# Patient Record
Sex: FEMALE | Race: WHITE | URBAN - METROPOLITAN AREA
[De-identification: names, ages, dates, MRNs, and addresses within clinical notes are randomized per-mention and may not be internally consistent; named-entity substitution may affect disease eponyms.]

---

## 2020-11-22 ENCOUNTER — APPOINTMENT (OUTPATIENT)
Dept: GENERAL RADIOLOGY | Age: 35
DRG: 880 | End: 2020-11-22
Payer: MEDICARE

## 2020-11-22 ENCOUNTER — APPOINTMENT (OUTPATIENT)
Dept: CT IMAGING | Age: 35
DRG: 880 | End: 2020-11-22
Payer: MEDICARE

## 2020-11-22 ENCOUNTER — HOSPITAL ENCOUNTER (INPATIENT)
Age: 35
LOS: 7 days | Discharge: HOME | DRG: 880 | End: 2020-11-29
Attending: EMERGENCY MEDICINE | Admitting: INTERNAL MEDICINE
Payer: MEDICARE

## 2020-11-22 PROBLEM — G40.901 STATUS EPILEPTICUS (HCC): Status: ACTIVE | Noted: 2020-11-22

## 2020-11-22 LAB
AADO2: 253.6 MMHG
AADO2: 86.5 MMHG
ACETAMINOPHEN LEVEL: <5 MCG/ML (ref 10–30)
ALBUMIN SERPL-MCNC: 4.1 G/DL (ref 3.5–5.2)
ALP BLD-CCNC: 80 U/L (ref 35–104)
ALT SERPL-CCNC: 24 U/L (ref 0–32)
AMMONIA: 14 UMOL/L (ref 11–51)
AMPHETAMINE SCREEN, URINE: NOT DETECTED
ANION GAP SERPL CALCULATED.3IONS-SCNC: 12 MMOL/L (ref 7–16)
AST SERPL-CCNC: 22 U/L (ref 0–31)
B.E.: -1.9 MMOL/L (ref -3–3)
B.E.: -5.9 MMOL/L (ref -3–3)
BARBITURATE SCREEN URINE: NOT DETECTED
BASOPHILS ABSOLUTE: 0.04 E9/L (ref 0–0.2)
BASOPHILS RELATIVE PERCENT: 0.5 % (ref 0–2)
BENZODIAZEPINE SCREEN, URINE: NOT DETECTED
BILIRUB SERPL-MCNC: 0.3 MG/DL (ref 0–1.2)
BUN BLDV-MCNC: 16 MG/DL (ref 6–20)
CALCIUM SERPL-MCNC: 9.4 MG/DL (ref 8.6–10.2)
CANNABINOID SCREEN URINE: NOT DETECTED
CHLORIDE BLD-SCNC: 102 MMOL/L (ref 98–107)
CHP ED QC CHECK: YES
CO2: 22 MMOL/L (ref 22–29)
COCAINE METABOLITE SCREEN URINE: NOT DETECTED
COHB: 0.3 % (ref 0–1.5)
COHB: 0.3 % (ref 0–1.5)
CREAT SERPL-MCNC: 1.1 MG/DL (ref 0.5–1)
CRITICAL: ABNORMAL
CRITICAL: ABNORMAL
DATE ANALYZED: ABNORMAL
DATE ANALYZED: ABNORMAL
DATE OF COLLECTION: ABNORMAL
DATE OF COLLECTION: ABNORMAL
EKG ATRIAL RATE: 84 BPM
EKG P AXIS: 51 DEGREES
EKG P-R INTERVAL: 176 MS
EKG Q-T INTERVAL: 398 MS
EKG QRS DURATION: 88 MS
EKG QTC CALCULATION (BAZETT): 470 MS
EKG R AXIS: 1 DEGREES
EKG T AXIS: 62 DEGREES
EKG VENTRICULAR RATE: 84 BPM
EOSINOPHILS ABSOLUTE: 0.06 E9/L (ref 0.05–0.5)
EOSINOPHILS RELATIVE PERCENT: 0.7 % (ref 0–6)
ETHANOL: <10 MG/DL (ref 0–0.08)
FENTANYL SCREEN, URINE: NOT DETECTED
FIO2: 40 %
FIO2: 60 %
GFR AFRICAN AMERICAN: >60
GFR NON-AFRICAN AMERICAN: 56 ML/MIN/1.73
GLUCOSE BLD-MCNC: 105 MG/DL
GLUCOSE BLD-MCNC: 117 MG/DL (ref 74–99)
HCG, URINE, POC: NEGATIVE
HCO3: 19.2 MMOL/L (ref 22–26)
HCO3: 21.8 MMOL/L (ref 22–26)
HCT VFR BLD CALC: 35.8 % (ref 34–48)
HEMOGLOBIN: 11.3 G/DL (ref 11.5–15.5)
HHB: 1.6 % (ref 0–5)
HHB: 2.1 % (ref 0–5)
IMMATURE GRANULOCYTES #: 0.02 E9/L
IMMATURE GRANULOCYTES %: 0.2 % (ref 0–5)
LAB: ABNORMAL
LAB: ABNORMAL
LACTIC ACID: 1.3 MMOL/L (ref 0.5–2.2)
LYMPHOCYTES ABSOLUTE: 2.15 E9/L (ref 1.5–4)
LYMPHOCYTES RELATIVE PERCENT: 25.4 % (ref 20–42)
Lab: ABNORMAL
Lab: ABNORMAL
Lab: NORMAL
Lab: NORMAL
MAGNESIUM: 2 MG/DL (ref 1.6–2.6)
MCH RBC QN AUTO: 23.4 PG (ref 26–35)
MCHC RBC AUTO-ENTMCNC: 31.6 % (ref 32–34.5)
MCV RBC AUTO: 74.1 FL (ref 80–99.9)
METER GLUCOSE: 105 MG/DL (ref 74–99)
METHADONE SCREEN, URINE: NOT DETECTED
METHB: 0.4 % (ref 0–1.5)
METHB: 0.5 % (ref 0–1.5)
MODE: ABNORMAL
MODE: AC
MONOCYTES ABSOLUTE: 0.48 E9/L (ref 0.1–0.95)
MONOCYTES RELATIVE PERCENT: 5.7 % (ref 2–12)
NEGATIVE QC PASS/FAIL: NORMAL
NEUTROPHILS ABSOLUTE: 5.7 E9/L (ref 1.8–7.3)
NEUTROPHILS RELATIVE PERCENT: 67.5 % (ref 43–80)
O2 CONTENT: 17.1 ML/DL
O2 SATURATION: 97.9 % (ref 92–98.5)
O2 SATURATION: 98.4 % (ref 92–98.5)
O2HB: 97.1 % (ref 94–97)
O2HB: 97.7 % (ref 94–97)
OPERATOR ID: 1394
OPERATOR ID: 1874
OPIATE SCREEN URINE: NOT DETECTED
OXYCODONE URINE: NOT DETECTED
PATIENT TEMP: 37 C
PATIENT TEMP: 37 C
PCO2: 33.6 MMHG (ref 35–45)
PCO2: 36.4 MMHG (ref 35–45)
PDW BLD-RTO: 16.9 FL (ref 11.5–15)
PEEP/CPAP: 5 CMH2O
PEEP/CPAP: 5 CMH2O
PFO2: 1.99 MMHG/%
PFO2: 3.75 MMHG/%
PH BLOOD GAS: 7.34 (ref 7.35–7.45)
PH BLOOD GAS: 7.43 (ref 7.35–7.45)
PHENCYCLIDINE SCREEN URINE: NOT DETECTED
PLATELET # BLD: 426 E9/L (ref 130–450)
PMV BLD AUTO: 8.9 FL (ref 7–12)
PO2: 119.2 MMHG (ref 75–100)
PO2: 150.1 MMHG (ref 75–100)
POSITIVE QC PASS/FAIL: NORMAL
POTASSIUM REFLEX MAGNESIUM: 3.5 MMOL/L (ref 3.5–5)
PROCALCITONIN: 0.06 NG/ML (ref 0–0.08)
PROLACTIN: 19.68 NG/ML
PS: 10 CMH20
RBC # BLD: 4.83 E12/L (ref 3.5–5.5)
RI(T): 0.58
RI(T): 213 %
RR MECHANICAL: 18 B/MIN
SALICYLATE, SERUM: <0.3 MG/DL (ref 0–30)
SARS-COV-2, NAAT: NOT DETECTED
SODIUM BLD-SCNC: 136 MMOL/L (ref 132–146)
SOURCE, BLOOD GAS: ABNORMAL
SOURCE, BLOOD GAS: ABNORMAL
THB: 10.9 G/DL (ref 11.5–16.5)
THB: 12.4 G/DL (ref 11.5–16.5)
TIME ANALYZED: 2138
TIME ANALYZED: 932
TOTAL CK: 34 U/L (ref 20–180)
TOTAL CK: 47 U/L (ref 20–180)
TOTAL PROTEIN: 7.4 G/DL (ref 6.4–8.3)
TRICYCLIC ANTIDEPRESSANTS SCREEN SERUM: NEGATIVE NG/ML
TROPONIN: <0.01 NG/ML (ref 0–0.03)
VT MECHANICAL: 450 ML
WBC # BLD: 8.5 E9/L (ref 4.5–11.5)

## 2020-11-22 PROCEDURE — 87040 BLOOD CULTURE FOR BACTERIA: CPT

## 2020-11-22 PROCEDURE — 84484 ASSAY OF TROPONIN QUANT: CPT

## 2020-11-22 PROCEDURE — 6360000002 HC RX W HCPCS: Performed by: INTERNAL MEDICINE

## 2020-11-22 PROCEDURE — 83735 ASSAY OF MAGNESIUM: CPT

## 2020-11-22 PROCEDURE — 80307 DRUG TEST PRSMV CHEM ANLYZR: CPT

## 2020-11-22 PROCEDURE — 2500000003 HC RX 250 WO HCPCS

## 2020-11-22 PROCEDURE — 02HV33Z INSERTION OF INFUSION DEVICE INTO SUPERIOR VENA CAVA, PERCUTANEOUS APPROACH: ICD-10-PCS | Performed by: INTERNAL MEDICINE

## 2020-11-22 PROCEDURE — 93005 ELECTROCARDIOGRAM TRACING: CPT | Performed by: EMERGENCY MEDICINE

## 2020-11-22 PROCEDURE — 31500 INSERT EMERGENCY AIRWAY: CPT

## 2020-11-22 PROCEDURE — 96372 THER/PROPH/DIAG INJ SC/IM: CPT

## 2020-11-22 PROCEDURE — 82550 ASSAY OF CK (CPK): CPT

## 2020-11-22 PROCEDURE — U0002 COVID-19 LAB TEST NON-CDC: HCPCS

## 2020-11-22 PROCEDURE — 87205 SMEAR GRAM STAIN: CPT

## 2020-11-22 PROCEDURE — 70450 CT HEAD/BRAIN W/O DYE: CPT

## 2020-11-22 PROCEDURE — 96366 THER/PROPH/DIAG IV INF ADDON: CPT

## 2020-11-22 PROCEDURE — 36556 INSERT NON-TUNNEL CV CATH: CPT

## 2020-11-22 PROCEDURE — 82805 BLOOD GASES W/O2 SATURATION: CPT

## 2020-11-22 PROCEDURE — 6360000002 HC RX W HCPCS: Performed by: EMERGENCY MEDICINE

## 2020-11-22 PROCEDURE — 2000000000 HC ICU R&B

## 2020-11-22 PROCEDURE — 94002 VENT MGMT INPAT INIT DAY: CPT

## 2020-11-22 PROCEDURE — 84145 PROCALCITONIN (PCT): CPT

## 2020-11-22 PROCEDURE — 5A1945Z RESPIRATORY VENTILATION, 24-96 CONSECUTIVE HOURS: ICD-10-PCS | Performed by: EMERGENCY MEDICINE

## 2020-11-22 PROCEDURE — 0BH18EZ INSERTION OF ENDOTRACHEAL AIRWAY INTO TRACHEA, VIA NATURAL OR ARTIFICIAL OPENING ENDOSCOPIC: ICD-10-PCS | Performed by: EMERGENCY MEDICINE

## 2020-11-22 PROCEDURE — 96375 TX/PRO/DX INJ NEW DRUG ADDON: CPT

## 2020-11-22 PROCEDURE — 96365 THER/PROPH/DIAG IV INF INIT: CPT

## 2020-11-22 PROCEDURE — 6370000000 HC RX 637 (ALT 250 FOR IP): Performed by: NURSE PRACTITIONER

## 2020-11-22 PROCEDURE — 85025 COMPLETE CBC W/AUTO DIFF WBC: CPT

## 2020-11-22 PROCEDURE — 99284 EMERGENCY DEPT VISIT MOD MDM: CPT

## 2020-11-22 PROCEDURE — 84146 ASSAY OF PROLACTIN: CPT

## 2020-11-22 PROCEDURE — G0480 DRUG TEST DEF 1-7 CLASSES: HCPCS

## 2020-11-22 PROCEDURE — 2500000003 HC RX 250 WO HCPCS: Performed by: EMERGENCY MEDICINE

## 2020-11-22 PROCEDURE — 83605 ASSAY OF LACTIC ACID: CPT

## 2020-11-22 PROCEDURE — 71045 X-RAY EXAM CHEST 1 VIEW: CPT

## 2020-11-22 PROCEDURE — 2580000003 HC RX 258: Performed by: INTERNAL MEDICINE

## 2020-11-22 PROCEDURE — 93010 ELECTROCARDIOGRAM REPORT: CPT | Performed by: INTERNAL MEDICINE

## 2020-11-22 PROCEDURE — 6360000002 HC RX W HCPCS

## 2020-11-22 PROCEDURE — 82962 GLUCOSE BLOOD TEST: CPT

## 2020-11-22 PROCEDURE — 80053 COMPREHEN METABOLIC PANEL: CPT

## 2020-11-22 PROCEDURE — 2580000003 HC RX 258: Performed by: EMERGENCY MEDICINE

## 2020-11-22 PROCEDURE — 82140 ASSAY OF AMMONIA: CPT

## 2020-11-22 PROCEDURE — 96367 TX/PROPH/DG ADDL SEQ IV INF: CPT

## 2020-11-22 PROCEDURE — 2500000003 HC RX 250 WO HCPCS: Performed by: NURSE PRACTITIONER

## 2020-11-22 PROCEDURE — 99291 CRITICAL CARE FIRST HOUR: CPT | Performed by: SURGERY

## 2020-11-22 RX ORDER — ETOMIDATE 2 MG/ML
30 INJECTION INTRAVENOUS ONCE
Status: COMPLETED | OUTPATIENT
Start: 2020-11-22 | End: 2020-11-22

## 2020-11-22 RX ORDER — HALOPERIDOL 5 MG
5 TABLET ORAL NIGHTLY
Status: ON HOLD | COMMUNITY
End: 2020-11-29 | Stop reason: HOSPADM

## 2020-11-22 RX ORDER — ROCURONIUM BROMIDE 10 MG/ML
INJECTION, SOLUTION INTRAVENOUS
Status: COMPLETED
Start: 2020-11-22 | End: 2020-11-22

## 2020-11-22 RX ORDER — ACETAMINOPHEN 650 MG/1
650 SUPPOSITORY RECTAL EVERY 6 HOURS PRN
Status: DISCONTINUED | OUTPATIENT
Start: 2020-11-22 | End: 2020-11-23

## 2020-11-22 RX ORDER — ROCURONIUM BROMIDE 10 MG/ML
100 INJECTION, SOLUTION INTRAVENOUS ONCE
Status: COMPLETED | OUTPATIENT
Start: 2020-11-22 | End: 2020-11-22

## 2020-11-22 RX ORDER — FENTANYL CITRATE 50 UG/ML
100 INJECTION, SOLUTION INTRAMUSCULAR; INTRAVENOUS ONCE
Status: COMPLETED | OUTPATIENT
Start: 2020-11-22 | End: 2020-11-22

## 2020-11-22 RX ORDER — GABAPENTIN 300 MG/1
300 CAPSULE ORAL 2 TIMES DAILY
COMMUNITY
End: 2022-03-29

## 2020-11-22 RX ORDER — CHLORHEXIDINE GLUCONATE 0.12 MG/ML
15 RINSE ORAL 2 TIMES DAILY
Status: DISCONTINUED | OUTPATIENT
Start: 2020-11-22 | End: 2020-11-23

## 2020-11-22 RX ORDER — PROPOFOL 10 MG/ML
10 INJECTION, EMULSION INTRAVENOUS ONCE
Status: DISCONTINUED | OUTPATIENT
Start: 2020-11-22 | End: 2020-11-22

## 2020-11-22 RX ORDER — ACETAMINOPHEN 325 MG/1
650 TABLET ORAL EVERY 6 HOURS PRN
Status: DISCONTINUED | OUTPATIENT
Start: 2020-11-22 | End: 2020-11-23

## 2020-11-22 RX ORDER — ACETAMINOPHEN 325 MG/1
650 TABLET ORAL EVERY 6 HOURS PRN
Status: DISCONTINUED | OUTPATIENT
Start: 2020-11-22 | End: 2020-11-22

## 2020-11-22 RX ORDER — PRAZOSIN HYDROCHLORIDE 2 MG/1
2 CAPSULE ORAL NIGHTLY
COMMUNITY
End: 2022-03-29

## 2020-11-22 RX ORDER — FENTANYL CITRATE 50 UG/ML
INJECTION, SOLUTION INTRAMUSCULAR; INTRAVENOUS
Status: DISPENSED
Start: 2020-11-22 | End: 2020-11-22

## 2020-11-22 RX ORDER — GABAPENTIN 600 MG/1
600 TABLET ORAL NIGHTLY
COMMUNITY
End: 2022-03-29

## 2020-11-22 RX ORDER — AMLODIPINE BESYLATE 5 MG/1
5 TABLET ORAL DAILY
COMMUNITY

## 2020-11-22 RX ORDER — SODIUM CHLORIDE 0.9 % (FLUSH) 0.9 %
10 SYRINGE (ML) INJECTION EVERY 12 HOURS SCHEDULED
Status: DISCONTINUED | OUTPATIENT
Start: 2020-11-22 | End: 2020-11-29 | Stop reason: HOSPADM

## 2020-11-22 RX ORDER — ACETAMINOPHEN 650 MG/1
650 SUPPOSITORY RECTAL EVERY 6 HOURS PRN
Status: DISCONTINUED | OUTPATIENT
Start: 2020-11-22 | End: 2020-11-22

## 2020-11-22 RX ORDER — LAMOTRIGINE 25 MG/1
25 TABLET ORAL DAILY
COMMUNITY
End: 2022-03-29

## 2020-11-22 RX ORDER — IPRATROPIUM BROMIDE AND ALBUTEROL SULFATE 2.5; .5 MG/3ML; MG/3ML
1 SOLUTION RESPIRATORY (INHALATION) 4 TIMES DAILY
Status: DISCONTINUED | OUTPATIENT
Start: 2020-11-22 | End: 2020-11-23

## 2020-11-22 RX ORDER — PROPOFOL 10 MG/ML
10 INJECTION, EMULSION INTRAVENOUS
Status: DISCONTINUED | OUTPATIENT
Start: 2020-11-22 | End: 2020-11-23

## 2020-11-22 RX ORDER — FLUOXETINE HYDROCHLORIDE 20 MG/1
20 CAPSULE ORAL DAILY
COMMUNITY
End: 2022-03-29

## 2020-11-22 RX ORDER — LORAZEPAM 2 MG/ML
2 INJECTION INTRAMUSCULAR ONCE
Status: COMPLETED | OUTPATIENT
Start: 2020-11-22 | End: 2020-11-22

## 2020-11-22 RX ORDER — MIDAZOLAM HYDROCHLORIDE 2 MG/2ML
2 INJECTION, SOLUTION INTRAMUSCULAR; INTRAVENOUS ONCE
Status: COMPLETED | OUTPATIENT
Start: 2020-11-22 | End: 2020-11-22

## 2020-11-22 RX ORDER — ONDANSETRON 2 MG/ML
4 INJECTION INTRAMUSCULAR; INTRAVENOUS EVERY 6 HOURS PRN
Status: DISCONTINUED | OUTPATIENT
Start: 2020-11-22 | End: 2020-11-29 | Stop reason: HOSPADM

## 2020-11-22 RX ORDER — LEVETIRACETAM 10 MG/ML
INJECTION INTRAVASCULAR
Status: COMPLETED
Start: 2020-11-22 | End: 2020-11-22

## 2020-11-22 RX ORDER — METOPROLOL TARTRATE 5 MG/5ML
5 INJECTION INTRAVENOUS EVERY 6 HOURS PRN
Status: DISCONTINUED | OUTPATIENT
Start: 2020-11-22 | End: 2020-11-29 | Stop reason: HOSPADM

## 2020-11-22 RX ORDER — PROMETHAZINE HYDROCHLORIDE 25 MG/1
12.5 TABLET ORAL EVERY 6 HOURS PRN
Status: DISCONTINUED | OUTPATIENT
Start: 2020-11-22 | End: 2020-11-29 | Stop reason: HOSPADM

## 2020-11-22 RX ORDER — LORAZEPAM 2 MG/ML
INJECTION INTRAMUSCULAR
Status: COMPLETED
Start: 2020-11-22 | End: 2020-11-22

## 2020-11-22 RX ORDER — LEVETIRACETAM 10 MG/ML
1000 INJECTION INTRAVASCULAR ONCE
Status: COMPLETED | OUTPATIENT
Start: 2020-11-22 | End: 2020-11-22

## 2020-11-22 RX ORDER — LEVETIRACETAM 100 MG/ML
500 SOLUTION ORAL 2 TIMES DAILY
Status: DISCONTINUED | OUTPATIENT
Start: 2020-11-22 | End: 2020-11-23

## 2020-11-22 RX ORDER — POLYETHYLENE GLYCOL 3350 17 G/17G
17 POWDER, FOR SOLUTION ORAL DAILY PRN
Status: DISCONTINUED | OUTPATIENT
Start: 2020-11-22 | End: 2020-11-29 | Stop reason: HOSPADM

## 2020-11-22 RX ORDER — SODIUM CHLORIDE 0.9 % (FLUSH) 0.9 %
10 SYRINGE (ML) INJECTION PRN
Status: DISCONTINUED | OUTPATIENT
Start: 2020-11-22 | End: 2020-11-27 | Stop reason: SDUPTHER

## 2020-11-22 RX ORDER — FENTANYL CITRATE 50 UG/ML
INJECTION, SOLUTION INTRAMUSCULAR; INTRAVENOUS
Status: COMPLETED
Start: 2020-11-22 | End: 2020-11-22

## 2020-11-22 RX ADMIN — ETOMIDATE 30 MG: 2 INJECTION, SOLUTION INTRAVENOUS at 08:10

## 2020-11-22 RX ADMIN — MIDAZOLAM HYDROCHLORIDE 2 MG: 1 INJECTION, SOLUTION INTRAMUSCULAR; INTRAVENOUS at 10:11

## 2020-11-22 RX ADMIN — FENTANYL CITRATE 10 ML/HR: 0.05 INJECTION, SOLUTION INTRAMUSCULAR; INTRAVENOUS at 12:59

## 2020-11-22 RX ADMIN — SODIUM CHLORIDE, PRESERVATIVE FREE 10 ML: 5 INJECTION INTRAVENOUS at 20:37

## 2020-11-22 RX ADMIN — CHLORHEXIDINE GLUCONATE 0.12% ORAL RINSE 15 ML: 1.2 LIQUID ORAL at 14:38

## 2020-11-22 RX ADMIN — FENTANYL CITRATE 100 MCG: 0.05 INJECTION, SOLUTION INTRAMUSCULAR; INTRAVENOUS at 10:45

## 2020-11-22 RX ADMIN — FENTANYL CITRATE 100 MCG: 50 INJECTION, SOLUTION INTRAMUSCULAR; INTRAVENOUS at 10:45

## 2020-11-22 RX ADMIN — LORAZEPAM 2 MG: 2 INJECTION INTRAMUSCULAR at 06:51

## 2020-11-22 RX ADMIN — FAMOTIDINE 20 MG: 10 INJECTION INTRAVENOUS at 14:38

## 2020-11-22 RX ADMIN — CHLORHEXIDINE GLUCONATE 0.12% ORAL RINSE 15 ML: 1.2 LIQUID ORAL at 20:37

## 2020-11-22 RX ADMIN — SODIUM CHLORIDE 3 G: 9 INJECTION, SOLUTION INTRAVENOUS at 10:53

## 2020-11-22 RX ADMIN — LORAZEPAM 2 MG: 2 INJECTION INTRAMUSCULAR; INTRAVENOUS at 06:51

## 2020-11-22 RX ADMIN — ENOXAPARIN SODIUM 40 MG: 40 INJECTION SUBCUTANEOUS at 14:38

## 2020-11-22 RX ADMIN — LEVETIRACETAM 500 MG: 100 SOLUTION ORAL at 14:38

## 2020-11-22 RX ADMIN — MIDAZOLAM HYDROCHLORIDE 2 MG: 1 INJECTION, SOLUTION INTRAMUSCULAR; INTRAVENOUS at 10:45

## 2020-11-22 RX ADMIN — ROCURONIUM BROMIDE 100 MG: 10 INJECTION, SOLUTION INTRAVENOUS at 08:10

## 2020-11-22 RX ADMIN — FENTANYL CITRATE 100 MCG: 50 INJECTION, SOLUTION INTRAMUSCULAR; INTRAVENOUS at 12:01

## 2020-11-22 RX ADMIN — LEVETIRACETAM 1000 MG: 10 INJECTION INTRAVASCULAR at 07:38

## 2020-11-22 RX ADMIN — MIDAZOLAM 2 MG: 1 INJECTION INTRAMUSCULAR; INTRAVENOUS at 11:28

## 2020-11-22 RX ADMIN — LEVETIRACETAM 500 MG: 100 SOLUTION ORAL at 20:36

## 2020-11-22 RX ADMIN — PROPOFOL INJECTABLE EMULSION 10 MCG/KG/MIN: 10 INJECTION, EMULSION INTRAVENOUS at 08:55

## 2020-11-22 RX ADMIN — LEVETIRACETAM 1000 MG: 10 INJECTION, SOLUTION INTRAVENOUS at 07:38

## 2020-11-22 RX ADMIN — FAMOTIDINE 20 MG: 10 INJECTION INTRAVENOUS at 20:37

## 2020-11-22 RX ADMIN — ROCURONIUM BROMIDE 100 MG: 50 INJECTION INTRAVENOUS at 08:10

## 2020-11-22 RX ADMIN — MIDAZOLAM HYDROCHLORIDE 2 MG: 1 INJECTION, SOLUTION INTRAMUSCULAR; INTRAVENOUS at 12:03

## 2020-11-22 ASSESSMENT — PULMONARY FUNCTION TESTS
PIF_VALUE: 16
PIF_VALUE: 20
PIF_VALUE: 19
PIF_VALUE: 15
PIF_VALUE: 25
PIF_VALUE: 16
PIF_VALUE: 19
PIF_VALUE: 16
PIF_VALUE: 16
PIF_VALUE: 28
PIF_VALUE: 21
PIF_VALUE: 16
PIF_VALUE: 20

## 2020-11-22 ASSESSMENT — PAIN SCALES - GENERAL
PAINLEVEL_OUTOF10: 0
PAINLEVEL_OUTOF10: 4
PAINLEVEL_OUTOF10: 10
PAINLEVEL_OUTOF10: 0
PAINLEVEL_OUTOF10: 10
PAINLEVEL_OUTOF10: 0

## 2020-11-22 ASSESSMENT — PAIN - FUNCTIONAL ASSESSMENT: PAIN_FUNCTIONAL_ASSESSMENT: CPOT

## 2020-11-22 NOTE — ED NOTES
Preparing to intubate patient to maintain airway pt has no gag reflex      Lianna Azar RN  11/22/20 9149

## 2020-11-22 NOTE — ED NOTES
Upon arrival to ED, [facility] ambulance provided RN with pt's demographics. This RN went to register pt with given SSN, but different pt name is associated with given SSN. This RN tried putting in pt's name and , but no patient found with this information. Attempted to ask pt her name and SSN, but pt not answering questions. This RN called [facility] and spoke with RN, Jo Ann Escoto who verified pt's name and SSN according to their paperwork. Per RN, pt was recently in [facility] and that was the name and SSN on their paperwork.  This RN registered pt as new pt and no SSN was entered     [facility], [facility]  20 [phone]

## 2020-11-22 NOTE — ED PROVIDER NOTES
with distal pulses. All compartments are soft. Skin:     General: Skin is warm and dry. Neurological:      Mental Status: She is unresponsive. GCS: GCS eye subscore is 4. GCS verbal subscore is 1. GCS motor subscore is 1. Comments: The patient is lying in the bed and is essentially unresponsive, eyes do exhibit a very faint no nystagmus. Pupils are 3 mm equal, round and reactive to light, the patient does not respond to any painful stimuli, patient does have eyes open exhibiting no nystagmus, there is no gag reflex present   Psychiatric:      Comments: Unable to be evaluated secondary to patient being unresponsive            Procedures   Central Line Placement Procedure Note #1    Indication: vascular access    Consent: Unable to be obtained due to patient's condition. Procedure: The patient was positioned appropriately and the skin over the right internal jugular vein was prepped with betadine and draped in a sterile fashion. Local anesthesia was obtained by infiltration using 1% Lidocaine without epinephrine. A large bore needle was used to identify the vein. A guide wire was then inserted into the vein through the needle. A triple lumen catheter was then inserted into the vessel over the guide wire using the Seldinger technique. All ports showed good, free flowing blood return and were flushed with saline solution. The catheter was then securely fastened to the skin with suture at 16 cm. Two sutures were placed into the a suture end from each of the securing sutures was extended around the catheter and tied to the proximal eyelets as an added measure to prevent dislodgement. An antibiotic disk was placed and the site was then covered with a sterile dressing. A post procedure X-ray was ordered and is still pending at this time. The patient tolerated the procedure well.     Complications: None during procedure- line was pulled out      Redeem Placement Procedure Note #2    Indication: vascular access    Consent: Unable to be obtained due to the emergent nature of this procedure. Procedure: The patient was positioned appropriately and the skin over the bilateral femoral vein was prepped with betadine and draped in a sterile fashion. Local anesthesia was obtained by infiltration using 1% Lidocaine without epinephrine. A large bore needle was used to identify the vein. A guide wire was then inserted into the right femoral vein as the attempt was initially made through the left femoral vein but the guidewire was not able to advance vein through the needle. A triple lumen catheter was then inserted into the vessel over the guide wire using the Seldinger technique. All ports showed good, free flowing blood return and were flushed with saline solution. The catheter was then securely fastened to the skin with suture at the hub Two sutures were placed into the kit included tube clamp, proximal eyelets and a suture end from each of the securing sutures was extended around the catheter and tied to the proximal eyelets as an added measure to prevent dislodgement. An antibiotic disk was placed and the site was then covered with a sterile dressing. A post procedure X-ray was not indicated. The patient tolerated the procedure well. Complications: None        Intubation Procedure Note    Indication: potential airway compromise    Consent: Unable to be obtained due to patient's condition. Medications Used: etomidate intravenously and rocuronium intravenously    Procedure: The patient was placed in the appropriate position. Cricoid pressure was not required. Intubation was performed by direct laryngoscopy using a laryngoscope and an 8.0 cuffed endotracheal tube. The cuff was then inflated and the tube was secured appropriately at a distance of 24 cm to the dental ridge.   Initial confirmation of placement included bilateral breath sounds, an end tidal CO2 detector, absence of Concern for maintaining airway. Patient was intubated with an 8.0 tube. [BB]   1112 Spoke with Dr. Latonya Lo accept the patient to the neuro ICU    [MT]      ED Course User Index  [BB] Chase JoynerkodakDO  [MT] Raman Go DO       --------------------------------------------- PAST HISTORY ---------------------------------------------  Past Medical History:  has no past medical history on file. Past Surgical History:  has no past surgical history on file. Social History:      Family History: family history is not on file. The patients home medications have been reviewed. Allergies: Patient has no known allergies.     -------------------------------------------------- RESULTS -------------------------------------------------    LABS:  Results for orders placed or performed during the hospital encounter of 11/22/20   CBC Auto Differential   Result Value Ref Range    WBC 8.5 4.5 - 11.5 E9/L    RBC 4.83 3.50 - 5.50 E12/L    Hemoglobin 11.3 (L) 11.5 - 15.5 g/dL    Hematocrit 35.8 34.0 - 48.0 %    MCV 74.1 (L) 80.0 - 99.9 fL    MCH 23.4 (L) 26.0 - 35.0 pg    MCHC 31.6 (L) 32.0 - 34.5 %    RDW 16.9 (H) 11.5 - 15.0 fL    Platelets 348 432 - 553 E9/L    MPV 8.9 7.0 - 12.0 fL    Neutrophils % 67.5 43.0 - 80.0 %    Immature Granulocytes % 0.2 0.0 - 5.0 %    Lymphocytes % 25.4 20.0 - 42.0 %    Monocytes % 5.7 2.0 - 12.0 %    Eosinophils % 0.7 0.0 - 6.0 %    Basophils % 0.5 0.0 - 2.0 %    Neutrophils Absolute 5.70 1.80 - 7.30 E9/L    Immature Granulocytes # 0.02 E9/L    Lymphocytes Absolute 2.15 1.50 - 4.00 E9/L    Monocytes Absolute 0.48 0.10 - 0.95 E9/L    Eosinophils Absolute 0.06 0.05 - 0.50 E9/L    Basophils Absolute 0.04 0.00 - 0.20 E9/L   Comprehensive Metabolic Panel w/ Reflex to MG   Result Value Ref Range    Sodium 136 132 - 146 mmol/L    Potassium reflex Magnesium 3.5 3.5 - 5.0 mmol/L    Chloride 102 98 - 107 mmol/L    CO2 22 22 - 29 mmol/L    Anion Gap 12 7 - 16 mmol/L    Glucose 117 (H) 74 - 99 mg/dL (L) 7.350 - 7.450    PCO2 36.4 35.0 - 45.0 mmHg    PO2 119.2 (H) 75.0 - 100.0 mmHg    HCO3 19.2 (L) 22.0 - 26.0 mmol/L    B.E. -5.9 (L) -3.0 - 3.0 mmol/L    O2 Sat 97.9 92.0 - 98.5 %    PO2/FIO2 1.99 mmHg/%    AaDO2 253.6 mmHg    RI(T) 213 %    O2Hb 97.1 (H) 94.0 - 97.0 %    COHb 0.3 0.0 - 1.5 %    MetHb 0.5 0.0 - 1.5 %    O2 Content 17.1 mL/dL    HHb 2.1 0.0 - 5.0 %    tHb (est) 12.4 11.5 - 16.5 g/dL    Mode AC     FIO2 60.0 %    Rr Mechanical 18.0 b/min    Vt Mechanical 450.0 mL    Peep/Cpap 5.0 cmH2O    Date Of Collection      Time Collected      Pt Temp 37.0 C     ID [id number]     Lab [id number]     Critical(s) Notified . No Critical Values    CK   Result Value Ref Range    Total CK 47 20 - 180 U/L   POCT Glucose   Result Value Ref Range    Glucose 105 mg/dL    QC OK? yes    POCT Glucose   Result Value Ref Range    Meter Glucose 105 (H) 74 - 99 mg/dL   POC Pregnancy Urine   Result Value Ref Range    HCG, Urine, POC Negative Negative    Lot Number [id number]     Positive QC Pass/Fail Pass     Negative QC Pass/Fail Pass    EKG 12 Lead   Result Value Ref Range    Ventricular Rate 84 BPM    Atrial Rate 84 BPM    P-R Interval 176 ms    QRS Duration 88 ms    Q-T Interval 398 ms    QTc Calculation (Bazett) 470 ms    P Axis 51 degrees    R Axis 1 degrees    T Axis 62 degrees       RADIOLOGY:  CT HEAD WO CONTRAST   Final Result   No acute intracranial abnormality. XR CHEST PORTABLE   Final Result   Endotracheal tube tip projects 4 cm to the master. Right lung base atelectasis or infiltrate      XR CHEST PORTABLE   Final Result   Bilateral infiltrates, mostly in mid to lower lungs. XR CHEST PORTABLE    (Results Pending)   XR CHEST PORTABLE    (Results Pending)   XR CHEST PORTABLE    (Results Pending)       EKG: This EKG is signed and interpreted by me.     Normal sinus rhythm, rate of 84, no ST segment elevation or depression, DE interval 176 MS, QRS 88 MS, QTc 470 MS  Comparison: no previous EKG available    ------------------------- NURSING NOTES AND VITALS REVIEWED ---------------------------  Date / Time Roomed:  11/22/2020  6:21 AM  ED Bed Assignment:  [id number]/[id number]    The nursing notes within the ED encounter and vital signs as below have been reviewed. Patient Vitals for the past 24 hrs:   BP Temp Temp src Pulse Resp SpO2 Height Weight   11/22/20 1900 (!) 147/96 -- -- 82 16 100 % -- --   11/22/20 1800 (!) 145/88 97.3 °F (36.3 °C) Temporal 84 16 97 % -- --   11/22/20 1727 -- -- -- 85 12 100 % -- --   11/22/20 1700 134/78 -- -- 74 18 99 % -- --   11/22/20 1600 136/87 97.6 °F (36.4 °C) Temporal 94 18 100 % -- --   11/22/20 1500 125/87 -- -- 80 19 100 % -- --   11/22/20 1430 (!) 131/91 97.2 °F (36.2 °C) Temporal 77 18 100 % -- --   11/22/20 1400 126/80 97 °F (36.1 °C) Temporal 85 19 99 % -- --   11/22/20 1300 (!) 134/94 -- -- 79 19 100 % -- --   11/22/20 1149 (!) 138/90 -- -- 94 18 100 % -- --   11/22/20 1052 (!) 138/93 -- -- 93 18 100 % -- --   11/22/20 1009 -- -- -- 109 (!) 33 100 % -- --   11/22/20 0912 (!) 148/103 -- -- 107 18 100 % -- --   11/22/20 0856 (!) 163/115 -- -- 105 18 100 % -- --   11/22/20 0836 (!) 177/127 -- -- 125 18 100 % -- --   11/22/20 0818 -- -- -- 105 19 100 % -- --   11/22/20 0816 (!) 171/110 -- -- 103 19 100 % -- --   11/22/20 0812 (!) 186/136 -- -- 116 29 100 % -- --   11/22/20 0806 (!) 181/101 -- -- 82 23 100 % -- --   11/22/20 0622 (!) 151/94 98.4 °F (36.9 °C) -- 61 18 99 % 5' 4\" (1.626 m) 260 lb (117.9 kg)       Oxygen Saturation Interpretation: Abnormal    ------------------------------------------ PROGRESS NOTES ------------------------------------------  Re-evaluation(s):  Time: 0945  Patients symptoms show no change  Repeat physical examination is not changed    --------------------------------- ADDITIONAL PROVIDER NOTES ---------------------------------  Consultations:  Time: 1250. Spoke with Dr. Octavia Hurtado. Discussed case. They will admit the patient.   This patient's ED course included: a personal history and physicial examination, multiple bedside re-evaluations, IV medications, cardiac monitoring, continuous pulse oximetry and complex medical decision making and emergency management    This patient has remained hemodynamically stable during their ED course. Critical care:  Critical Care: Please note that the withdrawal or failure to initiate urgent interventions for this patient would likely result in a life threatening deterioration or permanent disability. Accordingly this patient received 60 minutes of critical care time, excluding separately billable procedures. Diagnosis:  1. Status epilepticus ([address]    2. Hypoxia        Disposition:  Patient's disposition: Admit to Neuro CCU/ICU  Patient's condition is stable. Patient was seen and evaluated by both myself and Natanael Melgar DO  Resident   20    ATTENDING PROVIDER ATTESTATION:     Stacisheilaceline GuerreroRene presented to the emergency department for evaluation of Seizures (Seizures from generations, per generations pseudoseizures.)    I have reviewed and discussed the case, including pertinent history (medical, surgical, family and social) and exam findings with the Resident and the Nurse assigned to Yanci López. I have personally performed and/or participated in the history, exam, medical decision making, and procedures and agree with all pertinent clinical information. I have reviewed my findings and recommendations with Yanci López and members of family present at the time of disposition. I, Dr. Cordell Aleman am the primary physician of record for this patient. MDM: The patient is 38 y.o. female  with a past medical history of No past medical history on file. presenting to the emergency department with a chief complaint of seizure.   Initially upon arrival the patient was lying in the bed, protecting her airway with no seizure-like activity noted, patient at that time was not responding to pain but did appear to be swallowing, handling her secretions was saturating 98% on room air, she did have pupils which are equal, round and reactive to light but did have very slight nystagmus, she did not blink to threat. Due to the patient at the current time protecting her airway initially upon arrival the patient was given Ativan as well as Keppra's it was informed to us the patient was recently admitted to [facility] in [facility] and did have negative seizure evaluation including EEG. The patient again was protecting her airway and attempts were made to get the patient to regain her consciousness. The patient was given Ativan and Keppra. There is no change in the patient's status but again she did initially maintain pulse ox of 98% on room air. Due to the patient still having very faint nystagmus this was presumed to be seizure-like activity so when the patient was being prepared to be intubated for status epilepticus she was laid back and she did drop her oxygen saturation to 82%. She was placed on supplemental oxygen. The patient was intubated without any difficulty. The patient had very poor peripheral access and did have to have a central line placed in the right internal jugular vein, this was done without any difficulty but was pulled out so a central line had to be placed in the right femoral vein. There was initial attempt through the left femoral vein but the guidewire was not able to advance. The patient then did have 1 seizure-like episode on the ventilator. This was myoclonic activity of her right leg. She was given 2 mg of Versed and this did resolve her seizure-like activity. The patient was then awake and responding on the ventilator, sedation was increased. Differential diagnosis includes but not limited to, status epilepticus, electrolyte derangement, hypoglycemia, intracranial hemorrhage, pseudoseizure.   The patient did have CBC which was unremarkable, CMP unremarkable, troponin negative, lactic only 1.3, prolactin level 19.68, serum and urine drug screen were negative. CT head unremarkable, chest x-ray showed endotracheal tube and central line in appropriate position. No pneumothorax. Patient will be admitted to the neuro ICU for further treatment and evaluation      Critical care:  Critical Care: Please note that the withdrawal or failure to initiate urgent interventions for this patient would likely result in a life threatening deterioration or permanent disability. Accordingly this patient received 60 minutes of critical care time, excluding separately billable procedures. My findings/plan: The primary encounter diagnosis was Status epilepticus ([facility] [address] A diagnosis of Hypoxia was also pertinent to this visit.   Current Discharge Medication List        Raman Go, [address], DO  11/24/20 [address], DO  11/24/20 4564

## 2020-11-22 NOTE — ED NOTES
Bed: 15  Expected date:   Expected time:   Means of arrival:   Comments:      Roula Gonzalez RN  11/22/20 8807

## 2020-11-22 NOTE — H&P
[facility]  Internal Medicine Residency Program  History and Physical    Patient:  Vianey Navarro 38 y.o. female MRN: [id number]     Date of Service: 11/22/2020    Hospital Day: 1      Chief complaint: had concerns including Seizures (Seizures from [facility], per [facility] pseudoseizures. ). History of Present Illness   The patient is a 38 y.o. female with PMHx of diabetes (not on any medications), schizoaffective disorder, PTSD, borderline personality disorder presenting from [facility] facility due to seizure episodes. She was recently admitted at [facility] for seizures and falls. 2-day EEG was done there which was negative. Patient was then transferred to [facility] on the night of 11/21 where she was noted to have continued 'seizure like' generalized twitching of her extremities, but stable vital signs, neurologic exam.  Per the nurse at [facility], patient was not responsive to pain however her vitals were normal overnight. On the morning of 11/22, patient was found on the floor continued to have generalized twitching with respiratory rate of 45 and O2 saturation of 82% which prompted him to bring her to the ED. At the ED patient was  hypertensive was noted to be initially unresponsive with no gag reflex, saturating well on room air, some nystagmus. Pt was not in any distress. Oxygen saturation then dropped to 88 and given altered mentation pt was intubated in ED for airway protection. CT head was neg for acute process. CXR was significant for b/l infiltrates pt was given 1 dose of unasyn. While in ED, pt had a witnessed seizure. Pt was given  Keppra 1000 mg, Ativan 2 mg once and, Versed 2 mg twice the started on propofol drip. On evaluation in ED patient was waking up, nodding yes or no. She was able to tell us her correct age.  During examination pt started shaking, gagging on ET tube but HR was noted to remain stable at 100, pupillary response to light noted b/l, no rigidity, bowel incontinence, significant posture change was noted. She was then transferred to the surgical ICU for further management. Past Medical History:  No past medical history on file. Past Surgical History:    No past surgical history on file. Medications Prior to Admission:    Prior to Admission medications    Medication Sig Start Date End Date Taking? Authorizing Provider   gabapentin (NEURONTIN) 300 MG capsule Take 300 mg by mouth 2 times daily. Patient takes at 9A and 3P   Yes Historical Provider, MD   gabapentin (NEURONTIN) 600 MG tablet Take 600 mg by mouth nightly. Patient takes at [address]   Yes Historical Provider, MD   haloperidol (HALDOL) 5 MG tablet Take 5 mg by mouth nightly   Yes Historical Provider, MD   lamoTRIgine (LAMICTAL) 25 MG tablet Take 25 mg by mouth daily   Yes Historical Provider, MD   prazosin (MINIPRESS) 2 MG capsule Take 2 mg by mouth nightly   Yes Historical Provider, MD   amLODIPine (NORVASC) 10 MG tablet Take 10 mg by mouth daily   Yes Historical Provider, MD   FLUoxetine (PROZAC) 20 MG capsule Take 20 mg by mouth daily   Yes Historical Provider, MD       Allergies:  Patient has no known allergies. Social History:   TOBACCO:   has no history on file for tobacco.  ETOH:   has no history on file for alcohol. Family History:   No family history on file.     REVIEW OF SYSTEMS:    · Intubated, nodding head to answer questions    Physical Exam   · Vitals: /80   Pulse 85   Temp 97 °F (36.1 °C) (Temporal)   Resp 19   Ht 5' 4\" (1.626 m)   Wt 260 lb (117.9 kg)   LMP  (LMP Unknown)   SpO2 99%   BMI 44.63 kg/m²     · General Appearance: awake and follows commands, in no acute distress  · Skin: warm and dry, no rash or erythema  · Head: normocephalic and atraumatic  · Eyes: pupils equal, round, and reactive to light, extraocular eye movements intact, conjunctivae normal, no nystagmus noted at this time  · Neck: supple and non-tender without mass, no thyromegaly or thyroid nodules, no cervical lymphadenopathy  · Pulmonary/Chest: Rhonchi heard b/l   · Cardiovascular: normal rate, regular rhythm, normal S1 and S2, no murmurs, rubs, clicks, or gallops, distal pulses intact, no carotid bruits  · Abdomen: soft, non-tender, non-distended, normal bowel sounds  · Extremities: no cyanosis, clubbing or edema   Labs and Imaging Studies   Basic Labs  Recent Labs     11/22/20  0712 11/22/20 0714     --    K 3.5  --      --    CO2 22  --    BUN 16  --    CREATININE 1.1*  --    GLUCOSE 117* 105   CALCIUM 9.4  --        Recent Labs     11/22/20 0712   WBC 8.5   RBC 4.83   HGB 11.3*   HCT 35.8   MCV 74.1*   MCH 23.4*   MCHC 31.6*   RDW 16.9*      MPV 8.9       CBC:   Lab Results   Component Value Date    WBC 8.5 11/22/2020    RBC 4.83 11/22/2020    HGB 11.3 11/22/2020    HCT 35.8 11/22/2020    MCV 74.1 11/22/2020    RDW 16.9 11/22/2020     11/22/2020     CMP:  Lab Results   Component Value Date     11/22/2020    K 3.5 11/22/2020     11/22/2020    CO2 22 11/22/2020    BUN 16 11/22/2020    PROT 7.4 11/22/2020       Imaging Studies:     Ct Head Wo Contrast    Result Date: 11/22/2020  EXAMINATION: CT OF THE HEAD WITHOUT CONTRAST  11/22/2020 9:01 am TECHNIQUE: CT of the head was performed without the administration of intravenous contrast. Dose modulation, iterative reconstruction, and/or weight based adjustment of the mA/kV was utilized to reduce the radiation dose to as low as reasonably achievable. COMPARISON: None. HISTORY: ORDERING SYSTEM PROVIDED HISTORY: seizure TECHNOLOGIST PROVIDED HISTORY: Has a \"code stroke\" or \"stroke alert\" been called? ->No Reason for exam:->seizure What reading provider will be dictating this exam?->CRC FINDINGS: BRAIN/VENTRICLES: There is no acute intracranial hemorrhage, mass effect or midline shift. No abnormal extra-axial fluid collection.   The gray-white differentiation is maintained without evidence of an acute infarct. There is no evidence of hydrocephalus. ORBITS: The visualized portion of the orbits demonstrate no acute abnormality. SINUSES: Small amount of fluid in the sphenoid sinus. Trace left maxillary sinus mucosal thickening. Mastoids are aerated. SOFT TISSUES/SKULL:  No acute abnormality of the visualized skull or soft tissues. No acute intracranial abnormality. Xr Chest Portable    Result Date: 11/22/2020  EXAMINATION: ONE XRAY VIEW OF THE CHEST 11/22/2020 8:36 am COMPARISON: 11/22/2020 HISTORY: ORDERING SYSTEM PROVIDED HISTORY: intubation TECHNOLOGIST PROVIDED HISTORY: Reason for exam:->intubation What reading provider will be dictating this exam?->CRC FINDINGS: Endotracheal tube tip projects 4 cm superior to the master. Right IJ catheter tip is in the superior vena cava. Nasogastric tube tip projects off the edge of the film. There is patchy right lung base atelectasis or infiltrate. No pneumothorax or effusion is seen. No acute osseous abnormality. Endotracheal tube tip projects 4 cm to the master. Right lung base atelectasis or infiltrate    Xr Chest Portable    Result Date: 11/22/2020  EXAMINATION: ONE XRAY VIEW OF THE CHEST 11/22/2020 7:48 am COMPARISON: None. HISTORY: ORDERING SYSTEM PROVIDED HISTORY: seizure TECHNOLOGIST PROVIDED HISTORY: Reason for exam:->seizure What reading provider will be dictating this exam?->CRC FINDINGS: Central venous catheter tip is at SVC/right atrial junction. No pneumothorax seen. There are bilateral infiltrates which predominate in the mid to lower lungs. I cannot confirm pleural effusion. There is no pneumothorax. Bilateral infiltrates, mostly in mid to lower lungs. EKG: normal sinus rhythm, unchanged from previous tracings. Resident's Assessment and Plan     Silvia Lake is a 38 y.o. female    1. Altered mental status 2/2 Seizures  (status epilepticus highly unlikely)  - Recent admission with [facility] due to seizures.  2-day EEG then was negative. - Transferred to the SICU, management per critical care team  - Continue Keppra 500 mg twice daily. S/p Kapil, [place], versed in ED  - Neurology consulted, appreciate recommendations  - Follow EEG, daily ABGs, daily labs    2. Acute hypoxic respiratory failure 2/2 AMS  - Oxygen saturation at [facility] prior to admission was 82%, saturation noted to drop to 88% in ED  - Intubated at the ED for airway protection on 11/22  - Currently sedated with fentanyl and propofol  - CXR on admission showed bilateral infiltrates in the mid to lower lung lobes, concern for aspiration pneumonia vs pneumonitis s/p 1 dose unasyn in ED  - Procalcitonin 0.06, WBC 8.5, afebrile, normotensive   - Follow cultures, MRSA nares, resp cx  - Continue to monitor off antibiotics for now  - Start DuoNeb and formoterol     3. History of schizoaffective disorder, PTSD, borderline personality disorder  - On Haldol, prazosin, fluoxetine at [facility]  - Continue to hold for now    4. History of hypertension  - On Norvasc 10 mg daily at [facility]  - Hold for now    5.  History of diabetes mellitus  - Not on any maintenance medications at [facility]  - Follow HbA1c  - Continue to monitor blood glucose      DVT prophylaxis/ GI prophylaxis: Lovenox/Pepcid  Disposition: Admit under house team 2/SICU    Ole Shepard MD, PGY-1   Attending physician: Dr. Blakely Expose

## 2020-11-22 NOTE — FLOWSHEET NOTE
Patient educated on importance and safety of lines and tubes. Patient pulling at lines and airway requiring order for bilateral soft wrist restraints. Will continue to monitor.

## 2020-11-22 NOTE — PROGRESS NOTES
[facility]  Internal Medicine Residency Program  Progress Note - House Team 2    Patient:  Fady Garrett 38 y.o. female MRN: [id number]     Date of Service: 11/22/2020     CC: had concerns including Seizures (Seizures from [facility], per [facility] pseudoseizures. ). Overnight events: Received Ativan overnight for seizure activity. Subjective     Extubated yesterday, on 3 L nasal cannula saturating well. Vital signs stable. Awaiting neurology assessment and recommendations. Still having bilateral upper extremity muscular twitching (R>L). Propofol and fentanyl discontinued. Eyes open, but not following commands. Plan for EEG and SLP eval today. Objective     Physical Exam:  · Vitals: /78   Pulse 85   Temp 97.6 °F (36.4 °C) (Temporal)   Resp 12   Ht 5' 4\" (1.626 m)   Wt 260 lb (117.9 kg)   LMP  (LMP Unknown)   SpO2 100%   BMI 44.63 kg/m²     · I & O - 24hr: I/O this shift:  · In: 0   · Out: 150 [Urine:150]   · General Appearance: appears stated age and eyes open, but not following commands. · HEENT:  Head: Normocephalic, no lesions, without obvious abnormality. Pupils equal and reactive to light bilaterally. · Neck: no adenopathy, no carotid bruit, no JVD, supple, symmetrical, trachea midline and thyroid not enlarged, symmetric, no tenderness/mass/nodules  · Lung: clear to auscultation bilaterally  · Heart: regular rate and rhythm, S1, S2 normal, no murmur, click, rub or gallop  · Abdomen: soft, non-tender; bowel sounds normal; no masses,  no organomegaly  · Extremities:  extremities normal, atraumatic, no cyanosis or edema  · Musculokeletal: No joint swelling, no muscle tenderness. ROM normal in all joints of extremities. · Neurologic: Mental status: Unresponsive to questions.   Subject  Pertinent Labs & Imaging Studies   andrea  CBC:   Lab Results   Component Value Date    WBC 9.0 11/23/2020    RBC 4.05 11/23/2020    HGB 9.4 11/23/2020    HCT 30.9 11/23/2020 MCV 76.3 2020    MCH 23.2 2020    MCHC 30.4 2020    RDW 16.9 2020     2020    MPV 9.3 2020     CMP:    Lab Results   Component Value Date     2020    K 3.5 2020    K 3.5 2020     2020    CO2 26 2020    BUN 9 2020    CREATININE 1.0 2020    GFRAA >60 2020    LABGLOM >60 2020    GLUCOSE 103 2020    PROT 6.3 2020    LABALBU 3.6 2020    CALCIUM 8.2 2020    BILITOT 0.4 2020    ALKPHOS 66 2020    AST 14 2020    ALT 18 2020       Resident's Assessment and Plan     [place] Case is a 38 y.o. female with PMH DM (not on meds), schizoaffective disorder, PTSD, borderline personality disorder, presented to ED from [facility] d/t seizures and admitted to SICU for further management. 1. Acute Encephalopathy 2/2 Seizures vs pseudoseizures   -Recent admission to [facility] d/t seizures; 2-day EEG negative.    -Witnessed seizure in ED, given Keppra 1000 mg x 1, Ativan 2 mg x 1, versed 2 mg x 2 and started on propofol gtt.    -Propofol and fentanyl discontinued. -CK level 47   -N.p.o.   -Continue seizure precautions   -Continue IVF with NS at 100 ml/hr    -Ammonia 14, UDS negative   -Patient was intubated in the ED for airway protection, and extubated same day. -Continue Keppra 500 mg BID IV; starting additional Keppra 1,000 mg oral every PM and 500 mg oral every AM.   -Starting Gabapentin 300 mg BID PO and and 600 mg PO nightly.    -Adding Lamotrigine 25 mg oral daily    -Continue Ativan 1 mg IV Q2 hrs prn for seizures.    -Follow EEG final report    -Continue SICU management    -Neurology consulted    2. Acute Hypoxic Respiratory Failure 2/2 AMS (2/2 Seizure)   -O2 sat at [facility] reported to be 82%, and 82% in ED.   -Intubated  AM, extubated last night   -CXR on admission showed BL infiltrates mid-lower lobes.     -AB.430, 33.6 PCO2, 21.8 bicarb   -On 3 L nasal cannula, wean as able. -Continue Duoneb Q4 hrs prn for SOB   -Continue to monitor respiratory status     3. BL infiltrates mid-lower lobes 2/2 aspiration pna (2/2 AMS) vs pneumonitis    -s/p unasyn x 1 in ED for aspiration pna coverage   -Procalcitonin 0.06, afebrile, no leukocytosis ~ suggesting unlikely to be infectious etiology. -follow-up cultures and MRSA nares    -continue to monitor off antibiotics      4. Microcytic Anemia likely 2/2 PATRICIO   -No baseline hemoglobin on file   -Hemoglobin down trended from 11.3-9.4   -Consider checking Fe, Ferritin, TIBC levels   -Continue to follow H&H    5. History of schizoaffective disorder, PTSD, borderline personality disorder.    -resuming home medications haldol 5 mg OD, prazosin 2 mg OD, fluoxetine 20 mg OD   -Psychiatry consulted     6. History of HTN   -BP stable   -resuming norvasc 10 mg daily today   -Continue Lopressor 5 mg IV every 6 hours as needed for SBP greater than 165. Do not administer if heart rate is less than 65   -Hydralazine 10 mg IV Q4 hrs prn for high BP, SBP>140 mmHg.     7. History of DM, not on home medications   -Last Hemoglobin A1c 9.4% on 11/23/2020   -BG range: 103-105   -Currently NPO   -hypoglycemic protocol    -continue to monitor BG,       PT/OT evaluation: not indicated at this time  DVT prophylaxis/ GI prophylaxis: lovenox/pepcid   Disposition: SICU management     Jalen Gallardo MD, PGY-1  Attending physician: Dr. Cheryle Scanlon

## 2020-11-22 NOTE — PROGRESS NOTES
[facility]  SURGICAL INTENSIVE CARE UNIT (SICU)  ATTENDING PHYSICIAN CRITICAL CARE PROGRESS NOTE     I have examined the patient, reviewed the record, and discussed the case with the APN/ resident. Please refer to the APN/ resident's note. I agree with the assessment and plan. I have reviewed all relevant labs and imaging data. The following summarizes my clinical findings and independent assessment.     CC:  Critical care management for seizures    Hospital Course/Overnight Events:  11/22--presented from [facility] with possible seizures; given ativan and intubated in ED; admitted to NSICU    Intubated; sedated; narcotized  Opens eyes off of sedation  Follows commands  Hrt:  Regular  Lungs:  Fairly clear bilaterally  Abd: obese; soft; BS active; NT  Skin:  Warm/dry  Ext:  Distal pulses readily detectable    Patient Active Problem List    Diagnosis Date Noted    Status epilepticus ([facility] [address] 11/22/2020       Seizures--elevated prolactin--Neurology consult--start keppra; EEG  Acute resp failure--wean vent support; attempt spont breathing trial  Renal insuff--monitor BUN/Cr/UO  Inability to feed--start TF if unable to extubate soon  PT/OT evals  DVT risk--PCDs/lovenox    Pt is at risk for neurologic/respiratory/metabolic deterioration and requires ongoing ICU care    Rony Mccarty MD, FACS  11/22/2020  2:09 PM      Critical care time exclusive of teaching and procedures = 38 minutes

## 2020-11-22 NOTE — FLOWSHEET NOTE
Patient pulling at lines, tubes, devices, and medical equipment sustaining/monitoring her currently stable condition. All redirection activities were unsuccessful. Patient is a safety risk to herself. The need for bilateral soft wrist restraints continues.

## 2020-11-22 NOTE — PLAN OF CARE
Problem: Falls - Risk of:  Goal: Will remain free from falls  Description: Will remain free from falls  Outcome: Met This Shift  Goal: Absence of physical injury  Description: Absence of physical injury  Outcome: Met This Shift     Problem: Skin Integrity:  Goal: Will show no infection signs and symptoms  Description: Will show no infection signs and symptoms  Outcome: Met This Shift  Goal: Absence of new skin breakdown  Description: Absence of new skin breakdown  Outcome: Met This Shift     Problem: Restraint Use - Nonviolent/Non-Self-Destructive Behavior:  Goal: Absence of restraint indications  Description: Absence of restraint indications  11/22/2020 1622 by Mellisa Hawthorne RN  Outcome: Not Met This Shift  11/22/2020 1526 by Abram Greenberg  Outcome: Not Met This Shift  Goal: Absence of restraint-related injury  Description: Absence of restraint-related injury  11/22/2020 1622 by Mellisa Hawthorne RN  Outcome: Met This Shift  11/22/2020 1526 by Abram Greenberg  Outcome: Met This Shift

## 2020-11-23 ENCOUNTER — APPOINTMENT (OUTPATIENT)
Dept: GENERAL RADIOLOGY | Age: 35
DRG: 880 | End: 2020-11-23
Payer: MEDICARE

## 2020-11-23 ENCOUNTER — APPOINTMENT (OUTPATIENT)
Dept: NEUROLOGY | Age: 35
DRG: 880 | End: 2020-11-23
Payer: MEDICARE

## 2020-11-23 ENCOUNTER — APPOINTMENT (OUTPATIENT)
Dept: CT IMAGING | Age: 35
DRG: 880 | End: 2020-11-23
Payer: MEDICARE

## 2020-11-23 LAB
ALBUMIN SERPL-MCNC: 3.6 G/DL (ref 3.5–5.2)
ALBUMIN SERPL-MCNC: 3.6 G/DL (ref 3.5–5.2)
ALP BLD-CCNC: 66 U/L (ref 35–104)
ALP BLD-CCNC: 79 U/L (ref 35–104)
ALT SERPL-CCNC: 18 U/L (ref 0–32)
ALT SERPL-CCNC: 20 U/L (ref 0–32)
ANION GAP SERPL CALCULATED.3IONS-SCNC: 10 MMOL/L (ref 7–16)
ANION GAP SERPL CALCULATED.3IONS-SCNC: 7 MMOL/L (ref 7–16)
AST SERPL-CCNC: 14 U/L (ref 0–31)
AST SERPL-CCNC: 19 U/L (ref 0–31)
B.E.: -1 MMOL/L (ref -3–3)
B.E.: -1.2 MMOL/L (ref -3–3)
BASOPHILS ABSOLUTE: 0.03 E9/L (ref 0–0.2)
BASOPHILS ABSOLUTE: 0.04 E9/L (ref 0–0.2)
BASOPHILS RELATIVE PERCENT: 0.4 % (ref 0–2)
BASOPHILS RELATIVE PERCENT: 0.4 % (ref 0–2)
BILIRUB SERPL-MCNC: 0.4 MG/DL (ref 0–1.2)
BILIRUB SERPL-MCNC: 0.4 MG/DL (ref 0–1.2)
BUN BLDV-MCNC: 7 MG/DL (ref 6–20)
BUN BLDV-MCNC: 9 MG/DL (ref 6–20)
CALCIUM SERPL-MCNC: 8.2 MG/DL (ref 8.6–10.2)
CALCIUM SERPL-MCNC: 8.7 MG/DL (ref 8.6–10.2)
CHLORIDE BLD-SCNC: 103 MMOL/L (ref 98–107)
CHLORIDE BLD-SCNC: 105 MMOL/L (ref 98–107)
CO2: 24 MMOL/L (ref 22–29)
CO2: 26 MMOL/L (ref 22–29)
COHB: 0.2 % (ref 0–1.5)
COHB: 0.2 % (ref 0–1.5)
CREAT SERPL-MCNC: 1 MG/DL (ref 0.5–1)
CREAT SERPL-MCNC: 1.1 MG/DL (ref 0.5–1)
CRITICAL: ABNORMAL
CRITICAL: ABNORMAL
DATE ANALYZED: ABNORMAL
DATE ANALYZED: ABNORMAL
DATE OF COLLECTION: ABNORMAL
DATE OF COLLECTION: ABNORMAL
EOSINOPHILS ABSOLUTE: 0.08 E9/L (ref 0.05–0.5)
EOSINOPHILS ABSOLUTE: 0.09 E9/L (ref 0.05–0.5)
EOSINOPHILS RELATIVE PERCENT: 1 % (ref 0–6)
EOSINOPHILS RELATIVE PERCENT: 1.1 % (ref 0–6)
FERRITIN: 18 NG/ML
GFR AFRICAN AMERICAN: >60
GFR AFRICAN AMERICAN: >60
GFR NON-AFRICAN AMERICAN: 56 ML/MIN/1.73
GFR NON-AFRICAN AMERICAN: >60 ML/MIN/1.73
GLUCOSE BLD-MCNC: 103 MG/DL (ref 74–99)
GLUCOSE BLD-MCNC: 106 MG/DL (ref 74–99)
GRAM STAIN ORDERABLE: NORMAL
HCO3: 21.9 MMOL/L (ref 22–26)
HCO3: 22.6 MMOL/L (ref 22–26)
HCT VFR BLD CALC: 30.9 % (ref 34–48)
HCT VFR BLD CALC: 31.5 % (ref 34–48)
HEMOGLOBIN: 9.4 G/DL (ref 11.5–15.5)
HEMOGLOBIN: 9.9 G/DL (ref 11.5–15.5)
HHB: 0.9 % (ref 0–5)
HHB: 1 % (ref 0–5)
IMMATURE GRANULOCYTES #: 0.02 E9/L
IMMATURE GRANULOCYTES #: 0.03 E9/L
IMMATURE GRANULOCYTES %: 0.3 % (ref 0–5)
IMMATURE GRANULOCYTES %: 0.3 % (ref 0–5)
IRON SATURATION: 11 % (ref 15–50)
IRON: 41 MCG/DL (ref 37–145)
LAB: ABNORMAL
LAB: ABNORMAL
LACTIC ACID: 1.1 MMOL/L (ref 0.5–2.2)
LYMPHOCYTES ABSOLUTE: 1.88 E9/L (ref 1.5–4)
LYMPHOCYTES ABSOLUTE: 1.94 E9/L (ref 1.5–4)
LYMPHOCYTES RELATIVE PERCENT: 21.5 % (ref 20–42)
LYMPHOCYTES RELATIVE PERCENT: 26 % (ref 20–42)
Lab: ABNORMAL
Lab: ABNORMAL
MAGNESIUM: 2.1 MG/DL (ref 1.6–2.6)
MCH RBC QN AUTO: 23.2 PG (ref 26–35)
MCH RBC QN AUTO: 23.7 PG (ref 26–35)
MCHC RBC AUTO-ENTMCNC: 30.4 % (ref 32–34.5)
MCHC RBC AUTO-ENTMCNC: 31.4 % (ref 32–34.5)
MCV RBC AUTO: 75.4 FL (ref 80–99.9)
MCV RBC AUTO: 76.3 FL (ref 80–99.9)
METER GLUCOSE: 105 MG/DL (ref 74–99)
METER GLUCOSE: 107 MG/DL (ref 74–99)
METHB: 0.3 % (ref 0–1.5)
METHB: 0.3 % (ref 0–1.5)
MODE: ABNORMAL
MODE: ABNORMAL
MONOCYTES ABSOLUTE: 0.41 E9/L (ref 0.1–0.95)
MONOCYTES ABSOLUTE: 0.49 E9/L (ref 0.1–0.95)
MONOCYTES RELATIVE PERCENT: 5.4 % (ref 2–12)
MONOCYTES RELATIVE PERCENT: 5.7 % (ref 2–12)
NEUTROPHILS ABSOLUTE: 4.82 E9/L (ref 1.8–7.3)
NEUTROPHILS ABSOLUTE: 6.45 E9/L (ref 1.8–7.3)
NEUTROPHILS RELATIVE PERCENT: 66.5 % (ref 43–80)
NEUTROPHILS RELATIVE PERCENT: 71.4 % (ref 43–80)
O2 CONTENT: 16 ML/DL
O2 CONTENT: 16.3 ML/DL
O2 SATURATION: 99 % (ref 92–98.5)
O2 SATURATION: 99.1 % (ref 92–98.5)
O2HB: 98.5 % (ref 94–97)
O2HB: 98.6 % (ref 94–97)
OPERATOR ID: 1926
OPERATOR ID: 1926
PATIENT TEMP: 37 C
PATIENT TEMP: 37 C
PCO2: 31.4 MMHG (ref 35–45)
PCO2: 34.1 MMHG (ref 35–45)
PDW BLD-RTO: 16.6 FL (ref 11.5–15)
PDW BLD-RTO: 16.9 FL (ref 11.5–15)
PH BLOOD GAS: 7.44 (ref 7.35–7.45)
PH BLOOD GAS: 7.46 (ref 7.35–7.45)
PHOSPHORUS: 3 MG/DL (ref 2.5–4.5)
PLATELET # BLD: 354 E9/L (ref 130–450)
PLATELET # BLD: 374 E9/L (ref 130–450)
PMV BLD AUTO: 9.2 FL (ref 7–12)
PMV BLD AUTO: 9.3 FL (ref 7–12)
PO2: 176.4 MMHG (ref 75–100)
PO2: 190.7 MMHG (ref 75–100)
POTASSIUM SERPL-SCNC: 3.5 MMOL/L (ref 3.5–5)
POTASSIUM SERPL-SCNC: 3.76 MMOL/L (ref 3.5–5)
POTASSIUM SERPL-SCNC: 3.8 MMOL/L (ref 3.5–5)
POTASSIUM SERPL-SCNC: 3.83 MMOL/L (ref 3.5–5)
RBC # BLD: 4.05 E12/L (ref 3.5–5.5)
RBC # BLD: 4.18 E12/L (ref 3.5–5.5)
SODIUM BLD-SCNC: 137 MMOL/L (ref 132–146)
SODIUM BLD-SCNC: 138 MMOL/L (ref 132–146)
SOURCE, BLOOD GAS: ABNORMAL
SOURCE, BLOOD GAS: ABNORMAL
THB: 11.3 G/DL (ref 11.5–16.5)
THB: 11.5 G/DL (ref 11.5–16.5)
TIME ANALYZED: 1826
TIME ANALYZED: 2035
TOTAL CK: 119 U/L (ref 20–180)
TOTAL IRON BINDING CAPACITY: 374 MCG/DL (ref 250–450)
TOTAL PROTEIN: 6.3 G/DL (ref 6.4–8.3)
TOTAL PROTEIN: 6.5 G/DL (ref 6.4–8.3)
WBC # BLD: 7.2 E9/L (ref 4.5–11.5)
WBC # BLD: 9 E9/L (ref 4.5–11.5)

## 2020-11-23 PROCEDURE — 84132 ASSAY OF SERUM POTASSIUM: CPT

## 2020-11-23 PROCEDURE — 84146 ASSAY OF PROLACTIN: CPT

## 2020-11-23 PROCEDURE — 6370000000 HC RX 637 (ALT 250 FOR IP): Performed by: INTERNAL MEDICINE

## 2020-11-23 PROCEDURE — 83540 ASSAY OF IRON: CPT

## 2020-11-23 PROCEDURE — 36415 COLL VENOUS BLD VENIPUNCTURE: CPT

## 2020-11-23 PROCEDURE — 36600 WITHDRAWAL OF ARTERIAL BLOOD: CPT

## 2020-11-23 PROCEDURE — 93005 ELECTROCARDIOGRAM TRACING: CPT | Performed by: INTERNAL MEDICINE

## 2020-11-23 PROCEDURE — 92610 EVALUATE SWALLOWING FUNCTION: CPT

## 2020-11-23 PROCEDURE — 2500000003 HC RX 250 WO HCPCS: Performed by: NURSE PRACTITIONER

## 2020-11-23 PROCEDURE — 6360000002 HC RX W HCPCS: Performed by: PSYCHIATRY & NEUROLOGY

## 2020-11-23 PROCEDURE — 6360000002 HC RX W HCPCS

## 2020-11-23 PROCEDURE — 85025 COMPLETE CBC W/AUTO DIFF WBC: CPT

## 2020-11-23 PROCEDURE — 6360000002 HC RX W HCPCS: Performed by: INTERNAL MEDICINE

## 2020-11-23 PROCEDURE — APPSS15 APP SPLIT SHARED TIME 0-15 MINUTES: Performed by: NURSE PRACTITIONER

## 2020-11-23 PROCEDURE — 82962 GLUCOSE BLOOD TEST: CPT

## 2020-11-23 PROCEDURE — 2580000003 HC RX 258: Performed by: NURSE PRACTITIONER

## 2020-11-23 PROCEDURE — C9254 INJECTION, LACOSAMIDE: HCPCS | Performed by: PSYCHIATRY & NEUROLOGY

## 2020-11-23 PROCEDURE — 99223 1ST HOSP IP/OBS HIGH 75: CPT | Performed by: INTERNAL MEDICINE

## 2020-11-23 PROCEDURE — 82550 ASSAY OF CK (CPK): CPT

## 2020-11-23 PROCEDURE — 6360000002 HC RX W HCPCS: Performed by: NURSE PRACTITIONER

## 2020-11-23 PROCEDURE — 82728 ASSAY OF FERRITIN: CPT

## 2020-11-23 PROCEDURE — 99232 SBSQ HOSP IP/OBS MODERATE 35: CPT | Performed by: INTERNAL MEDICINE

## 2020-11-23 PROCEDURE — 6360000002 HC RX W HCPCS: Performed by: SURGERY

## 2020-11-23 PROCEDURE — 84100 ASSAY OF PHOSPHORUS: CPT

## 2020-11-23 PROCEDURE — 2580000003 HC RX 258: Performed by: INTERNAL MEDICINE

## 2020-11-23 PROCEDURE — 2580000003 HC RX 258: Performed by: PSYCHIATRY & NEUROLOGY

## 2020-11-23 PROCEDURE — 94664 DEMO&/EVAL PT USE INHALER: CPT

## 2020-11-23 PROCEDURE — 83550 IRON BINDING TEST: CPT

## 2020-11-23 PROCEDURE — 2500000003 HC RX 250 WO HCPCS: Performed by: INTERNAL MEDICINE

## 2020-11-23 PROCEDURE — 80053 COMPREHEN METABOLIC PANEL: CPT

## 2020-11-23 PROCEDURE — 95816 EEG AWAKE AND DROWSY: CPT

## 2020-11-23 PROCEDURE — 2000000000 HC ICU R&B

## 2020-11-23 PROCEDURE — 71045 X-RAY EXAM CHEST 1 VIEW: CPT

## 2020-11-23 PROCEDURE — 99223 1ST HOSP IP/OBS HIGH 75: CPT | Performed by: PSYCHIATRY & NEUROLOGY

## 2020-11-23 PROCEDURE — 70450 CT HEAD/BRAIN W/O DYE: CPT

## 2020-11-23 PROCEDURE — 83605 ASSAY OF LACTIC ACID: CPT

## 2020-11-23 PROCEDURE — 82805 BLOOD GASES W/O2 SATURATION: CPT

## 2020-11-23 PROCEDURE — 83735 ASSAY OF MAGNESIUM: CPT

## 2020-11-23 RX ORDER — SODIUM CHLORIDE 9 MG/ML
INJECTION, SOLUTION INTRAVENOUS CONTINUOUS
Status: ACTIVE | OUTPATIENT
Start: 2020-11-23 | End: 2020-11-25

## 2020-11-23 RX ORDER — LEVETIRACETAM 10 MG/ML
1000 INJECTION INTRAVASCULAR ONCE
Status: COMPLETED | OUTPATIENT
Start: 2020-11-23 | End: 2020-11-23

## 2020-11-23 RX ORDER — PRAZOSIN HYDROCHLORIDE 2 MG/1
2 CAPSULE ORAL NIGHTLY
Status: DISCONTINUED | OUTPATIENT
Start: 2020-11-23 | End: 2020-11-29 | Stop reason: HOSPADM

## 2020-11-23 RX ORDER — FLUOXETINE HYDROCHLORIDE 20 MG/1
20 CAPSULE ORAL DAILY
Status: DISCONTINUED | OUTPATIENT
Start: 2020-11-23 | End: 2020-11-29 | Stop reason: HOSPADM

## 2020-11-23 RX ORDER — LORAZEPAM 2 MG/ML
INJECTION INTRAMUSCULAR
Status: COMPLETED
Start: 2020-11-23 | End: 2020-11-23

## 2020-11-23 RX ORDER — LEVETIRACETAM 5 MG/ML
500 INJECTION INTRAVASCULAR EVERY MORNING
Status: DISCONTINUED | OUTPATIENT
Start: 2020-11-24 | End: 2020-11-29 | Stop reason: HOSPADM

## 2020-11-23 RX ORDER — HYDRALAZINE HYDROCHLORIDE 20 MG/ML
10 INJECTION INTRAMUSCULAR; INTRAVENOUS EVERY 4 HOURS PRN
Status: DISCONTINUED | OUTPATIENT
Start: 2020-11-23 | End: 2020-11-29 | Stop reason: HOSPADM

## 2020-11-23 RX ORDER — POTASSIUM CHLORIDE 7.45 MG/ML
10 INJECTION INTRAVENOUS
Status: COMPLETED | OUTPATIENT
Start: 2020-11-23 | End: 2020-11-23

## 2020-11-23 RX ORDER — GABAPENTIN 300 MG/1
300 CAPSULE ORAL 2 TIMES DAILY
Status: DISCONTINUED | OUTPATIENT
Start: 2020-11-23 | End: 2020-11-29 | Stop reason: HOSPADM

## 2020-11-23 RX ORDER — MIDAZOLAM HYDROCHLORIDE 2 MG/2ML
2 INJECTION, SOLUTION INTRAMUSCULAR; INTRAVENOUS ONCE
Status: COMPLETED | OUTPATIENT
Start: 2020-11-23 | End: 2020-11-23

## 2020-11-23 RX ORDER — LAMOTRIGINE 25 MG/1
25 TABLET ORAL DAILY
Status: DISCONTINUED | OUTPATIENT
Start: 2020-11-23 | End: 2020-11-29 | Stop reason: HOSPADM

## 2020-11-23 RX ORDER — LORAZEPAM 2 MG/ML
1 INJECTION INTRAMUSCULAR ONCE
Status: COMPLETED | OUTPATIENT
Start: 2020-11-23 | End: 2020-11-27

## 2020-11-23 RX ORDER — LEVETIRACETAM 500 MG/1
1000 TABLET ORAL EVERY EVENING
Status: DISCONTINUED | OUTPATIENT
Start: 2020-11-23 | End: 2020-11-23

## 2020-11-23 RX ORDER — ACETAMINOPHEN 650 MG/1
650 SUPPOSITORY RECTAL EVERY 6 HOURS PRN
Status: DISCONTINUED | OUTPATIENT
Start: 2020-11-23 | End: 2020-11-29 | Stop reason: HOSPADM

## 2020-11-23 RX ORDER — HALOPERIDOL 5 MG
5 TABLET ORAL NIGHTLY
Status: DISCONTINUED | OUTPATIENT
Start: 2020-11-23 | End: 2020-11-23

## 2020-11-23 RX ORDER — LORAZEPAM 2 MG/ML
1 INJECTION INTRAMUSCULAR
Status: DISCONTINUED | OUTPATIENT
Start: 2020-11-23 | End: 2020-11-29 | Stop reason: HOSPADM

## 2020-11-23 RX ORDER — IPRATROPIUM BROMIDE AND ALBUTEROL SULFATE 2.5; .5 MG/3ML; MG/3ML
1 SOLUTION RESPIRATORY (INHALATION) EVERY 4 HOURS PRN
Status: DISCONTINUED | OUTPATIENT
Start: 2020-11-23 | End: 2020-11-29 | Stop reason: HOSPADM

## 2020-11-23 RX ORDER — MIDAZOLAM HYDROCHLORIDE 1 MG/ML
INJECTION INTRAMUSCULAR; INTRAVENOUS
Status: DISPENSED
Start: 2020-11-23 | End: 2020-11-24

## 2020-11-23 RX ORDER — ACETAMINOPHEN 325 MG/1
650 TABLET ORAL EVERY 6 HOURS PRN
Status: DISCONTINUED | OUTPATIENT
Start: 2020-11-23 | End: 2020-11-29 | Stop reason: HOSPADM

## 2020-11-23 RX ORDER — LEVETIRACETAM 10 MG/ML
1000 INJECTION INTRAVASCULAR EVERY EVENING
Status: DISCONTINUED | OUTPATIENT
Start: 2020-11-23 | End: 2020-11-29 | Stop reason: HOSPADM

## 2020-11-23 RX ORDER — KETOROLAC TROMETHAMINE 30 MG/ML
15 INJECTION, SOLUTION INTRAMUSCULAR; INTRAVENOUS ONCE
Status: DISCONTINUED | OUTPATIENT
Start: 2020-11-23 | End: 2020-11-23

## 2020-11-23 RX ORDER — LEVETIRACETAM 500 MG/1
1000 TABLET ORAL EVERY EVENING
Status: ON HOLD | COMMUNITY
End: 2020-11-29 | Stop reason: HOSPADM

## 2020-11-23 RX ORDER — MIDAZOLAM HYDROCHLORIDE 1 MG/ML
INJECTION INTRAMUSCULAR; INTRAVENOUS
Status: DISCONTINUED
Start: 2020-11-23 | End: 2020-11-23 | Stop reason: WASHOUT

## 2020-11-23 RX ORDER — LEVETIRACETAM 5 MG/ML
500 INJECTION INTRAVASCULAR 2 TIMES DAILY
Status: DISCONTINUED | OUTPATIENT
Start: 2020-11-23 | End: 2020-11-23

## 2020-11-23 RX ORDER — LEVETIRACETAM 500 MG/1
500 TABLET ORAL EVERY MORNING
Status: DISCONTINUED | OUTPATIENT
Start: 2020-11-23 | End: 2020-11-23

## 2020-11-23 RX ORDER — AMLODIPINE BESYLATE 10 MG/1
10 TABLET ORAL DAILY
Status: DISCONTINUED | OUTPATIENT
Start: 2020-11-23 | End: 2020-11-29 | Stop reason: HOSPADM

## 2020-11-23 RX ORDER — LEVETIRACETAM 500 MG/1
500 TABLET ORAL EVERY MORNING
Status: ON HOLD | COMMUNITY
End: 2020-11-29 | Stop reason: SDUPTHER

## 2020-11-23 RX ORDER — GABAPENTIN 600 MG/1
600 TABLET ORAL NIGHTLY
Status: DISCONTINUED | OUTPATIENT
Start: 2020-11-23 | End: 2020-11-24 | Stop reason: SDUPTHER

## 2020-11-23 RX ADMIN — POTASSIUM CHLORIDE 10 MEQ: 10 INJECTION, SOLUTION INTRAVENOUS at 13:46

## 2020-11-23 RX ADMIN — SODIUM CHLORIDE, PRESERVATIVE FREE 10 ML: 5 INJECTION INTRAVENOUS at 09:19

## 2020-11-23 RX ADMIN — LORAZEPAM 1 MG: 2 INJECTION INTRAMUSCULAR; INTRAVENOUS at 00:06

## 2020-11-23 RX ADMIN — FAMOTIDINE 20 MG: 10 INJECTION INTRAVENOUS at 09:19

## 2020-11-23 RX ADMIN — MIDAZOLAM 1 MG/HR: 5 INJECTION INTRAMUSCULAR; INTRAVENOUS at 22:07

## 2020-11-23 RX ADMIN — LEVETIRACETAM 1000 MG: 10 INJECTION INTRAVENOUS at 19:08

## 2020-11-23 RX ADMIN — FAMOTIDINE 20 MG: 10 INJECTION INTRAVENOUS at 22:50

## 2020-11-23 RX ADMIN — LEVETIRACETAM 1000 MG: 10 INJECTION INTRAVENOUS at 20:27

## 2020-11-23 RX ADMIN — LORAZEPAM 1 MG: 2 INJECTION INTRAMUSCULAR; INTRAVENOUS at 18:09

## 2020-11-23 RX ADMIN — IPRATROPIUM BROMIDE AND ALBUTEROL SULFATE 1 AMPULE: .5; 2.5 SOLUTION RESPIRATORY (INHALATION) at 08:26

## 2020-11-23 RX ADMIN — MIDAZOLAM 2 MG: 1 INJECTION INTRAMUSCULAR; INTRAVENOUS at 22:12

## 2020-11-23 RX ADMIN — SODIUM CHLORIDE, PRESERVATIVE FREE 10 ML: 5 INJECTION INTRAVENOUS at 22:14

## 2020-11-23 RX ADMIN — POTASSIUM CHLORIDE 10 MEQ: 10 INJECTION, SOLUTION INTRAVENOUS at 12:43

## 2020-11-23 RX ADMIN — SODIUM CHLORIDE: 9 INJECTION, SOLUTION INTRAVENOUS at 17:12

## 2020-11-23 RX ADMIN — MIDAZOLAM 2 MG: 1 INJECTION INTRAMUSCULAR; INTRAVENOUS at 21:06

## 2020-11-23 RX ADMIN — LEVETIRACETAM 500 MG: 5 INJECTION INTRAVENOUS at 09:19

## 2020-11-23 RX ADMIN — LORAZEPAM 1 MG: 2 INJECTION INTRAMUSCULAR; INTRAVENOUS at 20:06

## 2020-11-23 RX ADMIN — DEXTROSE MONOHYDRATE 300 MG: 50 INJECTION, SOLUTION INTRAVENOUS at 19:21

## 2020-11-23 RX ADMIN — MIDAZOLAM HYDROCHLORIDE 2 MG: 2 INJECTION, SOLUTION INTRAMUSCULAR; INTRAVENOUS at 22:12

## 2020-11-23 RX ADMIN — LORAZEPAM 1 MG: 2 INJECTION INTRAMUSCULAR; INTRAVENOUS at 09:19

## 2020-11-23 RX ADMIN — ENOXAPARIN SODIUM 40 MG: 40 INJECTION SUBCUTANEOUS at 09:19

## 2020-11-23 RX ADMIN — METOPROLOL TARTRATE 5 MG: 5 INJECTION INTRAVENOUS at 09:19

## 2020-11-23 ASSESSMENT — PAIN SCALES - GENERAL
PAINLEVEL_OUTOF10: 0
PAINLEVEL_OUTOF10: 7
PAINLEVEL_OUTOF10: 0
PAINLEVEL_OUTOF10: 4
PAINLEVEL_OUTOF10: 0
PAINLEVEL_OUTOF10: 0

## 2020-11-23 ASSESSMENT — PAIN DESCRIPTION - PAIN TYPE
TYPE: ACUTE PAIN
TYPE: ACUTE PAIN

## 2020-11-23 ASSESSMENT — PAIN DESCRIPTION - LOCATION
LOCATION: HEAD
LOCATION: HEAD

## 2020-11-23 ASSESSMENT — PAIN DESCRIPTION - ORIENTATION
ORIENTATION: MID
ORIENTATION: RIGHT

## 2020-11-23 ASSESSMENT — PAIN DESCRIPTION - DESCRIPTORS
DESCRIPTORS: HEADACHE
DESCRIPTORS: HEADACHE

## 2020-11-23 NOTE — CONSULTS
Robina Merrill is a 38 y.o. right handed female    Neurology was consulted for seizure    History of Present Illness: The patient is a 38year old female with a past medical history of schizoaffective disorder, PTSD, borderline personality disorder presented from [facility] due to seizure episodes. She was recently admitted at [facility] for seizure and falls. EEG at that time was unremarkable for any epileptiform changes. She was transferred to [facility] due to suicidal ideation. On 11/21 she was found to have seizure like activity with generalized twitching and was transferred to ED. In the ED on 11/21, she was found to have oxygen saturation of around 82%, tachypnea likely due to altered mental status. She was given ativan, versed and started on propofol drip and briefly intubated to protect airway and later extubated on 11/22. CT head unremarkable for any acute process. EEG was done today, pending result. On questioning today, she stated that she does not remember any shaking movement or seizure like activity. She remembers waking up in the hospital with tube in her mouth from [facility]. She reports headache and nausea. She denies any fecal or urinary incontinence, blurry vision. She endorse having increased sensation on the right side both upper and lower extremities. Neurology was consulted for further evaluation of seizure activity. Past Medical History:     Schizoaffective disorder  Borderline personality disorder  PTSD  Anxiety    Past Surgical History:     No past surgical history on file. Allergies:     Patient has no known allergies. Medications:     Prior to Admission medications    Medication Sig Start Date End Date Taking?  Authorizing Provider   levETIRAcetam (KEPPRA) 500 MG tablet Take 500 mg by mouth every morning   Yes Historical Provider, MD   levETIRAcetam (KEPPRA) 500 MG tablet Take 1,000 mg by mouth every evening   Yes Historical Provider, MD gabapentin (NEURONTIN) 300 MG capsule Take 300 mg by mouth 2 times daily. Patient takes at 9A and 3P    Historical Provider, MD   gabapentin (NEURONTIN) 600 MG tablet Take 600 mg by mouth nightly. Patient takes at [address] [facility] Provider, MD   haloperidol (HALDOL) 5 MG tablet Take 5 mg by mouth nightly    Historical Provider, MD   lamoTRIgine (LAMICTAL) 25 MG tablet Take 25 mg by mouth daily    Historical Provider, MD   prazosin (MINIPRESS) 2 MG capsule Take 2 mg by mouth nightly    Historical Provider, MD   amLODIPine (NORVASC) 10 MG tablet Take 10 mg by mouth daily    Historical Provider, MD   FLUoxetine (PROZAC) 20 MG capsule Take 20 mg by mouth daily    Historical Provider, MD       Social History:     Denies ETOH, tobacco, or illicit drugs    Review of Systems:     No chest pain or palpitations  No SOB  No vertigo, lightheadedness or loss of consciousness  No falls, tripping or stumbling  No incontinence of bowels or bladder  No itching or bruising appreciated  No numbness, tingling or focal arm/leg weakness    ROS is otherwise negative     Family History:     No family history on file. Objective:     BP (!) 129/96   Pulse 81   Temp 98.5 °F (36.9 °C) (Axillary)   Resp 17   Ht 5' 4\" (1.626 m)   Wt 251 lb 5.2 oz (114 kg)   LMP  (LMP Unknown)   SpO2 95%   BMI 43.14 kg/m²     General appearance: alert, appears stated age, cooperative and no distress  Head: normocephalic, without obvious abnormality, atraumatic  Eyes: conjunctivae/corneas clear.  .  Neck: no adenopathy, no carotid bruit, supple, symmetrical, trachea midline and thyroid not enlarged, symmetric, no tenderness/mass/nodules  Lungs: clear to auscultation bilaterally  Heart: regular rate and rhythm, S1, S2 normal, no murmur, click, rub or gallop  Extremities: normal, atraumatic, no cyanosis or edema  Pulses: 2+ and symmetric  Skin: color, texture, turgor normal---no rashes or lesions    Mental Status: alert, oriented, thought content

## 2020-11-23 NOTE — PROGRESS NOTES
[facility]  Internal Medicine Residency / [address]    Attending Physician Statement  I have discussed the case, including pertinent history and exam findings with the resident and the team.  I have seen and examined the patient and the key elements of the encounter have been performed by me. I have also personally reviewed imaging studies and labs. I agree with the assessment, plan and orders as documented by the resident. Patient seen and examined this morning. Extubated yesterday. She is awake, would follow some commands and answer questions slowly but appropriately. Noted seizure episode this morning. EEG has been done. Awaiting recommendations from Psych and Neurology. Failed bedside swallow. SLP recommending further studies regarding dysphagia. Assessment:  Acute hypoxic episode 2/2 seizure-like activity, resolved  Seizure- like episodes    Plan:  Await final EEG results  Continue AED  Continue psych neds  Blood pressure control  Videoswallow          Remainder of medical problems as per resident note. Ema Rey MD  Internal Medicine Residency Faculty

## 2020-11-23 NOTE — PROGRESS NOTES
Surgical  Neuro Science Intensive Care Unit  Critical Care  Daily Progress Note 11/23/2020    Date of Admission: 11/22/2020    CC: Follow up for seizures. HOSPITAL COURSE/OVERNIGHT EVENTS:    11/22 Presented from [facility] with possible seizures. Given ativan &  intubated in ED. Admitted to NSICU. Extubated last evening. Reported to have \"seizure activity\" of right arm. 11/23 Eyes open. Answers yes/no questions. EEG in progress. Spoke with nursing staff from [facility]. She had just been admitted to the facility when she developed seizure like activity of her right arm. She was admitted from [facility] in [place]. She was admitted to [facility] for suicidal ideations. She has PMH of the following:  psuedoseizures,  HTN, schizoaffective disorder, borderline personality disorder, PTSD & anxiety. 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 PHYSICAL EXAM:  BP (!) 157/75   Pulse 86   Temp 98.5 °F (36.9 °C) (Axillary)   Resp 24   Ht 5' 4\" (1.626 m)   Wt 251 lb 5.2 oz (114 kg)   LMP  (LMP Unknown)   SpO2 93%   BMI 43.14 kg/m²     Intake/Output Summary (Last 24 hours) at 11/23/2020 1011  Last data filed at 11/23/2020 0919  Gross per 24 hour   Intake 729.41 ml   Output 2950 ml   Net -2220.59 ml     General appearance:  Comfortable. Pain Description: moderate HA    NEUROLOGIC:   RASS Score: 0  GCS:  14  4 - Opens eyes on own   6 - Follows simple motor commands  4 - Seems confused, disoriented       Pupil size:  Left 3 mm  Right 3 mm  Pupil reaction: Yes   PERRLA  Wiggles fingers: Left   Yes  Right Yes  Hand grasp:   Left: Yes     Right    Yes  Wiggles toes: Left   Yes Right  Yes  Plantar flexion: Left  Yes  Right   Yes  Facial droop:   none   Speech:  clear    CONSTITUTIONAL: No acute distress, lying in hospital bed. CARDIOVASCULAR: S1 S2, regular rate, regular rhythm, no murmur/gallop/rub. Monitor: NSR. PULMONARY: Bilaterally clear.   No rhonchi/rales/wheezes, no use of accessory muscles. Room air. RENAL: Flores to gravity, clear yellow urine. Fluid balance for previous 24 hours:  -2.2 l.    ABDOMEN: Soft, nontender, nondistended, nontympanic, normal bowel sounds. No reported nausea or vomiting. MUSCULOSKELETAL:  Moves all extremities purposefully, 5/5 strength   SKIN/EXTREMITIES: No rashes/ecchymosis, no edema/clubbing, warm/dry, good capillary refill. LINES:  Peripheral     Recent Labs     11/22/20  0712 11/23/20  0502   WBC 8.5 9.0   HGB 11.3* 9.4*   HCT 35.8 30.9*   MCV 74.1* 76.3*    354       Recent Labs     11/22/20 0712 11/23/20  0502    138   K 3.5 3.5   CO2 22 26   PHOS  --  3.0   BUN 16 9   CREATININE 1.1* 1.0       Recent Labs     11/22/20 0712 11/23/20  0502   PROT 7.4 6.3*     ASSESSMENT/PLAN:     Active Problems:    Status epilepticus ([facility] [address]  Resolved Problems:    * No resolved hospital problems. *    Neuro:  Seizures. Hx of psuedoseizures, schizoaffective disorder, borderline personality disorder, PTSD & anxiety. Monitor neuro status. Neurology following. EEG pending. Prozac. Gabapentin  Lamictal  Haldol. Minipress. .   CV:  No acute issues. Hx of HTN    Monitor hemodynamics. Norvasc   Pulm: No acute issues. At risk for respiratory insufficiency. Monitor RR & SpO2. O2 as needed. Albuterol. Duoneb PRN    Encourage cough, SMI  & deep breathing. GI: Failed bedside swallow. [place] BMI 43. Monitor bowel function. NPO. Will need bedside swallow. Zofran. Bowel regime. Speech to do Video swallow am 11/24. Renal:  No acute issues. Monitor BUN & Cr, electrolytes & replace as needed. Monitor I & O. Flores. ID: No acute issues. Blood cultures pending. Endocrine: No acute issues. Monitor BS.    MSK: No acute issues.    ROM. Turn & reposition. PT & OT  pending recommendations; [place] Monitor for skin breakdown. Heme: No acute issues. Monitor CBC. Bowel regime: Glycolax  Pain control/Sedation: Ativan  DVT prophylaxis: SCD and Lovenox   GI prophylaxis: Pepcid  Glucose protocol:  ISS  Flores: Keep in place for critical care monitoring of fluid balance. Ancillary consults:  Neurology, Medicine and Critical care  Patient/Family update: will update when family contact info obtained. Code status: Full code. Disposition: Admitted to ICU. Transfer.       Electronically signed by Lola Davalos RN MSN APRN-NP [facility] NP  CCNS CCRN 11/23/2020 10:53 AM

## 2020-11-23 NOTE — CARE COORDINATION
Pt was asleep when I visited. Spoke with Ysabel Peñaloza. Ysabel states that she is not a relative, just a very good friend for the past 12 yrs. She states that pt has no family contacts. Her mom  by suicide with an OD, 2 yrs later her sister  of suicide by hanging. Ysabel reports that pt has a brother and a grandmother ,but has no contact information. Ysabel is not [facility]. Pt lives with Niger at [address], [facility] [address]. Pt has a 11 yr old daughter who is in the custody of the child's father in [place]. Per Ysabel, pt was at  [facility] from  to  and was discharged to [facility] in [place]. Pt does not have a PCP. Ysabel is assisting pt in establishing with a psych dr at [facility] and has an appt on  and an appt with a counselor on 20. Awaiting psych eval to determine if pt to return to [facility] when stable. Contacted Intake at [facility] and was connected to a sw. Left vm to inquire if pt can return.

## 2020-11-23 NOTE — PLAN OF CARE
Problem: Restraint Use - Nonviolent/Non-Self-Destructive Behavior:  Goal: Absence of restraint indications  Description: Absence of restraint indications  11/22/2020 2158 by Lars Camacho RN  Outcome: Completed     Problem: Restraint Use - Nonviolent/Non-Self-Destructive Behavior:  Goal: Absence of restraint-related injury  Description: Absence of restraint-related injury  11/22/2020 2158 by Lars Camacho RN  Outcome: Completed

## 2020-11-23 NOTE — CONSULTS
I have seen and examined the patient with the Resident Physician on 11/23/2020 and the clinical findings and decision making for this patient were discussed in detail. I have reviewed the documentation included and I agree with plan of hearing [facility] including the workup, evaluation, management, and diagnosis as detailed in the Consultation note. Patient is a 77-year-old female history of bipolar disorder, schizoaffective disorder borderline personality disorder, and epilepsy who was admitted for seizure activity. She had been admitted to [facility] in [facility] for seizure and falls with suicidal ideations. The seizure work-up was negative for seizure activity on EEG and patient was transferred to [facility] because of her suicidal ideations. At [facility] she had a witnessed seizure on 11/21 described as generalized twitching with loss of awareness with postevent somnolence and confusion. She was brought to the ER due to altered mental status with noted O2 desaturation at 82% in tachypnea. Patient was intubated with sedation using Ativan, Versed and propofol. No further seizure activity was noted with subsequent extubation on 11/22/2020. She indicates that she is feeling more coherent today. Last recall prior to hospitalization was being at [facility]. Subsequent recall is awakening in the hospital on ventilator. She indicates that she has had a history of seizure activity since childhood and had been recently maintained on Keppra and Lamictal.  Previously, she has been on Lamictal and Vimpat but due to lack of insurance coverage of Vimpat had to go back on Keppra. With Keppra therapy she has not noticed any improvement in her seizure control and indicates that she has been having seizure activity about once or twice monthly. Same pattern to seizure activity.   She recently moved to [facility] from [place] where she had been followed by her neurologist.  She has not had an opportunity to reestablish physician care in [facility]. Examination was notable for slightly abnormal affect with some flattening of affect. She was responsive to questions and follow commands. She was somewhat disoriented but knew she was in the hospital.  Speech was fluent but voice tone was very low which she attributed to recent intubation and extubation with some soreness in her throat. Patient with difficulty swallowing per report. Strength was grossly normal throughout but there was note relative decrease sensation in the left hemibody in comparison to the right. Increased reflexes noted throughout with negative Babinski. EEG study was performed and results are pending. She is on low-dose of Lamictal and has not received any in the last 2 days. Head CT was negative for any significant intracranial abnormalities. Patient clinical picture despite her varied psychiatric diagnoses does have a pattern consistent with seizure activity. Seizure activity has been present now for several years ago. She continues to have frequent events but does not appear to be adequately treated by her medications. She could not afford Vimpat but given report of problems with swallowing and n.p.o. restriction she will be continued on Keppra and initiated on Vimpat. We will need to look for alternative to Vimpat in the outpatient setting. Patient had come from a psychiatric facility and likely will be discharged back to this facility for continuation of her psychiatric care. Recommendations:  1. Vimpat loading 300 mg IV followed by maintenance of 150 mg twice daily. 2.  Continue Keppra at 500 mg twice daily  3. Psychiatric continued care on referral back to [facility] for management. 4.  Outpatient neurology follow-up for management of seizure activity and AEDs. She is a resident of [facility] and has been looking for a neurologist in [facility] area. Patient also willing to follow-up.   Neurology clinic since she is now a known

## 2020-11-23 NOTE — PROGRESS NOTES
Messaged Dr Vanessa Marmolejo with House Team 2 regarding possible order for medication for headache that is not PO due to NPO status. Awaiting response.

## 2020-11-23 NOTE — PROGRESS NOTES
Spoke with Dr. Leticia Macias to extubate    Patient extubated, OG discontinued a long with ETT. Placed on 3L sating at 100%. Instructed to take slow deep breaths, patient demonstrated understanding. Patient alert, voice hoarse, follows commands, PACHECO, breath sounds remain clear and diminished.  No stridor noted

## 2020-11-23 NOTE — PROCEDURES
EEG Report  Chandu Fairbanks is a 38 y.o. female      Appointment Date 11/23/2020   Appointment Time 9:00am   Facility Location [facility] EEG Number [id number]   Type of Study Portable routine Floor 3821-A     Technical Specifications  Technician [facility] of consciousness awake   Sleep deprived? no   Hyperventilation tested? no   Photic stim tested? no   EEG recording Standard 10-20 electrode placement    Duration of recording 25 mins   EEG complete? Yes         Clinical History  Patient is a 55-year-old female history of bipolar disorder, schizoaffective disorder borderline personality disorder, and epilepsy who was admitted for seizure activity. She had been admitted to [facility] in [place] for seizure and falls with suicidal ideations. The seizure work-up was negative for seizure activity on EEG and patient was transferred to [facility] because of her suicidal ideations. At [facility] she had a witnessed seizure on 11/21 described as generalized twitching with loss of awareness with postevent somnolence and confusion. She was brought to the ER due to altered mental status with noted O2 desaturation at 82% in tachypnea. Patient was intubated with sedation using Ativan, Versed and propofol. No further seizure activity was noted with subsequent extubation on 11/22/2020. She indicates that she is feeling more coherent today. Last recall prior to hospitalization was being at [facility]. Subsequent recall is awakening in the hospital on ventilator. She indicates that she has had a history of seizure activity since childhood and had been recently maintained on Keppra and Lamictal.  Previously, she has been on Lamictal and Vimpat but due to lack of insurance coverage of Vimpat had to go back on Keppra. With Keppra therapy she has not noticed any improvement in her seizure control and indicates that she has been having seizure activity about once or twice monthly. Same pattern to seizure activity. She recently moved to [facility] from [place] where she had been followed by her neurologist.  She has not had an opportunity to reestablish physician care in [facility].     Medications    Current Facility-Administered Medications:     LORazepam (ATIVAN) injection 1 mg, 1 mg, Intravenous, Q2H PRN, Lesa Grimm MD, 1 mg at 11/23/20 0919    ipratropium-albuterol (DUONEB) nebulizer solution 1 ampule, 1 ampule, Inhalation, Q4H PRN, Radha Montoya APRN - CNS    levetiracetam (KEPPRA) 500 mg/100 mL IVPB, 500 mg, Intravenous, BID, Radha Montoya APRN - CNS, Stopped at 11/23/20 0934    amLODIPine (NORVASC) tablet 10 mg, 10 mg, Oral, Daily, Marnee Muster, APRN - CNS    FLUoxetine (PROZAC) capsule 20 mg, 20 mg, Oral, Daily, Soumyae Musteligio APRN - CNS    gabapentin (NEURONTIN) capsule 300 mg, 300 mg, Oral, BID, Marnee Muster, APRN - CNS    gabapentin (NEURONTIN) tablet 600 mg, 600 mg, Oral, Nightly, Soumyae Muster APRN - CNS    haloperidol (HALDOL) tablet 5 mg, 5 mg, Oral, Nightly, Marnee Muster, APRN - CNS    lamoTRIgine (LAMICTAL) tablet 25 mg, 25 mg, Oral, Daily, Marnee Muster, APRN - CNS    prazosin (MINIPRESS) capsule 2 mg, 2 mg, Oral, Nightly, Soumyae Muster, APRN - CNS    levETIRAcetam (KEPPRA) tablet 500 mg, 500 mg, Oral, QAM, Soumyae Muster, APRN - CNS    levETIRAcetam (KEPPRA) tablet 1,000 mg, 1,000 mg, Oral, QPM, Radha Montoya, APRN - CNS    sodium chloride flush 0.9 % injection 10 mL, 10 mL, Intravenous, 2 times per day, Lee Yoo MD, 10 mL at 11/23/20 0919    sodium chloride flush 0.9 % injection 10 mL, 10 mL, Intravenous, PRN, Willian Guillaume MD    polyethylene glycol (GLYCOLAX) packet 17 g, 17 g, Oral, Daily PRN, Willian Guillaume MD    promethazine (PHENERGAN) tablet 12.5 mg, 12.5 mg, Oral, Q6H PRN **OR** ondansetron (ZOFRAN) injection 4 mg, 4 mg, Intravenous, Q6H PRN, Willian Guillaume MD    enoxaparin (LOVENOX) injection 40 mg, 40 mg, Subcutaneous, Daily, Lee Yoo MD, 40 mg at 11/23/20 0919    metoprolol (LOPRESSOR) injection 5 mg, 5 mg, Intravenous, Q6H PRN, Willian Guillaume MD, 5 mg at 11/23/20 0919    acetaminophen (TYLENOL) tablet 650 mg, 650 mg, Per NG tube, Q6H PRN **OR** acetaminophen (TYLENOL) suppository 650 mg, 650 mg, Rectal, Q6H PRN, Cathern Jered, APRN - CNS    famotidine (PEPCID) injection 20 mg, 20 mg, Intravenous, BID, Cathern Jered, APRN - CNS, 20 mg at 11/23/20 7105        Physician Interpretation    General EEG Report  EEG study was performed using the 10-20 electrode placement system in patient who was awake and cooperative at time of study. No abnormal behavior or movements noted during the study. Activation procedures were not performed. Type of EEG: routine inpatient EEG with video    Epileptiform Discharge   None appreciated    Non-Epileptiform Abnormality  Use low amplitude background activity at baseline with patient unresponsive during this time. No significant posterior alpha appreciated. During most of the study with patient lying eyes closed. Did not appreciate any sleep activity. Superimposed beta activity likely he had medication effect given the low amplitude beta across all leads. Isolated sharp discharges emanating C3 or P3 that did not have spike and wave pattern. No significant slowing was appreciated. Ictal  Not applicable    General Impression  Normal-appearing EEG with low amplitude fast background activity and isolated sharp discharges or primarily left central and parietal in location without significant spread. No clear epileptiform discharges. No significant slowing. The absence of epileptiform activity during EEG study does not exclude the possibility of seizures or epilepsy given limited duration of study and lack of epileptic type activity during study. Clinical correlation is indicated.     MD Justen Riosport

## 2020-11-24 ENCOUNTER — APPOINTMENT (OUTPATIENT)
Dept: GENERAL RADIOLOGY | Age: 35
DRG: 880 | End: 2020-11-24
Payer: MEDICARE

## 2020-11-24 ENCOUNTER — ANESTHESIA EVENT (OUTPATIENT)
Dept: ICU | Age: 35
DRG: 880 | End: 2020-11-24
Payer: MEDICARE

## 2020-11-24 ENCOUNTER — APPOINTMENT (OUTPATIENT)
Dept: NEUROLOGY | Age: 35
DRG: 880 | End: 2020-11-24
Payer: MEDICARE

## 2020-11-24 ENCOUNTER — ANESTHESIA (OUTPATIENT)
Dept: ICU | Age: 35
DRG: 880 | End: 2020-11-24
Payer: MEDICARE

## 2020-11-24 LAB
AADO2: 125.8 MMHG
ALBUMIN SERPL-MCNC: 3.4 G/DL (ref 3.5–5.2)
ALP BLD-CCNC: 70 U/L (ref 35–104)
ALT SERPL-CCNC: 21 U/L (ref 0–32)
ANION GAP SERPL CALCULATED.3IONS-SCNC: 9 MMOL/L (ref 7–16)
AST SERPL-CCNC: 20 U/L (ref 0–31)
B.E.: -1.9 MMOL/L (ref -3–3)
B.E.: -2.1 MMOL/L (ref -3–3)
BASOPHILS ABSOLUTE: 0.02 E9/L (ref 0–0.2)
BASOPHILS ABSOLUTE: 0.04 E9/L (ref 0–0.2)
BASOPHILS RELATIVE PERCENT: 0.3 % (ref 0–2)
BASOPHILS RELATIVE PERCENT: 0.5 % (ref 0–2)
BILIRUB SERPL-MCNC: 0.4 MG/DL (ref 0–1.2)
BUN BLDV-MCNC: 9 MG/DL (ref 6–20)
CALCIUM SERPL-MCNC: 8.7 MG/DL (ref 8.6–10.2)
CHLORIDE BLD-SCNC: 106 MMOL/L (ref 98–107)
CO2: 24 MMOL/L (ref 22–29)
COHB: 0.1 % (ref 0–1.5)
COHB: 0.4 % (ref 0–1.5)
CREAT SERPL-MCNC: 1.1 MG/DL (ref 0.5–1)
CRITICAL: ABNORMAL
CRITICAL: ABNORMAL
DATE ANALYZED: ABNORMAL
DATE ANALYZED: ABNORMAL
DATE OF COLLECTION: ABNORMAL
DATE OF COLLECTION: ABNORMAL
EOSINOPHILS ABSOLUTE: 0.19 E9/L (ref 0.05–0.5)
EOSINOPHILS ABSOLUTE: 0.2 E9/L (ref 0.05–0.5)
EOSINOPHILS RELATIVE PERCENT: 2.7 % (ref 0–6)
EOSINOPHILS RELATIVE PERCENT: 2.8 % (ref 0–6)
FIO2: 40 %
GFR AFRICAN AMERICAN: >60
GFR NON-AFRICAN AMERICAN: 56 ML/MIN/1.73
GLUCOSE BLD-MCNC: 95 MG/DL (ref 74–99)
HCO3: 21.7 MMOL/L (ref 22–26)
HCO3: 21.8 MMOL/L (ref 22–26)
HCT VFR BLD CALC: 31.2 % (ref 34–48)
HCT VFR BLD CALC: 31.7 % (ref 34–48)
HEMOGLOBIN: 9.5 G/DL (ref 11.5–15.5)
HEMOGLOBIN: 9.6 G/DL (ref 11.5–15.5)
HHB: 1.3 % (ref 0–5)
HHB: 2.1 % (ref 0–5)
IMMATURE GRANULOCYTES #: 0.02 E9/L
IMMATURE GRANULOCYTES #: 0.02 E9/L
IMMATURE GRANULOCYTES %: 0.3 % (ref 0–5)
IMMATURE GRANULOCYTES %: 0.3 % (ref 0–5)
LAB: ABNORMAL
LAB: ABNORMAL
LACTIC ACID: 0.8 MMOL/L (ref 0.5–2.2)
LACTIC ACID: 0.9 MMOL/L (ref 0.5–2.2)
LYMPHOCYTES ABSOLUTE: 1.48 E9/L (ref 1.5–4)
LYMPHOCYTES ABSOLUTE: 1.71 E9/L (ref 1.5–4)
LYMPHOCYTES RELATIVE PERCENT: 21.8 % (ref 20–42)
LYMPHOCYTES RELATIVE PERCENT: 22.9 % (ref 20–42)
Lab: ABNORMAL
Lab: ABNORMAL
MAGNESIUM: 2.2 MG/DL (ref 1.6–2.6)
MCH RBC QN AUTO: 23.3 PG (ref 26–35)
MCH RBC QN AUTO: 23.4 PG (ref 26–35)
MCHC RBC AUTO-ENTMCNC: 30.3 % (ref 32–34.5)
MCHC RBC AUTO-ENTMCNC: 30.4 % (ref 32–34.5)
MCV RBC AUTO: 76.5 FL (ref 80–99.9)
MCV RBC AUTO: 77.1 FL (ref 80–99.9)
METER GLUCOSE: 90 MG/DL (ref 74–99)
METER GLUCOSE: 90 MG/DL (ref 74–99)
METER GLUCOSE: 96 MG/DL (ref 74–99)
METHB: 0.3 % (ref 0–1.5)
METHB: 0.3 % (ref 0–1.5)
MODE: ABNORMAL
MODE: AC
MONOCYTES ABSOLUTE: 0.41 E9/L (ref 0.1–0.95)
MONOCYTES ABSOLUTE: 0.47 E9/L (ref 0.1–0.95)
MONOCYTES RELATIVE PERCENT: 6 % (ref 2–12)
MONOCYTES RELATIVE PERCENT: 6.3 % (ref 2–12)
NEUTROPHILS ABSOLUTE: 4.68 E9/L (ref 1.8–7.3)
NEUTROPHILS ABSOLUTE: 5.04 E9/L (ref 1.8–7.3)
NEUTROPHILS RELATIVE PERCENT: 67.3 % (ref 43–80)
NEUTROPHILS RELATIVE PERCENT: 68.8 % (ref 43–80)
O2 CONTENT: 15 ML/DL
O2 CONTENT: 15.4 ML/DL
O2 SATURATION: 97.9 % (ref 92–98.5)
O2 SATURATION: 98.7 % (ref 92–98.5)
O2HB: 97.5 % (ref 94–97)
O2HB: 98 % (ref 94–97)
OPERATOR ID: 2962
OPERATOR ID: 366
PATIENT TEMP: 37 C
PATIENT TEMP: 37 C
PCO2: 33.4 MMHG (ref 35–45)
PCO2: 33.8 MMHG (ref 35–45)
PDW BLD-RTO: 16.6 FL (ref 11.5–15)
PDW BLD-RTO: 16.6 FL (ref 11.5–15)
PEEP/CPAP: 8 CMH2O
PFO2: 2.78 MMHG/%
PH BLOOD GAS: 7.43 (ref 7.35–7.45)
PH BLOOD GAS: 7.43 (ref 7.35–7.45)
PHOSPHORUS: 2.7 MG/DL (ref 2.5–4.5)
PLATELET # BLD: 337 E9/L (ref 130–450)
PLATELET # BLD: 341 E9/L (ref 130–450)
PMV BLD AUTO: 8.8 FL (ref 7–12)
PMV BLD AUTO: 8.9 FL (ref 7–12)
PO2: 111 MMHG (ref 75–100)
PO2: 135.2 MMHG (ref 75–100)
POTASSIUM SERPL-SCNC: 3.7 MMOL/L (ref 3.5–5)
PROLACTIN: 7.94 NG/ML
RBC # BLD: 4.08 E12/L (ref 3.5–5.5)
RBC # BLD: 4.11 E12/L (ref 3.5–5.5)
RI(T): 1.13
RR MECHANICAL: 16 B/MIN
SODIUM BLD-SCNC: 139 MMOL/L (ref 132–146)
SOURCE, BLOOD GAS: ABNORMAL
SOURCE, BLOOD GAS: ABNORMAL
THB: 10.7 G/DL (ref 11.5–16.5)
THB: 11.1 G/DL (ref 11.5–16.5)
TIME ANALYZED: 2217
TIME ANALYZED: 443
TOTAL PROTEIN: 6.6 G/DL (ref 6.4–8.3)
VT MECHANICAL: 400 ML
WBC # BLD: 6.8 E9/L (ref 4.5–11.5)
WBC # BLD: 7.5 E9/L (ref 4.5–11.5)

## 2020-11-24 PROCEDURE — 2500000003 HC RX 250 WO HCPCS: Performed by: NURSE PRACTITIONER

## 2020-11-24 PROCEDURE — 94770 HC ETCO2 MONITOR DAILY: CPT

## 2020-11-24 PROCEDURE — 71045 X-RAY EXAM CHEST 1 VIEW: CPT

## 2020-11-24 PROCEDURE — C9254 INJECTION, LACOSAMIDE: HCPCS | Performed by: PSYCHIATRY & NEUROLOGY

## 2020-11-24 PROCEDURE — 2580000003 HC RX 258: Performed by: NURSE PRACTITIONER

## 2020-11-24 PROCEDURE — 80053 COMPREHEN METABOLIC PANEL: CPT

## 2020-11-24 PROCEDURE — 84100 ASSAY OF PHOSPHORUS: CPT

## 2020-11-24 PROCEDURE — 2500000003 HC RX 250 WO HCPCS: Performed by: INTERNAL MEDICINE

## 2020-11-24 PROCEDURE — 95816 EEG AWAKE AND DROWSY: CPT

## 2020-11-24 PROCEDURE — 6360000002 HC RX W HCPCS: Performed by: NURSE PRACTITIONER

## 2020-11-24 PROCEDURE — 6360000002 HC RX W HCPCS: Performed by: INTERNAL MEDICINE

## 2020-11-24 PROCEDURE — 99233 SBSQ HOSP IP/OBS HIGH 50: CPT | Performed by: NURSE PRACTITIONER

## 2020-11-24 PROCEDURE — 2000000000 HC ICU R&B

## 2020-11-24 PROCEDURE — 92611 MOTION FLUOROSCOPY/SWALLOW: CPT | Performed by: SPEECH-LANGUAGE PATHOLOGIST

## 2020-11-24 PROCEDURE — 85025 COMPLETE CBC W/AUTO DIFF WBC: CPT

## 2020-11-24 PROCEDURE — 94003 VENT MGMT INPAT SUBQ DAY: CPT

## 2020-11-24 PROCEDURE — 83735 ASSAY OF MAGNESIUM: CPT

## 2020-11-24 PROCEDURE — 74018 RADEX ABDOMEN 1 VIEW: CPT

## 2020-11-24 PROCEDURE — 6360000002 HC RX W HCPCS

## 2020-11-24 PROCEDURE — 31500 INSERT EMERGENCY AIRWAY: CPT

## 2020-11-24 PROCEDURE — 2580000003 HC RX 258: Performed by: PSYCHIATRY & NEUROLOGY

## 2020-11-24 PROCEDURE — 74230 X-RAY XM SWLNG FUNCJ C+: CPT

## 2020-11-24 PROCEDURE — 95816 EEG AWAKE AND DROWSY: CPT | Performed by: PSYCHIATRY & NEUROLOGY

## 2020-11-24 PROCEDURE — 83605 ASSAY OF LACTIC ACID: CPT

## 2020-11-24 PROCEDURE — 99231 SBSQ HOSP IP/OBS SF/LOW 25: CPT | Performed by: INTERNAL MEDICINE

## 2020-11-24 PROCEDURE — 2700000000 HC OXYGEN THERAPY PER DAY

## 2020-11-24 PROCEDURE — 6360000002 HC RX W HCPCS: Performed by: PSYCHIATRY & NEUROLOGY

## 2020-11-24 PROCEDURE — 82962 GLUCOSE BLOOD TEST: CPT

## 2020-11-24 PROCEDURE — 82805 BLOOD GASES W/O2 SATURATION: CPT

## 2020-11-24 PROCEDURE — 99233 SBSQ HOSP IP/OBS HIGH 50: CPT | Performed by: INTERNAL MEDICINE

## 2020-11-24 PROCEDURE — 31500 INSERT EMERGENCY AIRWAY: CPT | Performed by: ANESTHESIOLOGY

## 2020-11-24 RX ORDER — SUCCINYLCHOLINE CHLORIDE 20 MG/ML
INJECTION INTRAMUSCULAR; INTRAVENOUS
Status: COMPLETED
Start: 2020-11-24 | End: 2020-11-24

## 2020-11-24 RX ORDER — ACETAMINOPHEN 325 MG/1
650 TABLET ORAL EVERY 6 HOURS PRN
Status: DISCONTINUED | OUTPATIENT
Start: 2020-11-24 | End: 2020-11-25 | Stop reason: SDUPTHER

## 2020-11-24 RX ORDER — POLYETHYLENE GLYCOL 3350 17 G/17G
17 POWDER, FOR SOLUTION ORAL DAILY PRN
Status: DISCONTINUED | OUTPATIENT
Start: 2020-11-24 | End: 2020-11-25 | Stop reason: SDUPTHER

## 2020-11-24 RX ORDER — PROPOFOL 10 MG/ML
10 INJECTION, EMULSION INTRAVENOUS
Status: DISCONTINUED | OUTPATIENT
Start: 2020-11-24 | End: 2020-11-27

## 2020-11-24 RX ORDER — ACETAMINOPHEN 650 MG/1
650 SUPPOSITORY RECTAL EVERY 6 HOURS PRN
Status: DISCONTINUED | OUTPATIENT
Start: 2020-11-24 | End: 2020-11-25 | Stop reason: SDUPTHER

## 2020-11-24 RX ORDER — PROPOFOL 10 MG/ML
INJECTION, EMULSION INTRAVENOUS
Status: COMPLETED
Start: 2020-11-24 | End: 2020-11-24

## 2020-11-24 RX ORDER — NICOTINE POLACRILEX 4 MG
15 LOZENGE BUCCAL PRN
Status: DISCONTINUED | OUTPATIENT
Start: 2020-11-24 | End: 2020-11-29 | Stop reason: HOSPADM

## 2020-11-24 RX ORDER — SUCCINYLCHOLINE CHLORIDE 20 MG/ML
INJECTION INTRAMUSCULAR; INTRAVENOUS PRN
Status: DISCONTINUED | OUTPATIENT
Start: 2020-11-24 | End: 2020-11-25 | Stop reason: HOSPADM

## 2020-11-24 RX ORDER — GABAPENTIN 300 MG/1
600 CAPSULE ORAL NIGHTLY
Status: DISCONTINUED | OUTPATIENT
Start: 2020-11-24 | End: 2020-11-24

## 2020-11-24 RX ORDER — SODIUM CHLORIDE 0.9 % (FLUSH) 0.9 %
10 SYRINGE (ML) INJECTION EVERY 12 HOURS SCHEDULED
Status: DISCONTINUED | OUTPATIENT
Start: 2020-11-25 | End: 2020-11-25 | Stop reason: SDUPTHER

## 2020-11-24 RX ORDER — DEXTROSE MONOHYDRATE 50 MG/ML
100 INJECTION, SOLUTION INTRAVENOUS PRN
Status: DISCONTINUED | OUTPATIENT
Start: 2020-11-24 | End: 2020-11-29 | Stop reason: HOSPADM

## 2020-11-24 RX ORDER — SODIUM CHLORIDE 0.9 % (FLUSH) 0.9 %
10 SYRINGE (ML) INJECTION PRN
Status: DISCONTINUED | OUTPATIENT
Start: 2020-11-24 | End: 2020-11-25 | Stop reason: SDUPTHER

## 2020-11-24 RX ORDER — DEXTROSE MONOHYDRATE 25 G/50ML
12.5 INJECTION, SOLUTION INTRAVENOUS PRN
Status: DISCONTINUED | OUTPATIENT
Start: 2020-11-24 | End: 2020-11-29 | Stop reason: HOSPADM

## 2020-11-24 RX ADMIN — LEVETIRACETAM 500 MG: 5 INJECTION INTRAVENOUS at 09:04

## 2020-11-24 RX ADMIN — BARIUM SULFATE 15 ML: 0.81 POWDER, FOR SUSPENSION ORAL at 10:35

## 2020-11-24 RX ADMIN — LEVETIRACETAM 1000 MG: 10 INJECTION INTRAVENOUS at 18:42

## 2020-11-24 RX ADMIN — FAMOTIDINE 20 MG: 10 INJECTION INTRAVENOUS at 09:08

## 2020-11-24 RX ADMIN — SUCCINYLCHOLINE CHLORIDE 140 MG: 20 INJECTION, SOLUTION INTRAMUSCULAR; INTRAVENOUS at 20:47

## 2020-11-24 RX ADMIN — PROPOFOL 100 MG: 10 INJECTION, EMULSION INTRAVENOUS at 20:47

## 2020-11-24 RX ADMIN — PROPOFOL 50 MCG/KG/MIN: 10 INJECTION, EMULSION INTRAVENOUS at 23:13

## 2020-11-24 RX ADMIN — SODIUM CHLORIDE, PRESERVATIVE FREE 10 ML: 5 INJECTION INTRAVENOUS at 09:08

## 2020-11-24 RX ADMIN — DEXTROSE MONOHYDRATE 150 MG: 50 INJECTION, SOLUTION INTRAVENOUS at 09:29

## 2020-11-24 RX ADMIN — SODIUM CHLORIDE, PRESERVATIVE FREE 10 ML: 5 INJECTION INTRAVENOUS at 09:04

## 2020-11-24 RX ADMIN — SODIUM CHLORIDE, PRESERVATIVE FREE 10 ML: 5 INJECTION INTRAVENOUS at 20:14

## 2020-11-24 RX ADMIN — PROPOFOL 10 MCG/KG/MIN: 10 INJECTION, EMULSION INTRAVENOUS at 20:57

## 2020-11-24 RX ADMIN — SODIUM CHLORIDE: 9 INJECTION, SOLUTION INTRAVENOUS at 04:51

## 2020-11-24 RX ADMIN — DEXTROSE MONOHYDRATE 150 MG: 50 INJECTION, SOLUTION INTRAVENOUS at 20:57

## 2020-11-24 ASSESSMENT — PAIN SCALES - GENERAL
PAINLEVEL_OUTOF10: 0

## 2020-11-24 ASSESSMENT — PULMONARY FUNCTION TESTS
PIF_VALUE: 23
PIF_VALUE: 24
PIF_VALUE: 22

## 2020-11-24 NOTE — CONSULTS
[address] [facility]   Department of Internal Medicine   Internal Medicine Residency  CONSULT    Patient:  Vianey Navarro 38 y.o. female   MRN: [id number]       Date of Service: 11/23/2020      Chief Complaint: AMS 2/2 status epilepticus    History of Present Illness   History obtained from chart    The patient is a 38 y.o. female with a PMH of diabetes, schizoaffective disorder, PTSD, and borderline personality disorder, admitted from [facility] for seizure episodes. She was recently admitted at [facility] for seizures and falls, EEG was negative there. Transfer to [facility] on 11/21, she was having seizure-like activity, and was altered, hypoxic, brought to ED. She was hypoxic, altered, nystagmus and was intubated for airway protection. CT head was negative. Patient was transferred out of SICU, 11/23. An RRT was called at around 6 PM, after she had a fall, possibly from seizure, on exam she was responsive to pain stimuli, able to protect airway, ABG within normal limits, having seizure-like activity with rhythmic movement of all extremities. 1 mg of Ativan given, neurology was consulted and Vimpat 300, Keppra 500, was recommended. She did get a CT head which was negative for hemorrhage, after return from the CT she started having another seizure episode, where she received Ativan x2. Neurology was consulted again, Keppra 1000 mg was started. Patient was seizing from around 8 PM, on and off with intermittent response, she was still having seizure-like activity, and Versed drip was recommended and transferred to MICU for further management of seizures. Past Medical History:  No past medical history on file. Past Surgical History:    No past surgical history on file. Prior to Admission medications    Medication Sig Start Date End Date Taking?  Authorizing Provider   levETIRAcetam (KEPPRA) 500 MG tablet Take 500 mg by mouth every morning   Yes Historical Provider, MD   levETIRAcetam (KEPPRA) 500 MG tablet Take 1,000 mg by mouth every evening   Yes Historical Provider, MD   gabapentin (NEURONTIN) 300 MG capsule Take 300 mg by mouth 2 times daily. Patient takes at 9A and 3P    Historical Provider, MD   gabapentin (NEURONTIN) 600 MG tablet Take 600 mg by mouth nightly. Patient takes at [facility] Provider, MD   haloperidol (HALDOL) 5 MG tablet Take 5 mg by mouth nightly    Historical Provider, MD   lamoTRIgine (LAMICTAL) 25 MG tablet Take 25 mg by mouth daily    Historical Provider, MD   prazosin (MINIPRESS) 2 MG capsule Take 2 mg by mouth nightly    Historical Provider, MD   amLODIPine (NORVASC) 10 MG tablet Take 10 mg by mouth daily    Historical Provider, MD   FLUoxetine (PROZAC) 20 MG capsule Take 20 mg by mouth daily    Historical Provider, MD       Allergies:  Patient has no known allergies. Social History:   TOBACCO:   has no history on file for tobacco.  ETOH:   has no history on file for alcohol. OCCUPATION:      Family History:   No family history on file. REVIEW OF SYSTEMS:  Could not be done due to patient's mental status    Physical Exam     VS: BP (!) 160/87   Pulse 73   Temp 98.6 °F (37 °C) (Temporal)   Resp 18   Ht 5' 4\" (1.626 m)   Wt 251 lb 5.2 oz (114 kg)   LMP  (LMP Unknown)   SpO2 94%   BMI 43.14 kg/m²   General Appearance: Altered, wakes up intermittently for verbal stimuli   HEENT:    NC/AT, PERRL, no pallor no icterus, moist mucous membrane   Neck:   Supple, Normal thyroid; no carotid bruit or JVD   Resp:     CTAB, no wheezes no rhonchi   Heart:    RRR, S1 & S2 normal, no murmur, rub or gallop.    Abdomen:     Soft, non-tender, BS +, no organomegaly   Extremities:  Rhythmic twitching movements   Pulses:   2+ and symmetric all extremities   Skin:   Skin color, texture, turgor normal, no rashes or lesions   Neurologic:  Altered and drowsy     Labs       CBC:   Lab Results   Component Value Date    WBC 7.2 11/23/2020    RBC 4.18 11/23/2020    HGB 9.9 11/23/2020    HCT 31.5 11/23/2020     11/23/2020    MCV 75.4 11/23/2020     BMP:    Lab Results   Component Value Date     11/23/2020    K 3.76 11/23/2020    K 3.5 11/22/2020     11/23/2020    CO2 24 11/23/2020    BUN 7 11/23/2020    CREATININE 1.1 11/23/2020    GLUCOSE 106 11/23/2020    CALCIUM 8.7 11/23/2020     Hepatic Function Panel:    Lab Results   Component Value Date    ALKPHOS 79 11/23/2020    AST 19 11/23/2020    ALT 20 11/23/2020    PROT 6.5 11/23/2020    LABALBU 3.6 11/23/2020    BILITOT 0.4 11/23/2020     Cardiac Enzymes:   Lab Results   Component Value Date    CKTOTAL 119 11/23/2020    CKTOTAL 47 11/22/2020    CKTOTAL 34 11/22/2020    TROPONINI <0.01 11/22/2020     PT/INR:  No results found for: PROTIME, INR  TSH:  No results found for: TSH  Notable Cultures:       Culture  Date sent  Result    Nasal      Sputum       Sputum Gram Stain      Respiratory Panel      Urine Strep pneumonia Ag        Urine Legionella Ag        Blood        Urine      Wound          Antibiotic  Days  Day started                                       Oxygen:   Nasal cannula L/min     Face mask %     Reservoirs mask %       ABG   Vent Settings     PH   Mode      PCO2   TV      PO2   RR      HCO3   PS      Sat%   PEEP        FIO2        P/F          Lines:  Site  Day  Date inserted     TLC              PICC              Arterial line              Peripheral line                           Imaging studies:  Ct Head Wo Contrast    Result Date: 11/23/2020  EXAMINATION: CT OF THE HEAD WITHOUT CONTRAST  11/23/2020 7:37 pm TECHNIQUE: CT of the head was performed without the administration of intravenous contrast. Dose modulation, iterative reconstruction, and/or weight based adjustment of the mA/kV was utilized to reduce the radiation dose to as low as reasonably achievable.  COMPARISON: 11/22/2020 HISTORY: ORDERING SYSTEM PROVIDED HISTORY: s/p seizure TECHNOLOGIST PROVIDED HISTORY: Reason for exam:->s/p seizure Has a \"code stroke\" or \"stroke alert\" been called? ->No What reading provider will be dictating this exam?->CRC FINDINGS: BRAIN/VENTRICLES: There is no acute intracranial hemorrhage, mass effect or midline shift. No abnormal extra-axial fluid collection. The gray-white differentiation is maintained without evidence of an acute infarct. There is no evidence of hydrocephalus. ORBITS: The bilateral orbits demonstrate no acute abnormality. SINUSES: Minimal mucoperiosteal thickening of the left maxillary and left sphenoid sinuses is seen. SOFT TISSUES/SKULL:  No acute abnormality of the visualized skull or soft tissues. No acute intracranial abnormality. MRI may be obtained if clinically indicated. Ct Head Wo Contrast    Result Date: 11/22/2020  EXAMINATION: CT OF THE HEAD WITHOUT CONTRAST  11/22/2020 9:01 am TECHNIQUE: CT of the head was performed without the administration of intravenous contrast. Dose modulation, iterative reconstruction, and/or weight based adjustment of the mA/kV was utilized to reduce the radiation dose to as low as reasonably achievable. COMPARISON: None. HISTORY: ORDERING SYSTEM PROVIDED HISTORY: seizure TECHNOLOGIST PROVIDED HISTORY: Has a \"code stroke\" or \"stroke alert\" been called? ->No Reason for exam:->seizure What reading provider will be dictating this exam?->CRC FINDINGS: BRAIN/VENTRICLES: There is no acute intracranial hemorrhage, mass effect or midline shift. No abnormal extra-axial fluid collection. The gray-white differentiation is maintained without evidence of an acute infarct. There is no evidence of hydrocephalus. ORBITS: The visualized portion of the orbits demonstrate no acute abnormality. SINUSES: Small amount of fluid in the sphenoid sinus. Trace left maxillary sinus mucosal thickening. Mastoids are aerated. SOFT TISSUES/SKULL:  No acute abnormality of the visualized skull or soft tissues. No acute intracranial abnormality.     Xr Chest Portable    Result Date: 11/23/2020  EXAMINATION: ONE XRAY VIEW OF THE CHEST 11/23/2020 6:15 pm COMPARISON: November 22 HISTORY: ORDERING SYSTEM PROVIDED HISTORY: r/o aspiration pneumonitis TECHNOLOGIST PROVIDED HISTORY: Reason for exam:->r/o aspiration pneumonitis What reading provider will be dictating this exam?->CRC FINDINGS: Status post extubation. Decreased inspiration. Resolution of right basilar atelectasis since the prior examination. No new areas of airspace consolidation or pleural effusions. Pulmonary vasculature is within normal limits. Decreased inspiration with no radiographic evidence of acute cardiopulmonary disease. Xr Chest Portable    Result Date: 11/22/2020  EXAMINATION: ONE XRAY VIEW OF THE CHEST 11/22/2020 8:36 am COMPARISON: 11/22/2020 HISTORY: ORDERING SYSTEM PROVIDED HISTORY: intubation TECHNOLOGIST PROVIDED HISTORY: Reason for exam:->intubation What reading provider will be dictating this exam?->CRC FINDINGS: Endotracheal tube tip projects 4 cm superior to the master. Right IJ catheter tip is in the superior vena cava. Nasogastric tube tip projects off the edge of the film. There is patchy right lung base atelectasis or infiltrate. No pneumothorax or effusion is seen. No acute osseous abnormality. Endotracheal tube tip projects 4 cm to the master. Right lung base atelectasis or infiltrate    Xr Chest Portable    Result Date: 11/22/2020  EXAMINATION: ONE XRAY VIEW OF THE CHEST 11/22/2020 7:48 am COMPARISON: None. HISTORY: ORDERING SYSTEM PROVIDED HISTORY: seizure TECHNOLOGIST PROVIDED HISTORY: Reason for exam:->seizure What reading provider will be dictating this exam?->CRC FINDINGS: Central venous catheter tip is at SVC/right atrial junction. No pneumothorax seen. There are bilateral infiltrates which predominate in the mid to lower lungs. I cannot confirm pleural effusion. There is no pneumothorax. Bilateral infiltrates, mostly in mid to lower lungs.           Resident's Assessment & Plan Assessment  Status epilepticus  Altered mental status 2/2 postictal state  Fall  Acute hypoxic respiratory failure 2/2 AMS  History of schizoaffective, PTSD, borderline disorder  Hypertension    Plan  Continue Versed drip  Follow EEG  Follow neurology recommendations  CT head unremarkable for hemorrhage  Monitor for worsening respiratory status  Monitor end-tidal CO2  Responds well to Versed bolus while seizing  Patient is having seizure-like episodes even on Versed drip  Her glucose levels were within normal limits  Continue Vimpat, Keppra  Seizure precautions  Aspiration precautions    1. Code status: Full    2. Peptic ulcer prophylaxis: Pepcid    3. DVT Prophylaxis: Lovenox    4. Lines / tubes: Right femoral line    5. Disposition: MICU Garnetta Olszewski, M.D. Internal Medicine Resident - PGY 2    Attending Physician: [address] [place] [address]  Department of Pulmonary, Critical Care and Sleep Medicine  [address] [facility]  Department of Internal Medicine      During multidisciplinary team rounds Renaldo Salcedo is a 38 y.o. female was seen, examined and discussed. This is confirmation that I have personally seen and examined the patient and that the key elements of the encounter were performed by me (> 85 % time). The medications & laboratory data was discussed and adjusted where necessary. The radiographic images were reviewed or with radiologist or consultant if felt dis-concordant with the exam or history. The above findings were corroborated, plans confirmed and changes made if needed. Family is updated at the bedside as available. Key issues of the case were discussed among consultants. Critical Care time is documented if appropriate.       David Lacey, , FACP, FCCP, [place],
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
## 2020-11-24 NOTE — PROGRESS NOTES
[facility]  Internal Medicine Residency Program  Progress Note - House Team 2    Patient:  Silvia Lake 38 y.o. female MRN: [id number]     Date of Service: 11/24/2020     CC: had concerns including Seizures (Seizures from generations, per generations pseudoseizures. ). Overnight events: Patient was having recurrent seizures starting yesterday evening. She was unassisted in the bathroom status post fall: CT head unremarkable. Vimpat and Keppra was started initially for the recurrent seizures, with minimal response. Another Keppra bolus 1000 mg given Ativan 1 mg x 2, again with minimal response. Followed by Versed 2 mg x 2, still seizure and with twitching and nystagmus. Patient was transferred to the MICU for further management. Subjective     Patient seen and examined. Vital signs stable. Eyes open, AAOx3, following commands. No muscular twitching. Complains of a right-sided headache today; states she has a history of chronic migraines. Failed bedside swallow study this morning. Plan for  today. P.o. medications on hold due to n.p.o. On IV fluids, normal saline at 75 mL an hour. On Versed drip at 2 mL an hour and weaning off. On Keppra 1000 mg p.m., 500 mg a.m. IV  On Vimpat 150 mg twice daily IV. Patient denies chest pain, SOB, fever, chills, abdominal pain, numbness, tingling, muscular twitching. Objective     Physical Exam:  · Vitals: /81   Pulse 77   Temp 98.1 °F (36.7 °C) (Bladder)   Resp 13   Ht 5' 4\" (1.626 m)   Wt 251 lb 5.2 oz (114 kg)   LMP  (LMP Unknown)   SpO2 100%   BMI 43.14 kg/m²     I & O - 24hr: I/O this shift: In: 508 [I.V.:508]  · Out: 185 [Urine:185]   · General Appearance: alert, appears stated age, cooperative, no distress and morbidly obese  · HEENT:  Head: Normocephalic, no lesions, without obvious abnormality. No tongue biting. Pupils equal and reactive to light bilaterally.  Mild left-beating nystagmus appreciated. · Neck: no adenopathy, no carotid bruit, no JVD, supple, symmetrical, trachea midline and thyroid not enlarged, symmetric, no tenderness/mass/nodules  · Lung: clear to auscultation bilaterally, no wheezing, rales, rhonchi. · Heart: regular rate and rhythm, S1, S2 normal, no murmur, click, rub or gallop  · Abdomen: soft, non-tender; bowel sounds normal; no masses,  no organomegaly  · Extremities:  extremities normal, atraumatic, no cyanosis or edema  · Musculokeletal: No joint swelling, no muscle tenderness. ROM normal in all joints of extremities. · Neurologic: Alert and oriented x 3, MSR are 5/5 in BL UE and LE in all planes of motion, DTR in patellar, bicipital are 2/4 BL, no babinski present BL, CN II-XII grossly intact, Sensation intact BL in UE and LE,   Subject  Pertinent Labs & Imaging Studies   andrea  CBC:   Lab Results   Component Value Date    WBC 7.5 11/24/2020    RBC 4.11 11/24/2020    HGB 9.6 11/24/2020    HCT 31.7 11/24/2020    MCV 77.1 11/24/2020    MCH 23.4 11/24/2020    MCHC 30.3 11/24/2020    RDW 16.6 11/24/2020     11/24/2020    MPV 8.8 11/24/2020     CMP:    Lab Results   Component Value Date     11/24/2020    K 3.7 11/24/2020    K 3.5 11/22/2020     11/24/2020    CO2 24 11/24/2020    BUN 9 11/24/2020    CREATININE 1.1 11/24/2020    GFRAA >60 11/24/2020    LABGLOM 56 11/24/2020    GLUCOSE 95 11/24/2020    PROT 6.6 11/24/2020    LABALBU 3.4 11/24/2020    CALCIUM 8.7 11/24/2020    BILITOT 0.4 11/24/2020    ALKPHOS 70 11/24/2020    AST 20 11/24/2020    ALT 21 11/24/2020       Resident's Assessment and Plan     Robb Garland is a 38 y.o. female with PMH DM (not on meds), schizoaffective disorder, PTSD, borderline personality disorder, presented to ED from [facility] facility d/t seizures and admitted to SICU for further management. 1. Acute Encephalopathy 2/2 Seizures vs pseudoseizures.   Patient states she has history of seizures since childhood and recently -continue to monitor off antibiotics      5. Microcytic Anemia likely 2/2 PATRICIO   -No baseline hemoglobin on file   -Hemoglobin down trended from 11.3->9.4->9.9->9.6   -Fe 41, Ferritin 18, TIBC 374, iron saturation 11    -Continue to follow H&H with daily CBC   -Follow-up peripheral blood smear    6. History of schizoaffective disorder, PTSD, borderline personality disorder.    -Holding home medications haldol 5 mg OD, prazosin 2 mg OD, fluoxetine 20 mg OD due to n.p.o.   -Psychiatry following     7. History of HTN   -BP stable   -Holding norvasc 10 mg daily today due to n.p.o.   -Continue Lopressor 5 mg IV every 6 hours as needed for SBP greater than 165. Do not administer if heart rate is less than 65   -Hydralazine 10 mg IV Q4 hrs prn for high BP, SBP>140 mmHg.     8. History of DM, not on home medications   -Last Hemoglobin A1c 9.4% on 11/23/2020   -BG range:    -Currently NPO   -hypoglycemic protocol    -continue to monitor BG,       PT/OT evaluation: not indicated at this time  DVT prophylaxis/ GI prophylaxis: lovenox/pepcid   Disposition: continue MICU management     Queen Gabrielle MD, PGY-1  Attending physician: Dr. Maycol Delgado

## 2020-11-24 NOTE — PROCEDURES
EEG Report  Renaldo Salcedo is a 38 y.o. female      Appointment Date 11/24/2020   Appointment Time 3:00pm   Facility Location [facility] EEG Number [id number]   Type of Study Portable Floor 4424     Technical Specifications  Technician [facility] of consciousness Awake/resting   Sleep deprived? no   Hyperventilation tested? no   Photic stim tested? yes   EEG recording Standard 10-20 electrode placement    Duration of recording 26 mins   EEG complete? Yes         Clinical History  As per prior EEG done yesterday. Patient had increased seizure activity reported on floor and subsequently transferred to ICU for close observation and management. Currently on Versed drip for seizure control given failure on the other medications. She is on Vimpat and Keppra along with Versed drip.     Medications    Current Facility-Administered Medications:     glucose (GLUTOSE) 40 % oral gel 15 g, 15 g, Oral, PRN, Daniel Villanueva MD    dextrose 50 % IV solution, 12.5 g, Intravenous, PRN, Daniel Villanueva MD    glucagon (rDNA) injection 1 mg, 1 mg, Intramuscular, PRN, Daniel Villanueva MD    dextrose 5 % solution, 100 mL/hr, Intravenous, PRN, Daniel Villanueva MD    LORazepam (ATIVAN) injection 1 mg, 1 mg, Intravenous, Q2H PRN, HARRISON Walker, 1 mg at 11/23/20 2006    ipratropium-albuterol (DUONEB) nebulizer solution 1 ampule, 1 ampule, Inhalation, Q4H PRN, HARRISON Walker - CNS    amLODIPine (NORVASC) tablet 10 mg, 10 mg, Oral, Daily, HARRISON Walker - CNS    FLUoxetine (PROZAC) capsule 20 mg, 20 mg, Oral, Daily, HARRISON Walker - CNS    gabapentin (NEURONTIN) capsule 300 mg, 300 mg, Oral, BID, HARRISON Walker    lamoTRIgine (LAMICTAL) tablet 25 mg, 25 mg, Oral, Daily, HARRISON Walker    prazosin (MINIPRESS) capsule 2 mg, 2 mg, Oral, Nightly, HARRISON Walker - CNS    hydrALAZINE (APRESOLINE) injection 10 mg, 10 mg, Intravenous, Q4H PRN, HARRISON Walker - CNS   0.9 % sodium chloride infusion, , Intravenous, Continuous, Lloyd Rana, APRN - CNS, Last Rate: 75 mL/hr at 11/24/20 0451    acetaminophen (TYLENOL) tablet 650 mg, 650 mg, Oral, Q6H PRN **OR** acetaminophen (TYLENOL) suppository 650 mg, 650 mg, Rectal, Q6H PRN, Lloyd Rana, APRN - CNS    [COMPLETED] lacosamide (VIMPAT) 300 mg in dextrose 5 % 80 mL IVPB, 300 mg, Intravenous, Once, Stopped at 11/23/20 2007 **FOLLOWED BY** lacosamide (VIMPAT) 150 mg in dextrose 5 % 65 mL IVPB, 150 mg, Intravenous, BID, Dandre Faust MD, Stopped at 11/24/20 1000    levetiracetam (KEPPRA) 1000 mg/100 mL IVPB, 1,000 mg, Intravenous, QPM, Ilana Mathew MD, Stopped at 11/23/20 2006    levetiracetam (KEPPRA) 500 mg/100 mL IVPB, 500 mg, Intravenous, QAM, Ilana Mathew MD, Stopped at 11/24/20 0929    LORazepam (ATIVAN) injection 1 mg, 1 mg, Intravenous, Once, Symone Durand MD, Stopped at 11/23/20 2251    midazolam (VERSED) 100 mg in dextrose 5 % 100 mL infusion, 1 mg/hr, Intravenous, Continuous, Symone Durand MD, Last Rate: 1 mL/hr at 11/24/20 1330, 1 mg/hr at 11/24/20 1330    sodium chloride flush 0.9 % injection 10 mL, 10 mL, Intravenous, 2 times per day, Lloyd Rana, APRN - CNS, 10 mL at 11/24/20 5271    sodium chloride flush 0.9 % injection 10 mL, 10 mL, Intravenous, PRN, Lloyd Rana, APRN - CNS, 10 mL at 11/24/20 0908    polyethylene glycol (GLYCOLAX) packet 17 g, 17 g, Oral, Daily PRN, Lloyd Rana, APRN - CNS    promethazine (PHENERGAN) tablet 12.5 mg, 12.5 mg, Oral, Q6H PRN **OR** ondansetron (ZOFRAN) injection 4 mg, 4 mg, Intravenous, Q6H PRN, Lloyd Rana, APRN - CNS    enoxaparin (LOVENOX) injection 40 mg, 40 mg, Subcutaneous, Daily, Lloyd Rana, APRN - CNS, 40 mg at 11/23/20 0919    metoprolol (LOPRESSOR) injection 5 mg, 5 mg, Intravenous, Q6H PRN, Lloyd Rana, APRN - CNS, 5 mg at 11/23/20 0919    famotidine (PEPCID) injection 20 mg, 20 mg, Intravenous, BID, Lloyd Rana, APRN - CNS, 20 mg at 11/24/20 0908        Physician Interpretation    General EEG Report  EEG study was performed using the 10-20 electrode placement system in patient who was awake and cooperative at time of study. No abnormal behavior or movements noted during the study. Activation procedures included photic. Type of EEG:   Routine inpatient EEG G with video done at bedside. Epileptiform Discharge   None appreciated    Non-Epileptiform Abnormality  Low amplitude background activity throughout the study in patient who was awake and drowsy throughout the study. There was no significant slowing appreciated. No abnormal discharges. No spike and wave activity seen. Small isolated sharps observed was nonspecific. Ictal  Bilateral a couple    General Impression  Normal awake and drowsy EEG with note of low amplitude background with superimposed beta suggesting medication effect. No epileptiform activity or significant slowing appreciated. Clinical correlation indicated. If further continue report of seizure activity will consider for prolonged EEG to capture seizure events on EEG.     Vangie West

## 2020-11-24 NOTE — PROGRESS NOTES
Spoke to residents about pts intermittent tremors in her hands and legs. She has less nystagmus than when she arrived to the unit. HR/BP have been stable. Will continue to monitor.

## 2020-11-24 NOTE — PROGRESS NOTES
Home Zavaleta is a 38 y.o.  female     Neurology is following for seizure    PMH: schizoaffective disorder, PTSD, borderline personality disorder, anxiety    Patient presented to ED from [facility] due to seizures. She was recently admitted at [facility] for seizure and falls. EEG at that time was unremarkable for any epileptiform changes. She was transferred to [facility] due to suicidal ideation. On 11/21 she was found to have seizure like activity with generalized twitching and was transferred to ED. In the ED on 11/21, she was found to have low O2 sat of 82%, tachypnea and altered mental status. She was given ativan, versed and started on propofol drip and briefly intubated to protect airway and later extubated on 11/22. CT head unremarkable for any acute process x2. EEG was done Monday, pending result. On initial neuro consult, she stated that she does not remember any shaking movement or seizure like activity. She remembers waking up in the hospital with tube in her mouth from [facility] facility. She reported headache and nausea with increased sensation on the right side both upper and lower extremities. She denies any fecal or urinary incontinence and blurry vision    Reported history of seizure activity since childhood   Maintained on Keppra and Lamictal;    Previously on Lamictal and Vimpat however changed due to insurance issues. With Keppra therapy she has not noticed any improvement in her seizure control and indicates that she has been having seizure activity about once or twice monthly.   Same pattern to seizure activity    Vital signs are stable at present time     Today ROS is limited as patient is currently shaking and nonverbal for me    No family present at bedside    Objective:     /81   Pulse 77   Temp 98.1 °F (36.7 °C) (Bladder)   Resp 13   Ht 5' 4\" (1.626 m)   Wt 251 lb 5.2 oz (114 kg)   LMP  (LMP Unknown)   SpO2 100%   BMI 43.14 kg/m² General appearance: nonverbal, eyes open intermittently, generalized shaking, no obvious distress   Head: normocephalic, without obvious abnormality, atraumatic  Eyes: conjunctivae/corneas clear.  .  Neck:  supple, symmetrical, trachea midline  Heart: NSR on tele   Extremities: normal, atraumatic, no cyanosis or edema  Pulses: 2+ and symmetric  Skin: color, texture, turgor normal---no rashes or lesions      Mental Status: Opens eyes, does not follow commands, nonverbal; flat affect    Poor attention/concentration  Intact fundus of knowledge--unable to assess due to condition  Intact memories--unable to assess due to condition    Speech/Language: nonverbal for me    Cranial Nerves: CN 2-12 limited as pt did not follow commands for me  I: smell NA   II: visual acuity  NA   II: visual fields  blinks to threat   II: pupils PERRL   III,VII: ptosis None   III,IV,VI: extraocular muscles   no gaze preference   V: mastication    V: facial light touch sensation  Appears to appreciate touch    V,VII: corneal reflex  Present   VII: facial muscle function - upper  Normal   VII: facial muscle function - lower Normal   VIII: hearing  blinks to loud claps   IX: soft palate elevation     IX,X: gag reflex Present   XI: trapezius strength     XI: sternocleidomastoid strength    XI: neck extension strength     XII: tongue strength       Motor:  Limited motor exam--- does not follow commands  Obese bulk  Increased tone to BLE  Generalized shaking right side more than left; RLE more than others at times    Sensory:  Withdraws to pain in all    Coordination:   Unable to complete due to patient condition    Gait:  Unable to complete due to patient condition    DTR:   Right Brachioradialis reflex 3+  Left Brachioradialis reflex 3+  Right Biceps reflex 2+  Left Biceps reflex 2+  Right Triceps reflex 2+  Left Triceps reflex 2+  Right Quadriceps reflex 1+  Left Quadriceps reflex 1+  Right Achilles reflex 0  Left Achilles reflex 0     No

## 2020-11-24 NOTE — PROGRESS NOTES
[facility] Quality Flow/Interdisciplinary Rounds Progress Note        Quality Flow Rounds held on November 24, 2020    Disciplines Attending: Bedside RN, charge nurse, , unit leadership, PT, OT    Obion Lush was admitted on 11/22/2020  6:21 AM    Anticipated Discharge Date:  Expected Discharge Date: 11/27/20    Disposition:    Vikash Score:  Vikash Scale Score: 17    Readmission Risk              Risk of Unplanned Readmission:        17           Discussed patient goal for the day, patient clinical progression, and barriers to discharge. The following Goal(s) of the Day/Commitment(s) have been identified:  Continue to monitor for seizures; diet per video swallow.       Kyle Siddiqui  November 24, 2020

## 2020-11-24 NOTE — SIGNIFICANT EVENT
Rapid Response Team Note  Date of event: 11/23/2020   Time of event: 06:05PM  Tico Capellan 38y.o. year old female   YOB: 1985   Admit date:  11/22/2020   Location: 52/5210-A   Witnessed? : [x]Yes  [] No  Monitored? : [x]Yes  [] No  Code status: [x] Full  [] DNR-CCA  []DNR-CC  ______________________________________________________________________  Reason for RRT:     RRT called for seizure  Subjective:   Upon arrival the pt was face down on the floor of bathroom. She was not responsive to chest rub or pain stimulation, her eyes and mouth were closed. She was able to protect her airway, she's on 3 L nasal cannula oxygen, saturation above 96%, stat ABG showed pH 7.461/PCO2 31.4 /PO2 176.4 /HCO3 21.9. According to staff, patient was as usual until 10 minutes ago, she was using toilet by self and when nurse aid came back in 2 minutes, found her down with forehead on the floor. She was also having seizure activities with shaking movement of all extremities, 1 mg of PRN Ativan was given, seizing stopped before RRT team arrived. Patient was just transferred from the SICU 1 hour ago after briefly intubated and extubated yesterday. She failed her bedside swallow and pending formal swallowing evaluation. She is on PO Keppra 500 mg AM and 1000 mg PM, PO Lamictal 25 mg daily. Neurology has been consulted, she was started on IV Vimpat 300 mg loading dose with maintenance dose 150mg BID, pending EEG result from this morning. She did not receive her oral seizure medications as well as her Vimpat. Discussed with neurology , will give her her Vimpat loading dose right now and switch Keppra to IV. Called pharmacy there is no IV form of Lamictal.  Patient was intermittently having some shaking movement over her extremities. Stat CT head unremarkable. Will continue monitor, if she continued seizing, may need versed drip.       Objective:   Vital signs: BP: 137/102    /RR: 20   /HR: 72    /Temp:  98.6 LABALBU 3.6 11/23/2020    CALCIUM 8.7 11/23/2020    BILITOT 0.4 11/23/2020    ALKPHOS 79 11/23/2020    AST 19 11/23/2020    ALT 20 11/23/2020         Teams Assessment and Plan:  ?  1. Seizure activity, postictal status  - Continue IV Vimpat 300 mg loading dose with maintenance dose 150mg BID  -Continue IV Keppra 500 a.m. and 1000 p.m.  -IV Ativan 1 mg PRN every 2 hours  - Pending EEG result   -Discussed with neurologist  -Seizure precaution, aspiration precaution   -May need Versed drip if seizure continues  -Check prolactin level, CK, lactic acid, CMP and CBC     2. S/p Fall   CT head unremarkable    ? Disposition:  [x] No transfer   [] Transfer to monitor floor  [] Transfer to: [] MICU [] NICU [] CCU [] SICU        Patients family updated: [] Yes  [x] No   Discussed with:  [] Critical Care Intensivist:         [x] Primary Care Provider: Unice Galeazzi, MD resident on-call      [x] Other: Neurologist Dr. Slaud Boyle ?     Effie Poole MD   PGY-3 Internal Medicine   7:55 PM  Attending Physician: Dr. Salud Boyle

## 2020-11-24 NOTE — PROCEDURES
[address] [facility]   Department of Internal Medicine   Internal Medicine Residency  MICU Progress Note    Patient:  Nabil Norris 38 y.o. female   MRN: [id number]       Date of Service: 2020    Allergy: Patient has no known allergies. Subjective     I saw and examined the  Patient in the AM today, she was on versed drip, awake and oriented x3. She mentioned that she is not taking any drugs or using alcohol. Other ROS is unremarkable    Objective     TEMPERATURE:  Current - Temp: 98.8 °F (37.1 °C); Max - Temp  Av.3 °F (36.8 °C)  Min: 97.7 °F (36.5 °C)  Max: 98.8 °F (37.1 °C)    RESPIRATIONS RANGE: Resp  Av.3  Min: 11  Max: 39    PULSE RANGE: Pulse  Av.1  Min: 66  Max: 79    BLOOD PRESSURE RANGE:  Systolic (52KYR), AJB:159 , Min:120 , XFM:581   ; Diastolic (07VNG), DWU:27, Min:80, Max:132      PULSE OXIMETRY RANGE: SpO2  Av.2 %  Min: 78 %  Max: 100 %    Physical Exam:  · I & O - 24hr: No intake/output data recorded. · General Appearance: alert, appears stated age and cooperative  · HEENT:  Head: Normocephalic, no lesions, without obvious abnormality. · Neck: no adenopathy, no carotid bruit, no JVD, supple, symmetrical, trachea midline and thyroid not enlarged, symmetric, no tenderness/mass/nodules  · Lung: clear to auscultation bilaterally  · Heart: regular rate and rhythm, S1, S2 normal, no murmur, click, rub or gallop  · Abdomen: soft, non-tender; bowel sounds normal; no masses,  no organomegaly  · Extremities:  extremities normal, atraumatic, no cyanosis or edema  · Musculokeletal: No joint swelling, no muscle tenderness. ROM normal in all joints of extremities.    · Neurologic: Mental status: Alert, oriented, thought content appropriate      Medications     Continuous Infusions:   dextrose      sodium chloride 75 mL/hr at 20 0451    midazolam 1 mg/hr (20 1330)     Scheduled Meds:   amLODIPine  10 mg Oral Daily    FLUoxetine  20 mg Oral Daily    gabapentin 300 mg Oral BID    lamoTRIgine  25 mg Oral Daily    prazosin  2 mg Oral Nightly    lacosamide (VIMPAT) IVPB  150 mg Intravenous BID    levetiracetam  1,000 mg Intravenous QPM    levetiracetam  500 mg Intravenous QAM    LORazepam  1 mg Intravenous Once    sodium chloride flush  10 mL Intravenous 2 times per day    enoxaparin  40 mg Subcutaneous Daily    famotidine (PEPCID) injection  20 mg Intravenous BID     PRN Meds: glucose, dextrose, glucagon (rDNA), dextrose, LORazepam, ipratropium-albuterol, hydrALAZINE, acetaminophen **OR** acetaminophen, sodium chloride flush, polyethylene glycol, promethazine **OR** ondansetron, metoprolol  Nutrition:   NPO    Labs and Imaging Studies     CBC:   Recent Labs     11/23/20 1850 11/24/20 0442 11/24/20  1339   WBC 7.2 7.5 6.8   RBC 4.18 4.11 4.08   HGB 9.9* 9.6* 9.5*   HCT 31.5* 31.7* 31.2*   MCV 75.4* 77.1* 76.5*   MCH 23.7* 23.4* 23.3*   MCHC 31.4* 30.3* 30.4*   RDW 16.6* 16.6* 16.6*    341 337   MPV 9.2 8.8 8.9       BMP:    Recent Labs     11/23/20  0502  11/23/20  1850 11/23/20 2035 11/24/20  0442     --  137  --  139   K 3.5   < > 3.8 3.76 3.7     --  103  --  106   CO2 26  --  24  --  24   BUN 9  --  7  --  9   CREATININE 1.0  --  1.1*  --  1.1*   GLUCOSE 103*  --  106*  --  95   CALCIUM 8.2*  --  8.7  --  8.7   PROT 6.3*  --  6.5  --  6.6   LABALBU 3.6  --  3.6  --  3.4*   BILITOT 0.4  --  0.4  --  0.4   ALKPHOS 66  --  79  --  70   AST 14  --  19  --  20   ALT 18  --  20  --  21    < > = values in this interval not displayed. LIVER PROFILE:   Recent Labs     11/23/20  0502 11/23/20  1850 11/24/20  0442   AST 14 19 20   ALT 18 20 21   BILITOT 0.4 0.4 0.4   ALKPHOS 66 79 70       PT/INR:   No results for input(s): PROTIME, INR in the last 72 hours. APTT:   No results for input(s): APTT in the last 72 hours.     Fasting Lipid Panel:    No results found for: CHOL, TRIG, HDL    Cardiac Enzymes:    Lab Results   Component Value Date CKTOTAL 119 11/23/2020    CKTOTAL 47 11/22/2020    CKTOTAL 34 11/22/2020    TROPONINI <0.01 11/22/2020       Notable Cultures:      Blood cultures   Blood Culture, Routine   Date Value Ref Range Status   11/22/2020 24 Hours no growth  Preliminary     Respiratory cultures No results found for: RESPCULTURE No results found for: LABGRAM  Urine No results found for: LABURIN  Legionella No results found for: LABLEGI  C Diff PCR No results found for: CDIFPCR  Wound culture/abscess: No results for input(s): WNDABS in the last 72 hours. Tip culture:No results for input(s): CXCATHTIP in the last 72 hours. Antibiotic  Days  Day started                                Oxygen:     Vent Information  $Ventilation: $Initial Day  Skin Assessment: Clean, dry, & intact  Suction Catheter Diameter: 14  Equipment ID: [id number]  Vent Type: 980  Vent Mode: PS  Vt Ordered: 0 mL  Rate Set: 0 bmp  Peak Flow: 0 L/min  Pressure Support: 10 cmH20  FiO2 : 40 %  SpO2: 99 %  SpO2/FiO2 ratio: 250  Sensitivity: 2  PEEP/CPAP: 5  I Time/ I Time %: 0 s  Humidification Source: Heated wire  Humidification Temp: 37  Humidification Temp Measured: 37  Circuit Condensation: Drained    Additional Respiratory  Assessments  Pulse: 69  Resp: (!) 39  SpO2: 99 %  $End Tidal CO2: 35  Humidification Source: Heated wire  Humidification Temp: 37  Circuit Condensation: Drained  Oral Care: Mouth swabbed, Lip moisturizer applied  Airway Type: ET   [REMOVED] Urethral Catheter-Output (mL): 225 mL  Urethral Catheter Temperature probe-Output (mL): 200 mL    Imaging Studies:    Ct Head Wo Contrast    Result Date: 11/23/2020  EXAMINATION: CT OF THE HEAD WITHOUT CONTRAST  11/23/2020 7:37 pm TECHNIQUE: CT of the head was performed without the administration of intravenous contrast. Dose modulation, iterative reconstruction, and/or weight based adjustment of the mA/kV was utilized to reduce the radiation dose to as low as reasonably achievable.  COMPARISON: 11/22/2020 HISTORY: ORDERING SYSTEM PROVIDED HISTORY: s/p seizure TECHNOLOGIST PROVIDED HISTORY: Reason for exam:->s/p seizure Has a \"code stroke\" or \"stroke alert\" been called? ->No What reading provider will be dictating this exam?->CRC FINDINGS: BRAIN/VENTRICLES: There is no acute intracranial hemorrhage, mass effect or midline shift. No abnormal extra-axial fluid collection. The gray-white differentiation is maintained without evidence of an acute infarct. There is no evidence of hydrocephalus. ORBITS: The bilateral orbits demonstrate no acute abnormality. SINUSES: Minimal mucoperiosteal thickening of the left maxillary and left sphenoid sinuses is seen. SOFT TISSUES/SKULL:  No acute abnormality of the visualized skull or soft tissues. No acute intracranial abnormality. MRI may be obtained if clinically indicated. Ct Head Wo Contrast    Result Date: 11/22/2020  EXAMINATION: CT OF THE HEAD WITHOUT CONTRAST  11/22/2020 9:01 am TECHNIQUE: CT of the head was performed without the administration of intravenous contrast. Dose modulation, iterative reconstruction, and/or weight based adjustment of the mA/kV was utilized to reduce the radiation dose to as low as reasonably achievable. COMPARISON: None. HISTORY: ORDERING SYSTEM PROVIDED HISTORY: seizure TECHNOLOGIST PROVIDED HISTORY: Has a \"code stroke\" or \"stroke alert\" been called? ->No Reason for exam:->seizure What reading provider will be dictating this exam?->CRC FINDINGS: BRAIN/VENTRICLES: There is no acute intracranial hemorrhage, mass effect or midline shift. No abnormal extra-axial fluid collection. The gray-white differentiation is maintained without evidence of an acute infarct. There is no evidence of hydrocephalus. ORBITS: The visualized portion of the orbits demonstrate no acute abnormality. SINUSES: Small amount of fluid in the sphenoid sinus. Trace left maxillary sinus mucosal thickening. Mastoids are aerated.  SOFT TISSUES/SKULL:  No acute abnormality of the visualized skull or soft tissues. No acute intracranial abnormality. Xr Chest Portable    Result Date: 11/23/2020  EXAMINATION: ONE XRAY VIEW OF THE CHEST 11/23/2020 6:15 pm COMPARISON: November 22 HISTORY: ORDERING SYSTEM PROVIDED HISTORY: r/o aspiration pneumonitis TECHNOLOGIST PROVIDED HISTORY: Reason for exam:->r/o aspiration pneumonitis What reading provider will be dictating this exam?->CRC FINDINGS: Status post extubation. Decreased inspiration. Resolution of right basilar atelectasis since the prior examination. No new areas of airspace consolidation or pleural effusions. Pulmonary vasculature is within normal limits. Decreased inspiration with no radiographic evidence of acute cardiopulmonary disease. Xr Chest Portable    Result Date: 11/22/2020  EXAMINATION: ONE XRAY VIEW OF THE CHEST 11/22/2020 8:36 am COMPARISON: 11/22/2020 HISTORY: ORDERING SYSTEM PROVIDED HISTORY: intubation TECHNOLOGIST PROVIDED HISTORY: Reason for exam:->intubation What reading provider will be dictating this exam?->CRC FINDINGS: Endotracheal tube tip projects 4 cm superior to the master. Right IJ catheter tip is in the superior vena cava. Nasogastric tube tip projects off the edge of the film. There is patchy right lung base atelectasis or infiltrate. No pneumothorax or effusion is seen. No acute osseous abnormality. Endotracheal tube tip projects 4 cm to the master. Right lung base atelectasis or infiltrate    Xr Chest Portable    Result Date: 11/22/2020  EXAMINATION: ONE XRAY VIEW OF THE CHEST 11/22/2020 7:48 am COMPARISON: None. HISTORY: ORDERING SYSTEM PROVIDED HISTORY: seizure TECHNOLOGIST PROVIDED HISTORY: Reason for exam:->seizure What reading provider will be dictating this exam?->CRC FINDINGS: Central venous catheter tip is at SVC/right atrial junction. No pneumothorax seen. There are bilateral infiltrates which predominate in the mid to lower lungs. I cannot confirm pleural effusion.   There is no intracranial hemorrhage, mass effect or midline shift. No abnormal extra-axial fluid collection. The gray-white differentiation is maintained without evidence of an acute infarct. There is no evidence of hydrocephalus. ORBITS: The visualized portion of the orbits demonstrate no acute abnormality. SINUSES: Small amount of fluid in the sphenoid sinus. Trace left maxillary sinus mucosal thickening. Mastoids are aerated. SOFT TISSUES/SKULL:  No acute abnormality of the visualized skull or soft tissues. No acute intracranial abnormality. Xr Chest Portable    Result Date: 11/23/2020  EXAMINATION: ONE XRAY VIEW OF THE CHEST 11/23/2020 6:15 pm COMPARISON: November 22 HISTORY: ORDERING SYSTEM PROVIDED HISTORY: r/o aspiration pneumonitis TECHNOLOGIST PROVIDED HISTORY: Reason for exam:->r/o aspiration pneumonitis What reading provider will be dictating this exam?->CRC FINDINGS: Status post extubation. Decreased inspiration. Resolution of right basilar atelectasis since the prior examination. No new areas of airspace consolidation or pleural effusions. Pulmonary vasculature is within normal limits. Decreased inspiration with no radiographic evidence of acute cardiopulmonary disease. Xr Chest Portable    Result Date: 11/22/2020  EXAMINATION: ONE XRAY VIEW OF THE CHEST 11/22/2020 8:36 am COMPARISON: 11/22/2020 HISTORY: ORDERING SYSTEM PROVIDED HISTORY: intubation TECHNOLOGIST PROVIDED HISTORY: Reason for exam:->intubation What reading provider will be dictating this exam?->CRC FINDINGS: Endotracheal tube tip projects 4 cm superior to the master. Right IJ catheter tip is in the superior vena cava. Nasogastric tube tip projects off the edge of the film. There is patchy right lung base atelectasis or infiltrate. No pneumothorax or effusion is seen. No acute osseous abnormality. Endotracheal tube tip projects 4 cm to the master.  Right lung base atelectasis or infiltrate    Xr Chest Portable    Result Date: 11/22/2020  EXAMINATION: ONE XRAY VIEW OF THE CHEST 11/22/2020 7:48 am COMPARISON: None. HISTORY: ORDERING SYSTEM PROVIDED HISTORY: seizure TECHNOLOGIST PROVIDED HISTORY: Reason for exam:->seizure What reading provider will be dictating this exam?->CRC FINDINGS: Central venous catheter tip is at SVC/right atrial junction. No pneumothorax seen. There are bilateral infiltrates which predominate in the mid to lower lungs. I cannot confirm pleural effusion. There is no pneumothorax. Bilateral infiltrates, mostly in mid to lower lungs. Fluoroscopy Modified Barium Swallow With Video    Result Date: 11/24/2020  EXAMINATION: MODIFIED BARIUM SWALLOW WAS PERFORMED IN CONJUNCTION WITH SPEECH PATHOLOGY SERVICES TECHNIQUE: Fluoroscopic evaluation of the swallowing mechanism was performed with multiple consistency of barium product. FLUOROSCOPY DOSE AND TYPE OR TIME AND EXPOSURES: Fluoroscopy time 0.8 minutes Dose: 10 mGy Single image COMPARISON: None HISTORY: ORDERING SYSTEM PROVIDED HISTORY: difficulty swalllow TECHNOLOGIST PROVIDED HISTORY: Reason for exam:->difficulty swalllow What reading provider will be dictating this exam?->CRC FINDINGS: Oral phase of swallowing was grossly within normal limits. Transient penetration seen with thin liquids. No aspiration. Transient penetration seen with thin liquids. No aspiration. Please see separate speech pathology report for full discussion of findings and recommendations. EKG: Rhythm Strip: normal sinus rhythm, unchanged from previous tracings. Resident's Assessment and PLan     Assessment:     1. Status epilepticus: rule out psychogenic seizures, On keppra and Vimpat per neurology. ON versed drip. Protecting her airways. Unremarkable EEG on initial assessment. 2. Bipolar disorder: on fluexetine and Lamotrigine   3. HTN: on amlodipine. Plan:     · Wean versed drip as tolerated.    · Keep NPO, another brief seizure episode in the ICU when the versed drip was turned off. · Continue IV antihypertensive medications  · Continue lamotrigine, keppra and vimpat per neurology. · Stat EEG for repeated episodes  · Check lactic acid and CK. · Keep Normal saline at 75 ml/hr for the next 24 hours  · Seizure precautions   · Possibly will need an MRI, will defer to neurology  · Follow psychiatry recommendation       Core measures:  1. Code status: Full     2. Peptic ulcer prophylaxis: not indicated    3. DVT Prophylaxis: ENOXAPARIN    4. Lines / tubes: Right femoral CVC (2 days)     5. Disposition: Continue current care    Paulina Perla M.D. Internal Medicine Resident - PGY2    Attending Physician: Dr. Michelle Jackson, [address] [facility]  Department of Pulmonary, Critical Care and Sleep Medicine  [address] [facility]  Department of Internal Medicine      During multidisciplinary team rounds Molly Heredia is a 38 y.o. female was seen, examined and discussed. This is confirmation that I have personally seen and examined the patient and that the key elements of the encounter were performed by me (> 85 % time). The medications & laboratory data was discussed and adjusted where necessary. The radiographic images were reviewed or with radiologist or consultant if felt dis-concordant with the exam or history. The above findings were corroborated, plans confirmed and changes made if needed. Family is updated at the bedside as available. Key issues of the case were discussed among consultants. Critical Care time is documented if appropriate.       Heidy Azar DO, FACP, FCCP, [facility],

## 2020-11-24 NOTE — PLAN OF CARE
Problem: Falls - Risk of:  Goal: Will remain free from falls  Description: Will remain free from falls  Outcome: Met This Shift  Goal: Absence of physical injury  Description: Absence of physical injury  Outcome: Met This Shift     Problem: Skin Integrity:  Goal: Will show no infection signs and symptoms  Description: Will show no infection signs and symptoms  Outcome: Met This Shift     Problem: Safety:  Goal: Ability to remain free from injury will improve  Description: Ability to remain free from injury will improve  Outcome: Met This Shift     Problem: Self-Concept:  Goal: Level of anxiety will decrease  Description: Level of anxiety will decrease  Outcome: Met This Shift     Problem: Physical Regulation:  Goal: Signs of adequate cerebral perfusion will increase  Description: Signs of adequate cerebral perfusion will increase  Outcome: Ongoing  Goal: Ability to maintain a stable neurologic state will improve  Description: Ability to maintain a stable neurologic state will improve  Outcome: Ongoing

## 2020-11-25 ENCOUNTER — APPOINTMENT (OUTPATIENT)
Dept: GENERAL RADIOLOGY | Age: 35
DRG: 880 | End: 2020-11-25
Payer: MEDICARE

## 2020-11-25 ENCOUNTER — APPOINTMENT (OUTPATIENT)
Dept: NEUROLOGY | Age: 35
DRG: 880 | End: 2020-11-25
Payer: MEDICARE

## 2020-11-25 LAB
AADO2: 114.3 MMHG
ALBUMIN SERPL-MCNC: 3.5 G/DL (ref 3.5–5.2)
ALP BLD-CCNC: 70 U/L (ref 35–104)
ALT SERPL-CCNC: 20 U/L (ref 0–32)
ANION GAP SERPL CALCULATED.3IONS-SCNC: 11 MMOL/L (ref 7–16)
AST SERPL-CCNC: 17 U/L (ref 0–31)
B.E.: -2.9 MMOL/L (ref -3–3)
BASOPHILS ABSOLUTE: 0.03 E9/L (ref 0–0.2)
BASOPHILS RELATIVE PERCENT: 0.4 % (ref 0–2)
BILIRUB SERPL-MCNC: 0.2 MG/DL (ref 0–1.2)
BUN BLDV-MCNC: 10 MG/DL (ref 6–20)
CALCIUM SERPL-MCNC: 8.8 MG/DL (ref 8.6–10.2)
CHLORIDE BLD-SCNC: 108 MMOL/L (ref 98–107)
CO2: 23 MMOL/L (ref 22–29)
COHB: 0.3 % (ref 0–1.5)
CREAT SERPL-MCNC: 1.2 MG/DL (ref 0.5–1)
CRITICAL: ABNORMAL
DATE ANALYZED: ABNORMAL
DATE OF COLLECTION: ABNORMAL
EOSINOPHILS ABSOLUTE: 0.18 E9/L (ref 0.05–0.5)
EOSINOPHILS RELATIVE PERCENT: 2.6 % (ref 0–6)
FIO2: 40 %
GFR AFRICAN AMERICAN: >60
GFR NON-AFRICAN AMERICAN: 50 ML/MIN/1.73
GLUCOSE BLD-MCNC: 85 MG/DL (ref 74–99)
HCO3: 21 MMOL/L (ref 22–26)
HCT VFR BLD CALC: 32.8 % (ref 34–48)
HEMOGLOBIN: 9.9 G/DL (ref 11.5–15.5)
HHB: 1.6 % (ref 0–5)
IMMATURE GRANULOCYTES #: 0.02 E9/L
IMMATURE GRANULOCYTES %: 0.3 % (ref 0–5)
LAB: ABNORMAL
LACTIC ACID: 0.8 MMOL/L (ref 0.5–2.2)
LYMPHOCYTES ABSOLUTE: 1.4 E9/L (ref 1.5–4)
LYMPHOCYTES RELATIVE PERCENT: 20.5 % (ref 20–42)
Lab: ABNORMAL
MAGNESIUM: 2 MG/DL (ref 1.6–2.6)
MCH RBC QN AUTO: 23.2 PG (ref 26–35)
MCHC RBC AUTO-ENTMCNC: 30.2 % (ref 32–34.5)
MCV RBC AUTO: 77 FL (ref 80–99.9)
METHB: 0.3 % (ref 0–1.5)
MODE: AC
MONOCYTES ABSOLUTE: 0.45 E9/L (ref 0.1–0.95)
MONOCYTES RELATIVE PERCENT: 6.6 % (ref 2–12)
NEUTROPHILS ABSOLUTE: 4.74 E9/L (ref 1.8–7.3)
NEUTROPHILS RELATIVE PERCENT: 69.6 % (ref 43–80)
O2 CONTENT: 15.5 ML/DL
O2 SATURATION: 98.4 % (ref 92–98.5)
O2HB: 97.8 % (ref 94–97)
OPERATOR ID: 3342
PATHOLOGIST REVIEW: NORMAL
PATIENT TEMP: 37 C
PCO2: 33.8 MMHG (ref 35–45)
PDW BLD-RTO: 16.3 FL (ref 11.5–15)
PEEP/CPAP: 8 CMH2O
PFO2: 3.05 MMHG/%
PH BLOOD GAS: 7.41 (ref 7.35–7.45)
PLATELET # BLD: 339 E9/L (ref 130–450)
PMV BLD AUTO: 9 FL (ref 7–12)
PO2: 122 MMHG (ref 75–100)
POTASSIUM REFLEX MAGNESIUM: 3.5 MMOL/L (ref 3.5–5)
POTASSIUM SERPL-SCNC: 3.5 MMOL/L (ref 3.5–5)
RBC # BLD: 4.26 E12/L (ref 3.5–5.5)
RI(T): 0.94
RR MECHANICAL: 14 B/MIN
SODIUM BLD-SCNC: 142 MMOL/L (ref 132–146)
SOURCE, BLOOD GAS: ABNORMAL
THB: 11.1 G/DL (ref 11.5–16.5)
TIME ANALYZED: 512
TOTAL CK: 45 U/L (ref 20–180)
TOTAL PROTEIN: 6.7 G/DL (ref 6.4–8.3)
VT MECHANICAL: 400 ML
WBC # BLD: 6.8 E9/L (ref 4.5–11.5)

## 2020-11-25 PROCEDURE — 2580000003 HC RX 258: Performed by: PSYCHIATRY & NEUROLOGY

## 2020-11-25 PROCEDURE — 36592 COLLECT BLOOD FROM PICC: CPT

## 2020-11-25 PROCEDURE — C9254 INJECTION, LACOSAMIDE: HCPCS | Performed by: PSYCHIATRY & NEUROLOGY

## 2020-11-25 PROCEDURE — 6360000002 HC RX W HCPCS: Performed by: STUDENT IN AN ORGANIZED HEALTH CARE EDUCATION/TRAINING PROGRAM

## 2020-11-25 PROCEDURE — 85025 COMPLETE CBC W/AUTO DIFF WBC: CPT

## 2020-11-25 PROCEDURE — 6360000002 HC RX W HCPCS: Performed by: INTERNAL MEDICINE

## 2020-11-25 PROCEDURE — 80053 COMPREHEN METABOLIC PANEL: CPT

## 2020-11-25 PROCEDURE — C9113 INJ PANTOPRAZOLE SODIUM, VIA: HCPCS | Performed by: STUDENT IN AN ORGANIZED HEALTH CARE EDUCATION/TRAINING PROGRAM

## 2020-11-25 PROCEDURE — 2500000003 HC RX 250 WO HCPCS

## 2020-11-25 PROCEDURE — 94003 VENT MGMT INPAT SUBQ DAY: CPT

## 2020-11-25 PROCEDURE — 6360000002 HC RX W HCPCS: Performed by: PSYCHIATRY & NEUROLOGY

## 2020-11-25 PROCEDURE — 99233 SBSQ HOSP IP/OBS HIGH 50: CPT | Performed by: INTERNAL MEDICINE

## 2020-11-25 PROCEDURE — 82805 BLOOD GASES W/O2 SATURATION: CPT

## 2020-11-25 PROCEDURE — 2580000003 HC RX 258: Performed by: INTERNAL MEDICINE

## 2020-11-25 PROCEDURE — 6360000002 HC RX W HCPCS: Performed by: NURSE PRACTITIONER

## 2020-11-25 PROCEDURE — 2580000003 HC RX 258: Performed by: NURSE PRACTITIONER

## 2020-11-25 PROCEDURE — 71045 X-RAY EXAM CHEST 1 VIEW: CPT

## 2020-11-25 PROCEDURE — 83735 ASSAY OF MAGNESIUM: CPT

## 2020-11-25 PROCEDURE — 99232 SBSQ HOSP IP/OBS MODERATE 35: CPT | Performed by: INTERNAL MEDICINE

## 2020-11-25 PROCEDURE — 2580000003 HC RX 258: Performed by: STUDENT IN AN ORGANIZED HEALTH CARE EDUCATION/TRAINING PROGRAM

## 2020-11-25 PROCEDURE — 80048 BASIC METABOLIC PNL TOTAL CA: CPT

## 2020-11-25 PROCEDURE — 83605 ASSAY OF LACTIC ACID: CPT

## 2020-11-25 PROCEDURE — 82550 ASSAY OF CK (CPK): CPT

## 2020-11-25 PROCEDURE — 6370000000 HC RX 637 (ALT 250 FOR IP): Performed by: NURSE PRACTITIONER

## 2020-11-25 PROCEDURE — 2000000000 HC ICU R&B

## 2020-11-25 PROCEDURE — 95714 VEEG EA 12-26 HR UNMNTR: CPT

## 2020-11-25 RX ORDER — SODIUM CHLORIDE 9 MG/ML
10 INJECTION INTRAVENOUS DAILY
Status: DISCONTINUED | OUTPATIENT
Start: 2020-11-25 | End: 2020-11-29 | Stop reason: HOSPADM

## 2020-11-25 RX ORDER — PANTOPRAZOLE SODIUM 40 MG/10ML
40 INJECTION, POWDER, LYOPHILIZED, FOR SOLUTION INTRAVENOUS DAILY
Status: DISCONTINUED | OUTPATIENT
Start: 2020-11-25 | End: 2020-11-25 | Stop reason: SDUPTHER

## 2020-11-25 RX ORDER — PANTOPRAZOLE SODIUM 40 MG/10ML
40 INJECTION, POWDER, LYOPHILIZED, FOR SOLUTION INTRAVENOUS DAILY
Status: DISCONTINUED | OUTPATIENT
Start: 2020-11-25 | End: 2020-11-29 | Stop reason: HOSPADM

## 2020-11-25 RX ADMIN — MIDAZOLAM 4 MG/HR: 5 INJECTION INTRAMUSCULAR; INTRAVENOUS at 06:07

## 2020-11-25 RX ADMIN — AMLODIPINE BESYLATE 10 MG: 10 TABLET ORAL at 09:14

## 2020-11-25 RX ADMIN — GABAPENTIN 300 MG: 300 CAPSULE ORAL at 09:14

## 2020-11-25 RX ADMIN — LAMOTRIGINE 25 MG: 25 TABLET ORAL at 09:14

## 2020-11-25 RX ADMIN — PANTOPRAZOLE SODIUM 40 MG: 40 INJECTION, POWDER, FOR SOLUTION INTRAVENOUS at 09:14

## 2020-11-25 RX ADMIN — PROPOFOL 30 MCG/KG/MIN: 10 INJECTION, EMULSION INTRAVENOUS at 06:25

## 2020-11-25 RX ADMIN — PRAZOSIN HYDROCHLORIDE 2 MG: 2 CAPSULE ORAL at 21:15

## 2020-11-25 RX ADMIN — LEVETIRACETAM 1000 MG: 10 INJECTION INTRAVENOUS at 18:45

## 2020-11-25 RX ADMIN — PROPOFOL 40 MCG/KG/MIN: 10 INJECTION, EMULSION INTRAVENOUS at 02:48

## 2020-11-25 RX ADMIN — SODIUM CHLORIDE, PRESERVATIVE FREE 10 ML: 5 INJECTION INTRAVENOUS at 21:15

## 2020-11-25 RX ADMIN — SODIUM CHLORIDE, PRESERVATIVE FREE 10 ML: 5 INJECTION INTRAVENOUS at 09:14

## 2020-11-25 RX ADMIN — SODIUM CHLORIDE, PRESERVATIVE FREE 10 ML: 5 INJECTION INTRAVENOUS at 09:16

## 2020-11-25 RX ADMIN — GABAPENTIN 300 MG: 300 CAPSULE ORAL at 15:38

## 2020-11-25 RX ADMIN — Medication 50 MCG/HR: at 17:13

## 2020-11-25 RX ADMIN — PROPOFOL 30 MCG/KG/MIN: 10 INJECTION, EMULSION INTRAVENOUS at 11:47

## 2020-11-25 RX ADMIN — DEXTROSE MONOHYDRATE 150 MG: 50 INJECTION, SOLUTION INTRAVENOUS at 21:15

## 2020-11-25 RX ADMIN — FLUOXETINE HYDROCHLORIDE 20 MG: 20 CAPSULE ORAL at 09:14

## 2020-11-25 RX ADMIN — LEVETIRACETAM 500 MG: 5 INJECTION INTRAVENOUS at 09:15

## 2020-11-25 RX ADMIN — ENOXAPARIN SODIUM 40 MG: 40 INJECTION SUBCUTANEOUS at 09:15

## 2020-11-25 RX ADMIN — DEXTROSE MONOHYDRATE 150 MG: 50 INJECTION, SOLUTION INTRAVENOUS at 09:13

## 2020-11-25 ASSESSMENT — PULMONARY FUNCTION TESTS
PIF_VALUE: 20
PIF_VALUE: 25
PIF_VALUE: 17
PIF_VALUE: 23
PIF_VALUE: 21
PIF_VALUE: 21
PIF_VALUE: 25
PIF_VALUE: 22
PIF_VALUE: 20
PIF_VALUE: 19
PIF_VALUE: 21
PIF_VALUE: 26
PIF_VALUE: 23
PIF_VALUE: 22
PIF_VALUE: 18
PIF_VALUE: 27
PIF_VALUE: 20
PIF_VALUE: 19
PIF_VALUE: 18
PIF_VALUE: 22
PIF_VALUE: 18
PIF_VALUE: 21
PIF_VALUE: 23
PIF_VALUE: 25
PIF_VALUE: 23

## 2020-11-25 ASSESSMENT — PAIN SCALES - GENERAL
PAINLEVEL_OUTOF10: 0

## 2020-11-25 NOTE — PROGRESS NOTES
Speech Language Pathology      NAME:  Monserrat Rooney  :  1985  DATE: 2020  ROOM:  [id number]    Pt intubated. Please re-order SLP for dysphagia management when extubated.  Thank you    [facility] epilepticus ([facility] [B57.857]

## 2020-11-25 NOTE — PROGRESS NOTES
[facility] Quality Flow/Interdisciplinary Rounds Progress Note        Quality Flow Rounds held on November 25, 2020    Disciplines Attending:  Jefry Dobbs and ICU team, bedside nurse, charge nurse, and pharmacist.    Robel Camacho was admitted on 11/22/2020  6:21 AM    Anticipated Discharge Date:  Expected Discharge Date: 11/27/20    Disposition:    Vikash Score:  Vikash Scale Score: 15    Readmission Risk              Risk of Unplanned Readmission:        18           Discussed patient goal for the day, patient clinical progression, and barriers to discharge. The following Goal(s) of the Day/Commitment(s) have been identified:  EEG, continue present care.       Kyle Siddiqui  November 25, 2020

## 2020-11-25 NOTE — PLAN OF CARE
Problem: Falls - Risk of:  Goal: Will remain free from falls  Description: Will remain free from falls  Outcome: Met This Shift     Problem: Falls - Risk of:  Goal: Absence of physical injury  Description: Absence of physical injury  Outcome: Met This Shift     Problem: Skin Integrity:  Goal: Will show no infection signs and symptoms  Description: Will show no infection signs and symptoms  Outcome: Met This Shift     Problem: Skin Integrity:  Goal: Absence of new skin breakdown  Description: Absence of new skin breakdown  Outcome: Met This Shift     Problem: Pain:  Goal: Pain level will decrease  Description: Pain level will decrease  Outcome: Met This Shift     Problem: Pain:  Goal: Control of acute pain  Description: Control of acute pain  Outcome: Met This Shift     Problem: Pain:  Goal: Control of chronic pain  Description: Control of chronic pain  Outcome: Met This Shift     Problem: Restraint Use - Nonviolent/Non-Self-Destructive Behavior:  Goal: Absence of restraint-related injury  Description: Absence of restraint-related injury  11/25/2020 0545 by Xena Retana RN  Outcome: Met This Shift     Problem: Restraint Use - Nonviolent/Non-Self-Destructive Behavior:  Goal: Absence of restraint indications  Description: Absence of restraint indications  11/25/2020 0545 by Xena Retana RN  Outcome: Not Met This Shift  Plan of care reviewed with patient, as able.

## 2020-11-25 NOTE — PROGRESS NOTES
Pt continues to reach for lines and tubes despite attempts to deter. Patient unable to be redirected/follow commands. Bilateral soft wrist restraints continued for pt safety.

## 2020-11-25 NOTE — CARE COORDINATION
Mark Anthony Otto remains in MICU, Was intubated last pm. Care Coordination: Pt was an admit from [address]. She has no family, She has a friend she lives with [place] Pt lives with Ysabel at [address], [facility] [address]. Pt's  mom  by suicide with an OD, 2 yrs later her sister  of suicide by hanging. Ysabel reports that pt has a brother and a grandmother ,but has no contact information. Pt has a 11 yr old daughter who is in the custody of the child's father in [place]. Ysabel is assisting pt in establishing with a psych dr at [facility] and has an appt on  and an appt with a counselor on 20. Will need psych eval when extubated. Call out to [facility] about patient returning there when medically ready. Spoke with Geoffrey Miles at [address]. Mark Anthony Otto was there for Suicidal Ideation. They will admit back when stable. CM/SW will continue to follow for discharge planning.    Priscila MARTINSN,RN--[phone]

## 2020-11-25 NOTE — PROGRESS NOTES
[address] [facility]   Department of Internal Medicine   Internal Medicine Residency  MICU Progress Note    Patient:  Home Zavaleta 38 y.o. female   MRN: [id number]       Date of Service: 2020    Allergy: Patient has no known allergies. Subjective     I saw and examined the  Patient in the AM today, she is sedated and intubated. Objective     TEMPERATURE:  Current - Temp: 99.3 °F (37.4 °C); Max - Temp  Av.9 °F (37.2 °C)  Min: 98.4 °F (36.9 °C)  Max: 99.3 °F (37.4 °C)    RESPIRATIONS RANGE: Resp  Av.9  Min: 12  Max: 39    PULSE RANGE: Pulse  Av  Min: 60  Max: 98    BLOOD PRESSURE RANGE:  Systolic (33OKB), SSZ:945 , Min:122 , KRI:994   ; Diastolic (82MGS), LUL:38, Min:82, Max:136      PULSE OXIMETRY RANGE: SpO2  Av.4 %  Min: 93 %  Max: 100 %    Physical Exam:  I & O - 24hr: I/O this shift: In: 76 [NG/GT:75]  · Out: 60 [Urine:60]   · General Appearance: Sedated and intubated   · HEENT:  Head: Normocephalic, no lesions, without obvious abnormality. · Neck: no adenopathy, no carotid bruit, no JVD, supple, symmetrical, trachea midline and thyroid not enlarged, symmetric, no tenderness/mass/nodules  · Lung: clear to auscultation bilaterally  · Heart: regular rate and rhythm, S1, S2 normal, no murmur, click, rub or gallop  · Abdomen: soft, non-tender; bowel sounds normal; no masses,  no organomegaly  · Extremities:  extremities normal, atraumatic, no cyanosis or edema  · Musculokeletal: No joint swelling, no muscle tenderness. ROM normal in all joints of extremities.    · Neurologic: Sedated and intubated       Medications     Continuous Infusions:   dextrose      propofol 30 mcg/kg/min (20 4957)    midazolam 4 mg/hr (20 8818)     Scheduled Meds:   pantoprazole  40 mg Intravenous Daily    And    sodium chloride (PF)  10 mL Intravenous Daily    amLODIPine  10 mg Oral Daily    FLUoxetine  20 mg Oral Daily    gabapentin  300 mg Oral BID    lamoTRIgine  25 mg Oral Value Ref Range Status   11/22/2020 24 Hours no growth  Preliminary     Respiratory cultures No results found for: RESPCULTURE No results found for: LABGRAM  Urine No results found for: LABURIN  Legionella No results found for: LABLEGI  C Diff PCR No results found for: CDIFPCR  Wound culture/abscess: No results for input(s): WNDABS in the last 72 hours. Tip culture:No results for input(s): CXCATHTIP in the last 72 hours. Oxygen:     Vent Information  $Ventilation: $Subsequent Day  Skin Assessment: Clean, dry, & intact  Suction Catheter Diameter: 14  Equipment ID: [id number]  Vent Type: 980  Vent Mode: AC/VC  Vt Ordered: 400 mL  Rate Set: 12 bmp  Peak Flow: 60 L/min  Pressure Support: 0 cmH20  FiO2 : 40 %  SpO2: 100 %  SpO2/FiO2 ratio: 250  Sensitivity: 3  PEEP/CPAP: 8  I Time/ I Time %: 0 s  Humidification Source: Heated wire  Humidification Temp: 37  Humidification Temp Measured: 36.9  Circuit Condensation: Drained    Additional Respiratory  Assessments  Pulse: 70  Resp: 12  SpO2: 100 %  $End Tidal CO2: 24  Position: Semi-Sultana's  Humidification Source: Heated wire  Humidification Temp: 37  Circuit Condensation: Drained  Oral Care: Mouth swabbed, Mouth suctioned, Suction toothette  Airway Type: ET  Airway Size: 8   [REMOVED] Urethral Catheter-Output (mL): 225 mL  Urethral Catheter Temperature probe-Output (mL): 30 mL    Imaging Studies:    Ct Head Wo Contrast    Result Date: 11/23/2020  EXAMINATION: CT OF THE HEAD WITHOUT CONTRAST  11/23/2020 7:37 pm TECHNIQUE: CT of the head was performed without the administration of intravenous contrast. Dose modulation, iterative reconstruction, and/or weight based adjustment of the mA/kV was utilized to reduce the radiation dose to as low as reasonably achievable. COMPARISON: 11/22/2020 HISTORY: ORDERING SYSTEM PROVIDED HISTORY: s/p seizure TECHNOLOGIST PROVIDED HISTORY: Reason for exam:->s/p seizure Has a \"code stroke\" or \"stroke alert\" been called? ->No What reading provider will be dictating this exam?->CRC FINDINGS: BRAIN/VENTRICLES: There is no acute intracranial hemorrhage, mass effect or midline shift. No abnormal extra-axial fluid collection. The gray-white differentiation is maintained without evidence of an acute infarct. There is no evidence of hydrocephalus. ORBITS: The bilateral orbits demonstrate no acute abnormality. SINUSES: Minimal mucoperiosteal thickening of the left maxillary and left sphenoid sinuses is seen. SOFT TISSUES/SKULL:  No acute abnormality of the visualized skull or soft tissues. No acute intracranial abnormality. MRI may be obtained if clinically indicated. Ct Head Wo Contrast    Result Date: 11/22/2020  EXAMINATION: CT OF THE HEAD WITHOUT CONTRAST  11/22/2020 9:01 am TECHNIQUE: CT of the head was performed without the administration of intravenous contrast. Dose modulation, iterative reconstruction, and/or weight based adjustment of the mA/kV was utilized to reduce the radiation dose to as low as reasonably achievable. COMPARISON: None. HISTORY: ORDERING SYSTEM PROVIDED HISTORY: seizure TECHNOLOGIST PROVIDED HISTORY: Has a \"code stroke\" or \"stroke alert\" been called? ->No Reason for exam:->seizure What reading provider will be dictating this exam?->CRC FINDINGS: BRAIN/VENTRICLES: There is no acute intracranial hemorrhage, mass effect or midline shift. No abnormal extra-axial fluid collection. The gray-white differentiation is maintained without evidence of an acute infarct. There is no evidence of hydrocephalus. ORBITS: The visualized portion of the orbits demonstrate no acute abnormality. SINUSES: Small amount of fluid in the sphenoid sinus. Trace left maxillary sinus mucosal thickening. Mastoids are aerated. SOFT TISSUES/SKULL:  No acute abnormality of the visualized skull or soft tissues. No acute intracranial abnormality.     Xr Chest Portable    Result Date: 11/23/2020  EXAMINATION: ONE XRAY VIEW OF THE CHEST 11/23/2020 6:15 pm COMPARISON: November 22 HISTORY: ORDERING SYSTEM PROVIDED HISTORY: r/o aspiration pneumonitis TECHNOLOGIST PROVIDED HISTORY: Reason for exam:->r/o aspiration pneumonitis What reading provider will be dictating this exam?->CRC FINDINGS: Status post extubation. Decreased inspiration. Resolution of right basilar atelectasis since the prior examination. No new areas of airspace consolidation or pleural effusions. Pulmonary vasculature is within normal limits. Decreased inspiration with no radiographic evidence of acute cardiopulmonary disease. Xr Chest Portable    Result Date: 11/22/2020  EXAMINATION: ONE XRAY VIEW OF THE CHEST 11/22/2020 8:36 am COMPARISON: 11/22/2020 HISTORY: ORDERING SYSTEM PROVIDED HISTORY: intubation TECHNOLOGIST PROVIDED HISTORY: Reason for exam:->intubation What reading provider will be dictating this exam?->CRC FINDINGS: Endotracheal tube tip projects 4 cm superior to the master. Right IJ catheter tip is in the superior vena cava. Nasogastric tube tip projects off the edge of the film. There is patchy right lung base atelectasis or infiltrate. No pneumothorax or effusion is seen. No acute osseous abnormality. Endotracheal tube tip projects 4 cm to the master. Right lung base atelectasis or infiltrate    Xr Chest Portable    Result Date: 11/22/2020  EXAMINATION: ONE XRAY VIEW OF THE CHEST 11/22/2020 7:48 am COMPARISON: None. HISTORY: ORDERING SYSTEM PROVIDED HISTORY: seizure TECHNOLOGIST PROVIDED HISTORY: Reason for exam:->seizure What reading provider will be dictating this exam?->CRC FINDINGS: Central venous catheter tip is at SVC/right atrial junction. No pneumothorax seen. There are bilateral infiltrates which predominate in the mid to lower lungs. I cannot confirm pleural effusion. There is no pneumothorax. Bilateral infiltrates, mostly in mid to lower lungs.     Ct Head Wo Contrast    Result Date: 11/23/2020  EXAMINATION: CT OF THE HEAD WITHOUT CONTRAST  11/23/2020 7:37 pm TECHNIQUE: CT of the head was performed without the administration of intravenous contrast. Dose modulation, iterative reconstruction, and/or weight based adjustment of the mA/kV was utilized to reduce the radiation dose to as low as reasonably achievable. COMPARISON: 11/22/2020 HISTORY: ORDERING SYSTEM PROVIDED HISTORY: s/p seizure TECHNOLOGIST PROVIDED HISTORY: Reason for exam:->s/p seizure Has a \"code stroke\" or \"stroke alert\" been called? ->No What reading provider will be dictating this exam?->CRC FINDINGS: BRAIN/VENTRICLES: There is no acute intracranial hemorrhage, mass effect or midline shift. No abnormal extra-axial fluid collection. The gray-white differentiation is maintained without evidence of an acute infarct. There is no evidence of hydrocephalus. ORBITS: The bilateral orbits demonstrate no acute abnormality. SINUSES: Minimal mucoperiosteal thickening of the left maxillary and left sphenoid sinuses is seen. SOFT TISSUES/SKULL:  No acute abnormality of the visualized skull or soft tissues. No acute intracranial abnormality. MRI may be obtained if clinically indicated. Ct Head Wo Contrast    Result Date: 11/22/2020  EXAMINATION: CT OF THE HEAD WITHOUT CONTRAST  11/22/2020 9:01 am TECHNIQUE: CT of the head was performed without the administration of intravenous contrast. Dose modulation, iterative reconstruction, and/or weight based adjustment of the mA/kV was utilized to reduce the radiation dose to as low as reasonably achievable. COMPARISON: None. HISTORY: ORDERING SYSTEM PROVIDED HISTORY: seizure TECHNOLOGIST PROVIDED HISTORY: Has a \"code stroke\" or \"stroke alert\" been called? ->No Reason for exam:->seizure What reading provider will be dictating this exam?->CRC FINDINGS: BRAIN/VENTRICLES: There is no acute intracranial hemorrhage, mass effect or midline shift. No abnormal extra-axial fluid collection. The gray-white differentiation is maintained without evidence of an acute infarct.   There is no evidence of hydrocephalus. ORBITS: The visualized portion of the orbits demonstrate no acute abnormality. SINUSES: Small amount of fluid in the sphenoid sinus. Trace left maxillary sinus mucosal thickening. Mastoids are aerated. SOFT TISSUES/SKULL:  No acute abnormality of the visualized skull or soft tissues. No acute intracranial abnormality. Xr Chest Portable    Result Date: 11/23/2020  EXAMINATION: ONE XRAY VIEW OF THE CHEST 11/23/2020 6:15 pm COMPARISON: November 22 HISTORY: ORDERING SYSTEM PROVIDED HISTORY: r/o aspiration pneumonitis TECHNOLOGIST PROVIDED HISTORY: Reason for exam:->r/o aspiration pneumonitis What reading provider will be dictating this exam?->CRC FINDINGS: Status post extubation. Decreased inspiration. Resolution of right basilar atelectasis since the prior examination. No new areas of airspace consolidation or pleural effusions. Pulmonary vasculature is within normal limits. Decreased inspiration with no radiographic evidence of acute cardiopulmonary disease. Xr Chest Portable    Result Date: 11/22/2020  EXAMINATION: ONE XRAY VIEW OF THE CHEST 11/22/2020 8:36 am COMPARISON: 11/22/2020 HISTORY: ORDERING SYSTEM PROVIDED HISTORY: intubation TECHNOLOGIST PROVIDED HISTORY: Reason for exam:->intubation What reading provider will be dictating this exam?->CRC FINDINGS: Endotracheal tube tip projects 4 cm superior to the master. Right IJ catheter tip is in the superior vena cava. Nasogastric tube tip projects off the edge of the film. There is patchy right lung base atelectasis or infiltrate. No pneumothorax or effusion is seen. No acute osseous abnormality. Endotracheal tube tip projects 4 cm to the master. Right lung base atelectasis or infiltrate    Xr Chest Portable    Result Date: 11/22/2020  EXAMINATION: ONE XRAY VIEW OF THE CHEST 11/22/2020 7:48 am COMPARISON: None.  HISTORY: ORDERING SYSTEM PROVIDED HISTORY: seizure TECHNOLOGIST PROVIDED HISTORY: Reason for exam:->seizure What reading provider will be dictating this exam?->CRC FINDINGS: Central venous catheter tip is at SVC/right atrial junction. No pneumothorax seen. There are bilateral infiltrates which predominate in the mid to lower lungs. I cannot confirm pleural effusion. There is no pneumothorax. Bilateral infiltrates, mostly in mid to lower lungs. Fluoroscopy Modified Barium Swallow With Video    Result Date: 11/24/2020  EXAMINATION: MODIFIED BARIUM SWALLOW WAS PERFORMED IN CONJUNCTION WITH SPEECH PATHOLOGY SERVICES TECHNIQUE: Fluoroscopic evaluation of the swallowing mechanism was performed with multiple consistency of barium product. FLUOROSCOPY DOSE AND TYPE OR TIME AND EXPOSURES: Fluoroscopy time 0.8 minutes Dose: 10 mGy Single image COMPARISON: None HISTORY: ORDERING SYSTEM PROVIDED HISTORY: difficulty swalllow TECHNOLOGIST PROVIDED HISTORY: Reason for exam:->difficulty swalllow What reading provider will be dictating this exam?->CRC FINDINGS: Oral phase of swallowing was grossly within normal limits. Transient penetration seen with thin liquids. No aspiration. Transient penetration seen with thin liquids. No aspiration. Please see separate speech pathology report for full discussion of findings and recommendations. EKG: Rhythm Strip: normal sinus rhythm, unchanged from previous tracings. Resident's Assessment and PLan     Assessment:     1. Status epilepticus: rule out psychogenic seizures, On keppra and Vimpat per neurology. ON versed drip. Protecting her airways. Unremarkable EEG on initial assessment. Intubated overnight for another seizure episode. 2. Bipolar disorder/ schizoaffective: on fluexetine and Lamotrigine   3. HTN: on amlodipine.       Plan:     · Wean versed and propofol   · Continuous EEG monitoring when off sedation as discussed with neurology, will monitor for any seizure activity  · Will keep intubated overnight  · Start pantoprazole for GI prophylaxis    Was intubated overnight because she desaturated and was not able to protect her airways. During the seizure activity she was also having nystagmus plus generalized stiffness. The fact that she was intubated 2 times in the last 3 days is concerning due to complications. We will follow the EEG results and neurology recommendations. Core measures:  1. Code status: Full     2. Peptic ulcer prophylaxis: not indicated    3. DVT Prophylaxis: ENOXAPARIN    4. Lines / tubes: Right femoral CVC (3 days)     5. Disposition: Continue current care    Nestor Villagran M.D. Internal Medicine Resident - PGY2    Attending Physician: Dr. Pete Saucedo, [address] [facility]  Department of Pulmonary, Critical Care and Sleep Medicine  [address] [facility]  Department of Internal Medicine      During multidisciplinary team rounds Fabricio Guy is a 38 y.o. female was seen, examined and discussed. This is confirmation that I have personally seen and examined the patient and that the key elements of the encounter were performed by me (> 85 % time). The medications & laboratory data was discussed and adjusted where necessary. The radiographic images were reviewed or with radiologist or consultant if felt dis-concordant with the exam or history. The above findings were corroborated, plans confirmed and changes made if needed. Family is updated at the bedside as available. Key issues of the case were discussed among consultants. Critical Care time is documented if appropriate.       Chioma Hollingsworth DO, FACP, FCCP, [facility],

## 2020-11-25 NOTE — PROGRESS NOTES
[facility]  Internal Medicine Residency / [address]    Attending Physician Statement  I have discussed the case, including pertinent history and exam findings with the resident and the team.  I have seen and examined the patient and the key elements of the encounter have been performed by me. I have also personally reviewed imaging studies and labs. I agree with the assessment, plan and orders as documented by the resident. Patient intubated overnight for status epilepticus. This morning, she was propofol and versed gtt - able to follow some commands. EEG done a few days ago negative for seizure episode. Assessment:  Multiple episodes of seizure-like activities, 2nd intubation yesterday. Schizoaffective disorder  Borderline personality disorder      Plan:  Recommend continuous imaging and/or MRI to evaluate for seizure etiology  Continue AED      Remainder of medical problems as per resident note. Melva Mcrae MD  Internal Medicine Residency Faculty

## 2020-11-25 NOTE — ANESTHESIA PROCEDURE NOTES
Airway  Urgency: emergent    Airway not difficult    General Information and Staff    Patient location during procedure: ICU  Anesthesiologist: Apurva Muñoz MD  Resident/CRNA: HARRISON Ramirez - CRNA  Other anesthesia staff: Viktoria Chin RN  Performed: other anesthesia staff     Consent for Airway (if performed for an anesthetic, see related documentation for consents)  Patient identity confirmed: per hospital policy  Consent: The procedure was performed in an emergent situation. Verbal consent not obtained. Written consent not obtained. Risks and benefits: risks, benefits and alternatives were not discussed      Code status verified:yes  Indications and Patient Condition  Indications for airway management: airway protection and respiratory distress  Spontaneous ventilation: present  Sedation level: deep  Preoxygenated: yes  Patient position: sniffing  MILS not maintained throughout  Mask difficulty assessment: vent by bag mask    Final Airway Details  Final airway type: endotracheal airway      Successful airway: ETT  Cuffed: yes   Successful intubation technique: video laryngoscopy  Endotracheal tube insertion site: oral  Blade type: CMAC.   Blade size: #4  ETT size (mm): 8.0  Cormack-Lehane Classification: grade I - full view of glottis  Placement verified by: chest auscultation and capnometry   Inital cuff pressure (cm H2O): 9  Measured from: lips  ETT to lips (cm): 22  Number of attempts at approach: 1  Ventilation between attempts: bag mask  Number of other approaches attempted: 0    no

## 2020-11-25 NOTE — PROGRESS NOTES
Versed 2mg IV bolus administered for seizure. Patient continues to seize. Staff continuing to manually ventilate patient with bag-valve mask.

## 2020-11-25 NOTE — PROGRESS NOTES
[facility]  Internal Medicine Residency Program  Progress Note - House Team 2    Patient:  Gartet Robbins 38 y.o. female MRN: [id number]     Date of Service: 11/25/2020     CC: had concerns including Seizures (Seizures from [facility], per [facility] pseudoseizures. ). Overnight events: Patient was having a seizure for more than 5 minutes, given 2 mg Versed bolus x2, respiratory failure s/p bagging for 10 min, with no break in the seizures. Patient was intubated a second time and sedated for airway protection. Subjective     Patient seen and examined. Intubated and sedated on propofol 30 and Versed 4. Vital signs stable. Sedated and barely opening her eyes, not responding to commands. No muscular twitching. OG tube placed, and able to give PO meds. On Keppra 1000 mg p.m., 500 mg a.m. IV  On Vimpat 150 mg twice daily IV. On Lamotrigine 25 mg oral daily. Making good urine, 2.7L out over the last 24 hrs, net negative 3.6L since admission. Plan for 24 hr EEG for further evaluation why the patient is having recurrent imaging +/- imaging with MRI brain. Objective     Physical Exam:  · Vitals: BP (!) 139/98   Pulse 75   Temp 98.8 °F (37.1 °C) (Bladder)   Resp 14   Ht 5' 4\" (1.626 m)   Wt 250 lb 9.6 oz (113.7 kg)   LMP  (LMP Unknown)   SpO2 100%   BMI 43.02 kg/m²     · I & O - 24hr: No intake/output data recorded. · General Appearance: Intubated and sedated. · HEENT:  Head: Normocephalic, no lesions, without obvious abnormality. Pupils equal and reactive to light bilaterally. · Neck: no adenopathy, no carotid bruit, no JVD, supple, symmetrical, trachea midline and thyroid not enlarged, symmetric, no tenderness/mass/nodules  · Lung: coarse breath sounds appreciated bilaterally.  , no wheezing  · Heart: regular rate and rhythm, S1, S2 normal, no murmur, click, rub or gallop  · Abdomen: soft, non-tender; bowel sounds normal; no masses,  no organomegaly  · Extremities:  extremities normal, atraumatic, no cyanosis or edema  · Musculokeletal: No joint swelling, no muscle tenderness. · Neurologic: Intubated and sedated. Subject  Pertinent Labs & Imaging Studies   andrea  CBC:   Lab Results   Component Value Date    WBC 6.8 11/25/2020    RBC 4.26 11/25/2020    HGB 9.9 11/25/2020    HCT 32.8 11/25/2020    MCV 77.0 11/25/2020    MCH 23.2 11/25/2020    MCHC 30.2 11/25/2020    RDW 16.3 11/25/2020     11/25/2020    MPV 9.0 11/25/2020     CMP:    Lab Results   Component Value Date     11/25/2020    K 3.5 11/25/2020    K 3.5 11/22/2020     11/25/2020    CO2 23 11/25/2020    BUN 10 11/25/2020    CREATININE 1.2 11/25/2020    GFRAA >60 11/25/2020    LABGLOM 50 11/25/2020    GLUCOSE 85 11/25/2020    PROT 6.7 11/25/2020    LABALBU 3.5 11/25/2020    CALCIUM 8.8 11/25/2020    BILITOT 0.2 11/25/2020    ALKPHOS 70 11/25/2020    AST 17 11/25/2020    ALT 20 11/25/2020       Resident's Assessment and Plan     Renaldo Salcedo is a 38 y.o. female with PMH DM (not on meds), schizoaffective disorder, PTSD, borderline personality disorder, presented to ED from [facility] facility d/t seizures and admitted to SICU for further management. 1. Acute Encephalopathy 2/2 Seizures vs pseudoseizures s/p intubation for airway protection. Patient states she has history of seizures since childhood and recently maintained on Keppra and Lamictal.  Previously, she had been on Lamictal and Vimpat but due to lack of insurance coverage and she had gone back on Keppra with minimal response to her seizures. She had a recent admission to [facility] d/t seizures; 2-day EEG negative. Witnessed seizure in ED, given Keppra 1000 mg x 1, Ativan 2 mg x 1, versed 2 mg x 2 and started on propofol gtt. CK level 47, Ammonia 14, UDS negative, intubated and extubated on 11/22/2020.  EEG report 11/23/2020 unremarkable.    -N.p.o.   -Continue seizure and aspiration precautions   -Sedated on propofol 25 and versed 4, weaning as able. -Continue Keppra 1000 mg p.m., 500 mg a.m. IV   -Continue Vimpat 150 mg twice daily IV.   -Continue gabapentin 300 mg BID PO and and 600 mg PO nightly.   -Continue lamotrigine 25 mg oral daily due to n.p.o.   -Continue Ativan 1 mg IV Q2 hrs prn for seizures.    -Follow repeat EEG final report from 2020   -Plan for 24 hr continuous EEG. -Continue MICU management.    -Neurology following    2. Dysphagia likely 2/2 seizures vs anatomical abnormality. Fluoroscopy modified barium study 2020 with transient penetration with thin liquids, no aspiration.     -Plan to extubate soon and re-order SLP for dysphagia. -OG tube in place for PO meds.    -Plan for fluoroscopy modified barium study today. 3. Acute Hypoxic Respiratory Failure 2/2 AMS (2/2 Seizure). O2 sat at [facility] reported to be 82%, and 82% in ED. Intubated and extubated on 2020. CXR on admission showed BL infiltrates mid-lower lobes. CXR  showed no acute cardiopulmonary disease.    -Intubated (#2) and sedated   -AC/VC, 14/400/8/40% FiO2   -AB.412, 33.8 PCO2, 122 PO2, 21 bicarb   -Continue Duoneb Q4 hrs prn for SOB   -Wean from the ventilator as able   -Follow daily ABG and CXR   -Continue to monitor respiratory status    -Plan for spontaneous breathing trial     4. BL infiltrates mid-lower lobes 2/2 aspiration pna (2/2 AMS from recurrent seizures). s/p unasyn x 1 in ED for aspiration pna coverage. Procalcitonin 0.06, afebrile, no leukocytosis ~ suggesting unlikely to be infectious etiology.    -Culture x1 bottle grew gram-positive rods, likely contaminant   -Continue to follow cultures    -continue to monitor off antibiotics      5. Microcytic Anemia likely 2/2 PATRICIO   -No baseline hemoglobin on file   -Hemoglobin stable at 9.9   -Fe 41, Ferritin 18, TIBC 374, iron saturation 11    -Continue to follow H&H with daily CBC   -Follow-up peripheral blood smear    6.  History of schizoaffective disorder, PTSD, borderline personality disorder.    -Continue haldol 5 mg OD, prazosin 2 mg OD, fluoxetine 20 mg OD.   -Psychiatry following     7. History of HTN   -BP stable   -Continue norvasc 10 mg daily   -Continue Lopressor 5 mg IV every 6 hours as needed for SBP greater than 165. Do not administer if heart rate is less than 65   -Hydralazine 10 mg IV Q4 hrs prn for high BP, SBP>140 mmHg.     8. History of DM, not on home medications   -Last Hemoglobin A1c 9.4% on 11/23/2020   -BG range: 85-90   -Currently NPO   -hypoglycemic protocol    -continue to monitor BG       PT/OT evaluation: not indicated at this time  DVT prophylaxis/ GI prophylaxis: lovenox/pepcid   Disposition: continue MICU management     Sergio Andrews MD, PGY-1  Attending physician: Dr. Marisa Limon

## 2020-11-25 NOTE — PROGRESS NOTES
Entered patient's room d/t ETCO2 monitor alarming. Patient actively seizing at this time and not taking any breaths. Respiratory at bedside and began manually ventilating patient. Dr. Lieutenant Fernandes notified and at bedside.

## 2020-11-25 NOTE — PROGRESS NOTES
Patient continuing to seize. Anesthesia paged for STAT intubation for airway protection. Versed 2mg IV bolus administered at this time for seizure.

## 2020-11-25 NOTE — PROGRESS NOTES
[address] [facility]   Department of Internal Medicine   Internal Medicine Residency  MICU Progress Note    Patient:  Chandu Fairbanks 38 y.o. female   MRN: [id number]       Date of Service: 2020    Allergy: Patient has no known allergies. Subjective     I saw and examined the  Patient in the AM today, She is sedated and intubated. Objective     TEMPERATURE:  Current - Temp: 99.3 °F (37.4 °C); Max - Temp  Av.8 °F (37.1 °C)  Min: 98.4 °F (36.9 °C)  Max: 99.3 °F (37.4 °C)    RESPIRATIONS RANGE: Resp  Av.3  Min: 12  Max: 39    PULSE RANGE: Pulse  Av.6  Min: 60  Max: 98    BLOOD PRESSURE RANGE:  Systolic (12NQS), HQH:109 , Min:122 , QQD:852   ; Diastolic (09YQG), TRQ:755, Min:82, Max:136      PULSE OXIMETRY RANGE: SpO2  Av.1 %  Min: 92 %  Max: 100 %    Physical Exam:  I & O - 24hr: I/O this shift: In: 76 [NG/GT:75]  · Out: 60 [Urine:60]   · General Appearance: Sedated and intubated  · HEENT:  Head: Normocephalic, no lesions, without obvious abnormality. · Neck: no adenopathy, no carotid bruit, no JVD, supple, symmetrical, trachea midline and thyroid not enlarged, symmetric, no tenderness/mass/nodules  · Lung: clear to auscultation bilaterally  · Heart: regular rate and rhythm, S1, S2 normal, no murmur, click, rub or gallop  · Abdomen: soft, non-tender; bowel sounds normal; no masses,  no organomegaly  · Extremities:  extremities normal, atraumatic, no cyanosis or edema  · Musculokeletal: No joint swelling, no muscle tenderness. ROM normal in all joints of extremities.    · Neurologic: Sedated and intubated      Medications     Continuous Infusions:   dextrose      propofol 30 mcg/kg/min (20 2514)    midazolam 4 mg/hr (20 7445)     Scheduled Meds:   pantoprazole  40 mg Intravenous Daily    And    sodium chloride (PF)  10 mL Intravenous Daily    amLODIPine  10 mg Oral Daily    FLUoxetine  20 mg Oral Daily    gabapentin  300 mg Oral BID    lamoTRIgine  25 mg Oral Daily    prazosin  2 mg Oral Nightly    lacosamide (VIMPAT) IVPB  150 mg Intravenous BID    levetiracetam  1,000 mg Intravenous QPM    levetiracetam  500 mg Intravenous QAM    LORazepam  1 mg Intravenous Once    sodium chloride flush  10 mL Intravenous 2 times per day    enoxaparin  40 mg Subcutaneous Daily     PRN Meds: glucose, dextrose, glucagon (rDNA), dextrose, LORazepam, ipratropium-albuterol, hydrALAZINE, acetaminophen **OR** acetaminophen, sodium chloride flush, polyethylene glycol, promethazine **OR** ondansetron, metoprolol  Nutrition:   NPO    Labs and Imaging Studies     CBC:   Recent Labs     11/24/20 0442 11/24/20  1339 11/25/20 0453   WBC 7.5 6.8 6.8   RBC 4.11 4.08 4.26   HGB 9.6* 9.5* 9.9*   HCT 31.7* 31.2* 32.8*   MCV 77.1* 76.5* 77.0*   MCH 23.4* 23.3* 23.2*   MCHC 30.3* 30.4* 30.2*   RDW 16.6* 16.6* 16.3*    337 339   MPV 8.8 8.9 9.0       BMP:    Recent Labs     11/23/20 1850 11/24/20 0442 11/25/20 0453     --  139 142   K 3.8   < > 3.7 3.5  3.5     --  106 108*   CO2 24  --  24 23   BUN 7  --  9 10   CREATININE 1.1*  --  1.1* 1.2*   GLUCOSE 106*  --  95 85   CALCIUM 8.7  --  8.7 8.8   PROT 6.5  --  6.6 6.7   LABALBU 3.6  --  3.4* 3.5   BILITOT 0.4  --  0.4 0.2   ALKPHOS 79  --  70 70   AST 19  --  20 17   ALT 20  --  21 20    < > = values in this interval not displayed. LIVER PROFILE:   Recent Labs     11/23/20 1850 11/24/20 0442 11/25/20 0453   AST 19 20 17   ALT 20 21 20   BILITOT 0.4 0.4 0.2   ALKPHOS 79 70 70       PT/INR:   No results for input(s): PROTIME, INR in the last 72 hours. APTT:   No results for input(s): APTT in the last 72 hours.     Fasting Lipid Panel:    No results found for: CHOL, TRIG, HDL    Cardiac Enzymes:    Lab Results   Component Value Date    CKTOTAL 45 11/25/2020    CKTOTAL 119 11/23/2020    CKTOTAL 47 11/22/2020    TROPONINI <0.01 11/22/2020       Notable Cultures:      Blood cultures   Blood Culture, Routine   Date Value Ref Range Status   11/22/2020 24 Hours no growth  Preliminary     Respiratory cultures No results found for: RESPCULTURE No results found for: LABGRAM  Urine No results found for: LABURIN  Legionella No results found for: LABLEGI  C Diff PCR No results found for: CDIFPCR  Wound culture/abscess: No results for input(s): WNDABS in the last 72 hours. Tip culture:No results for input(s): CXCATHTIP in the last 72 hours. Oxygen:     Vent Information  $Ventilation: $Subsequent Day  Skin Assessment: Clean, dry, & intact  Suction Catheter Diameter: 14  Equipment ID: [id number]  Vent Type: 980  Vent Mode: AC/VC  Vt Ordered: 400 mL  Rate Set: 12 bmp  Peak Flow: 60 L/min  Pressure Support: 0 cmH20  FiO2 : 40 %  SpO2: 100 %  SpO2/FiO2 ratio: 250  Sensitivity: 3  PEEP/CPAP: 8  I Time/ I Time %: 0 s  Humidification Source: Heated wire  Humidification Temp: 37  Humidification Temp Measured: 36.9  Circuit Condensation: Drained    Additional Respiratory  Assessments  Pulse: 70  Resp: 12  SpO2: 100 %  $End Tidal CO2: 24  Position: Semi-Sultana's  Humidification Source: Heated wire  Humidification Temp: 37  Circuit Condensation: Drained  Oral Care: Mouth swabbed, Mouth suctioned, Suction toothette  Airway Type: ET  Airway Size: 8   [REMOVED] Urethral Catheter-Output (mL): 225 mL  Urethral Catheter Temperature probe-Output (mL): 30 mL    Imaging Studies:    Ct Head Wo Contrast    Result Date: 11/23/2020  EXAMINATION: CT OF THE HEAD WITHOUT CONTRAST  11/23/2020 7:37 pm TECHNIQUE: CT of the head was performed without the administration of intravenous contrast. Dose modulation, iterative reconstruction, and/or weight based adjustment of the mA/kV was utilized to reduce the radiation dose to as low as reasonably achievable. COMPARISON: 11/22/2020 HISTORY: ORDERING SYSTEM PROVIDED HISTORY: s/p seizure TECHNOLOGIST PROVIDED HISTORY: Reason for exam:->s/p seizure Has a \"code stroke\" or \"stroke alert\" been called? ->No What reading provider COMPARISON: November 22 HISTORY: ORDERING SYSTEM PROVIDED HISTORY: r/o aspiration pneumonitis TECHNOLOGIST PROVIDED HISTORY: Reason for exam:->r/o aspiration pneumonitis What reading provider will be dictating this exam?->CRC FINDINGS: Status post extubation. Decreased inspiration. Resolution of right basilar atelectasis since the prior examination. No new areas of airspace consolidation or pleural effusions. Pulmonary vasculature is within normal limits. Decreased inspiration with no radiographic evidence of acute cardiopulmonary disease. Xr Chest Portable    Result Date: 11/22/2020  EXAMINATION: ONE XRAY VIEW OF THE CHEST 11/22/2020 8:36 am COMPARISON: 11/22/2020 HISTORY: ORDERING SYSTEM PROVIDED HISTORY: intubation TECHNOLOGIST PROVIDED HISTORY: Reason for exam:->intubation What reading provider will be dictating this exam?->CRC FINDINGS: Endotracheal tube tip projects 4 cm superior to the master. Right IJ catheter tip is in the superior vena cava. Nasogastric tube tip projects off the edge of the film. There is patchy right lung base atelectasis or infiltrate. No pneumothorax or effusion is seen. No acute osseous abnormality. Endotracheal tube tip projects 4 cm to the master. Right lung base atelectasis or infiltrate    Xr Chest Portable    Result Date: 11/22/2020  EXAMINATION: ONE XRAY VIEW OF THE CHEST 11/22/2020 7:48 am COMPARISON: None. HISTORY: ORDERING SYSTEM PROVIDED HISTORY: seizure TECHNOLOGIST PROVIDED HISTORY: Reason for exam:->seizure What reading provider will be dictating this exam?->CRC FINDINGS: Central venous catheter tip is at SVC/right atrial junction. No pneumothorax seen. There are bilateral infiltrates which predominate in the mid to lower lungs. I cannot confirm pleural effusion. There is no pneumothorax. Bilateral infiltrates, mostly in mid to lower lungs.     Ct Head Wo Contrast    Result Date: 11/23/2020  EXAMINATION: CT OF THE HEAD WITHOUT CONTRAST  11/23/2020 7:37 pm TECHNIQUE: CT of the head was performed without the administration of intravenous contrast. Dose modulation, iterative reconstruction, and/or weight based adjustment of the mA/kV was utilized to reduce the radiation dose to as low as reasonably achievable. COMPARISON: 11/22/2020 HISTORY: ORDERING SYSTEM PROVIDED HISTORY: s/p seizure TECHNOLOGIST PROVIDED HISTORY: Reason for exam:->s/p seizure Has a \"code stroke\" or \"stroke alert\" been called? ->No What reading provider will be dictating this exam?->CRC FINDINGS: BRAIN/VENTRICLES: There is no acute intracranial hemorrhage, mass effect or midline shift. No abnormal extra-axial fluid collection. The gray-white differentiation is maintained without evidence of an acute infarct. There is no evidence of hydrocephalus. ORBITS: The bilateral orbits demonstrate no acute abnormality. SINUSES: Minimal mucoperiosteal thickening of the left maxillary and left sphenoid sinuses is seen. SOFT TISSUES/SKULL:  No acute abnormality of the visualized skull or soft tissues. No acute intracranial abnormality. MRI may be obtained if clinically indicated. Ct Head Wo Contrast    Result Date: 11/22/2020  EXAMINATION: CT OF THE HEAD WITHOUT CONTRAST  11/22/2020 9:01 am TECHNIQUE: CT of the head was performed without the administration of intravenous contrast. Dose modulation, iterative reconstruction, and/or weight based adjustment of the mA/kV was utilized to reduce the radiation dose to as low as reasonably achievable. COMPARISON: None. HISTORY: ORDERING SYSTEM PROVIDED HISTORY: seizure TECHNOLOGIST PROVIDED HISTORY: Has a \"code stroke\" or \"stroke alert\" been called? ->No Reason for exam:->seizure What reading provider will be dictating this exam?->CRC FINDINGS: BRAIN/VENTRICLES: There is no acute intracranial hemorrhage, mass effect or midline shift. No abnormal extra-axial fluid collection. The gray-white differentiation is maintained without evidence of an acute infarct.   [facility] Medicine Resident - PGY2    Attending Physician: Dr. Arthur Sarah

## 2020-11-26 ENCOUNTER — APPOINTMENT (OUTPATIENT)
Dept: GENERAL RADIOLOGY | Age: 35
DRG: 880 | End: 2020-11-26
Payer: MEDICARE

## 2020-11-26 LAB
AADO2: 100.2 MMHG
ANION GAP SERPL CALCULATED.3IONS-SCNC: 17 MMOL/L (ref 7–16)
B.E.: -5.2 MMOL/L (ref -3–3)
BUN BLDV-MCNC: 12 MG/DL (ref 6–20)
CALCIUM SERPL-MCNC: 8.9 MG/DL (ref 8.6–10.2)
CHLORIDE BLD-SCNC: 105 MMOL/L (ref 98–107)
CO2: 18 MMOL/L (ref 22–29)
COHB: 0.3 % (ref 0–1.5)
CREAT SERPL-MCNC: 1 MG/DL (ref 0.5–1)
CRITICAL: ABNORMAL
DATE ANALYZED: ABNORMAL
DATE OF COLLECTION: ABNORMAL
EKG ATRIAL RATE: 67 BPM
EKG P AXIS: 0 DEGREES
EKG P-R INTERVAL: 148 MS
EKG Q-T INTERVAL: 388 MS
EKG QRS DURATION: 88 MS
EKG QTC CALCULATION (BAZETT): 409 MS
EKG R AXIS: 14 DEGREES
EKG T AXIS: 60 DEGREES
EKG VENTRICULAR RATE: 67 BPM
FIO2: 40 %
GFR AFRICAN AMERICAN: >60
GFR NON-AFRICAN AMERICAN: >60 ML/MIN/1.73
GLUCOSE BLD-MCNC: 96 MG/DL (ref 74–99)
HCO3: 20.3 MMOL/L (ref 22–26)
HCT VFR BLD CALC: 30.7 % (ref 34–48)
HEMOGLOBIN: 9.2 G/DL (ref 11.5–15.5)
HHB: 1.7 % (ref 0–5)
LAB: ABNORMAL
Lab: ABNORMAL
MCH RBC QN AUTO: 23.2 PG (ref 26–35)
MCHC RBC AUTO-ENTMCNC: 30 % (ref 32–34.5)
MCV RBC AUTO: 77.5 FL (ref 80–99.9)
METHB: 0.4 % (ref 0–1.5)
MODE: AC
O2 CONTENT: 15.5 ML/DL
O2 SATURATION: 98.3 % (ref 92–98.5)
O2HB: 97.6 % (ref 94–97)
OPERATOR ID: 2593
PATIENT TEMP: 37 C
PCO2: 39.2 MMHG (ref 35–45)
PDW BLD-RTO: 16.7 FL (ref 11.5–15)
PEEP/CPAP: 8 CMH2O
PFO2: 3.25 MMHG/%
PH BLOOD GAS: 7.33 (ref 7.35–7.45)
PLATELET # BLD: 314 E9/L (ref 130–450)
PMV BLD AUTO: 9.4 FL (ref 7–12)
PO2: 129.9 MMHG (ref 75–100)
POTASSIUM SERPL-SCNC: 3.7 MMOL/L (ref 3.5–5)
RBC # BLD: 3.96 E12/L (ref 3.5–5.5)
RI(T): 0.77
RR MECHANICAL: 12 B/MIN
SODIUM BLD-SCNC: 140 MMOL/L (ref 132–146)
SOURCE, BLOOD GAS: ABNORMAL
THB: 11.1 G/DL (ref 11.5–16.5)
TIME ANALYZED: 643
VT MECHANICAL: 400 ML
WBC # BLD: 7.9 E9/L (ref 4.5–11.5)

## 2020-11-26 PROCEDURE — 2580000003 HC RX 258: Performed by: NURSE PRACTITIONER

## 2020-11-26 PROCEDURE — 2580000003 HC RX 258: Performed by: STUDENT IN AN ORGANIZED HEALTH CARE EDUCATION/TRAINING PROGRAM

## 2020-11-26 PROCEDURE — 6360000002 HC RX W HCPCS: Performed by: INTERNAL MEDICINE

## 2020-11-26 PROCEDURE — 2580000003 HC RX 258: Performed by: PSYCHIATRY & NEUROLOGY

## 2020-11-26 PROCEDURE — 93010 ELECTROCARDIOGRAM REPORT: CPT | Performed by: INTERNAL MEDICINE

## 2020-11-26 PROCEDURE — 80048 BASIC METABOLIC PNL TOTAL CA: CPT

## 2020-11-26 PROCEDURE — 99231 SBSQ HOSP IP/OBS SF/LOW 25: CPT | Performed by: INTERNAL MEDICINE

## 2020-11-26 PROCEDURE — 82805 BLOOD GASES W/O2 SATURATION: CPT

## 2020-11-26 PROCEDURE — 6360000002 HC RX W HCPCS: Performed by: PSYCHIATRY & NEUROLOGY

## 2020-11-26 PROCEDURE — 6370000000 HC RX 637 (ALT 250 FOR IP): Performed by: NURSE PRACTITIONER

## 2020-11-26 PROCEDURE — 85027 COMPLETE CBC AUTOMATED: CPT

## 2020-11-26 PROCEDURE — 99233 SBSQ HOSP IP/OBS HIGH 50: CPT | Performed by: INTERNAL MEDICINE

## 2020-11-26 PROCEDURE — C9113 INJ PANTOPRAZOLE SODIUM, VIA: HCPCS | Performed by: STUDENT IN AN ORGANIZED HEALTH CARE EDUCATION/TRAINING PROGRAM

## 2020-11-26 PROCEDURE — 99233 SBSQ HOSP IP/OBS HIGH 50: CPT | Performed by: NURSE PRACTITIONER

## 2020-11-26 PROCEDURE — 6360000002 HC RX W HCPCS: Performed by: STUDENT IN AN ORGANIZED HEALTH CARE EDUCATION/TRAINING PROGRAM

## 2020-11-26 PROCEDURE — 71045 X-RAY EXAM CHEST 1 VIEW: CPT

## 2020-11-26 PROCEDURE — 6360000002 HC RX W HCPCS: Performed by: NURSE PRACTITIONER

## 2020-11-26 PROCEDURE — 95720 EEG PHY/QHP EA INCR W/VEEG: CPT | Performed by: PSYCHIATRY & NEUROLOGY

## 2020-11-26 PROCEDURE — 2000000000 HC ICU R&B

## 2020-11-26 PROCEDURE — C9254 INJECTION, LACOSAMIDE: HCPCS | Performed by: PSYCHIATRY & NEUROLOGY

## 2020-11-26 PROCEDURE — 94003 VENT MGMT INPAT SUBQ DAY: CPT

## 2020-11-26 PROCEDURE — 2700000000 HC OXYGEN THERAPY PER DAY

## 2020-11-26 RX ADMIN — LAMOTRIGINE 25 MG: 25 TABLET ORAL at 09:15

## 2020-11-26 RX ADMIN — DEXTROSE MONOHYDRATE 150 MG: 50 INJECTION, SOLUTION INTRAVENOUS at 21:21

## 2020-11-26 RX ADMIN — PANTOPRAZOLE SODIUM 40 MG: 40 INJECTION, POWDER, FOR SOLUTION INTRAVENOUS at 09:15

## 2020-11-26 RX ADMIN — FLUOXETINE HYDROCHLORIDE 20 MG: 20 CAPSULE ORAL at 09:15

## 2020-11-26 RX ADMIN — PRAZOSIN HYDROCHLORIDE 2 MG: 2 CAPSULE ORAL at 21:21

## 2020-11-26 RX ADMIN — DEXTROSE MONOHYDRATE 150 MG: 50 INJECTION, SOLUTION INTRAVENOUS at 09:15

## 2020-11-26 RX ADMIN — GABAPENTIN 300 MG: 300 CAPSULE ORAL at 14:12

## 2020-11-26 RX ADMIN — AMLODIPINE BESYLATE 10 MG: 10 TABLET ORAL at 09:15

## 2020-11-26 RX ADMIN — LEVETIRACETAM 1000 MG: 10 INJECTION INTRAVENOUS at 18:31

## 2020-11-26 RX ADMIN — ACETAMINOPHEN 650 MG: 325 TABLET ORAL at 05:52

## 2020-11-26 RX ADMIN — LEVETIRACETAM 500 MG: 5 INJECTION INTRAVENOUS at 09:15

## 2020-11-26 RX ADMIN — SODIUM CHLORIDE, PRESERVATIVE FREE 10 ML: 5 INJECTION INTRAVENOUS at 09:15

## 2020-11-26 RX ADMIN — SODIUM CHLORIDE, PRESERVATIVE FREE 10 ML: 5 INJECTION INTRAVENOUS at 09:21

## 2020-11-26 RX ADMIN — GABAPENTIN 300 MG: 300 CAPSULE ORAL at 09:15

## 2020-11-26 RX ADMIN — SODIUM CHLORIDE, PRESERVATIVE FREE 10 ML: 5 INJECTION INTRAVENOUS at 21:21

## 2020-11-26 ASSESSMENT — PAIN SCALES - GENERAL
PAINLEVEL_OUTOF10: 0
PAINLEVEL_OUTOF10: 2
PAINLEVEL_OUTOF10: 0

## 2020-11-26 ASSESSMENT — PULMONARY FUNCTION TESTS
PIF_VALUE: 21
PIF_VALUE: 15
PIF_VALUE: 20
PIF_VALUE: 19
PIF_VALUE: 18
PIF_VALUE: 20
PIF_VALUE: 22
PIF_VALUE: 17
PIF_VALUE: 20
PIF_VALUE: 18
PIF_VALUE: 21
PIF_VALUE: 19
PIF_VALUE: 17
PIF_VALUE: 17
PIF_VALUE: 19
PIF_VALUE: 19
PIF_VALUE: 18

## 2020-11-26 NOTE — PROGRESS NOTES
[address] [facility]   Department of Internal Medicine   Internal Medicine Residency  MICU Progress Note    Patient:  Nikolay Silva 38 y.o. female   MRN: [id number]       Date of Service: 2020    Allergy: Patient has no known allergies. Subjective     I saw and examined the  Patient in the AM today, she is sedated and intubated. Appears to be responsive to commands, no seizure activity reported in the last 24 hours. Objective     TEMPERATURE:  Current - Temp: 98.8 °F (37.1 °C); Max - Temp  Av.1 °F (37.3 °C)  Min: 98.8 °F (37.1 °C)  Max: 99.5 °F (37.5 °C)    RESPIRATIONS RANGE: Resp  Av  Min: 1  Max: 23    PULSE RANGE: Pulse  Av.6  Min: 65  Max: 107    BLOOD PRESSURE RANGE:  Systolic (92HAD), IWJ:466 , Min:114 , ANGLEICA:825   ; Diastolic (39QKL), HYB:24, Min:72, Max:102      PULSE OXIMETRY RANGE: SpO2  Av.2 %  Min: 90 %  Max: 100 %    Physical Exam:  I & O - 24hr: I/O this shift:  In: -   · Out: 120 [Urine:120]   · General Appearance: Sedated and intubated   · HEENT:  Head: Normocephalic, no lesions, without obvious abnormality. · Neck: no adenopathy, no carotid bruit, no JVD, supple, symmetrical, trachea midline and thyroid not enlarged, symmetric, no tenderness/mass/nodules  · Lung: clear to auscultation bilaterally  · Heart: regular rate and rhythm, S1, S2 normal, no murmur, click, rub or gallop  · Abdomen: soft, non-tender; bowel sounds normal; no masses,  no organomegaly  · Extremities:  extremities normal, atraumatic, no cyanosis or edema  · Musculokeletal: No joint swelling, no muscle tenderness. ROM normal in all joints of extremities.    · Neurologic: Sedated and intubated       Medications     Continuous Infusions:   fentaNYL 5 mcg/ml in 0.9%  ml infusion 50 mcg/hr (20 7259)    dextrose      propofol 30 mcg/kg/min (20 6177)    midazolam 4 mg/hr (20 6127)     Scheduled Meds:   pantoprazole  40 mg Intravenous Daily    And    sodium chloride (PF) 10 mL Intravenous Daily    amLODIPine  10 mg Oral Daily    FLUoxetine  20 mg Oral Daily    gabapentin  300 mg Oral BID    lamoTRIgine  25 mg Oral Daily    prazosin  2 mg Oral Nightly    lacosamide (VIMPAT) IVPB  150 mg Intravenous BID    levetiracetam  1,000 mg Intravenous QPM    levetiracetam  500 mg Intravenous QAM    LORazepam  1 mg Intravenous Once    sodium chloride flush  10 mL Intravenous 2 times per day    enoxaparin  40 mg Subcutaneous Daily     PRN Meds: glucose, dextrose, glucagon (rDNA), dextrose, LORazepam, ipratropium-albuterol, hydrALAZINE, acetaminophen **OR** acetaminophen, sodium chloride flush, polyethylene glycol, promethazine **OR** ondansetron, metoprolol  Nutrition:   NPO    Labs and Imaging Studies     CBC:   Recent Labs     11/24/20  1339 11/25/20  0453 11/26/20  0531   WBC 6.8 6.8 7.9   RBC 4.08 4.26 3.96   HGB 9.5* 9.9* 9.2*   HCT 31.2* 32.8* 30.7*   MCV 76.5* 77.0* 77.5*   MCH 23.3* 23.2* 23.2*   MCHC 30.4* 30.2* 30.0*   RDW 16.6* 16.3* 16.7*    339 314   MPV 8.9 9.0 9.4       BMP:    Recent Labs     11/23/20  1850  11/24/20  0442 11/25/20  0453 11/26/20  0531     --  139 142 140   K 3.8   < > 3.7 3.5  3.5 3.7     --  106 108* 105   CO2 24  --  24 23 18*   BUN 7  --  9 10 12   CREATININE 1.1*  --  1.1* 1.2* 1.0   GLUCOSE 106*  --  95 85 96   CALCIUM 8.7  --  8.7 8.8 8.9   PROT 6.5  --  6.6 6.7  --    LABALBU 3.6  --  3.4* 3.5  --    BILITOT 0.4  --  0.4 0.2  --    ALKPHOS 79  --  70 70  --    AST 19  --  20 17  --    ALT 20  --  21 20  --     < > = values in this interval not displayed. LIVER PROFILE:   Recent Labs     11/23/20  1850 11/24/20  0442 11/25/20  0453   AST 19 20 17   ALT 20 21 20   BILITOT 0.4 0.4 0.2   ALKPHOS 79 70 70       PT/INR:   No results for input(s): PROTIME, INR in the last 72 hours. APTT:   No results for input(s): APTT in the last 72 hours.     Fasting Lipid Panel:    No results found for: CHOL, TRIG, HDL    Cardiac Enzymes: Lab Results   Component Value Date    CKTOTAL 45 11/25/2020    CKTOTAL 119 11/23/2020    CKTOTAL 47 11/22/2020    TROPONINI <0.01 11/22/2020       Notable Cultures:      Blood cultures   Blood Culture, Routine   Date Value Ref Range Status   11/22/2020 24 Hours no growth  Preliminary     Respiratory cultures No results found for: RESPCULTURE No results found for: LABGRAM  Urine No results found for: LABURIN  Legionella No results found for: LABLEGI  C Diff PCR No results found for: CDIFPCR  Wound culture/abscess: No results for input(s): WNDABS in the last 72 hours. Tip culture:No results for input(s): CXCATHTIP in the last 72 hours.     Oxygen:     Vent Information  $Ventilation: $Subsequent Day  Skin Assessment: Clean, dry, & intact  Suction Catheter Diameter: 14  Equipment ID: [id number]  Vent Type: [phone]  Vent Mode: PS  Vt Ordered: 0 mL  Rate Set: 0 bmp  Peak Flow: 0 L/min  Pressure Support: (S) 8 cmH20  FiO2 : 40 %  SpO2: 100 %  SpO2/FiO2 ratio: 250  Sensitivity: 2  PEEP/CPAP: 8  I Time/ I Time %: 0 s  Humidification Source: Heated wire  Humidification Temp: 37  Humidification Temp Measured: 37  Circuit Condensation: Drained    Additional Respiratory  Assessments  Pulse: 71  Resp: 17  SpO2: 100 %  $End Tidal CO2: 24  Position: Semi-Sultana's  Humidification Source: Heated wire  Humidification Temp: 37  Circuit Condensation: Drained  Oral Care: Mouth swabbed, Mouth moisturizer, Mouth suctioned  Airway Type: ET  Airway Size: 8(23)  Cuff Pressure (cm H2O): 29 cm H2O   [REMOVED] Urethral Catheter-Output (mL): 225 mL  Urethral Catheter Temperature probe-Output (mL): 30 mL    Imaging Studies:    Ct Head Wo Contrast    Result Date: 11/23/2020  EXAMINATION: CT OF THE HEAD WITHOUT CONTRAST  11/23/2020 7:37 pm TECHNIQUE: CT of the head was performed without the administration of intravenous contrast. Dose modulation, iterative reconstruction, and/or weight based adjustment of the mA/kV was utilized to reduce the radiation dose to as low as reasonably achievable. COMPARISON: 11/22/2020 HISTORY: ORDERING SYSTEM PROVIDED HISTORY: s/p seizure TECHNOLOGIST PROVIDED HISTORY: Reason for exam:->s/p seizure Has a \"code stroke\" or \"stroke alert\" been called? ->No What reading provider will be dictating this exam?->CRC FINDINGS: BRAIN/VENTRICLES: There is no acute intracranial hemorrhage, mass effect or midline shift. No abnormal extra-axial fluid collection. The gray-white differentiation is maintained without evidence of an acute infarct. There is no evidence of hydrocephalus. ORBITS: The bilateral orbits demonstrate no acute abnormality. SINUSES: Minimal mucoperiosteal thickening of the left maxillary and left sphenoid sinuses is seen. SOFT TISSUES/SKULL:  No acute abnormality of the visualized skull or soft tissues. No acute intracranial abnormality. MRI may be obtained if clinically indicated. Ct Head Wo Contrast    Result Date: 11/22/2020  EXAMINATION: CT OF THE HEAD WITHOUT CONTRAST  11/22/2020 9:01 am TECHNIQUE: CT of the head was performed without the administration of intravenous contrast. Dose modulation, iterative reconstruction, and/or weight based adjustment of the mA/kV was utilized to reduce the radiation dose to as low as reasonably achievable. COMPARISON: None. HISTORY: ORDERING SYSTEM PROVIDED HISTORY: seizure TECHNOLOGIST PROVIDED HISTORY: Has a \"code stroke\" or \"stroke alert\" been called? ->No Reason for exam:->seizure What reading provider will be dictating this exam?->CRC FINDINGS: BRAIN/VENTRICLES: There is no acute intracranial hemorrhage, mass effect or midline shift. No abnormal extra-axial fluid collection. The gray-white differentiation is maintained without evidence of an acute infarct. There is no evidence of hydrocephalus. ORBITS: The visualized portion of the orbits demonstrate no acute abnormality. SINUSES: Small amount of fluid in the sphenoid sinus. Trace left maxillary sinus mucosal thickening. Mastoids are aerated. SOFT TISSUES/SKULL:  No acute abnormality of the visualized skull or soft tissues. No acute intracranial abnormality. Xr Chest Portable    Result Date: 11/23/2020  EXAMINATION: ONE XRAY VIEW OF THE CHEST 11/23/2020 6:15 pm COMPARISON: November 22 HISTORY: ORDERING SYSTEM PROVIDED HISTORY: r/o aspiration pneumonitis TECHNOLOGIST PROVIDED HISTORY: Reason for exam:->r/o aspiration pneumonitis What reading provider will be dictating this exam?->CRC FINDINGS: Status post extubation. Decreased inspiration. Resolution of right basilar atelectasis since the prior examination. No new areas of airspace consolidation or pleural effusions. Pulmonary vasculature is within normal limits. Decreased inspiration with no radiographic evidence of acute cardiopulmonary disease. Xr Chest Portable    Result Date: 11/22/2020  EXAMINATION: ONE XRAY VIEW OF THE CHEST 11/22/2020 8:36 am COMPARISON: 11/22/2020 HISTORY: ORDERING SYSTEM PROVIDED HISTORY: intubation TECHNOLOGIST PROVIDED HISTORY: Reason for exam:->intubation What reading provider will be dictating this exam?->CRC FINDINGS: Endotracheal tube tip projects 4 cm superior to the master. Right IJ catheter tip is in the superior vena cava. Nasogastric tube tip projects off the edge of the film. There is patchy right lung base atelectasis or infiltrate. No pneumothorax or effusion is seen. No acute osseous abnormality. Endotracheal tube tip projects 4 cm to the amster. Right lung base atelectasis or infiltrate    Xr Chest Portable    Result Date: 11/22/2020  EXAMINATION: ONE XRAY VIEW OF THE CHEST 11/22/2020 7:48 am COMPARISON: None. HISTORY: ORDERING SYSTEM PROVIDED HISTORY: seizure TECHNOLOGIST PROVIDED HISTORY: Reason for exam:->seizure What reading provider will be dictating this exam?->CRC FINDINGS: Central venous catheter tip is at SVC/right atrial junction. No pneumothorax seen.  There are bilateral infiltrates which predominate in the mid to lower lungs. I cannot confirm pleural effusion. There is no pneumothorax. Bilateral infiltrates, mostly in mid to lower lungs. Ct Head Wo Contrast    Result Date: 11/23/2020  EXAMINATION: CT OF THE HEAD WITHOUT CONTRAST  11/23/2020 7:37 pm TECHNIQUE: CT of the head was performed without the administration of intravenous contrast. Dose modulation, iterative reconstruction, and/or weight based adjustment of the mA/kV was utilized to reduce the radiation dose to as low as reasonably achievable. COMPARISON: 11/22/2020 HISTORY: ORDERING SYSTEM PROVIDED HISTORY: s/p seizure TECHNOLOGIST PROVIDED HISTORY: Reason for exam:->s/p seizure Has a \"code stroke\" or \"stroke alert\" been called? ->No What reading provider will be dictating this exam?->CRC FINDINGS: BRAIN/VENTRICLES: There is no acute intracranial hemorrhage, mass effect or midline shift. No abnormal extra-axial fluid collection. The gray-white differentiation is maintained without evidence of an acute infarct. There is no evidence of hydrocephalus. ORBITS: The bilateral orbits demonstrate no acute abnormality. SINUSES: Minimal mucoperiosteal thickening of the left maxillary and left sphenoid sinuses is seen. SOFT TISSUES/SKULL:  No acute abnormality of the visualized skull or soft tissues. No acute intracranial abnormality. MRI may be obtained if clinically indicated. Ct Head Wo Contrast    Result Date: 11/22/2020  EXAMINATION: CT OF THE HEAD WITHOUT CONTRAST  11/22/2020 9:01 am TECHNIQUE: CT of the head was performed without the administration of intravenous contrast. Dose modulation, iterative reconstruction, and/or weight based adjustment of the mA/kV was utilized to reduce the radiation dose to as low as reasonably achievable. COMPARISON: None. HISTORY: ORDERING SYSTEM PROVIDED HISTORY: seizure TECHNOLOGIST PROVIDED HISTORY: Has a \"code stroke\" or \"stroke alert\" been called? ->No Reason for exam:->seizure What reading provider will be dictating this exam?->CRC FINDINGS: BRAIN/VENTRICLES: There is no acute intracranial hemorrhage, mass effect or midline shift. No abnormal extra-axial fluid collection. The gray-white differentiation is maintained without evidence of an acute infarct. There is no evidence of hydrocephalus. ORBITS: The visualized portion of the orbits demonstrate no acute abnormality. SINUSES: Small amount of fluid in the sphenoid sinus. Trace left maxillary sinus mucosal thickening. Mastoids are aerated. SOFT TISSUES/SKULL:  No acute abnormality of the visualized skull or soft tissues. No acute intracranial abnormality. Xr Chest Portable    Result Date: 11/23/2020  EXAMINATION: ONE XRAY VIEW OF THE CHEST 11/23/2020 6:15 pm COMPARISON: November 22 HISTORY: ORDERING SYSTEM PROVIDED HISTORY: r/o aspiration pneumonitis TECHNOLOGIST PROVIDED HISTORY: Reason for exam:->r/o aspiration pneumonitis What reading provider will be dictating this exam?->CRC FINDINGS: Status post extubation. Decreased inspiration. Resolution of right basilar atelectasis since the prior examination. No new areas of airspace consolidation or pleural effusions. Pulmonary vasculature is within normal limits. Decreased inspiration with no radiographic evidence of acute cardiopulmonary disease. Xr Chest Portable    Result Date: 11/22/2020  EXAMINATION: ONE XRAY VIEW OF THE CHEST 11/22/2020 8:36 am COMPARISON: 11/22/2020 HISTORY: ORDERING SYSTEM PROVIDED HISTORY: intubation TECHNOLOGIST PROVIDED HISTORY: Reason for exam:->intubation What reading provider will be dictating this exam?->CRC FINDINGS: Endotracheal tube tip projects 4 cm superior to the master. Right IJ catheter tip is in the superior vena cava. Nasogastric tube tip projects off the edge of the film. There is patchy right lung base atelectasis or infiltrate. No pneumothorax or effusion is seen. No acute osseous abnormality. Endotracheal tube tip projects 4 cm to the master.  Right and plan for extubation  · Continuous EEG monitoring should be done with 12:00 today, pending neurology reading  · She did not have seizure activity in the last 24 hours, she was only on fentanyl for being comfortable on the ventilator. · Remove central line  · Order MRI of the brain  · Keppra and Vimpat neurology      Core measures:  1. Code status: Full     2. Peptic ulcer prophylaxis: not indicated    3. DVT Prophylaxis: ENOXAPARIN    4. Lines / tubes: Right femoral CVC (4 days)     5. Disposition: Continue current care    Daniel Villanueva M.D. Internal Medicine Resident - PGY2    Attending Physician: Dr. Nohemy Delcid, [address] [facility]  Department of Pulmonary, Critical Care and Sleep Medicine  [address] [facility]  Department of Internal Medicine      During multidisciplinary team rounds Renaldo Salcedo is a 38 y.o. female was seen, examined and discussed. This is confirmation that I have personally seen and examined the patient and that the key elements of the encounter were performed by me (> 85 % time). The medications & laboratory data was discussed and adjusted where necessary. The radiographic images were reviewed or with radiologist or consultant if felt dis-concordant with the exam or history. The above findings were corroborated, plans confirmed and changes made if needed. Family is updated at the bedside as available. Key issues of the case were discussed among consultants. Critical Care time is documented if appropriate.       David Lacey DO, FACP, FCCP, [facility],

## 2020-11-26 NOTE — PROGRESS NOTES
[facility] Quality Flow/Interdisciplinary Rounds Progress Note        Quality Flow Rounds held on November 26, 2020    Disciplines Attending:  Bedside nurse, charge nurse, , PT/OT, pharmacy, nursing unit leadership, , Medical residents    Candice Pill was admitted on 11/22/2020  6:21 AM    Anticipated Discharge Date:  Expected Discharge Date: 11/27/20    Disposition:    Vikash Score:  Vikash Scale Score: 15    Readmission Risk              Risk of Unplanned Readmission:        17           Discussed patient goal for the day, patient clinical progression, and barriers to discharge.   The following Goal(s) of the Day/Commitment(s) have been identified:  Continue icu care, EEG,       Krista Guillermo  November 26, 2020

## 2020-11-26 NOTE — PLAN OF CARE
Problem: Falls - Risk of:  Goal: Will remain free from falls  Description: Will remain free from falls  Outcome: Met This Shift  Goal: Absence of physical injury  Description: Absence of physical injury  Outcome: Met This Shift     Problem: Skin Integrity:  Goal: Will show no infection signs and symptoms  Description: Will show no infection signs and symptoms  Outcome: Met This Shift  Goal: Absence of new skin breakdown  Description: Absence of new skin breakdown  Outcome: Met This Shift     Problem: Pain:  Goal: Pain level will decrease  Description: Pain level will decrease  Outcome: Met This Shift  Goal: Control of acute pain  Description: Control of acute pain  Outcome: Met This Shift  Goal: Control of chronic pain  Description: Control of chronic pain  Outcome: Met This Shift     Problem: Physical Regulation:  Goal: Signs of adequate cerebral perfusion will increase  Description: Signs of adequate cerebral perfusion will increase  Outcome: Met This Shift     Problem: Safety:  Goal: Ability to remain free from injury will improve  Description: Ability to remain free from injury will improve  Outcome: Met This Shift     Problem: Self-Concept:  Goal: Level of anxiety will decrease  Description: Level of anxiety will decrease  Outcome: Met This Shift  Goal: Ability to verbalize feelings about condition will improve  Description: Ability to verbalize feelings about condition will improve  Outcome: Met This Shift     Problem: Restraint Use - Nonviolent/Non-Self-Destructive Behavior:  Goal: Absence of restraint-related injury  Description: Absence of restraint-related injury  Outcome: Met This Shift

## 2020-11-26 NOTE — PLAN OF CARE
Problem: Restraint Use - Nonviolent/Non-Self-Destructive Behavior:  Goal: Absence of restraint-related injury  Description: Absence of restraint-related injury  11/26/2020 1100 by Fransisca Castle RN  Outcome: Met This Shift     Problem: Restraint Use - Nonviolent/Non-Self-Destructive Behavior:  Goal: Absence of restraint indications  Description: Absence of restraint indications  11/26/2020 1100 by Fransisca Castle RN  Outcome: Not Met This Shift

## 2020-11-26 NOTE — PROGRESS NOTES
Vianey Navarro is a 38 y.o.  female     Neurology is following for seizure    PMH: schizoaffective disorder, PTSD, borderline personality disorder, anxiety    Patient presented to ED from [facility] due to seizures. She was recently admitted at [facility] for seizure and falls. EEG at that time was unremarkable for any epileptiform changes. She was transferred to [facility] due to suicidal ideation. On 11/21 she was found to have seizure like activity with generalized twitching and was transferred to ED. In the ED on 11/21, she was found to have low O2 sat of 82%, tachypnea and altered mental status. She was given ativan, versed and started on propofol drip and briefly intubated to protect airway and later extubated on 11/22. CT head unremarkable for any acute process x2. Routine EEG x2 normal   On initial neuro consult, she stated that she does not remember any shaking movement or seizure like activity. She remembers waking up in the hospital with tube in her mouth from [facility]. She reported headache and nausea with increased sensation on the right side both upper and lower extremities. She denies any fecal or urinary incontinence and blurry vision   Pt was intubated after desating on Wednesday d/t seizure activity described with nystagmus and generalized stiffness    Pt is currently connected to extended EEG. Events overnight include a 2 minute, 1 minute and a 3 minute seizure like activity    Reported history of seizure activity since childhood   Maintained on Keppra and Lamictal;    Previously on Lamictal and Vimpat however changed due to insurance issues. With Keppra therapy she has not noticed any improvement in her seizure control and indicates that she has been having seizure activity about once or twice monthly.   Same pattern to seizure activity    Vital signs are stable at present time     ROS is limited d/t intubation     No family present at bedside    Objective:     /81   Pulse 69   Temp 98.8 °F (37.1 °C) (Bladder)   Resp 9   Ht 5' 4\" (1.626 m)   Wt 258 lb (117 kg)   LMP  (LMP Unknown)   SpO2 100%   BMI 44.29 kg/m²     General appearance: awake, intubated, no obvious distress   Head: normocephalic, without obvious abnormality, atraumatic  Eyes: conjunctivae/corneas clear.  .  Neck:  supple, symmetrical, trachea midline  Heart: NSR on tele   Extremities: normal, atraumatic, no cyanosis or edema  Pulses: 2+ and symmetric  Skin: color, texture, turgor normal---no rashes or lesions      Mental Status: Opens eyes, intubated but awake, following simple commands    Decent attention/concentration  Intact fundus of knowledge--unable to assess due to condition  Intact memories--unable to assess due to condition    Speech/Language: nonverbal for me    Cranial Nerves: CN 2-12 limited as pt is intubated   I: smell NA   II: visual acuity  NA   II: visual fields  blinks to threat   II: pupils PERRL   III,VII: ptosis None   III,IV,VI: extraocular muscles   no gaze preference, tracks examiner   V: mastication    V: facial light touch sensation  Appears to appreciate touch    V,VII: corneal reflex  Present   VII: facial muscle function - upper  Normal   VII: facial muscle function - lower Normal   VIII: hearing  normal    IX: soft palate elevation     IX,X: gag reflex Present   XI: trapezius strength     XI: sternocleidomastoid strength    XI: neck extension strength     XII: tongue strength       Motor:  Limited motor exam  Wrist restraints in place bilaterally  Obese bulk  Increased tone to BLE  Generalized shaking noted with stimulation only today     Sensory:  Withdraws to pain in all    Coordination:   Unable to complete due to patient condition    Gait:  Unable to complete due to patient condition    DTR:   Right Brachioradialis reflex 3+  Left Brachioradialis reflex 3+  Right Biceps reflex 2+  Left Biceps reflex 2+  Right Triceps reflex 2+  Left Result   1. Satisfactory position of enteric tube. 2.  Endotracheal tube. Low lung volumes. Retrocardiac and left basilar   opacity may reflect atelectasis however underlying infection is not excluded. RECOMMENDATION:   (Recommend upright PA and lateral chest radiographs 6-8 weeks after   resolution of patient's symptoms to ensure complete resolution of   radiographic findings.)      Fluoroscopy modified barium swallow with video   Final Result   Transient penetration seen with thin liquids. No aspiration. Please see separate speech pathology report for full discussion of findings   and recommendations. CT HEAD WO CONTRAST   Final Result   No acute intracranial abnormality. MRI may be obtained if clinically   indicated. XR CHEST PORTABLE   Final Result   Decreased inspiration with no radiographic evidence of acute cardiopulmonary   disease. CT HEAD WO CONTRAST   Final Result   No acute intracranial abnormality. XR CHEST PORTABLE   Final Result   Endotracheal tube tip projects 4 cm to the master. Right lung base atelectasis or infiltrate      XR CHEST PORTABLE   Final Result   Bilateral infiltrates, mostly in mid to lower lungs. XR CHEST PORTABLE    (Results Pending)   XR CHEST PORTABLE    (Results Pending)   XR CHEST PORTABLE    (Results Pending)     EEG 11/24: Normal awake and drowsy EEG with note of low amplitude background with superimposed beta suggesting medication effect. No epileptiform activity or significant slowing appreciated. Clinical correlation indicated. If further continue report of seizure activity will consider for prolonged EEG to capture seizure events on EEG. EEG 11/23: Normal-appearing EEG with low amplitude fast background activity and isolated sharp discharges or primarily left central and parietal in location without significant spread. No clear epileptiform discharges. No significant slowing.  The absence of epileptiform activity during EEG study does not exclude the possibility of seizures or epilepsy given limited duration of study and lack of epileptic type activity during study. Clinical correlation is indicated.     All labs and images personally reviewed today    Assessment:     Seizure versus pseudoseizure in a patient with multiple psychiatric disorders   CT head unremarkable x2   Routine EEG x2 normal   Lactic acid normal x3   Extended EEG connected     Borderline personality disorder, schizoaffective disorder, PTSD and anxiety    Psych evaluated-- monitor for SI     Plan:     Continue supportive care  Continue extended EEG--- want to capture event off sedation if able   Wean as able  Continue Vimpat 150 mg BID and Keppra 1500 mg BID   Continue Lamictal 25mg daily  Continue seizure precautions  SCDs    Patient will need to establish with outpatient neurology at discharge for seizure management    HARRISON Sagastume NP-C   9:43 AM  11/26/2020

## 2020-11-26 NOTE — PROGRESS NOTES
[facility]  Internal Medicine Residency Program  Progress Note - House Team 2    Patient:  Terrell Mcarthur 38 y.o. female MRN: [id number]     Date of Service: 11/26/2020     CC: had concerns including Seizures (Seizures from generations, per generations pseudoseizures. ). Overnight events: None    Subjective     Patient seen and examined. Intubated, off sedation. Vital signs stable. No seizures overnight. No muscular twitching. Patient opens eyes and follows commands appropriately. AC/VC-12/400/8/40 percent  ABG; 7.332, 39.2 PCO2, 129.9 PO2, 20.3 bicarb  On Keppra 1000 mg p.m., 500 mg a.m. IV  On Vimpat 150 mg twice daily IV. On Lamotrigine 25 mg oral daily. Making urine, 1.1L out over the last 24 hrs, net negative 3.3L since admission. Plan to extubate today. Completed 24 hr continuous EEG this morning for further evaluation why the patient is having recurrent seizures. Objective     Physical Exam:  · Vitals: /72   Pulse 71   Temp 98.8 °F (37.1 °C) (Bladder)   Resp (!) 6   Ht 5' 4\" (1.626 m)   Wt 258 lb (117 kg)   LMP  (LMP Unknown)   SpO2 100%   BMI 44.29 kg/m²     · I & O - 24hr: No intake/output data recorded. · General Appearance: Intubated, off sedation. · HEENT:  Head: Normocephalic, no lesions, without obvious abnormality. Pupils equal and reactive to light bilaterally. OG tube in place  · Neck: no adenopathy, no carotid bruit, no JVD, supple, symmetrical, trachea midline and thyroid not enlarged, symmetric, no tenderness/mass/nodules  · Lung: coarse breath sounds appreciated bilaterally. , no wheezing  · Heart: regular rate and rhythm, S1, S2 normal, no murmur, click, rub or gallop  · Abdomen: soft, non-tender; bowel sounds normal; no masses,  no organomegaly  · Extremities:  extremities normal, atraumatic, no cyanosis or edema  · Musculokeletal: No joint swelling, no muscle tenderness. · Neurologic: Intubated.   Opens eyes and follows commands. Subject  Pertinent Labs & Imaging Studies   andrea  CBC:   Lab Results   Component Value Date    WBC 7.9 11/26/2020    RBC 3.96 11/26/2020    HGB 9.2 11/26/2020    HCT 30.7 11/26/2020    MCV 77.5 11/26/2020    MCH 23.2 11/26/2020    MCHC 30.0 11/26/2020    RDW 16.7 11/26/2020     11/26/2020    MPV 9.4 11/26/2020     CMP:    Lab Results   Component Value Date     11/25/2020    K 3.5 11/25/2020    K 3.5 11/25/2020     11/25/2020    CO2 23 11/25/2020    BUN 10 11/25/2020    CREATININE 1.2 11/25/2020    GFRAA >60 11/25/2020    LABGLOM 50 11/25/2020    GLUCOSE 85 11/25/2020    PROT 6.7 11/25/2020    LABALBU 3.5 11/25/2020    CALCIUM 8.8 11/25/2020    BILITOT 0.2 11/25/2020    ALKPHOS 70 11/25/2020    AST 17 11/25/2020    ALT 20 11/25/2020         Resident's Assessment and Plan     Rayray Stubbs is a 38 y.o. female with PMH DM (not on meds), schizoaffective disorder, PTSD, borderline personality disorder, presented to ED from [facility] facility d/t seizures and admitted to SICU for further management. 1. Acute Encephalopathy 2/2 Seizures vs pseudoseizures s/p intubation for airway protection. Patient states she has history of seizures since childhood and recently maintained on Keppra and Lamictal.  Previously, she had been on Lamictal and Vimpat but due to lack of insurance coverage and she had gone back on Keppra with minimal response to her seizures. She had a recent admission to [facility] d/t seizures; 2-day EEG negative. Witnessed seizure in ED, given Keppra 1000 mg x 1, Ativan 2 mg x 1, versed 2 mg x 2 and started on propofol gtt. CK level 47, Ammonia 14, UDS negative, intubated and extubated on 11/22/2020. EEG report 11/23/2020 unremarkable.    -N.p.o.   -Continue seizure and aspiration precautions   -Off sedation   -Continue Keppra 1000 mg p.m., 500 mg a.m.  IV   -Continue Vimpat 150 mg twice daily IV.   -Continue gabapentin 300 mg BID PO and and 600 mg PO nightly.   -Continue lamotrigine 25 mg oral daily   -Continue Ativan 1 mg IV Q2 hrs prn for seizures.    -Follow repeat EEG final report from 2020   -Continue 24 hr continuous EEG. -Continue MICU management.    -Neurology following    2. Dysphagia likely 2/2 seizures vs anatomical abnormality. Fluoroscopy modified barium study 2020 with transient penetration with thin liquids, no aspiration. -NPO   -Will re-order SLP once extubated, for dysphagia. -OG tube in place for PO meds. 3. Acute Hypoxic Respiratory Failure 2/2 AMS (2/2 Seizure). O2 sat at [facility] reported to be 82%, and 82% in ED. Intubated and extubated on 2020. CXR on admission showed BL infiltrates mid-lower lobes. CXR  showed no acute cardiopulmonary disease.    -Intubated, off sedation   -AC/VC, 12/400/8/40% FiO2   -AB.332, 39.2 PCO2, 129.9 PO2, 20.3 bicarb   -Continue Duoneb Q4 hrs prn for SOB   -Wean from the ventilator as able   -Follow daily ABG and CXR   -Continue to monitor respiratory status    -Plan for daily weaning parameters     4. BL infiltrates mid-lower lobes 2/2 aspiration pna (2/2 AMS from recurrent seizures). s/p unasyn x 1 in ED for aspiration pna coverage. Procalcitonin 0.06, afebrile, no leukocytosis ~ suggesting unlikely to be infectious etiology.    -Culture x1 bottle grew gram-positive rods, likely contaminant   -Continue to follow cultures    -continue to monitor off antibiotics      5. Microcytic Anemia likely 2/2 thalassemia trait. No baseline hemoglobin on file. Fe 41, Ferritin 18, TIBC 374, iron saturation 11 ~unlikely to be iron deficiency anemia. Peripheral blood smear showing microcytic anemia with red cell absolute count within normal range, white cell and platelets within normal range.    -Hg stable   -Continue to follow H&H with daily CBC    6.  History of schizoaffective disorder, PTSD, bipolar, borderline personality disorder.    -Continue haldol 5 mg OD, prazosin 2 mg OD, fluoxetine 20 mg OD.   -Continue lamotrigine 25 mg oral daily   -Psychiatry following     7. History of HTN   -BP stable   -Continue norvasc 10 mg daily   -Continue Lopressor 5 mg IV every 6 hours as needed for SBP greater than 165. Do not administer if heart rate is less than 65   -Hydralazine 10 mg IV Q4 hrs prn for high BP, SBP>140 mmHg.     8. History of DM, not on home medications   -Last Hemoglobin A1c 9.4% on 11/23/2020   -BG range: 90's   -Currently NPO   -hypoglycemic protocol    -continue to monitor BG       PT/OT evaluation: not indicated at this time  DVT prophylaxis/ GI prophylaxis: lovenox/Protonix  Disposition: continue MICU management     Carmella Suero MD, PGY-1  Attending physician: Dr. Davie Pop

## 2020-11-26 NOTE — PROGRESS NOTES
Wean parameter done on PS of 5    VT= 1273 mls  F= 15 B/M  V= 19.7 l/m  NIF=-50 cmH2O  VC= 1.5 L  RSBI=12    Patient was suctioned both orally and via ET tube prior to extubation. Patient was extubated to 4 liters/min via nasal cannula. Breath Sounds post extubation were 100. Stridor was not present post extubation. SPO2 was 100%. Performed by  Romero Ibarra.  Tony,RRT,RRT-ACCS

## 2020-11-26 NOTE — PROCEDURES
EEG Report  Garett Robbins is a 38 y.o. female      Appointment Date 11/25/20 Appointment Time 1 [facility],6Th Floor Location [facility] EEG Number [phone]   Type of Study Continuous Floor 4424-A     Technical Specifications  Technician [facility] of consciousness Awake/Asleep   Sleep deprived? NO   Hyperventilation tested? NO   Photic stim tested? NO   EEG recording Standard 10-20 electrode placement    Duration of recording 22 hours   EEG complete? YES       Clinical History  Patient is a 39 yo female now with uncontrolled seizures. She has a history of schizoaffective disorder, bipolar disorder, and personality disorder with epilepsy dating back to her childhood. She recently moved to [facility] from [place] and was admitted to hospital in [place] for seizure. Work-up at that time was negative including no epileptiform activity on EEG. She has been admitted to psychiatry inpatient for suicidal ideations with a seizure in 11/21. Patient admitted through the emergency room with O2 desaturation requiring intubation at the time. She is has been extubated but is continuing to have report of a seizure activity. Patient admitted to the ICU because of ongoing seizure activity with note of increased seizure activity despite Versed drip, Keppra, Vimpat, and low-dose Lamictal therapy. Staff indicated that whenever her Versed drip was weaned she developed no seizure activity.   Patient had 2 EEGs done on 11/23 and 11/24 that were both negative for epileptiform activity despite report of ongoing seizures.       Medications    Current Facility-Administered Medications:     pantoprazole (PROTONIX) injection 40 mg, 40 mg, Intravenous, Daily, 40 mg at 11/26/20 0915 **AND** sodium chloride (PF) 0.9 % injection 10 mL, 10 mL, Intravenous, Daily, Mayra Arias MD, 10 mL at 11/26/20 0921    fentaNYL 5 mcg/ml in 0.9%  ml infusion, 50 mcg/hr, Intravenous, Continuous, Mayra Arias MD, Last Rate: 10 mL/hr at 11/25/20 1713, 50 mcg/hr at 11/25/20 1713    glucose (GLUTOSE) 40 % oral gel 15 g, 15 g, Oral, PRN, Carmen Ybarra MD    dextrose 50 % IV solution, 12.5 g, Intravenous, PRN, Carmen Ybarra MD    glucagon (rDNA) injection 1 mg, 1 mg, Intramuscular, PRN, Carmen Ybarra MD    dextrose 5 % solution, 100 mL/hr, Intravenous, PRN, Carmen Ybarra MD    propofol injection, 10 mcg/kg/min, Intravenous, Titrated, Yuly Thy Colby Gonzalez MD, Last Rate: 20.5 mL/hr at 11/25/20 1147, 30 mcg/kg/min at 11/25/20 1147    LORazepam (ATIVAN) injection 1 mg, 1 mg, Intravenous, Q2H PRN, Flex Beards, APRN - CNS, 1 mg at 11/23/20 2006    ipratropium-albuterol (DUONEB) nebulizer solution 1 ampule, 1 ampule, Inhalation, Q4H PRN, [place] Beards, APRN - CNS    amLODIPine (NORVASC) tablet 10 mg, 10 mg, Oral, Daily, [place] Beards, APRN - CNS, 10 mg at 11/26/20 0915    FLUoxetine (PROZAC) capsule 20 mg, 20 mg, Oral, Daily, Flex Beards, APRN - CNS, 20 mg at 11/26/20 0915    gabapentin (NEURONTIN) capsule 300 mg, 300 mg, Oral, BID, Flex Beards, APRN - CNS, 300 mg at 11/26/20 0915    lamoTRIgine (LAMICTAL) tablet 25 mg, 25 mg, Oral, Daily, Flex Beards, APRN - CNS, 25 mg at 11/26/20 0915    prazosin (MINIPRESS) capsule 2 mg, 2 mg, Oral, Nightly, Flex Beards, APRN - CNS, 2 mg at 11/25/20 2115    hydrALAZINE (APRESOLINE) injection 10 mg, 10 mg, Intravenous, Q4H PRN, [place] Beards, APRN - CNS    acetaminophen (TYLENOL) tablet 650 mg, 650 mg, Oral, Q6H PRN, 650 mg at 11/26/20 7128 **OR** acetaminophen (TYLENOL) suppository 650 mg, 650 mg, Rectal, Q6H PRN, Flex Crooks APRN - CNS    [COMPLETED] lacosamide (VIMPAT) 300 mg in dextrose 5 % 80 mL IVPB, 300 mg, Intravenous, Once, Stopped at 11/23/20 2007 **FOLLOWED BY** lacosamide (VIMPAT) 150 mg in dextrose 5 % 65 mL IVPB, 150 mg, Intravenous, BID, Rachel Pallas, MD, Stopped at 11/26/20 0945    levetiracetam (KEPPRA) 1000 mg/100 mL IVPB, 1,000 mg, Intravenous, QPM, Audria Brunner, MD, Stopped at 11/25/20 1900    levetiracetam (KEPPRA) 500 mg/100 mL IVPB, 500 mg, Intravenous, QAM, Kiana Ji MD, Stopped at 11/26/20 0930    LORazepam (ATIVAN) injection 1 mg, 1 mg, Intravenous, Once, Willa Butcher MD, Stopped at 11/23/20 2251    midazolam (VERSED) 100 mg in dextrose 5 % 100 mL infusion, 1 mg/hr, Intravenous, Continuous, Willa Butcher MD, Last Rate: 4 mL/hr at 11/25/20 0607, 4 mg/hr at 11/25/20 0607    sodium chloride flush 0.9 % injection 10 mL, 10 mL, Intravenous, 2 times per day, Cathern Jered, APRN - CNS, 10 mL at 11/26/20 0915    sodium chloride flush 0.9 % injection 10 mL, 10 mL, Intravenous, PRN, Cathern Jered, APRN - CNS, 10 mL at 11/24/20 0908    polyethylene glycol (GLYCOLAX) packet 17 g, 17 g, Oral, Daily PRN, Cathern Jered, APRN - CNS    promethazine (PHENERGAN) tablet 12.5 mg, 12.5 mg, Oral, Q6H PRN **OR** ondansetron (ZOFRAN) injection 4 mg, 4 mg, Intravenous, Q6H PRN, Cathern Jered, APRN - CNS    enoxaparin (LOVENOX) injection 40 mg, 40 mg, Subcutaneous, Daily, Cathern Jered, APRN - CNS, 40 mg at 11/25/20 0915    metoprolol (LOPRESSOR) injection 5 mg, 5 mg, Intravenous, Q6H PRN, Cathern Jered, APRN - CNS, 5 mg at 11/23/20 5347        Physician Interpretation    General EEG Report  EEG study was performed using the 10-20 electrode placement system in patient who was awake and asleep during time of study. She was intubated on ventilator throughout study with use of sedation. Patient with abnormal shaking movements reported to be her seizure activity. Activation procedures were not performed. Type of EEG:    Inpatient video EEG monitoring. Duration: Start:   11/25/2020: 1307                    Finish: 11/26/2020: 1110    Epileptiform Discharge   None Appreciated     Non-Epileptiform Abnormality  EEG study was reviewed in its entirety with note of low amplitude activity during the waking state. Patient with frequent eye fluttering.   Staring off to 1 side with roving eye movements side to side with eyes closed. She did have increased pronator activity typically when staff present in room more with interaction with staff. Patient intubated throughout study. There is radial abnormal activity noted with episodes of tremor type activity. Some with whole-body involvement. During all of these events, there was no epileptiform discharges appreciated. Patient transitioning from wake to sleep states and no abnormal discharges noted during a sleep-wake transition. There were some isolated spikes noted without or single spikes without spread. No significant slowing, asymmetry, abnormal discharges, fast activity, or spike and wave discharges noted during review of the study. Ictal  Not applicable    General Impression  Normal-appearing awake and asleep EEG with no evidence of epileptiform activity despite presence of abnormal movements that have been previously associated with his seizure activity. Findings are is consistent with nonelectrical seizure type activity with likely pseudoseizures in patient with prior history of seizure activity.     MD Nneka Castellon

## 2020-11-26 NOTE — PROGRESS NOTES
[facility]  Internal Medicine Residency / [address]    Attending Physician Statement  I have discussed the case, including pertinent history and exam findings with the resident and the team.  I have seen and examined the patient and the key elements of the encounter have been performed by me. I have also personally reviewed imaging studies and labs. I agree with the assessment, plan and orders as documented by the resident. No seizure episode overnight altough with reports of intermittent nystagmus. This morning, awake, following commands, tolerating PS. EEG does not show epileptiform activity during episode of abnormal movements. Assessment:  Likely psychogenic nonepileptic seizures  HAGMA, likely starvation ketosis    Plan:  Noted plan to extubate today  MRI today to r/o other organic cause of seizure-like activities  Would defer continued need for AED to Neurology        Remainder of medical problems as per resident note. Mart Celis MD  Internal Medicine Residency Faculty

## 2020-11-27 ENCOUNTER — APPOINTMENT (OUTPATIENT)
Dept: GENERAL RADIOLOGY | Age: 35
DRG: 880 | End: 2020-11-27
Payer: MEDICARE

## 2020-11-27 ENCOUNTER — APPOINTMENT (OUTPATIENT)
Dept: MRI IMAGING | Age: 35
DRG: 880 | End: 2020-11-27
Payer: MEDICARE

## 2020-11-27 ENCOUNTER — APPOINTMENT (OUTPATIENT)
Dept: CT IMAGING | Age: 35
DRG: 880 | End: 2020-11-27
Payer: MEDICARE

## 2020-11-27 LAB
AMMONIA: 16 UMOL/L (ref 11–51)
ANION GAP SERPL CALCULATED.3IONS-SCNC: 10 MMOL/L (ref 7–16)
ANION GAP SERPL CALCULATED.3IONS-SCNC: 13 MMOL/L (ref 7–16)
APTT: 25.9 SEC (ref 24.5–35.1)
B.E.: -3.6 MMOL/L (ref -3–3)
BLOOD CULTURE, ROUTINE: NORMAL
BUN BLDV-MCNC: 10 MG/DL (ref 6–20)
BUN BLDV-MCNC: 8 MG/DL (ref 6–20)
CALCIUM SERPL-MCNC: 8.8 MG/DL (ref 8.6–10.2)
CALCIUM SERPL-MCNC: 9 MG/DL (ref 8.6–10.2)
CHLORIDE BLD-SCNC: 101 MMOL/L (ref 98–107)
CHLORIDE BLD-SCNC: 103 MMOL/L (ref 98–107)
CO2: 21 MMOL/L (ref 22–29)
CO2: 23 MMOL/L (ref 22–29)
COHB: 0 % (ref 0–1.5)
CREAT SERPL-MCNC: 1 MG/DL (ref 0.5–1)
CREAT SERPL-MCNC: 1.1 MG/DL (ref 0.5–1)
CRITICAL: ABNORMAL
DATE ANALYZED: ABNORMAL
DATE OF COLLECTION: ABNORMAL
GFR AFRICAN AMERICAN: >60
GFR AFRICAN AMERICAN: >60
GFR NON-AFRICAN AMERICAN: 56 ML/MIN/1.73
GFR NON-AFRICAN AMERICAN: >60 ML/MIN/1.73
GLUCOSE BLD-MCNC: 183 MG/DL (ref 74–99)
GLUCOSE BLD-MCNC: 98 MG/DL (ref 74–99)
HCO3: 20.7 MMOL/L (ref 22–26)
HCT VFR BLD CALC: 30.5 % (ref 34–48)
HCT VFR BLD CALC: 32.4 % (ref 34–48)
HEMOGLOBIN: 10 G/DL (ref 11.5–15.5)
HEMOGLOBIN: 9.5 G/DL (ref 11.5–15.5)
HHB: 1.7 % (ref 0–5)
INR BLD: 1
LAB: ABNORMAL
LACTIC ACID: 2.9 MMOL/L (ref 0.5–2.2)
Lab: ABNORMAL
MAGNESIUM: 1.8 MG/DL (ref 1.6–2.6)
MCH RBC QN AUTO: 23.3 PG (ref 26–35)
MCH RBC QN AUTO: 23.4 PG (ref 26–35)
MCHC RBC AUTO-ENTMCNC: 30.9 % (ref 32–34.5)
MCHC RBC AUTO-ENTMCNC: 31.1 % (ref 32–34.5)
MCV RBC AUTO: 75.1 FL (ref 80–99.9)
MCV RBC AUTO: 75.5 FL (ref 80–99.9)
METER GLUCOSE: 195 MG/DL (ref 74–99)
METER GLUCOSE: 313 MG/DL (ref 74–99)
METHB: 0.3 % (ref 0–1.5)
MODE: ABNORMAL
O2 CONTENT: 15 ML/DL
O2 SATURATION: 98.3 % (ref 92–98.5)
O2HB: 98 % (ref 94–97)
OPERATOR ID: 1632
PATIENT TEMP: 37 C
PCO2: 34.7 MMHG (ref 35–45)
PDW BLD-RTO: 16.5 FL (ref 11.5–15)
PDW BLD-RTO: 16.8 FL (ref 11.5–15)
PH BLOOD GAS: 7.39 (ref 7.35–7.45)
PH VENOUS: 7.32 (ref 7.35–7.45)
PHOSPHORUS: 1.6 MG/DL (ref 2.5–4.5)
PLATELET # BLD: 310 E9/L (ref 130–450)
PLATELET # BLD: 328 E9/L (ref 130–450)
PMV BLD AUTO: 9.1 FL (ref 7–12)
PMV BLD AUTO: 9.2 FL (ref 7–12)
PO2: 123.7 MMHG (ref 75–100)
POTASSIUM SERPL-SCNC: 3.5 MMOL/L (ref 3.5–5)
POTASSIUM SERPL-SCNC: 3.8 MMOL/L (ref 3.5–5)
PROTHROMBIN TIME: 11.5 SEC (ref 9.3–12.4)
RBC # BLD: 4.06 E12/L (ref 3.5–5.5)
RBC # BLD: 4.29 E12/L (ref 3.5–5.5)
SODIUM BLD-SCNC: 135 MMOL/L (ref 132–146)
SODIUM BLD-SCNC: 136 MMOL/L (ref 132–146)
SOURCE, BLOOD GAS: ABNORMAL
THB: 10.7 G/DL (ref 11.5–16.5)
TIME ANALYZED: 642
TROPONIN: <0.01 NG/ML (ref 0–0.03)
TSH SERPL DL<=0.05 MIU/L-ACNC: 6.32 UIU/ML (ref 0.27–4.2)
WBC # BLD: 8.5 E9/L (ref 4.5–11.5)
WBC # BLD: 8.6 E9/L (ref 4.5–11.5)

## 2020-11-27 PROCEDURE — 84100 ASSAY OF PHOSPHORUS: CPT

## 2020-11-27 PROCEDURE — 6360000002 HC RX W HCPCS: Performed by: INTERNAL MEDICINE

## 2020-11-27 PROCEDURE — 84443 ASSAY THYROID STIM HORMONE: CPT

## 2020-11-27 PROCEDURE — 85027 COMPLETE CBC AUTOMATED: CPT

## 2020-11-27 PROCEDURE — 70551 MRI BRAIN STEM W/O DYE: CPT

## 2020-11-27 PROCEDURE — 2580000003 HC RX 258: Performed by: PSYCHIATRY & NEUROLOGY

## 2020-11-27 PROCEDURE — 76937 US GUIDE VASCULAR ACCESS: CPT

## 2020-11-27 PROCEDURE — 2580000003 HC RX 258: Performed by: NURSE PRACTITIONER

## 2020-11-27 PROCEDURE — 6360000002 HC RX W HCPCS: Performed by: NURSE PRACTITIONER

## 2020-11-27 PROCEDURE — 84484 ASSAY OF TROPONIN QUANT: CPT

## 2020-11-27 PROCEDURE — 82140 ASSAY OF AMMONIA: CPT

## 2020-11-27 PROCEDURE — 85730 THROMBOPLASTIN TIME PARTIAL: CPT

## 2020-11-27 PROCEDURE — 6360000002 HC RX W HCPCS: Performed by: PSYCHIATRY & NEUROLOGY

## 2020-11-27 PROCEDURE — 6370000000 HC RX 637 (ALT 250 FOR IP): Performed by: NURSE PRACTITIONER

## 2020-11-27 PROCEDURE — C9254 INJECTION, LACOSAMIDE: HCPCS | Performed by: PSYCHIATRY & NEUROLOGY

## 2020-11-27 PROCEDURE — 36415 COLL VENOUS BLD VENIPUNCTURE: CPT

## 2020-11-27 PROCEDURE — 2580000003 HC RX 258: Performed by: STUDENT IN AN ORGANIZED HEALTH CARE EDUCATION/TRAINING PROGRAM

## 2020-11-27 PROCEDURE — 83605 ASSAY OF LACTIC ACID: CPT

## 2020-11-27 PROCEDURE — 6360000002 HC RX W HCPCS: Performed by: STUDENT IN AN ORGANIZED HEALTH CARE EDUCATION/TRAINING PROGRAM

## 2020-11-27 PROCEDURE — 02HV33Z INSERTION OF INFUSION DEVICE INTO SUPERIOR VENA CAVA, PERCUTANEOUS APPROACH: ICD-10-PCS | Performed by: FAMILY MEDICINE

## 2020-11-27 PROCEDURE — 80048 BASIC METABOLIC PNL TOTAL CA: CPT

## 2020-11-27 PROCEDURE — 82962 GLUCOSE BLOOD TEST: CPT

## 2020-11-27 PROCEDURE — 36410 VNPNXR 3YR/> PHY/QHP DX/THER: CPT

## 2020-11-27 PROCEDURE — 2700000000 HC OXYGEN THERAPY PER DAY

## 2020-11-27 PROCEDURE — 2140000000 HC CCU INTERMEDIATE R&B

## 2020-11-27 PROCEDURE — 82800 BLOOD PH: CPT

## 2020-11-27 PROCEDURE — 85610 PROTHROMBIN TIME: CPT

## 2020-11-27 PROCEDURE — C1751 CATH, INF, PER/CENT/MIDLINE: HCPCS

## 2020-11-27 PROCEDURE — 83735 ASSAY OF MAGNESIUM: CPT

## 2020-11-27 PROCEDURE — 99233 SBSQ HOSP IP/OBS HIGH 50: CPT | Performed by: INTERNAL MEDICINE

## 2020-11-27 PROCEDURE — 71045 X-RAY EXAM CHEST 1 VIEW: CPT

## 2020-11-27 PROCEDURE — 82805 BLOOD GASES W/O2 SATURATION: CPT

## 2020-11-27 PROCEDURE — 72125 CT NECK SPINE W/O DYE: CPT

## 2020-11-27 PROCEDURE — 2500000003 HC RX 250 WO HCPCS: Performed by: INTERNAL MEDICINE

## 2020-11-27 PROCEDURE — 70450 CT HEAD/BRAIN W/O DYE: CPT

## 2020-11-27 PROCEDURE — C9113 INJ PANTOPRAZOLE SODIUM, VIA: HCPCS | Performed by: STUDENT IN AN ORGANIZED HEALTH CARE EDUCATION/TRAINING PROGRAM

## 2020-11-27 RX ORDER — HEPARIN SODIUM (PORCINE) LOCK FLUSH IV SOLN 100 UNIT/ML 100 UNIT/ML
1 SOLUTION INTRAVENOUS PRN
Status: DISCONTINUED | OUTPATIENT
Start: 2020-11-27 | End: 2020-11-29 | Stop reason: HOSPADM

## 2020-11-27 RX ORDER — LABETALOL HYDROCHLORIDE 5 MG/ML
INJECTION, SOLUTION INTRAVENOUS
Status: DISPENSED
Start: 2020-11-27 | End: 2020-11-28

## 2020-11-27 RX ORDER — LABETALOL HYDROCHLORIDE 5 MG/ML
10 INJECTION, SOLUTION INTRAVENOUS ONCE
Status: COMPLETED | OUTPATIENT
Start: 2020-11-27 | End: 2020-11-27

## 2020-11-27 RX ORDER — SODIUM CHLORIDE 0.9 % (FLUSH) 0.9 %
10 SYRINGE (ML) INJECTION PRN
Status: DISCONTINUED | OUTPATIENT
Start: 2020-11-27 | End: 2020-11-29 | Stop reason: HOSPADM

## 2020-11-27 RX ORDER — HEPARIN SODIUM (PORCINE) LOCK FLUSH IV SOLN 100 UNIT/ML 100 UNIT/ML
1 SOLUTION INTRAVENOUS EVERY 12 HOURS SCHEDULED
Status: DISCONTINUED | OUTPATIENT
Start: 2020-11-27 | End: 2020-11-29 | Stop reason: HOSPADM

## 2020-11-27 RX ORDER — LORAZEPAM 2 MG/ML
2 INJECTION INTRAMUSCULAR ONCE
Status: COMPLETED | OUTPATIENT
Start: 2020-11-27 | End: 2020-11-27

## 2020-11-27 RX ADMIN — SODIUM CHLORIDE, PRESERVATIVE FREE 10 ML: 5 INJECTION INTRAVENOUS at 08:35

## 2020-11-27 RX ADMIN — GABAPENTIN 300 MG: 300 CAPSULE ORAL at 15:52

## 2020-11-27 RX ADMIN — SODIUM CHLORIDE, PRESERVATIVE FREE 100 UNITS: 5 INJECTION INTRAVENOUS at 21:06

## 2020-11-27 RX ADMIN — SODIUM CHLORIDE, PRESERVATIVE FREE 10 ML: 5 INJECTION INTRAVENOUS at 08:34

## 2020-11-27 RX ADMIN — LAMOTRIGINE 25 MG: 25 TABLET ORAL at 08:31

## 2020-11-27 RX ADMIN — LEVETIRACETAM 500 MG: 5 INJECTION INTRAVENOUS at 08:25

## 2020-11-27 RX ADMIN — LORAZEPAM 1 MG: 2 INJECTION INTRAMUSCULAR; INTRAVENOUS at 23:29

## 2020-11-27 RX ADMIN — DEXTROSE MONOHYDRATE 150 MG: 50 INJECTION, SOLUTION INTRAVENOUS at 23:55

## 2020-11-27 RX ADMIN — LORAZEPAM 2 MG: 2 INJECTION INTRAMUSCULAR; INTRAVENOUS at 21:54

## 2020-11-27 RX ADMIN — PRAZOSIN HYDROCHLORIDE 2 MG: 2 CAPSULE ORAL at 21:07

## 2020-11-27 RX ADMIN — SODIUM CHLORIDE, PRESERVATIVE FREE 10 ML: 5 INJECTION INTRAVENOUS at 21:06

## 2020-11-27 RX ADMIN — LEVETIRACETAM 1000 MG: 10 INJECTION INTRAVENOUS at 18:48

## 2020-11-27 RX ADMIN — GABAPENTIN 300 MG: 300 CAPSULE ORAL at 08:30

## 2020-11-27 RX ADMIN — PANTOPRAZOLE SODIUM 40 MG: 40 INJECTION, POWDER, FOR SOLUTION INTRAVENOUS at 08:34

## 2020-11-27 RX ADMIN — DEXTROSE MONOHYDRATE 150 MG: 50 INJECTION, SOLUTION INTRAVENOUS at 08:39

## 2020-11-27 RX ADMIN — LORAZEPAM 2 MG: 2 INJECTION INTRAMUSCULAR; INTRAVENOUS at 22:35

## 2020-11-27 RX ADMIN — LORAZEPAM 2 MG: 2 INJECTION INTRAMUSCULAR; INTRAVENOUS at 21:55

## 2020-11-27 RX ADMIN — FLUOXETINE HYDROCHLORIDE 20 MG: 20 CAPSULE ORAL at 08:30

## 2020-11-27 RX ADMIN — AMLODIPINE BESYLATE 10 MG: 10 TABLET ORAL at 08:39

## 2020-11-27 RX ADMIN — LABETALOL HYDROCHLORIDE 10 MG: 5 INJECTION INTRAVENOUS at 22:15

## 2020-11-27 ASSESSMENT — PAIN SCALES - GENERAL
PAINLEVEL_OUTOF10: 0

## 2020-11-27 NOTE — PLAN OF CARE
Problem: Falls - Risk of:  Goal: Will remain free from falls  Description: Will remain free from falls  Outcome: Met This Shift  Goal: Absence of physical injury  Description: Absence of physical injury  Outcome: Met This Shift     Problem: Skin Integrity:  Goal: Will show no infection signs and symptoms  Description: Will show no infection signs and symptoms  Outcome: Met This Shift  Goal: Absence of new skin breakdown  Description: Absence of new skin breakdown  Outcome: Met This Shift     Problem: Pain:  Goal: Pain level will decrease  Description: Pain level will decrease  Outcome: Met This Shift  Goal: Control of acute pain  Description: Control of acute pain  Outcome: Met This Shift  Goal: Control of chronic pain  Description: Control of chronic pain  Outcome: Met This Shift     Problem: Coping:  Goal: Ability to cope will improve  Description: Ability to cope will improve  Outcome: Met This Shift  Goal: Ability to identify appropriate support needs will improve  Description: Ability to identify appropriate support needs will improve  Outcome: Met This Shift     Problem: Health Behavior:  Goal: Ability to manage health-related needs will improve  Description: Ability to manage health-related needs will improve  Outcome: Met This Shift     Problem: Physical Regulation:  Goal: Signs of adequate cerebral perfusion will increase  Description: Signs of adequate cerebral perfusion will increase  Outcome: Met This Shift  Goal: Ability to maintain a stable neurologic state will improve  Description: Ability to maintain a stable neurologic state will improve  Outcome: Met This Shift     Problem: Safety:  Goal: Ability to remain free from injury will improve  Description: Ability to remain free from injury will improve  Outcome: Met This Shift     Problem: Self-Concept:  Goal: Level of anxiety will decrease  Description: Level of anxiety will decrease  Outcome: Met This Shift  Goal: Ability to verbalize feelings about condition will improve  Description: Ability to verbalize feelings about condition will improve  Outcome: Met This Shift

## 2020-11-27 NOTE — PLAN OF CARE
Problem: Falls - Risk of:  Goal: Will remain free from falls  Description: Will remain free from falls  11/27/2020 1609 by Patrick Morton  Outcome: Met This Shift  11/27/2020 0712 by Karen Brown RN  Outcome: Met This Shift  Goal: Absence of physical injury  Description: Absence of physical injury  11/27/2020 1609 by Patrick Morton  Outcome: Met This Shift  11/27/2020 0712 by Karen Brown RN  Outcome: Met This Shift     Problem: Skin Integrity:  Goal: Will show no infection signs and symptoms  Description: Will show no infection signs and symptoms  11/27/2020 1609 by Patrick Morton  Outcome: Met This Shift  11/27/2020 0712 by Karen Brown RN  Outcome: Met This Shift  Goal: Absence of new skin breakdown  Description: Absence of new skin breakdown  11/27/2020 1609 by Patrick Morton  Outcome: Met This Shift  11/27/2020 0712 by Karen Brown RN  Outcome: Met This Shift     Problem: Pain:  Goal: Pain level will decrease  Description: Pain level will decrease  11/27/2020 1609 by Patrick Morton  Outcome: Met This Shift  11/27/2020 0712 by Karen Brown RN  Outcome: Met This Shift  Goal: Control of acute pain  Description: Control of acute pain  11/27/2020 1609 by Patrick Morton  Outcome: Met This Shift  11/27/2020 0712 by Karen Brown RN  Outcome: Met This Shift  Goal: Control of chronic pain  Description: Control of chronic pain  11/27/2020 1609 by Patrick Morton  Outcome: Met This Shift  11/27/2020 0712 by Karen Brown RN  Outcome: Met This Shift     Problem: Coping:  Goal: Ability to cope will improve  Description: Ability to cope will improve  11/27/2020 0712 by Karen Brown RN  Outcome: Met This Shift  Goal: Ability to identify appropriate support needs will improve  Description: Ability to identify appropriate support needs will improve  11/27/2020 0712 by Karen Brown RN  Outcome: Met This Shift     Problem: Health Behavior:  Goal: Ability to manage health-related needs will improve  Description: Ability to manage health-related needs will improve  11/27/2020 4830 by Adrianna Fair RN  Outcome: Met This Shift     Problem: Physical Regulation:  Goal: Signs of adequate cerebral perfusion will increase  Description: Signs of adequate cerebral perfusion will increase  11/27/2020 1822 by Adrianna Fair RN  Outcome: Met This Shift  Goal: Ability to maintain a stable neurologic state will improve  Description: Ability to maintain a stable neurologic state will improve  11/27/2020 0712 by Adrianna Fair RN  Outcome: Met This Shift     Problem: Safety:  Goal: Ability to remain free from injury will improve  Description: Ability to remain free from injury will improve  11/27/2020 0712 by Adrianna Fair RN  Outcome: Met This Shift     Problem: Self-Concept:  Goal: Level of anxiety will decrease  Description: Level of anxiety will decrease  11/27/2020 0712 by Adrianna Fair RN  Outcome: Met This Shift  Goal: Ability to verbalize feelings about condition will improve  Description: Ability to verbalize feelings about condition will improve  11/27/2020 0712 by Adrianna Fair RN  Outcome: Met This Shift

## 2020-11-27 NOTE — PLAN OF CARE
Problem: Falls - Risk of:  Goal: Will remain free from falls  Description: Will remain free from falls  11/27/2020 1854 by Gladystine Cave  Outcome: Completed  11/27/2020 1609 by Gladystine Cave  Outcome: Met This Shift  11/27/2020 0712 by Camron Watkins RN  Outcome: Met This Shift  Goal: Absence of physical injury  Description: Absence of physical injury  11/27/2020 1854 by Gladystine Cave  Outcome: Completed  11/27/2020 1609 by Gladystine Cave  Outcome: Met This Shift  11/27/2020 0712 by Camron Watkins RN  Outcome: Met This Shift     Problem: Skin Integrity:  Goal: Will show no infection signs and symptoms  Description: Will show no infection signs and symptoms  11/27/2020 1854 by Gladystine Cave  Outcome: Completed  11/27/2020 1609 by Gladystine Cave  Outcome: Met This Shift  11/27/2020 0712 by Camron Watkins RN  Outcome: Met This Shift  Goal: Absence of new skin breakdown  Description: Absence of new skin breakdown  11/27/2020 1854 by Gladystine Cave  Outcome: Completed  11/27/2020 1609 by Gladystine Cave  Outcome: Met This Shift  11/27/2020 0712 by Camron Watkins RN  Outcome: Met This Shift     Problem: Pain:  Goal: Pain level will decrease  Description: Pain level will decrease  11/27/2020 1854 by Gladystine Cave  Outcome: Completed  11/27/2020 1609 by Gladystine Cave  Outcome: Met This Shift  11/27/2020 0712 by Camron Watkins RN  Outcome: Met This Shift  Goal: Control of acute pain  Description: Control of acute pain  11/27/2020 1854 by Gladystine Cave  Outcome: Completed  11/27/2020 1609 by Gladystine Cave  Outcome: Met This Shift  11/27/2020 0712 by Camron Watkins RN  Outcome: Met This Shift  Goal: Control of chronic pain  Description: Control of chronic pain  11/27/2020 1854 by Gladystine Cave  Outcome: Completed  11/27/2020 1609 by Gladystine Cave  Outcome: Met This Shift  11/27/2020 0712 by Camron Watkins RN  Outcome: Met This Shift     Problem: Coping:  Goal: Ability to cope will improve  Description: Ability to cope will improve  11/27/2020 1854 by David Mai  Outcome: Completed  11/27/2020 0712 by Paige Funez RN  Outcome: Met This Shift  Goal: Ability to identify appropriate support needs will improve  Description: Ability to identify appropriate support needs will improve  11/27/2020 1854 by David Mai  Outcome: Completed  11/27/2020 0712 by Paige Funez RN  Outcome: Met This Shift     Problem: Health Behavior:  Goal: Ability to manage health-related needs will improve  Description: Ability to manage health-related needs will improve  11/27/2020 1854 by David aMi  Outcome: Completed  11/27/2020 0712 by Paige Funez RN  Outcome: Met This Shift     Problem: Physical Regulation:  Goal: Signs of adequate cerebral perfusion will increase  Description: Signs of adequate cerebral perfusion will increase  11/27/2020 1854 by David Mai  Outcome: Completed  11/27/2020 0712 by Paige Funez RN  Outcome: Met This Shift  Goal: Ability to maintain a stable neurologic state will improve  Description: Ability to maintain a stable neurologic state will improve  11/27/2020 1854 by David Mai  Outcome: Completed  11/27/2020 0712 by Paige Funez RN  Outcome: Met This Shift     Problem: Safety:  Goal: Ability to remain free from injury will improve  Description: Ability to remain free from injury will improve  11/27/2020 1854 by David Mai  Outcome: Completed  11/27/2020 0712 by Paige Funez RN  Outcome: Met This Shift     Problem: Self-Concept:  Goal: Level of anxiety will decrease  Description: Level of anxiety will decrease  11/27/2020 1854 by David Mai  Outcome: Completed  11/27/2020 0712 by Paige Funez RN  Outcome: Met This Shift  Goal: Ability to verbalize feelings about condition will improve  Description: Ability to verbalize feelings about condition will improve  11/27/2020 1854 by David Mai  Outcome: Completed  11/27/2020 1931 by Fayetta Hamman, RN  Outcome: Met This Shift     Problem: Restraint Use - Nonviolent/Non-Self-Destructive Behavior:  Goal: Absence of restraint indications  Description: Absence of restraint indications  11/27/2020 0712 by Fayetta Hamman, RN  Outcome: Completed  Goal: Absence of restraint-related injury  Description: Absence of restraint-related injury  11/27/2020 4386 by Fayetta Hamman, RN  Outcome: Completed

## 2020-11-27 NOTE — PROGRESS NOTES
[facility] Quality Flow/Interdisciplinary Rounds Progress Note        Quality Flow Rounds held on November 27, 2020    Disciplines Attending:  Bedside Nurse, Charge nurse, NILESH, OT, PT, , and . Vinnie Dunn was admitted on 11/22/2020  6:21 AM    Anticipated Discharge Date:  Expected Discharge Date: 12/03/20    Disposition:    Vikash Score:  Vikash Scale Score: 18    Readmission Risk              Risk of Unplanned Readmission:        16           Discussed patient goal for the day, patient clinical progression, and barriers to discharge.   The following Goal(s) of the Day/Commitment(s) have been identified:  Okay to transfer to med/surg      Russell Baeza  November 27, 2020

## 2020-11-27 NOTE — CARE COORDINATION
Nurse inform the on-call team that the family requested to be called for update. I called Ysabel @ [phone], per the perfect serve message, and spoke to her about the pt's current clinical status, and management plan. Ysabel also ask questions about pt discharging plan, which I defer this to the day team. At the end of the conversation, Ysabel asked whether the day team can call her tomorrow morning to update her in more details about pt's clinical condition, treatment plan and discharge planning. I will inform the day team and the intern Dr. Nick Steve who are involved in pt's care for the family's request to be called and updated.

## 2020-11-27 NOTE — PROGRESS NOTES
[address] [facility]   Department of Internal Medicine   Internal Medicine Residency  MICU Progress Note    Patient:  Rayray Stubbs 38 y.o. female   MRN: [id number]       Date of Service: 2020    Allergy: Patient has no known allergies. Subjective     I saw and examined the  Patient in the AM today, she is now extubated, alert awake and oriented, appears to be at baseline. Objective     TEMPERATURE:  Current - Temp: 98.4 °F (36.9 °C); Max - Temp  Av.8 °F (37.1 °C)  Min: 97.9 °F (36.6 °C)  Max: 99.7 °F (37.6 °C)    RESPIRATIONS RANGE: Resp  Av.4  Min: 6  Max: 32    PULSE RANGE: Pulse  Av  Min: 61  Max: 113    BLOOD PRESSURE RANGE:  Systolic (44TJZ), VRV:979 , Min:96 , BWV:592   ; Diastolic (79RVX), WVA:56, Min:60, Max:93      PULSE OXIMETRY RANGE: SpO2  Av.4 %  Min: 90 %  Max: 100 %    Physical Exam:  I & O - 24hr: I/O this shift:  In: 16 [I.V.:16]  · Out: 75 [Urine:75]   · General Appearance: Alert awake oriented x3  · HEENT:  Head: Normocephalic, no lesions, without obvious abnormality. · Neck: no adenopathy, no carotid bruit, no JVD, supple, symmetrical, trachea midline and thyroid not enlarged, symmetric, no tenderness/mass/nodules  · Lung: clear to auscultation bilaterally  · Heart: regular rate and rhythm, S1, S2 normal, no murmur, click, rub or gallop  · Abdomen: soft, non-tender; bowel sounds normal; no masses,  no organomegaly  · Extremities:  extremities normal, atraumatic, no cyanosis or edema  · Musculokeletal: No joint swelling, no muscle tenderness. ROM normal in all joints of extremities.    · Neurologic: Alert awake oriented x3      Medications     Continuous Infusions:   dextrose       Scheduled Meds:   lidocaine  5 mL Intradermal Once    heparin flush  1 mL Intravenous 2 times per day    pantoprazole  40 mg Intravenous Daily    And    sodium chloride (PF)  10 mL Intravenous Daily    amLODIPine  10 mg Oral Daily    FLUoxetine  20 mg Oral Daily    gabapentin  300 mg Oral BID    lamoTRIgine  25 mg Oral Daily    prazosin  2 mg Oral Nightly    lacosamide (VIMPAT) IVPB  150 mg Intravenous BID    levetiracetam  1,000 mg Intravenous QPM    levetiracetam  500 mg Intravenous QAM    LORazepam  1 mg Intravenous Once    sodium chloride flush  10 mL Intravenous 2 times per day    enoxaparin  40 mg Subcutaneous Daily     PRN Meds: sodium chloride flush, heparin flush, glucose, dextrose, glucagon (rDNA), dextrose, LORazepam, ipratropium-albuterol, hydrALAZINE, acetaminophen **OR** acetaminophen, polyethylene glycol, promethazine **OR** ondansetron, metoprolol  Nutrition:   NPO    Labs and Imaging Studies     CBC:   Recent Labs     11/25/20 0453 11/26/20 0531 11/27/20  0630   WBC 6.8 7.9 8.6   RBC 4.26 3.96 4.06   HGB 9.9* 9.2* 9.5*   HCT 32.8* 30.7* 30.5*   MCV 77.0* 77.5* 75.1*   MCH 23.2* 23.2* 23.4*   MCHC 30.2* 30.0* 31.1*   RDW 16.3* 16.7* 16.5*    314 310   MPV 9.0 9.4 9.1       BMP:    Recent Labs     11/25/20 0453 11/26/20 0531 11/27/20  0630    140 136   K 3.5  3.5 3.7 3.8   * 105 103   CO2 23 18* 23   BUN 10 12 10   CREATININE 1.2* 1.0 1.0   GLUCOSE 85 96 98   CALCIUM 8.8 8.9 9.0   PROT 6.7  --   --    LABALBU 3.5  --   --    BILITOT 0.2  --   --    ALKPHOS 70  --   --    AST 17  --   --    ALT 20  --   --        LIVER PROFILE:   Recent Labs     11/25/20 0453   AST 17   ALT 20   BILITOT 0.2   ALKPHOS 70       PT/INR:   No results for input(s): PROTIME, INR in the last 72 hours. APTT:   No results for input(s): APTT in the last 72 hours.     Fasting Lipid Panel:    No results found for: CHOL, TRIG, HDL    Cardiac Enzymes:    Lab Results   Component Value Date    CKTOTAL 45 11/25/2020    CKTOTAL 119 11/23/2020    CKTOTAL 47 11/22/2020    TROPONINI <0.01 11/22/2020       Notable Cultures:      Blood cultures   Blood Culture, Routine   Date Value Ref Range Status   11/22/2020 24 Hours no growth  Preliminary     Respiratory cultures No results found for: RESPCULTURE No results found for: LABGRAM  Urine No results found for: LABURIN  Legionella No results found for: LABLEGI  C Diff PCR No results found for: CDIFPCR  Wound culture/abscess: No results for input(s): WNDABS in the last 72 hours. Tip culture:No results for input(s): CXCATHTIP in the last 72 hours. Oxygen:     Vent Information  $Ventilation: $Subsequent Day  Skin Assessment: Clean, dry, & intact  Suction Catheter Diameter: 14  Equipment ID: [id number]  Vent Type: [id number]  Vent Mode: PS  Vt Ordered: 0 mL  Rate Set: 0 bmp  Peak Flow: 0 L/min  Pressure Support: 8 cmH20  FiO2 : 40 %  SpO2: 95 %  SpO2/FiO2 ratio: 250  Sensitivity: 2  PEEP/CPAP: 8  I Time/ I Time %: 0 s  Humidification Source: Heated wire  Humidification Temp: 37  Humidification Temp Measured: 37  Circuit Condensation: Drained    Additional Respiratory  Assessments  Pulse: 73  Resp: 25  SpO2: 95 %  $End Tidal CO2: 24  Position: Semi-Sultana's  Humidification Source: Heated wire  Humidification Temp: 37  Circuit Condensation: Drained  Oral Care: Mouth moisturizer, Mouth swabbed, Lip moisturizer applied  Airway Type: ET  Airway Size: 8(23)  Cuff Pressure (cm H2O): 29 cm H2O   [REMOVED] Urethral Catheter-Output (mL): 225 mL  Urethral Catheter Temperature probe-Output (mL): 75 mL    Imaging Studies:    Ct Head Wo Contrast    Result Date: 11/23/2020  EXAMINATION: CT OF THE HEAD WITHOUT CONTRAST  11/23/2020 7:37 pm TECHNIQUE: CT of the head was performed without the administration of intravenous contrast. Dose modulation, iterative reconstruction, and/or weight based adjustment of the mA/kV was utilized to reduce the radiation dose to as low as reasonably achievable. COMPARISON: 11/22/2020 HISTORY: ORDERING SYSTEM PROVIDED HISTORY: s/p seizure TECHNOLOGIST PROVIDED HISTORY: Reason for exam:->s/p seizure Has a \"code stroke\" or \"stroke alert\" been called? ->No What reading provider will be dictating this exam?->CRC FINDINGS: PROVIDED HISTORY: r/o aspiration pneumonitis TECHNOLOGIST PROVIDED HISTORY: Reason for exam:->r/o aspiration pneumonitis What reading provider will be dictating this exam?->CRC FINDINGS: Status post extubation. Decreased inspiration. Resolution of right basilar atelectasis since the prior examination. No new areas of airspace consolidation or pleural effusions. Pulmonary vasculature is within normal limits. Decreased inspiration with no radiographic evidence of acute cardiopulmonary disease. Xr Chest Portable    Result Date: 11/22/2020  EXAMINATION: ONE XRAY VIEW OF THE CHEST 11/22/2020 8:36 am COMPARISON: 11/22/2020 HISTORY: ORDERING SYSTEM PROVIDED HISTORY: intubation TECHNOLOGIST PROVIDED HISTORY: Reason for exam:->intubation What reading provider will be dictating this exam?->CRC FINDINGS: Endotracheal tube tip projects 4 cm superior to the master. Right IJ catheter tip is in the superior vena cava. Nasogastric tube tip projects off the edge of the film. There is patchy right lung base atelectasis or infiltrate. No pneumothorax or effusion is seen. No acute osseous abnormality. Endotracheal tube tip projects 4 cm to the master. Right lung base atelectasis or infiltrate    Xr Chest Portable    Result Date: 11/22/2020  EXAMINATION: ONE XRAY VIEW OF THE CHEST 11/22/2020 7:48 am COMPARISON: None. HISTORY: ORDERING SYSTEM PROVIDED HISTORY: seizure TECHNOLOGIST PROVIDED HISTORY: Reason for exam:->seizure What reading provider will be dictating this exam?->CRC FINDINGS: Central venous catheter tip is at SVC/right atrial junction. No pneumothorax seen. There are bilateral infiltrates which predominate in the mid to lower lungs. I cannot confirm pleural effusion. There is no pneumothorax. Bilateral infiltrates, mostly in mid to lower lungs.     Ct Head Wo Contrast    Result Date: 11/23/2020  EXAMINATION: CT OF THE HEAD WITHOUT CONTRAST  11/23/2020 7:37 pm TECHNIQUE: CT of the head was performed without the administration of intravenous contrast. Dose modulation, iterative reconstruction, and/or weight based adjustment of the mA/kV was utilized to reduce the radiation dose to as low as reasonably achievable. COMPARISON: 11/22/2020 HISTORY: ORDERING SYSTEM PROVIDED HISTORY: s/p seizure TECHNOLOGIST PROVIDED HISTORY: Reason for exam:->s/p seizure Has a \"code stroke\" or \"stroke alert\" been called? ->No What reading provider will be dictating this exam?->CRC FINDINGS: BRAIN/VENTRICLES: There is no acute intracranial hemorrhage, mass effect or midline shift. No abnormal extra-axial fluid collection. The gray-white differentiation is maintained without evidence of an acute infarct. There is no evidence of hydrocephalus. ORBITS: The bilateral orbits demonstrate no acute abnormality. SINUSES: Minimal mucoperiosteal thickening of the left maxillary and left sphenoid sinuses is seen. SOFT TISSUES/SKULL:  No acute abnormality of the visualized skull or soft tissues. No acute intracranial abnormality. MRI may be obtained if clinically indicated. Ct Head Wo Contrast    Result Date: 11/22/2020  EXAMINATION: CT OF THE HEAD WITHOUT CONTRAST  11/22/2020 9:01 am TECHNIQUE: CT of the head was performed without the administration of intravenous contrast. Dose modulation, iterative reconstruction, and/or weight based adjustment of the mA/kV was utilized to reduce the radiation dose to as low as reasonably achievable. COMPARISON: None. HISTORY: ORDERING SYSTEM PROVIDED HISTORY: seizure TECHNOLOGIST PROVIDED HISTORY: Has a \"code stroke\" or \"stroke alert\" been called? ->No Reason for exam:->seizure What reading provider will be dictating this exam?->CRC FINDINGS: BRAIN/VENTRICLES: There is no acute intracranial hemorrhage, mass effect or midline shift. No abnormal extra-axial fluid collection. The gray-white differentiation is maintained without evidence of an acute infarct. There is no evidence of hydrocephalus.  ORBITS: The visualized portion of the orbits demonstrate no acute abnormality. SINUSES: Small amount of fluid in the sphenoid sinus. Trace left maxillary sinus mucosal thickening. Mastoids are aerated. SOFT TISSUES/SKULL:  No acute abnormality of the visualized skull or soft tissues. No acute intracranial abnormality. Xr Chest Portable    Result Date: 11/23/2020  EXAMINATION: ONE XRAY VIEW OF THE CHEST 11/23/2020 6:15 pm COMPARISON: November 22 HISTORY: ORDERING SYSTEM PROVIDED HISTORY: r/o aspiration pneumonitis TECHNOLOGIST PROVIDED HISTORY: Reason for exam:->r/o aspiration pneumonitis What reading provider will be dictating this exam?->CRC FINDINGS: Status post extubation. Decreased inspiration. Resolution of right basilar atelectasis since the prior examination. No new areas of airspace consolidation or pleural effusions. Pulmonary vasculature is within normal limits. Decreased inspiration with no radiographic evidence of acute cardiopulmonary disease. Xr Chest Portable    Result Date: 11/22/2020  EXAMINATION: ONE XRAY VIEW OF THE CHEST 11/22/2020 8:36 am COMPARISON: 11/22/2020 HISTORY: ORDERING SYSTEM PROVIDED HISTORY: intubation TECHNOLOGIST PROVIDED HISTORY: Reason for exam:->intubation What reading provider will be dictating this exam?->CRC FINDINGS: Endotracheal tube tip projects 4 cm superior to the master. Right IJ catheter tip is in the superior vena cava. Nasogastric tube tip projects off the edge of the film. There is patchy right lung base atelectasis or infiltrate. No pneumothorax or effusion is seen. No acute osseous abnormality. Endotracheal tube tip projects 4 cm to the master. Right lung base atelectasis or infiltrate    Xr Chest Portable    Result Date: 11/22/2020  EXAMINATION: ONE XRAY VIEW OF THE CHEST 11/22/2020 7:48 am COMPARISON: None.  HISTORY: ORDERING SYSTEM PROVIDED HISTORY: seizure TECHNOLOGIST PROVIDED HISTORY: Reason for exam:->seizure What reading provider will be dictating this exam?->CRC FINDINGS: Central venous catheter tip is at SVC/right atrial junction. No pneumothorax seen. There are bilateral infiltrates which predominate in the mid to lower lungs. I cannot confirm pleural effusion. There is no pneumothorax. Bilateral infiltrates, mostly in mid to lower lungs. Fluoroscopy Modified Barium Swallow With Video    Result Date: 11/24/2020  EXAMINATION: MODIFIED BARIUM SWALLOW WAS PERFORMED IN CONJUNCTION WITH SPEECH PATHOLOGY SERVICES TECHNIQUE: Fluoroscopic evaluation of the swallowing mechanism was performed with multiple consistency of barium product. FLUOROSCOPY DOSE AND TYPE OR TIME AND EXPOSURES: Fluoroscopy time 0.8 minutes Dose: 10 mGy Single image COMPARISON: None HISTORY: ORDERING SYSTEM PROVIDED HISTORY: difficulty clarisa TECHNOLOGIST PROVIDED HISTORY: Reason for exam:->difficulty swalllow What reading provider will be dictating this exam?->CRC FINDINGS: Oral phase of swallowing was grossly within normal limits. Transient penetration seen with thin liquids. No aspiration. Transient penetration seen with thin liquids. No aspiration. Please see separate speech pathology report for full discussion of findings and recommendations. EKG: Rhythm Strip: normal sinus rhythm, unchanged from previous tracings. Resident's Assessment and PLan     Assessment:     1. Status epilepticus: rule out psychogenic seizures, On keppra and Vimpat per neurology. Continuous EEG monitoring done. 2. Bipolar disorder/ schizoaffective: on fluexetine and Lamotrigine   3. HTN: on amlodipine. Plan:     · Continuous EEG showed no evidence of epileptiform activity. · Follow chest x-ray after extubation  · No seizure activity reported in the last 24 hours  · Antiepileptic medications per neurology  · Transfer to a general floor  · Remove central line  · Due to limited access will likely need to midline placement, will consult IV team.      Core measures:  1.  Code status: Full 2. Peptic ulcer prophylaxis: not indicated    3. DVT Prophylaxis: ENOXAPARIN    4. Lines / tubes: Right femoral CVC (5 days)     5. Disposition: Continue current care    Suleiman Epstein M.D. Internal Medicine Resident - PGY2    Attending Physician: Dr. Ranulfo Bernard, [address] [place] [address]  Department of Pulmonary, Critical Care and Sleep Medicine  [address] [facility]  Department of Internal Medicine      During multidisciplinary team rounds Vianey Case is a 38 y.o. female was seen, examined and discussed. This is confirmation that I have personally seen and examined the patient and that the key elements of the encounter were performed by me (> 85 % time). The medications & laboratory data was discussed and adjusted where necessary. The radiographic images were reviewed or with radiologist or consultant if felt dis-concordant with the exam or history. The above findings were corroborated, plans confirmed and changes made if needed. Family is updated at the bedside as available. Key issues of the case were discussed among consultants. Critical Care time is documented if appropriate.       Stephy Peters DO, FACP, FCCP, Omar crowder,

## 2020-11-27 NOTE — PROGRESS NOTES
11/27/2020    MCV 75.1 11/27/2020    MCH 23.4 11/27/2020    MCHC 31.1 11/27/2020    RDW 16.5 11/27/2020     11/27/2020    MPV 9.1 11/27/2020     CMP:    Lab Results   Component Value Date     11/27/2020    K 3.8 11/27/2020    K 3.5 11/25/2020     11/27/2020    CO2 23 11/27/2020    BUN 10 11/27/2020    CREATININE 1.0 11/27/2020    GFRAA >60 11/27/2020    LABGLOM >60 11/27/2020    GLUCOSE 98 11/27/2020    PROT 6.7 11/25/2020    LABALBU 3.5 11/25/2020    CALCIUM 9.0 11/27/2020    BILITOT 0.2 11/25/2020    ALKPHOS 70 11/25/2020    AST 17 11/25/2020    ALT 20 11/25/2020       22 hour EEG on 11/26  General Impression   Normal-appearing awake and asleep EEG with no evidence of   epileptiform activity despite presence of abnormal movements that   have been previously associated with his seizure activity.     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 Alanna Baig MD     [facility], [place]   Resident's Assessment and Plan     The Colony Case is a 38 y.o. female with PMH DM (not on meds), schizoaffective disorder, PTSD, borderline personality disorder, presented to ED from [facility] facility d/t seizures and admitted to SICU for further management. 1. Acute Encephalopathy 2/2 Seizures vs pseudoseizures s/p intubation for airway protection. Patient states she has history of seizures since childhood and recently maintained on Keppra and Lamictal.  Previously, she had been on Lamictal and Vimpat but due to lack of insurance coverage and she had gone back on Keppra with minimal response to her seizures. She had a recent admission to [facility] d/t seizures; 2-day EEG negative. Witnessed seizure in ED, given Keppra 1000 mg x 1, Ativan 2 mg x 1, versed 2 mg x 2 and started on propofol gtt. CK level 47, Ammonia 14, UDS negative, intubated and extubated on 11/22/2020.  EEG report 11/23/2020 unremarkable. -General diet   -Continue seizure and aspiration precautions   -Extubated 11/26   -Continue Keppra 1000 mg p.m., 500 mg a.m. IV   -Continue Vimpat 150 mg twice daily IV.   -Continue gabapentin 300 mg BID PO and and 600 mg PO nightly.   -Continue lamotrigine 25 mg oral daily due to n.p.o.   -Continue Ativan 1 mg IV Q2 hrs prn for seizures. -Repeat EEG final report from 11/24/2020 negative for true seizure activity   -24 hr continuous EEG 11/26 also negative for true seizure activity    -Likely to transfer out of ICU today   -Neurology following, recommend brain MRI for today   -recommend transfer back to psychiatric facility after above, [facility]    2. Dysphagia likely 2/2 seizures vs anatomical abnormality. Fluoroscopy modified barium study 11/24/2020 with transient penetration with thin liquids, no aspiration. -SLP for dysphagia. -OG tube removed   -Tolerating general diet     3. Acute Hypoxic Respiratory Failure 2/2 AMS (2/2 Seizure). O2 sat at [facility] reported to be 82%, and 82% in ED. Intubated and extubated on 11/22/2020. CXR on admission showed BL infiltrates mid-lower lobes. CXR 11/23 showed no acute cardiopulmonary disease.    -Extubated 11/26, saturating well on RA   -ABG post extubation: 7.39, 34 PCO2   -Continue Duoneb Q4 hrs prn for SOB   -Follow daily ABG and CXR   -Continue to monitor respiratory status     4. BL infiltrates mid-lower lobes 2/2 aspiration pna (2/2 AMS from recurrent seizures). s/p unasyn x 1 in ED for aspiration pna coverage. Procalcitonin 0.06, afebrile, no leukocytosis ~ suggesting unlikely to be infectious etiology.    -Culture x1 bottle grew gram-positive rods, likely contaminant   -Continue to follow cultures    -continue to monitor off antibiotics      5.  Microcytic Anemia likely 2/2 PATRICIO   -No baseline hemoglobin on file   -Hemoglobin stable at 9.9   -Fe 41, Ferritin 18, TIBC 374, iron saturation 11    -Continue to follow H&H with daily CBC   -Peripheral blood smear showing possible thalassemia trait    6. History of schizoaffective disorder, PTSD, borderline personality disorder.    -Continue haldol 5 mg OD, prazosin 2 mg OD, fluoxetine 20 mg OD.   -Psychiatry following     7. History of HTN   -BP stable   -Continue norvasc 10 mg daily   -Continue Lopressor 5 mg IV every 6 hours as needed for SBP greater than 165. Do not administer if heart rate is less than 65   -Hydralazine 10 mg IV Q4 hrs prn for high BP, SBP>140 mmHg.     8. History of DM, not on home medications   -Last Hemoglobin A1c 9.4% on 11/23/2020   -BG range: 85-90   -Currently diabetic diet   -hypoglycemic protocol    -continue to monitor BG       PT/OT evaluation: not indicated at this time  DVT prophylaxis/ GI prophylaxis: lovenox/pepcid   Disposition: continue MICU management     Jamia Thomson MD, PGY-1  Attending physician: Dr. Daniel Torres

## 2020-11-27 NOTE — PROGRESS NOTES
Neurology addendum:    Prolonged video EEG study completed in and results reviewed. There was no evidence epileptiform activity despite tremor type activity behavior suspicious for seizures noted. Findings consistent with nonelectrical seizure activity or pseudoseizures. Recommendations:  1. Wean off sedating medications including Versed drip. 2.  Once clinically stable would recommend returning to inpatient psych fac ility she was transferred from to complete psychiatric evaluation and management.

## 2020-11-28 ENCOUNTER — APPOINTMENT (OUTPATIENT)
Dept: CT IMAGING | Age: 35
DRG: 880 | End: 2020-11-28
Payer: MEDICARE

## 2020-11-28 ENCOUNTER — APPOINTMENT (OUTPATIENT)
Dept: GENERAL RADIOLOGY | Age: 35
DRG: 880 | End: 2020-11-28
Payer: MEDICARE

## 2020-11-28 LAB
B.E.: -0.6 MMOL/L (ref -3–3)
COHB: 0.3 % (ref 0–1.5)
CRITICAL: ABNORMAL
CULTURE, BLOOD 2: ABNORMAL
DATE ANALYZED: ABNORMAL
DATE OF COLLECTION: ABNORMAL
HCO3: 23.3 MMOL/L (ref 22–26)
HHB: 0.4 % (ref 0–5)
LAB: ABNORMAL
LACTIC ACID: 2.4 MMOL/L (ref 0.5–2.2)
Lab: ABNORMAL
METER GLUCOSE: 110 MG/DL (ref 74–99)
METHB: 0.4 % (ref 0–1.5)
MODE: ABNORMAL
O2 CONTENT: 16.1 ML/DL
O2 SATURATION: 99.6 % (ref 92–98.5)
O2HB: 98.9 % (ref 94–97)
OPERATOR ID: ABNORMAL
ORGANISM: ABNORMAL
PATIENT TEMP: 37 C
PCO2: 35.3 MMHG (ref 35–45)
PH BLOOD GAS: 7.44 (ref 7.35–7.45)
PO2: 408.4 MMHG (ref 75–100)
POTASSIUM SERPL-SCNC: 3.87 MMOL/L (ref 3.5–5)
SOURCE, BLOOD GAS: ABNORMAL
THB: 10.8 G/DL (ref 11.5–16.5)
TIME ANALYZED: 2132

## 2020-11-28 PROCEDURE — 70450 CT HEAD/BRAIN W/O DYE: CPT

## 2020-11-28 PROCEDURE — 6360000002 HC RX W HCPCS: Performed by: NURSE PRACTITIONER

## 2020-11-28 PROCEDURE — C9113 INJ PANTOPRAZOLE SODIUM, VIA: HCPCS | Performed by: STUDENT IN AN ORGANIZED HEALTH CARE EDUCATION/TRAINING PROGRAM

## 2020-11-28 PROCEDURE — 84132 ASSAY OF SERUM POTASSIUM: CPT

## 2020-11-28 PROCEDURE — 6360000002 HC RX W HCPCS: Performed by: STUDENT IN AN ORGANIZED HEALTH CARE EDUCATION/TRAINING PROGRAM

## 2020-11-28 PROCEDURE — 36415 COLL VENOUS BLD VENIPUNCTURE: CPT

## 2020-11-28 PROCEDURE — 6370000000 HC RX 637 (ALT 250 FOR IP): Performed by: NURSE PRACTITIONER

## 2020-11-28 PROCEDURE — 2700000000 HC OXYGEN THERAPY PER DAY

## 2020-11-28 PROCEDURE — 6360000002 HC RX W HCPCS: Performed by: INTERNAL MEDICINE

## 2020-11-28 PROCEDURE — 99231 SBSQ HOSP IP/OBS SF/LOW 25: CPT | Performed by: INTERNAL MEDICINE

## 2020-11-28 PROCEDURE — 82962 GLUCOSE BLOOD TEST: CPT

## 2020-11-28 PROCEDURE — 82805 BLOOD GASES W/O2 SATURATION: CPT

## 2020-11-28 PROCEDURE — 2580000003 HC RX 258: Performed by: STUDENT IN AN ORGANIZED HEALTH CARE EDUCATION/TRAINING PROGRAM

## 2020-11-28 PROCEDURE — 83605 ASSAY OF LACTIC ACID: CPT

## 2020-11-28 PROCEDURE — 2580000003 HC RX 258: Performed by: PSYCHIATRY & NEUROLOGY

## 2020-11-28 PROCEDURE — 2140000000 HC CCU INTERMEDIATE R&B

## 2020-11-28 PROCEDURE — 2500000003 HC RX 250 WO HCPCS: Performed by: INTERNAL MEDICINE

## 2020-11-28 PROCEDURE — C9254 INJECTION, LACOSAMIDE: HCPCS | Performed by: PSYCHIATRY & NEUROLOGY

## 2020-11-28 PROCEDURE — 2580000003 HC RX 258: Performed by: NURSE PRACTITIONER

## 2020-11-28 PROCEDURE — 2580000003 HC RX 258: Performed by: INTERNAL MEDICINE

## 2020-11-28 PROCEDURE — 6360000002 HC RX W HCPCS: Performed by: PSYCHIATRY & NEUROLOGY

## 2020-11-28 RX ORDER — LORAZEPAM 2 MG/ML
2 INJECTION INTRAMUSCULAR ONCE
Status: COMPLETED | OUTPATIENT
Start: 2020-11-28 | End: 2020-11-28

## 2020-11-28 RX ADMIN — FLUOXETINE HYDROCHLORIDE 20 MG: 20 CAPSULE ORAL at 08:14

## 2020-11-28 RX ADMIN — LAMOTRIGINE 25 MG: 25 TABLET ORAL at 08:15

## 2020-11-28 RX ADMIN — LORAZEPAM 2 MG: 2 INJECTION INTRAMUSCULAR; INTRAVENOUS at 05:50

## 2020-11-28 RX ADMIN — LEVETIRACETAM 1000 MG: 10 INJECTION INTRAVENOUS at 17:52

## 2020-11-28 RX ADMIN — AMLODIPINE BESYLATE 10 MG: 10 TABLET ORAL at 08:14

## 2020-11-28 RX ADMIN — SODIUM CHLORIDE, PRESERVATIVE FREE 10 ML: 5 INJECTION INTRAVENOUS at 08:15

## 2020-11-28 RX ADMIN — DEXTROSE MONOHYDRATE 150 MG: 50 INJECTION, SOLUTION INTRAVENOUS at 20:31

## 2020-11-28 RX ADMIN — LEVETIRACETAM 500 MG: 5 INJECTION INTRAVENOUS at 08:15

## 2020-11-28 RX ADMIN — LORAZEPAM 1 MG: 2 INJECTION INTRAMUSCULAR; INTRAVENOUS at 07:17

## 2020-11-28 RX ADMIN — POTASSIUM PHOSPHATE, MONOBASIC AND POTASSIUM PHOSPHATE, DIBASIC 30 MMOL: 224; 236 INJECTION, SOLUTION, CONCENTRATE INTRAVENOUS at 10:27

## 2020-11-28 RX ADMIN — GABAPENTIN 300 MG: 300 CAPSULE ORAL at 08:14

## 2020-11-28 RX ADMIN — LORAZEPAM 1 MG: 2 INJECTION INTRAMUSCULAR; INTRAVENOUS at 02:08

## 2020-11-28 RX ADMIN — SODIUM CHLORIDE, PRESERVATIVE FREE 100 UNITS: 5 INJECTION INTRAVENOUS at 08:42

## 2020-11-28 RX ADMIN — DEXTROSE MONOHYDRATE 150 MG: 50 INJECTION, SOLUTION INTRAVENOUS at 08:10

## 2020-11-28 RX ADMIN — LORAZEPAM 1 MG: 2 INJECTION INTRAMUSCULAR; INTRAVENOUS at 15:42

## 2020-11-28 RX ADMIN — SODIUM CHLORIDE, PRESERVATIVE FREE 10 ML: 5 INJECTION INTRAVENOUS at 20:31

## 2020-11-28 RX ADMIN — LORAZEPAM 1 MG: 2 INJECTION INTRAMUSCULAR; INTRAVENOUS at 23:45

## 2020-11-28 RX ADMIN — PRAZOSIN HYDROCHLORIDE 2 MG: 2 CAPSULE ORAL at 20:31

## 2020-11-28 RX ADMIN — SODIUM CHLORIDE, PRESERVATIVE FREE 10 ML: 5 INJECTION INTRAVENOUS at 08:14

## 2020-11-28 RX ADMIN — PANTOPRAZOLE SODIUM 40 MG: 40 INJECTION, POWDER, FOR SOLUTION INTRAVENOUS at 08:14

## 2020-11-28 ASSESSMENT — PAIN SCALES - GENERAL
PAINLEVEL_OUTOF10: 0

## 2020-11-28 NOTE — SIGNIFICANT EVENT
Rapid Response Team Note  Date of event: 11/28/2020   Time of event: 2125  Rayray Stubbs 38y.o. year old female   YOB: 1985   Admit date:  11/22/2020   Location: 1284/5160-X   Witnessed? : [x]Yes  [] No  Monitored? : []Yes  [x] No  Code status: [x] Full  [] DNR-CCA  []DNR-CC  ______________________________________________________________________  Reason for RRT:    [] RR < 8     [] RR > 28   [] SpO2 <90%   [] HR < 40 bpm   [] HR > 130 bpm  [] SBP < 90 mmHg    [] SpO2 <90%   [x] LOC   [x] Seizures    [] Significant Bleeding Event    [x] Other: Fall    Subjective:   CTSP regarding the above event, RRT was called after patient had a witnessed fall and seizure. As per nursing patient struck her head at that time. Upon arrival to the room patient is nonresponsive but saturating 89% on room air. Patient was initially placed on nonrebreather but transitioned to 6 L nasal cannula. Patient continued to saturate well off nasal cannula. BP was elevated patient was given labetalol. Labs pending. CT head and neck performed for possible circular fracture or intracranial bleeds. Gross evaluation negative for any acute bleeds or fractures. Will follow up with radiology's assessment. Patient transferred to monitored medical floor. During the aforementioned events patient is being transferred down to CAT scan. While in the elevator patient began having a seizure. When it was discussed the patient may need intubated if seizing continued, patient seizure stopped without additional medical treatment.      Objective:   Vital signs: Temp: 99.5/BP: 136/84/RR: 14 /HR: 92  Initial Condition:  Conscious   [] Yes  [x] No     Breathing [x] Yes  [] No     Pulse  [x] Yes  [] No    Airway:   [x] Open/ Clear     Intervention: [x] None  [] Pooled secretions     [] Suctioned  [] Stridor      [] Intubation    Lungs:   [x] Symmetrical chest rise/ CTABL Intervention: [x] None  [] Use of accessory muscles    [] NIV

## 2020-11-28 NOTE — PROGRESS NOTES
Patient has appointment with [facility] in [place] for new patient appointment on 11/30/20 per howard Grady.

## 2020-11-28 NOTE — PROGRESS NOTES
[facility]  Internal Medicine Residency Program  Progress Note - House Team 2    Patient:  Oleksandr Kingston 38 y.o. female MRN: [id number]     Date of Service: 11/28/2020     CC: had concerns including Seizures (Seizures from [facility], per [facility] pseudoseizures. ). Overnight events: Patient RRT overnight for seizure like activity. Given total of 10mg ativan since last night. Of note, when mention of need for intubation was made, patient stopped seizing. Subjective     Patient seen and examined. Patient very upset about discussion of going back to [facility] stating that nurses are mean to her there. It has been noted that patient seems to \"seize\" more frequently when [facility] is mentioned. Extubated 11/26, seizure activity since then noted in RRT note overnight  Vital signs stable. No muscular twitching. Patient tolerating regular diet  On Keppra 1000 mg p.m., 500 mg a.m. IV  On Vimpat 150 mg twice daily IV. On Lamotrigine 25 mg oral daily. Neuro recommending continuing these medications while she gets inpatient workup back at [facility]. Objective     Physical Exam:  · Vitals: /65   Pulse 87   Temp 97 °F (36.1 °C) (Temporal)   Resp 18   Ht 5' 4\" (1.626 m)   Wt 242 lb 14.4 oz (110.2 kg)   LMP  (LMP Unknown)   SpO2 95%   BMI 41.69 kg/m²     I & O - 24hr: I/O this shift:  In: 120 [P.O.:120]  · Out: -    · General Appearance: alert, appears older than stated age, cooperative and Intubated and sedated. · HEENT:  Head: Normocephalic, no lesions, without obvious abnormality. Pupils equal and reactive to light bilaterally. · Neck: no adenopathy, no carotid bruit, no JVD, supple, symmetrical, trachea midline and thyroid not enlarged, symmetric, no tenderness/mass/nodules  · Lung: coarse breath sounds appreciated bilaterally.  , no wheezing  · Heart: regular rate and rhythm, S1, S2 normal, no murmur, click, rub or gallop  · Abdomen: soft, non-tender; bowel sounds normal; no masses,  no organomegaly  · Extremities:  extremities normal, atraumatic, no cyanosis or edema  · Musculokeletal: No joint swelling, no muscle tenderness. · Neurologic: Alert and oriented  Subject  Pertinent Labs & Imaging Studies   andrea  CBC:   Lab Results   Component Value Date    WBC 8.5 11/27/2020    RBC 4.29 11/27/2020    HGB 10.0 11/27/2020    HCT 32.4 11/27/2020    MCV 75.5 11/27/2020    MCH 23.3 11/27/2020    MCHC 30.9 11/27/2020    RDW 16.8 11/27/2020     11/27/2020    MPV 9.2 11/27/2020     CMP:    Lab Results   Component Value Date     11/27/2020    K 3.5 11/27/2020    K 3.5 11/25/2020     11/27/2020    CO2 21 11/27/2020    BUN 8 11/27/2020    CREATININE 1.1 11/27/2020    GFRAA >60 11/27/2020    LABGLOM 56 11/27/2020    GLUCOSE 183 11/27/2020    PROT 6.7 11/25/2020    LABALBU 3.5 11/25/2020    CALCIUM 8.8 11/27/2020    BILITOT 0.2 11/25/2020    ALKPHOS 70 11/25/2020    AST 17 11/25/2020    ALT 20 11/25/2020     CT HEAD WO CONTRAST   Final Result   No acute intracranial abnormality. No acute abnormality in the cervical spine. CT CERVICAL SPINE WO CONTRAST   Final Result   No acute intracranial abnormality. No acute abnormality in the cervical spine. MRI BRAIN WO CONTRAST   Final Result   Unremarkable MRI of the brain. XR CHEST PORTABLE   Final Result   No acute process identified. Status post extubation. XR CHEST PORTABLE   Final Result   1. Airspace disease seen within the retrocardiac region. 2. The left upper lobe and right lung are clear. XR CHEST PORTABLE   Final Result   No interval change compared to prior exam.      XR ABDOMEN FOR NG/OG/NE TUBE PLACEMENT   Final Result   1. Satisfactory position of enteric tube. 2.  Endotracheal tube. Low lung volumes. Retrocardiac and left basilar   opacity may reflect atelectasis however underlying infection is not excluded.    RECOMMENDATION:   (Recommend upright PA and lateral chest radiographs 6-8 weeks after   resolution of patient's symptoms to ensure complete resolution of   radiographic findings. )      XR CHEST PORTABLE   Final Result   1. Satisfactory position of enteric tube. 2.  Endotracheal tube. Low lung volumes. Retrocardiac and left basilar   opacity may reflect atelectasis however underlying infection is not excluded. RECOMMENDATION:   (Recommend upright PA and lateral chest radiographs 6-8 weeks after   resolution of patient's symptoms to ensure complete resolution of   radiographic findings.)      Fluoroscopy modified barium swallow with video   Final Result   Transient penetration seen with thin liquids. No aspiration. Please see separate speech pathology report for full discussion of findings   and recommendations. CT HEAD WO CONTRAST   Final Result   No acute intracranial abnormality. MRI may be obtained if clinically   indicated. XR CHEST PORTABLE   Final Result   Decreased inspiration with no radiographic evidence of acute cardiopulmonary   disease. CT HEAD WO CONTRAST   Final Result   No acute intracranial abnormality. XR CHEST PORTABLE   Final Result   Endotracheal tube tip projects 4 cm to the master. Right lung base atelectasis or infiltrate      XR CHEST PORTABLE   Final Result   Bilateral infiltrates, mostly in mid to lower lungs. XR CHEST PORTABLE    (Results Pending)   IR REMOVAL TUNNELED CVC WO SQ PORT/PUMP    (Results Pending)         22 hour EEG on 11/26  General Impression   Normal-appearing awake and asleep EEG with no evidence of   epileptiform activity despite presence of abnormal movements that   have been previously associated with his seizure activity.     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      MD Alice Mcpherson   Resident's Assessment and Plan     Delia Gregory is a 38 y.o. female with PMH DM (not on meds), schizoaffective disorder, PTSD, borderline personality disorder, presented to ED from [facility] d/t seizures and admitted to SICU for further management. 1. Acute Encephalopathy 2/2 pseudoseizures s/p intubation for airway protection x2. Patient states she has history of seizures since childhood and recently maintained on Keppra and Lamictal.  Previously, she had been on Lamictal and Vimpat but due to lack of insurance coverage and she had gone back on Keppra with minimal response to her seizures. She had a recent admission to [facility] d/t seizures; 2-day EEG negative. Witnessed seizure in ED, given Keppra 1000 mg x 1, Ativan 2 mg x 1, versed 2 mg x 2 and started on propofol gtt. CK level 47, Ammonia 14, UDS negative, intubated and extubated on 11/22/2020. EEG report 11/23/2020 unremarkable. -General diet   -Continue seizure and aspiration precautions   -Extubated 11/26   -Continue Keppra 1000 mg p.m., 500 mg a.m. IV   -Continue Vimpat 150 mg twice daily IV.   -Continue gabapentin 300 mg BID PO and and 600 mg PO nightly.   -Continue lamotrigine 25 mg oral daily due to n.p.o.   -Continue Ativan 1 mg IV Q2 hrs prn for seizures. -Repeat EEG final report from 11/24/2020 negative for true seizure activity   -24 hr continuous EEG 11/26 also negative for true seizure activity while patient had tremors during study   -Patient with fall again from toilet overnight with RRT, see note, CT head and neck negative   -Neurology following, recommended MRI brain, negative for pathology 11/27   -recommend transfer back to psychiatric facility, [facility]   -Neuro states they are pseudoseizures and requires psychiatric treatment    2. Dysphagia likely 2/2 seizures vs anatomical abnormality. Fluoroscopy modified barium study 11/24/2020 with transient penetration with thin liquids, no aspiration. -SLP for dysphagia. -OG tube removed   -Tolerating general diet     3.  Acute Hypoxic Respiratory Failure 2/2 AMS (2/2 Seizure). O2 sat at [facility] reported to be 82%, and 82% in ED. Intubated and extubated on 11/22/2020. CXR on admission showed BL infiltrates mid-lower lobes. CXR 11/23 showed no acute cardiopulmonary disease.    -Extubated 11/26, saturating well on RA   -ABG post extubation: 7.39, 34 PCO2   -Continue Duoneb Q4 hrs prn for SOB   -Last CXR and ABG 11/27 WNL   -Continue to monitor respiratory status     4. BL infiltrates mid-lower lobes 2/2 aspiration pna (2/2 AMS from recurrent seizures). s/p unasyn x 1 in ED for aspiration pna coverage. Procalcitonin 0.06, afebrile, no leukocytosis ~ suggesting unlikely to be infectious etiology.    -Culture x1 bottle grew gram-positive rods, likely contaminant   -Continue to follow cultures    -continue to monitor off antibiotics      5. Microcytic Anemia likely 2/2 PATRICIO   -No baseline hemoglobin on file   -Hemoglobin stable at 9.9   -Fe 41, Ferritin 18, TIBC 374, iron saturation 11    -Continue to follow H&H with daily CBC   -Peripheral blood smear showing possible thalassemia trait    6. History of schizoaffective disorder, PTSD, borderline personality disorder.    -Continue haldol 5 mg OD, prazosin 2 mg OD, fluoxetine 20 mg OD.   -Psychiatry following     7. History of HTN   -BP stable   -Continue norvasc 10 mg daily   -Continue Lopressor 5 mg IV every 6 hours as needed for SBP greater than 165. Do not administer if heart rate is less than 65   -Hydralazine 10 mg IV Q4 hrs prn for high BP, SBP>140 mmHg.     8. History of DM, not on home medications   -Last Hemoglobin A1c 9.4% on 11/23/2020   -BG range: 85-90   -Currently diabetic diet   -hypoglycemic protocol    -continue to monitor BG       PT/OT evaluation: not indicated at this time  DVT prophylaxis/ GI prophylaxis: lovenox/pepcid   Disposition: medically stable for discharge to Jeremiah Becerra MD, PGY-1  Attending physician: Dr. Sandra Duque

## 2020-11-28 NOTE — PROGRESS NOTES
Ysabel called and updated on pt. Dr. Getachew Gonzalez spoke with her and told her day team will be informed and they will call in morning to talk about discharge and update her on pt in more detail.

## 2020-11-28 NOTE — PLAN OF CARE
Addendum for earlier this jovani:    various colleagues from [facility] in RRT tx/ eventual xfer to [facility],  after pt's fall and apparent seizure which started at the approx time she was in bathroom at approx 2130 yest jovani.

## 2020-11-28 NOTE — PROGRESS NOTES
Patient's emergency contact called and asked for a update regarding patient's fall earlier in the evening. Emergency contact is a friend with whom the patient lives. Emergency contact said the patient is a \"attention seeker\". Stated that the patient has the seizures and falls quite frequently to get attention. Stated that the patient has undergone many tests to \"see what the problem is\", nothing has been found and that previous doctors have said it is more of a psych issue. Emergency contact stated that doctors in [place] [address] refuse to treat the patient for seizures and that the patient has to go elsewhere for treatment. Emergency contact said the seizures happen when patient enters a psych facility or hears that she will be going to a psych facility. Emergency contact said that patient was told today she would be going back to [facility], became upset and called the contact who tried to calm the patient. Emergency contact said patient does not keep in touch with family due to strained relationships. Emergency contact said she would be willing to talk with anyone on the treatment team regarding the patient, however she did not want the patient to know that she was talking to members of the treatment team about the patient. Emergency contacts name is Ysabel and phone number is [phone].

## 2020-11-28 NOTE — PROGRESS NOTES
Patient was found in the patient's bathroom face down, unresponsive at 2135 on 11/27. Patient's constant observer notified members of the nursing staff that patient had fallen off the toilet, hit her head on the bathroom door and had a seizure. RRT was called. Labs were drawn, CT scan of head and neck and x-rays were taken. Dr Chhaya Ferrer was present in the room during the RRT and through the patient's transfer. Patient was transferred to [phone].

## 2020-11-28 NOTE — PROGRESS NOTES
[place], patient emergency contact notified that patient fell in bathroom, and found unresponsive. Ysabel also notified that rapid response team called.

## 2020-11-28 NOTE — CARE COORDINATION
SOCIAL WORK/CASEMANAGEMENT TRANSITION OF CARE [id number] [place]  [facility] [address], [place], [place] [phone]): rn from floor called that pt can return to [facility]. I called the facility and they need a updated psych note indicating that pt needs to return and dx of why. rn is aware.   Carlie Meraz  11/28/2020

## 2020-11-29 ENCOUNTER — APPOINTMENT (OUTPATIENT)
Dept: CT IMAGING | Age: 35
DRG: 880 | End: 2020-11-29
Payer: MEDICARE

## 2020-11-29 VITALS
HEART RATE: 97 BPM | SYSTOLIC BLOOD PRESSURE: 175 MMHG | DIASTOLIC BLOOD PRESSURE: 84 MMHG | BODY MASS INDEX: 41.35 KG/M2 | RESPIRATION RATE: 18 BRPM | WEIGHT: 242.2 LBS | OXYGEN SATURATION: 100 % | TEMPERATURE: 97 F | HEIGHT: 64 IN

## 2020-11-29 PROBLEM — E11.9 TYPE 2 DIABETES MELLITUS, WITHOUT LONG-TERM CURRENT USE OF INSULIN (HCC): Status: ACTIVE | Noted: 2020-11-29

## 2020-11-29 PROBLEM — R56.9 PSEUDOSEIZURES (HCC): Status: ACTIVE | Noted: 2020-11-22

## 2020-11-29 LAB
ANION GAP SERPL CALCULATED.3IONS-SCNC: 13 MMOL/L (ref 7–16)
B.E.: -0.7 MMOL/L (ref -3–3)
BUN BLDV-MCNC: 11 MG/DL (ref 6–20)
CALCIUM SERPL-MCNC: 9 MG/DL (ref 8.6–10.2)
CHLORIDE BLD-SCNC: 104 MMOL/L (ref 98–107)
CO2: 23 MMOL/L (ref 22–29)
COHB: 0.1 % (ref 0–1.5)
CREAT SERPL-MCNC: 1 MG/DL (ref 0.5–1)
CRITICAL: ABNORMAL
DATE ANALYZED: ABNORMAL
DATE OF COLLECTION: ABNORMAL
GFR AFRICAN AMERICAN: >60
GFR NON-AFRICAN AMERICAN: >60 ML/MIN/1.73
GLUCOSE BLD-MCNC: 106 MG/DL (ref 74–99)
HCO3: 23.5 MMOL/L (ref 22–26)
HCT VFR BLD CALC: 31.4 % (ref 34–48)
HEMOGLOBIN: 9.5 G/DL (ref 11.5–15.5)
HHB: 0.7 % (ref 0–5)
LAB: ABNORMAL
Lab: ABNORMAL
MCH RBC QN AUTO: 23.1 PG (ref 26–35)
MCHC RBC AUTO-ENTMCNC: 30.3 % (ref 32–34.5)
MCV RBC AUTO: 76.4 FL (ref 80–99.9)
METER GLUCOSE: 107 MG/DL (ref 74–99)
METHB: 0.3 % (ref 0–1.5)
MODE: ABNORMAL
O2 CONTENT: 16 ML/DL
O2 SATURATION: 99.3 % (ref 92–98.5)
O2HB: 98.9 % (ref 94–97)
OPERATOR ID: 1023
PATIENT TEMP: 37 C
PCO2: 37.2 MMHG (ref 35–45)
PDW BLD-RTO: 16.6 FL (ref 11.5–15)
PH BLOOD GAS: 7.42 (ref 7.35–7.45)
PLATELET # BLD: 310 E9/L (ref 130–450)
PMV BLD AUTO: 9.2 FL (ref 7–12)
PO2: 198.6 MMHG (ref 75–100)
POTASSIUM SERPL-SCNC: 3.7 MMOL/L (ref 3.5–5)
POTASSIUM SERPL-SCNC: 4 MMOL/L (ref 3.5–5)
PROLACTIN: 10.54 NG/ML
RBC # BLD: 4.11 E12/L (ref 3.5–5.5)
SODIUM BLD-SCNC: 140 MMOL/L (ref 132–146)
SOURCE, BLOOD GAS: ABNORMAL
T4 FREE: 1.17 NG/DL (ref 0.93–1.7)
THB: 11.2 G/DL (ref 11.5–16.5)
TIME ANALYZED: 1402
WBC # BLD: 7.1 E9/L (ref 4.5–11.5)

## 2020-11-29 PROCEDURE — 82962 GLUCOSE BLOOD TEST: CPT

## 2020-11-29 PROCEDURE — 2580000003 HC RX 258: Performed by: NURSE PRACTITIONER

## 2020-11-29 PROCEDURE — 84146 ASSAY OF PROLACTIN: CPT

## 2020-11-29 PROCEDURE — 6360000002 HC RX W HCPCS: Performed by: STUDENT IN AN ORGANIZED HEALTH CARE EDUCATION/TRAINING PROGRAM

## 2020-11-29 PROCEDURE — 6370000000 HC RX 637 (ALT 250 FOR IP): Performed by: NURSE PRACTITIONER

## 2020-11-29 PROCEDURE — 84439 ASSAY OF FREE THYROXINE: CPT

## 2020-11-29 PROCEDURE — 85027 COMPLETE CBC AUTOMATED: CPT

## 2020-11-29 PROCEDURE — C9254 INJECTION, LACOSAMIDE: HCPCS | Performed by: PSYCHIATRY & NEUROLOGY

## 2020-11-29 PROCEDURE — C9113 INJ PANTOPRAZOLE SODIUM, VIA: HCPCS | Performed by: STUDENT IN AN ORGANIZED HEALTH CARE EDUCATION/TRAINING PROGRAM

## 2020-11-29 PROCEDURE — 2580000003 HC RX 258: Performed by: STUDENT IN AN ORGANIZED HEALTH CARE EDUCATION/TRAINING PROGRAM

## 2020-11-29 PROCEDURE — 80048 BASIC METABOLIC PNL TOTAL CA: CPT

## 2020-11-29 PROCEDURE — 6360000002 HC RX W HCPCS: Performed by: INTERNAL MEDICINE

## 2020-11-29 PROCEDURE — 36415 COLL VENOUS BLD VENIPUNCTURE: CPT

## 2020-11-29 PROCEDURE — 84132 ASSAY OF SERUM POTASSIUM: CPT

## 2020-11-29 PROCEDURE — 99231 SBSQ HOSP IP/OBS SF/LOW 25: CPT | Performed by: INTERNAL MEDICINE

## 2020-11-29 PROCEDURE — 2700000000 HC OXYGEN THERAPY PER DAY

## 2020-11-29 PROCEDURE — 70450 CT HEAD/BRAIN W/O DYE: CPT

## 2020-11-29 PROCEDURE — 2580000003 HC RX 258: Performed by: PSYCHIATRY & NEUROLOGY

## 2020-11-29 PROCEDURE — 82805 BLOOD GASES W/O2 SATURATION: CPT

## 2020-11-29 PROCEDURE — 6360000002 HC RX W HCPCS: Performed by: PSYCHIATRY & NEUROLOGY

## 2020-11-29 RX ORDER — LACOSAMIDE 150 MG/1
150 TABLET ORAL 2 TIMES DAILY
Qty: 60 TABLET | Refills: 1 | Status: SHIPPED | OUTPATIENT
Start: 2020-11-29 | End: 2022-03-29

## 2020-11-29 RX ORDER — LEVETIRACETAM 500 MG/1
750 TABLET ORAL 2 TIMES DAILY
Qty: 60 TABLET | Refills: 1 | Status: SHIPPED | OUTPATIENT
Start: 2020-11-29 | End: 2022-03-29

## 2020-11-29 RX ADMIN — SODIUM CHLORIDE, PRESERVATIVE FREE 100 UNITS: 5 INJECTION INTRAVENOUS at 08:23

## 2020-11-29 RX ADMIN — GABAPENTIN 300 MG: 300 CAPSULE ORAL at 08:22

## 2020-11-29 RX ADMIN — PANTOPRAZOLE SODIUM 40 MG: 40 INJECTION, POWDER, FOR SOLUTION INTRAVENOUS at 08:23

## 2020-11-29 RX ADMIN — SODIUM CHLORIDE, PRESERVATIVE FREE 10 ML: 5 INJECTION INTRAVENOUS at 08:22

## 2020-11-29 RX ADMIN — LAMOTRIGINE 25 MG: 25 TABLET ORAL at 08:22

## 2020-11-29 RX ADMIN — DEXTROSE MONOHYDRATE 150 MG: 50 INJECTION, SOLUTION INTRAVENOUS at 08:21

## 2020-11-29 RX ADMIN — SODIUM CHLORIDE, PRESERVATIVE FREE 10 ML: 5 INJECTION INTRAVENOUS at 08:23

## 2020-11-29 RX ADMIN — FLUOXETINE HYDROCHLORIDE 20 MG: 20 CAPSULE ORAL at 08:22

## 2020-11-29 RX ADMIN — AMLODIPINE BESYLATE 10 MG: 10 TABLET ORAL at 08:22

## 2020-11-29 RX ADMIN — LEVETIRACETAM 500 MG: 5 INJECTION INTRAVENOUS at 08:22

## 2020-11-29 RX ADMIN — LEVETIRACETAM 1000 MG: 10 INJECTION INTRAVENOUS at 17:13

## 2020-11-29 ASSESSMENT — PAIN SCALES - GENERAL
PAINLEVEL_OUTOF10: 0
PAINLEVEL_OUTOF10: 0

## 2020-11-29 NOTE — CARE COORDINATION
11/29 UPDATE  NOTE: Received a call from IM resident regarding patient returning to [facility] today. Note from  on 11/28 states [facility] is requesting an updated psyche note with patient needs and appropriate psyche diagnosis. This is not on the chart as of 10:30am today. Call placed to [facility] and message left for intake to return CM call. Also instructed IM resident to contact psyche and obtain needed information requested yesterday by [facility]. Will follow up on possible return today when [facility] Intake returns the call to .   Brijesh Chandra RN

## 2020-11-29 NOTE — CONSULTS
I saw the patient with the nurse practitioner this morning and patient has a full note on 11/24/2020. The medical team requested a follow-up and repeat evaluation. I saw the patient and she was very coherent and did not display any acute psychiatric symptoms. She is not suicidal, homicidal, manic, and psychotic and does not meet the criteria for inpatient level of psychiatric care at this time  She talk to us rationally, she said she will go back home, and from psychiatric point of view, she has the capacity to make that decision. Her decision making process is not from psychotic thinking at this time. Competency is determined the court and any physician can assess the capacity.   Psychiatry will sign off with this case

## 2020-11-29 NOTE — PROGRESS NOTES
IM residents asked to verify with Dr. Ambika Miranda about seizure medications - per Dr. Rebecca Winslow to wean her AEDs over time and would start with Keppra since it is associated with behavior change. Reduce Keppra to 750 mg BID now and keep other meds the same. With leave further reduction to Psych. Just spoke to them this am about this patient. Needs lamictal titration and will pass that on to Psych  Residents notified of the above.

## 2020-11-29 NOTE — PROGRESS NOTES
Staff called to room patient in bed with 3 side rails up and CO at bedside. CO states patient leaned over bed and fell onto side. RRT called due to not responding. CT head done.

## 2020-11-29 NOTE — PROGRESS NOTES
Discharge instructions, appointments and medications reviewed with friend, Ysabel. Ysabel came up to room due to patient not waking up. Staff helped get patient dressed and patient was able to stand on her own and get into w/c and transferred to car. Patient stood on her own and got into car. Walter Bear is used to her behavior and states she has an appointment 11/30/20 with [facility]. Patient discharged home with all belongings via private car with daughter.

## 2020-11-29 NOTE — CARE COORDINATION
11/29 CM Note: Received a call from Valente Bobo, who states she will need to discuss return of this patient with the Supervisor. [facility]. Will await her return call.  Flo Thompson RN CM

## 2020-11-29 NOTE — PROGRESS NOTES
tenderness. · Neurologic: Alert and oriented  Subject  Pertinent Labs & Imaging Studies   andrea  CBC:   Lab Results   Component Value Date    WBC 8.5 11/27/2020    RBC 4.29 11/27/2020    HGB 10.0 11/27/2020    HCT 32.4 11/27/2020    MCV 75.5 11/27/2020    MCH 23.3 11/27/2020    MCHC 30.9 11/27/2020    RDW 16.8 11/27/2020     11/27/2020    MPV 9.2 11/27/2020     CMP:    Lab Results   Component Value Date     11/27/2020    K 3.5 11/27/2020    K 3.5 11/25/2020     11/27/2020    CO2 21 11/27/2020    BUN 8 11/27/2020    CREATININE 1.1 11/27/2020    GFRAA >60 11/27/2020    LABGLOM 56 11/27/2020    GLUCOSE 183 11/27/2020    PROT 6.7 11/25/2020    LABALBU 3.5 11/25/2020    CALCIUM 8.8 11/27/2020    BILITOT 0.2 11/25/2020    ALKPHOS 70 11/25/2020    AST 17 11/25/2020    ALT 20 11/25/2020     CT HEAD WO CONTRAST   Final Result   No acute intracranial abnormality. No acute abnormality in the cervical spine. CT CERVICAL SPINE WO CONTRAST   Final Result   No acute intracranial abnormality. No acute abnormality in the cervical spine. MRI BRAIN WO CONTRAST   Final Result   Unremarkable MRI of the brain. XR CHEST PORTABLE   Final Result   No acute process identified. Status post extubation. XR CHEST PORTABLE   Final Result   1. Airspace disease seen within the retrocardiac region. 2. The left upper lobe and right lung are clear. XR CHEST PORTABLE   Final Result   No interval change compared to prior exam.      XR ABDOMEN FOR NG/OG/NE TUBE PLACEMENT   Final Result   1. Satisfactory position of enteric tube. 2.  Endotracheal tube. Low lung volumes. Retrocardiac and left basilar   opacity may reflect atelectasis however underlying infection is not excluded. RECOMMENDATION:   (Recommend upright PA and lateral chest radiographs 6-8 weeks after   resolution of patient's symptoms to ensure complete resolution of   radiographic findings. )      XR CHEST PORTABLE   Final Result   1. Satisfactory position of enteric tube. 2.  Endotracheal tube. Low lung volumes. Retrocardiac and left basilar   opacity may reflect atelectasis however underlying infection is not excluded. RECOMMENDATION:   (Recommend upright PA and lateral chest radiographs 6-8 weeks after   resolution of patient's symptoms to ensure complete resolution of   radiographic findings.)      Fluoroscopy modified barium swallow with video   Final Result   Transient penetration seen with thin liquids. No aspiration. Please see separate speech pathology report for full discussion of findings   and recommendations. CT HEAD WO CONTRAST   Final Result   No acute intracranial abnormality. MRI may be obtained if clinically   indicated. XR CHEST PORTABLE   Final Result   Decreased inspiration with no radiographic evidence of acute cardiopulmonary   disease. CT HEAD WO CONTRAST   Final Result   No acute intracranial abnormality. XR CHEST PORTABLE   Final Result   Endotracheal tube tip projects 4 cm to the master. Right lung base atelectasis or infiltrate      XR CHEST PORTABLE   Final Result   Bilateral infiltrates, mostly in mid to lower lungs. XR CHEST PORTABLE    (Results Pending)   IR REMOVAL TUNNELED CVC WO SQ PORT/PUMP    (Results Pending)         22 hour EEG on 11/26  General Impression   Normal-appearing awake and asleep EEG with no evidence of   epileptiform activity despite presence of abnormal movements that   have been previously associated with his seizure activity.     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      MD Nneka Pinedasmith   Resident's Assessment and Plan     Nabil Norris is a 38 y.o. female with PMH DM (not on meds), schizoaffective disorder, PTSD, borderline personality disorder, presented to ED from [facility] facility d/t seizures and admitted to SICU for further management. 1. Acute Encephalopathy 2/2 pseudoseizures s/p intubation for airway protection x2. Patient states she has history of seizures since childhood and recently maintained on Keppra and Lamictal.  Previously, she had been on Lamictal and Vimpat but due to lack of insurance coverage and she had gone back on Keppra with minimal response to her seizures. She had a recent admission to [facility] d/t seizures; 2-day EEG negative. Witnessed seizure in ED, given Keppra 1000 mg x 1, Ativan 2 mg x 1, versed 2 mg x 2 and started on propofol gtt. CK level 47, Ammonia 14, UDS negative, intubated and extubated on 11/22/2020. EEG report 11/23/2020 unremarkable. -General diet   -Continue seizure and aspiration precautions   -Extubated 11/26   -Continue Keppra 1000 mg p.m., 500 mg a.m. IV   -Continue Vimpat 150 mg twice daily IV.   -Continue gabapentin 300 mg BID PO and and 600 mg PO nightly.   -Continue lamotrigine 25 mg oral daily due to n.p.o.   -Continue Ativan 1 mg IV Q2 hrs prn for seizures. -Repeat EEG final report from 11/24/2020 negative for true seizure activity   -TSH elevated but free T4 WNL   -24 hr continuous EEG 11/26 also negative for true seizure activity while patient had tremors during study   -Patient with fall again from toilet overnight with RRT, see note, CT head and neck negative   -Neurology following, recommended MRI brain, negative for pathology 11/27   -recommend transfer back to psychiatric facility, [facility]   -Neuro states they are pseudoseizures and requires psychiatric treatment    2. Dysphagia likely 2/2 seizures vs anatomical abnormality. Fluoroscopy modified barium study 11/24/2020 with transient penetration with thin liquids, no aspiration. -SLP for dysphagia. -OG tube removed   -Tolerating general diet     3. Acute Hypoxic Respiratory Failure 2/2 AMS (2/2 Seizure). O2 sat at [facility] reported to be 82%, and 82% in ED.  Intubated and extubated on 11/22/2020. CXR on admission showed BL infiltrates mid-lower lobes. CXR 11/23 showed no acute cardiopulmonary disease.    -Extubated 11/26, saturating well on RA   -ABG post extubation: 7.39, 34 PCO2   -Continue Duoneb Q4 hrs prn for SOB   -Last CXR and ABG 11/27 WNL   -Continue to monitor respiratory status     4. BL infiltrates mid-lower lobes 2/2 aspiration pna (2/2 AMS from recurrent seizures). s/p unasyn x 1 in ED for aspiration pna coverage. Procalcitonin 0.06, afebrile, no leukocytosis ~ suggesting unlikely to be infectious etiology.    -Culture x1 bottle grew gram-positive rods, likely contaminant   -Continue to follow cultures    -continue to monitor off antibiotics      5. Microcytic Anemia likely 2/2 PATRICIO   -No baseline hemoglobin on file   -Hemoglobin stable at 9.9   -Fe 41, Ferritin 18, TIBC 374, iron saturation 11    -Continue to follow H&H with daily CBC   -Peripheral blood smear showing possible thalassemia trait    6. History of schizoaffective disorder, PTSD, borderline personality disorder.    -Continue haldol 5 mg OD, prazosin 2 mg OD, fluoxetine 20 mg OD.   -Psychiatry following     7. History of HTN   -BP stable   -Continue norvasc 10 mg daily   -Continue Lopressor 5 mg IV every 6 hours as needed for SBP greater than 165. Do not administer if heart rate is less than 65   -Hydralazine 10 mg IV Q4 hrs prn for high BP, SBP>140 mmHg.     8. History of DM, not on home medications   -Last Hemoglobin A1c 9.4% on 11/23/2020   -BG range: 85-90   -Currently diabetic diet   -hypoglycemic protocol    -continue to monitor BG       PT/OT evaluation: not indicated at this time  DVT prophylaxis/ GI prophylaxis: lovenox/pepcid   Disposition: medically stable for discharge to Jamar Smith MD, PGY-1  Attending physician: Dr. Bisi Quezada

## 2020-11-29 NOTE — CARE COORDINATION
11/29 MICHAEL Note: [facility] notified via phone of note from Dr Godfrey Bruno dated 11/29 @ 11:40am. Plan is for patient to discharge to home.  Ranulfo Trinh RN CM

## 2020-11-29 NOTE — PLAN OF CARE
Updated NP of psychiatry the requests from CM obtained from [facility], will wait psych response and keep following.

## 2020-11-29 NOTE — PLAN OF CARE
After multiple attempts to psychiatry on perfect serve, Ja Kelsey(JUNIOR) agreed to see patient with Dr Oscar Sevilla, however they didn't encourage these follow-up requests in the future from outside hospital. Spoke to SW, she will contact [facility] now to ask for transfer today. Will continue to follow.

## 2020-11-29 NOTE — CARE COORDINATION
11/29 Update CM Note: Received return call from [place] at [facility]. After speaking with her Supervisor they continue to request a full note from pyschiatry with diagnosis, what needs the patient has, and why the patient requires return to [facility] along with medical concerns for the patient. She will then have it reviewed by the physician for possible return. Fax [phone]. Please notify CM at [phone] before 3:30pm if the above is completed and ready for faxing.  Jefferson Tracy RN CM

## 2020-11-29 NOTE — SIGNIFICANT EVENT
[]  Narcan (Naloxone)   []  Romazicon (Flumazenil)    []  Breathing Treatment    []  Steroids (Prednisone, Solu-Medrol)  []  Adenosine  [] Cardiac Medicines:    r      [] Lactate Ringers      [] Other:     Imaging:   [] CXR:   [] Normal         [] Pneumothorax         [] Pulmonary Edema  [] Infiltrate          [x] CT Head  [x] Normal          [] ICB          [] SAH          [] Other:     Laboratory Tests:    ABG , blood glucose and BP Reviewed within normal limits/      Teams Assessment and Plan:  1.   ?  ?  ?   Disposition:  [x] No transfer   [] Transfer to monitor floor  [] Transfer to: [] MICU [] NICU [] CCU [] SICU    Patients family updated: [] Yes  [x] No   Discussed with:  [] Critical Care Intensivist:         [] Primary Care Provider:       [] Other: ?    Leatha Elmore MD PGY-2  11/29/2020 4:08 AM  Attending Physician: Dr Phuong Horton

## 2020-11-30 NOTE — DISCHARGE SUMMARY
[address]  Discharge Summary    PCP: No primary care provider on file. Admit Date:11/22/2020  Discharge Date: 11/30/2020    Admission Diagnosis:   1. Acute encephalopathy 2/2 Status epilepticus vs psuedoseizures  2. Dysphagia  3. Acute hypoxic respiratory failure  4. History of HTN  5. Microcytic anemia  6. History of Diabetes without medication use    Discharge Diagnosis:  1. Acute encephalopathy 2/2 Pseudoseizures  2. Dysphagia  3. Acute hypoxic respiratory failure  4. History of HTN  5. Microcytic anemia  6. History of Diabetes without medication use    Hospital Course: The patient is a 38 y.o. female with PMHx of diabetes (not on any medications), schizoaffective disorder, PTSD, borderline personality disorder presenting from [facility] facility due to seizure episodes. She was recently admitted at [facility] for seizures and falls. 2-day EEG was done there which was negative. Patient was then transferred to [facility] on the night of 11/21 where she was noted to have continued 'seizure like' generalized twitching of her extremities, but stable vital signs, neurologic exam.  Per the nurse at [facility], patient was not responsive to pain however her vitals were normal overnight. On the morning of 11/22, patient was found on the floor continued to have generalized twitching with respiratory rate of 45 and O2 saturation of 82% which prompted him to bring her to the ED. At the ED patient was  hypertensive was noted to be initially unresponsive with no gag reflex, saturating well on room air, some nystagmus. Pt was not in any distress. Oxygen saturation then dropped to 88 and given altered mentation pt was intubated in ED for airway protection. CT head was neg for acute process. CXR was significant for b/l infiltrates pt was given 1 dose of unasyn. While in ED, pt had a witnessed seizure.  Pt was given  Keppra 1000 mg, Ativan 2 mg once and, Versed 2 mg twice the started on propofol drip. On evaluation in ED patient was waking up, nodding yes or no. She was able to tell us her correct age. During examination pt started shaking, gagging on ET tube but HR was noted to remain stable at 100, pupillary response to light noted b/l, no rigidity, bowel incontinence, significant posture change was noted. She was then transferred to the surgical ICU for further management. Patient was extubated the same day. She was evaluated by psychiatry and neurology. Patient underwent initial EEG that was negative for epileptiform activity. Patient had a multiple RRT's throughout her stay with desaturation requiring intubation a second time. Prolactin levels drawn shortly after events were within normal limits. She was also started on Vimpat during her stay and continued on home dose Lamictal and Keppra. Patient was given as needed Ativan for seizure-like activity throughout her stay. Patient then underwent 24-hour EEG while having witnessed shaking during the encounter without epileptiform activity again. Patient did RRT for seizure-like activity prior to discharge. During this encounter hand drop test performed and patient turned head quickly while having seizure-like activity so as to not hit self in face. Neurology recommended discharge on 150 mg Vimpat twice daily, home dose Lamictal, and home dose Keppra with plan to follow-up in clinic within 3 weeks to taper doses. Psychiatry reevaluated prior to discharge and recommended patient was stable to go home with no current psychiatric needs. Patient scheduled for follow-up appointment at [facility] in [place] on 11/30.     Significant findings (history and exam, laboratory, radiological, pathology, other tests):   · BP (!) 175/84   Pulse 97   Temp 97 °F (36.1 °C) (Temporal)   Resp 18   Ht 5' 4\" (1.626 m)   Wt 242 lb 3.2 oz (109.9 kg)   LMP  (LMP Unknown)   SpO2 100%   BMI 41.57 kg/m² · General Appearance: alert, appears older than stated age, cooperative and Intubated and sedated. · HEENT:  Head: Normocephalic, no lesions, without obvious abnormality. Pupils equal and reactive to light bilaterally. · Neck: no adenopathy, no carotid bruit, no JVD, supple, symmetrical, trachea midline and thyroid not enlarged, symmetric, no tenderness/mass/nodules  · Lung: coarse breath sounds appreciated bilaterally. , no wheezing  · Heart: regular rate and rhythm, S1, S2 normal, no murmur, click, rub or gallop  · Abdomen: soft, non-tender; bowel sounds normal; no masses,  no organomegaly  · Extremities:  extremities normal, atraumatic, no cyanosis or edema  · Musculokeletal: No joint swelling, no muscle tenderness. · Neurologic: Alert and oriented    Pending test results: none    Consults:  1. Psychiatry  2. Neurology    Procedures:  1. Central line placed in R IJ  2. Midline catheter    Condition at discharge: Stable    Disposition: home    Discharge Medications:     Medication List      START taking these medications    lacosamide 150 MG Tabs tablet  Commonly known as:  VIMPAT  Take 1 tablet by mouth 2 times daily for 30 days. CHANGE how you take these medications    levETIRAcetam 500 MG tablet  Commonly known as:  KEPPRA  Take 2 tablets by mouth 2 times daily  What changed:    · how much to take  · when to take this  · Another medication with the same name was removed. Continue taking this medication, and follow the directions you see here.         CONTINUE taking these medications    amLODIPine 10 MG tablet  Commonly known as:  NORVASC     FLUoxetine 20 MG capsule  Commonly known as:  PROZAC     * gabapentin 300 MG capsule  Commonly known as:  NEURONTIN     * gabapentin 600 MG tablet  Commonly known as:  NEURONTIN     lamoTRIgine 25 MG tablet  Commonly known as:  LAMICTAL     prazosin 2 MG capsule  Commonly known as:  MINIPRESS         * This list has 2 medication(s) that are the same as other medications prescribed for you. Read the directions carefully, and ask your doctor or other care provider to review them with you. STOP taking these medications    haloperidol 5 MG tablet  Commonly known as:  HALDOL           Where to Get Your Medications      These medications were sent to [address] [facility]   [address] [facility]  [address]    Phone:  [phone]   · levETIRAcetam 500 MG tablet     You can get these medications from any pharmacy    Bring a paper prescription for each of these medications  · lacosamide 150 MG Tabs tablet         Activity: activity as tolerated  Diet: regular diet    Follow-up appointments:   Future Appointments   Date Time Provider Angie Segovia   12/7/2020 10:30 AM Micheline Weems MD [facility]       Patient Instructions: [facility] Internal Medicine Resident Service        Discharge to:  Home  Diet: Regular  Activity: As tolerated         Be compliant with medications        Follow up  [facility] on December 7th at 10:30am. Call [phone] ([address]  Internal Medicine Clinic) if there are any appointment changes or other issues. It is very important that you keep this appointment.     Teri Ortega MD  PGY 1   3:04 PM 11/30/2020

## 2020-11-30 NOTE — SIGNIFICANT EVENT
Rapid Response Team Note  Date of event: 11/29/2020   Time of event: [address] 38y.o. year old female   YOB: 1985   Admit date:  11/22/2020   Location: 6455/2678-W   Witnessed? : [x]Yes  [] No  Monitored? : [x]Yes  [] No  Code status: [x] Full  [] DNR-CCA  []DNR-CC  ______________________________________________________________________  Reason for RRT:    [] RR < 8     [] RR > 28   [] SpO2 <90%   [] HR < 40 bpm   [] HR > 130 bpm  [] SBP < 90 mmHg    [] SpO2 <90%   [] LOC   [x] Seizures    [] Significant Bleeding Event        Subjective:   Patient with past medical history of schizoaffective disorder, PTSD, borderline personality disorder admitted 11/22/2020 from [facility] due to \"seizure\" episodes. She has had multiple EEGs completed this admission including 24-hour EEG, all of which were negative for epileptiform activity. In addition, the patient has had multiple CT head ordered this admission, all of which were negative for any intracranial pathology. Neurology has been consulted this admission and the patient has been seizing Keppra, Lamictal, Vimpat. She was scheduled for discharge today, however RRT was called for seizure activity at 1349. On arrival to bedside, patient was nonresponsive to voice and found laying on the floor without any evidence of any tonic-clonic movements, urinary incontinence, tongue lacerations on exam.  CO at bedside states that the patient was in bed, leaned over and fell out onto the floor and began \"seizing\". Vitals were all stable, serum glucose was checked and normal at 107. Oxygen saturation was greater than 95% on 4 L nasal cannula. On exam, patient withdrew to pain, pupils were equal and reactive to light bilaterally, patient was noted to flinch on confrontation.   An ABG was obtained due to concerns for CO2 retention and was normal.  The patient was subsequently brought down to CT for stat CT head to rule out any intracranial bleed and was subsequently negative. Prolactin was also checked and noted to be within normal limits. Decision was made to keep the patient on the floor as she remained hemodynamically stable, was not hypoxic or retaining any CO2 on blood gases and without any clinical signs of epileptic seizure.     Objective:   Vital signs: BP: 139/89/RR: 30/HR: 102  Initial Condition:  Conscious   [] Yes  [x] No     Breathing [x] Yes  [] No     Pulse  [x] Yes  [] No    Airway:   [x] Open/ Clear     Intervention: [x] None  [] Pooled secretions     [] Suctioned  [] Stridor      [] Intubation    Lungs:   [x] Symmetrical chest rise/ CTABL Intervention: [x] None  [] Use of accessory muscles    [] NIV (CPAP/BiPAP)  [] Cyanosis      [] Nasal Oxygen/Mask  [] Wheezing       [] ABG             [] CXR  [] Other:     Circulation:   Rhythm:  [x] Sinus [] Other:   Intervention: [x] None            [x] IV Access  [x] Peripheral              [] Central            [] EKG            [] Cardioversion            [] Defibrillation     Capillary Refill:  [] > 2 seconds [x] < 2 seconds    Neurologic:   [] NIHSS      [x] Pupillary Response:  Reactive bilaterally   Response to pain:   [x] Yes  [] No  Follow commands:  [] Yes  [x] No  Facial asymmetry:  [] Yes  [x] No  Motor strength:  [] Equal  [] Focal deficit:   Diagnostic Test:  Blood Sugar:  107 mg/dL    ABG:      Lab Results   Component Value Date    PH 7.419 11/29/2020    PCO2 37.2 11/29/2020    PO2 198.6 11/29/2020    HCO3 23.5 11/29/2020    O2SAT 99.3 11/29/2020     Medication(s) Given:  []  Narcan (Naloxone)   []  Romazicon (Flumazenil)    []  Breathing Treatment    []  Steroids (Prednisone, Solu-Medrol)  []  Adenosine  [] Cardiac Medicines:     Infusion(s):   [] Normal Saline    Amount:  cc/hr      [] Lactate Ringers      [] Other:     Imaging:   [] CXR:   [] Normal         [] Pneumothorax         [] Pulmonary Edema  [] Infiltrate          [x] CT Head  [x] Normal          [] ICB          [] [facility]          [] Other:     Laboratory Tests:     CBC with Differential:    Lab Results   Component Value Date    WBC 7.1 11/29/2020    RBC 4.11 11/29/2020    HGB 9.5 11/29/2020    HCT 31.4 11/29/2020     11/29/2020    MCV 76.4 11/29/2020    MCH 23.1 11/29/2020    MCHC 30.3 11/29/2020    RDW 16.6 11/29/2020    LYMPHOPCT 20.5 11/25/2020    MONOPCT 6.6 11/25/2020    BASOPCT 0.4 11/25/2020    MONOSABS 0.45 11/25/2020    LYMPHSABS 1.40 11/25/2020    EOSABS 0.18 11/25/2020    BASOSABS 0.03 11/25/2020     BMP:    Lab Results   Component Value Date     11/29/2020    K 4.00 11/29/2020    K 3.5 11/25/2020     11/29/2020    CO2 23 11/29/2020    BUN 11 11/29/2020    LABALBU 3.5 11/25/2020    CREATININE 1.0 11/29/2020    CALCIUM 9.0 11/29/2020    GFRAA >60 11/29/2020    LABGLOM >60 11/29/2020    GLUCOSE 106 11/29/2020         Assessment:    1.) Psychogenic Non-Epileptiform Seizures   - multiple EEGs negative for true seizures   - multiple CT head w/out contrast negative for intracranial abnormality   - neuro on consult   - on Keppra, Vimpat, Lamictal     Plan:    · Obtain CT head r/o bleed s/p fall  · Hold discharge to home  · Neuro to see tomorrow  · Discuss case with LSW, recommend transfer to [facility] due to psychogenic nature of \"seizures\"  · Monitor respiratory status, continuous pulse oximetry  ? ?  ?   Disposition:  [x] No transfer   [] Transfer to monitor floor  [] Transfer to: [] MICU [] NICU [] CCU [] SICU    Patients family updated: [] Yes  [] No   Discussed with:  [] Critical Care Intensivist:       [x] Primary Care Provider: Dr. Becka Rey      [] Other:     James Willoughby MD PGY-2  11/29/2020 7:45 PM  Attending Physician: Dr. Becka Rey

## 2020-12-01 NOTE — PLAN OF CARE
I was notified that an RRT was called on Ms Arlette Ram on 11/29/20 at 2:05pm. We had initially planned a discharge for the patient earlier in the day but given the events in the afternoon, I discussed with the on call team to hold the discharge for the day. Plan was to discuss case with [facility] the next day so we can safely discharge Ms Arlette Ram. A discharge order was inadvertently placed at 3:18pm and patient was discharged with her emergency contact Jagruti. Multiple attempts made yesterday to get in touch with emergency contact. I have been in contact with Ms Mario Ortiz ([phone]) today. Ms Arlette Ram is doing well immediately after discharge and continued to be well - no recurrence of falls or seizure like episodes. She was seen in [facility] yesterday where here medications were reviewed and with plans to continue outpatient counseling (first one scheduled tomorrow). Advised Ms. Mario Ortiz that if there is recurrence of seizure like episodes, to proceed to the emergency room to be evaluated. Ms Arlette Ram has an appointment with our clinic 12/07/20. Ms. Mario Ortiz will call later this week to confirm appointment as she is also trying to get Ms Arlette Ram established in [facility]. Ms Meadows's number in the chart is not working. It is best to contact her through her emergency contact Jagruti at [phone].

## 2021-01-04 NOTE — ED NOTES
Pt seizing Dr Adria Corrales notified     Savanah Love, RN  11/22/20 1025 Detail Level: Detailed Detail Level: Zone

## 2021-02-11 ENCOUNTER — HOSPITAL ENCOUNTER (EMERGENCY)
Age: 36
Discharge: HOME | End: 2021-02-11
Attending: EMERGENCY MEDICINE
Payer: MEDICARE

## 2021-02-11 ENCOUNTER — APPOINTMENT (OUTPATIENT)
Dept: CT IMAGING | Age: 36
End: 2021-02-11
Payer: MEDICARE

## 2021-02-11 ENCOUNTER — APPOINTMENT (OUTPATIENT)
Dept: GENERAL RADIOLOGY | Age: 36
End: 2021-02-11
Payer: MEDICARE

## 2021-02-11 VITALS
SYSTOLIC BLOOD PRESSURE: 136 MMHG | RESPIRATION RATE: 18 BRPM | HEART RATE: 82 BPM | DIASTOLIC BLOOD PRESSURE: 78 MMHG | BODY MASS INDEX: 41.54 KG/M2 | WEIGHT: 242 LBS | OXYGEN SATURATION: 99 % | TEMPERATURE: 97.9 F

## 2021-02-11 DIAGNOSIS — F44.5 PSEUDOSEIZURE: Primary | ICD-10-CM

## 2021-02-11 DIAGNOSIS — I10 ESSENTIAL HYPERTENSION: ICD-10-CM

## 2021-02-11 LAB
ALBUMIN SERPL-MCNC: 4 G/DL (ref 3.5–4.6)
ALP BLD-CCNC: 72 U/L (ref 40–130)
ALT SERPL-CCNC: 23 U/L (ref 0–33)
AMPHETAMINE SCREEN, URINE: NORMAL
ANION GAP SERPL CALCULATED.3IONS-SCNC: 12 MEQ/L (ref 9–15)
AST SERPL-CCNC: 28 U/L (ref 0–35)
BACTERIA: NEGATIVE /HPF
BARBITURATE SCREEN URINE: NORMAL
BASOPHILS ABSOLUTE: 0.1 K/UL (ref 0–0.2)
BASOPHILS RELATIVE PERCENT: 0.8 %
BENZODIAZEPINE SCREEN, URINE: NORMAL
BILIRUB SERPL-MCNC: 0.3 MG/DL (ref 0.2–0.7)
BILIRUBIN URINE: NEGATIVE
BLOOD, URINE: NEGATIVE
BUN BLDV-MCNC: 15 MG/DL (ref 6–20)
CALCIUM SERPL-MCNC: 9.1 MG/DL (ref 8.5–9.9)
CANNABINOID SCREEN URINE: NORMAL
CHLORIDE BLD-SCNC: 105 MEQ/L (ref 95–107)
CHP ED QC CHECK: NORMAL
CLARITY: ABNORMAL
CO2: 19 MEQ/L (ref 20–31)
COCAINE METABOLITE SCREEN URINE: NORMAL
COLOR: YELLOW
CREAT SERPL-MCNC: 1.25 MG/DL (ref 0.5–0.9)
EKG ATRIAL RATE: 71 BPM
EKG P AXIS: 41 DEGREES
EKG P-R INTERVAL: 144 MS
EKG Q-T INTERVAL: 394 MS
EKG QRS DURATION: 90 MS
EKG QTC CALCULATION (BAZETT): 428 MS
EKG R AXIS: 6 DEGREES
EKG T AXIS: 41 DEGREES
EKG VENTRICULAR RATE: 71 BPM
EOSINOPHILS ABSOLUTE: 0.1 K/UL (ref 0–0.7)
EOSINOPHILS RELATIVE PERCENT: 0.9 %
EPITHELIAL CELLS, UA: ABNORMAL /HPF (ref 0–5)
ETHANOL PERCENT: NORMAL G/DL
ETHANOL: <10 MG/DL (ref 0–0.08)
GFR AFRICAN AMERICAN: 58.1
GFR NON-AFRICAN AMERICAN: 48
GLOBULIN: 3.4 G/DL (ref 2.3–3.5)
GLUCOSE BLD-MCNC: 103 MG/DL (ref 70–99)
GLUCOSE URINE: NEGATIVE MG/DL
HCG QUALITATIVE: NEGATIVE
HCT VFR BLD CALC: 33 % (ref 37–47)
HEMOGLOBIN: 10.5 G/DL (ref 12–16)
HYALINE CASTS: ABNORMAL /HPF (ref 0–5)
KETONES, URINE: ABNORMAL MG/DL
LACTIC ACID: 1 MMOL/L (ref 0.5–2.2)
LEUKOCYTE ESTERASE, URINE: ABNORMAL
LYMPHOCYTES ABSOLUTE: 1.5 K/UL (ref 1–4.8)
LYMPHOCYTES RELATIVE PERCENT: 20.5 %
Lab: NORMAL
MAGNESIUM: 2.2 MG/DL (ref 1.7–2.4)
MCH RBC QN AUTO: 23.1 PG (ref 27–31.3)
MCHC RBC AUTO-ENTMCNC: 31.9 % (ref 33–37)
MCV RBC AUTO: 72.4 FL (ref 82–100)
METHADONE SCREEN, URINE: NORMAL
MONOCYTES ABSOLUTE: 0.4 K/UL (ref 0.2–0.8)
MONOCYTES RELATIVE PERCENT: 5 %
NEUTROPHILS ABSOLUTE: 5.4 K/UL (ref 1.4–6.5)
NEUTROPHILS RELATIVE PERCENT: 72.8 %
NITRITE, URINE: NEGATIVE
OPIATE SCREEN URINE: NORMAL
OXYCODONE URINE: NORMAL
PDW BLD-RTO: 20.8 % (ref 11.5–14.5)
PH UA: 5 (ref 5–9)
PHENCYCLIDINE SCREEN URINE: NORMAL
PLATELET # BLD: 402 K/UL (ref 130–400)
POTASSIUM SERPL-SCNC: 4.5 MEQ/L (ref 3.4–4.9)
PREGNANCY TEST URINE, POC: NEGATIVE
PROLACTIN: 11 NG/ML
PROPOXYPHENE SCREEN: NORMAL
PROTEIN UA: NEGATIVE MG/DL
RBC # BLD: 4.56 M/UL (ref 4.2–5.4)
RBC UA: ABNORMAL /HPF (ref 0–5)
SODIUM BLD-SCNC: 136 MEQ/L (ref 135–144)
SPECIFIC GRAVITY UA: 1.03 (ref 1–1.03)
TOTAL PROTEIN: 7.4 G/DL (ref 6.3–8)
TROPONIN: <0.01 NG/ML (ref 0–0.01)
UROBILINOGEN, URINE: 0.2 E.U./DL
WBC # BLD: 7.4 K/UL (ref 4.8–10.8)
WBC UA: ABNORMAL /HPF (ref 0–5)

## 2021-02-11 PROCEDURE — 85025 COMPLETE CBC W/AUTO DIFF WBC: CPT

## 2021-02-11 PROCEDURE — 6370000000 HC RX 637 (ALT 250 FOR IP): Performed by: EMERGENCY MEDICINE

## 2021-02-11 PROCEDURE — 80307 DRUG TEST PRSMV CHEM ANLYZR: CPT

## 2021-02-11 PROCEDURE — 71045 X-RAY EXAM CHEST 1 VIEW: CPT

## 2021-02-11 PROCEDURE — 84484 ASSAY OF TROPONIN QUANT: CPT

## 2021-02-11 PROCEDURE — 36415 COLL VENOUS BLD VENIPUNCTURE: CPT

## 2021-02-11 PROCEDURE — 93005 ELECTROCARDIOGRAM TRACING: CPT | Performed by: EMERGENCY MEDICINE

## 2021-02-11 PROCEDURE — 84703 CHORIONIC GONADOTROPIN ASSAY: CPT

## 2021-02-11 PROCEDURE — 70450 CT HEAD/BRAIN W/O DYE: CPT

## 2021-02-11 PROCEDURE — 96374 THER/PROPH/DIAG INJ IV PUSH: CPT

## 2021-02-11 PROCEDURE — 84146 ASSAY OF PROLACTIN: CPT

## 2021-02-11 PROCEDURE — 83605 ASSAY OF LACTIC ACID: CPT

## 2021-02-11 PROCEDURE — 81001 URINALYSIS AUTO W/SCOPE: CPT

## 2021-02-11 PROCEDURE — 2580000003 HC RX 258: Performed by: EMERGENCY MEDICINE

## 2021-02-11 PROCEDURE — 80053 COMPREHEN METABOLIC PANEL: CPT

## 2021-02-11 PROCEDURE — 99285 EMERGENCY DEPT VISIT HI MDM: CPT

## 2021-02-11 PROCEDURE — 82077 ASSAY SPEC XCP UR&BREATH IA: CPT

## 2021-02-11 PROCEDURE — 6360000002 HC RX W HCPCS: Performed by: EMERGENCY MEDICINE

## 2021-02-11 PROCEDURE — 83735 ASSAY OF MAGNESIUM: CPT

## 2021-02-11 RX ORDER — 0.9 % SODIUM CHLORIDE 0.9 %
1000 INTRAVENOUS SOLUTION INTRAVENOUS ONCE
Status: COMPLETED | OUTPATIENT
Start: 2021-02-11 | End: 2021-02-11

## 2021-02-11 RX ORDER — HYDRALAZINE HYDROCHLORIDE 50 MG/1
100 TABLET, FILM COATED ORAL ONCE
Status: COMPLETED | OUTPATIENT
Start: 2021-02-11 | End: 2021-02-11

## 2021-02-11 RX ORDER — LOSARTAN POTASSIUM 50 MG/1
100 TABLET ORAL ONCE
Status: COMPLETED | OUTPATIENT
Start: 2021-02-11 | End: 2021-02-11

## 2021-02-11 RX ORDER — LORAZEPAM 2 MG/ML
1 INJECTION INTRAMUSCULAR ONCE
Status: COMPLETED | OUTPATIENT
Start: 2021-02-11 | End: 2021-02-11

## 2021-02-11 RX ORDER — LOSARTAN POTASSIUM 100 MG/1
100 TABLET ORAL DAILY
Qty: 60 TABLET | Refills: 0 | Status: SHIPPED | OUTPATIENT
Start: 2021-02-11 | End: 2022-03-29

## 2021-02-11 RX ADMIN — LORAZEPAM 1 MG: 2 INJECTION INTRAMUSCULAR; INTRAVENOUS at 17:11

## 2021-02-11 RX ADMIN — HYDRALAZINE HYDROCHLORIDE 100 MG: 50 TABLET, FILM COATED ORAL at 18:47

## 2021-02-11 RX ADMIN — LOSARTAN POTASSIUM 100 MG: 50 TABLET, FILM COATED ORAL at 18:47

## 2021-02-11 RX ADMIN — SODIUM CHLORIDE 1000 ML: 9 INJECTION, SOLUTION INTRAVENOUS at 17:11

## 2021-02-11 NOTE — ED NOTES
Bed: 09  Expected date: 2/11/21  Expected time: 2:59 PM  Means of arrival: [facility]  Comments:  35f [place]. 3 separate events lasting 2 mins each. 151/90 100% ra. 74 nsr.       Kallie Dupont RN  02/11/21 1512

## 2021-02-11 NOTE — ED PROVIDER NOTES
[facility] ED  eMERGENCYdEPARTMENT eNCOUnter      Pt Name: Emeka De Santiago  MRN: [id number]  Valeriygfsharee 1985  Date of evaluation: 2/11/2021  Cintia Mccain MD    CHIEF COMPLAINT           HPI  Emeka De Santiago is a 38 y.o. female per chart review has a h/o schizoaffective disorder, pseudoseizures on keppra, vimpat, DM II, HTN, Hpl presents to the ED with possible seizure. Pt resides at [facility] and per [facility], pt had 3 episodes of seizure like activity. Pt with diffuse tremors that lasted about 20-30 seconds. Pt not speaking upon arrival.  Pt not speaking, eyes open, localizes to pain. GCS 10. ROS  Review of Systems   Unable to perform ROS: Psychiatric disorder       Except as noted above the remainder of the review of systems was reviewed and negative. PAST MEDICAL HISTORY     Past Medical History:   Diagnosis Date    Diabetes ([facility] [address]     Pseudoseizures 11/22/2020         SURGICAL HISTORY     No past surgical history on file. CURRENTMEDICATIONS       Previous Medications    AMLODIPINE (NORVASC) 10 MG TABLET    Take 10 mg by mouth daily    FLUOXETINE (PROZAC) 20 MG CAPSULE    Take 20 mg by mouth daily    GABAPENTIN (NEURONTIN) 300 MG CAPSULE    Take 300 mg by mouth 2 times daily. Patient takes at 9A and 3P    GABAPENTIN (NEURONTIN) 600 MG TABLET    Take 600 mg by mouth nightly. Patient takes at 9P    LACOSAMIDE (VIMPAT) 150 MG TABS TABLET    Take 1 tablet by mouth 2 times daily for 30 days. LAMOTRIGINE (LAMICTAL) 25 MG TABLET    Take 25 mg by mouth daily    LEVETIRACETAM (KEPPRA) 500 MG TABLET    Take 2 tablets by mouth 2 times daily    PRAZOSIN (MINIPRESS) 2 MG CAPSULE    Take 2 mg by mouth nightly       ALLERGIES     Patient has no known allergies. FAMILY HISTORY     No family history on file.        SOCIAL HISTORY       Social History     Socioeconomic History    Marital status: Single     Spouse name: Not on file    Number of children: Not on file    Years of education: Not on file    Highest education level: Not on file   Occupational History    Not on file   Social Needs    Financial resource strain: Not on file    Food insecurity     Worry: Not on file     Inability: Not on file    Transportation needs     Medical: Not on file     Non-medical: Not on file   Tobacco Use    Smoking status: Never Smoker    Smokeless tobacco: Never Used   Substance and Sexual Activity    Alcohol use: Not on file    Drug use: Not on file    Sexual activity: Not on file   Lifestyle    Physical activity     Days per week: Not on file     Minutes per session: Not on file    Stress: Not on file   Relationships    Social connections     Talks on phone: Not on file     Gets together: Not on file     Attends Orthodoxy service: Not on file     Active member of club or organization: Not on file     Attends meetings of clubs or organizations: Not on file     Relationship status: Not on file    Intimate partner violence     Fear of current or ex partner: Not on file     Emotionally abused: Not on file     Physically abused: Not on file     Forced sexual activity: Not on file   Other Topics Concern    Not on file   Social History Narrative    Not on file         PHYSICAL EXAM       ED Triage Vitals [02/11/21 1514]   BP Temp Temp Source Pulse Resp SpO2 Height Weight   114/78 97.9 °F (36.6 °C) Oral 89 22 98 % -- 242 lb (109.8 kg)       Physical Exam  Vitals signs and nursing note reviewed. Constitutional:       Appearance: She is well-developed. HENT:      Head: Normocephalic. Right Ear: External ear normal.      Left Ear: External ear normal.   Eyes:      Conjunctiva/sclera: Conjunctivae normal.      Pupils: Pupils are equal, round, and reactive to light. Neck:      Musculoskeletal: Normal range of motion and neck supple. Cardiovascular:      Rate and Rhythm: Regular rhythm. Tachycardia present. Heart sounds: Normal heart sounds.    Pulmonary:      Effort: Pulmonary

## 2021-02-12 ENCOUNTER — APPOINTMENT (OUTPATIENT)
Dept: CT IMAGING | Age: 36
End: 2021-02-12
Payer: COMMERCIAL

## 2021-02-12 ENCOUNTER — HOSPITAL ENCOUNTER (EMERGENCY)
Age: 36
Discharge: HOME | End: 2021-02-13
Attending: EMERGENCY MEDICINE
Payer: COMMERCIAL

## 2021-02-12 VITALS
BODY MASS INDEX: 40.97 KG/M2 | HEART RATE: 76 BPM | RESPIRATION RATE: 20 BRPM | WEIGHT: 240 LBS | DIASTOLIC BLOOD PRESSURE: 82 MMHG | OXYGEN SATURATION: 97 % | HEIGHT: 64 IN | SYSTOLIC BLOOD PRESSURE: 120 MMHG | TEMPERATURE: 99.9 F

## 2021-02-12 DIAGNOSIS — R06.00 DYSPNEA, UNSPECIFIED TYPE: Primary | ICD-10-CM

## 2021-02-12 DIAGNOSIS — I26.99 PULMONARY EMBOLISM, OTHER, UNSPECIFIED CHRONICITY, UNSPECIFIED WHETHER ACUTE COR PULMONALE PRESENT (HCC): ICD-10-CM

## 2021-02-12 LAB
ALBUMIN SERPL-MCNC: 3.8 G/DL (ref 3.5–4.6)
ALP BLD-CCNC: 73 U/L (ref 40–130)
ALT SERPL-CCNC: 18 U/L (ref 0–33)
ANION GAP SERPL CALCULATED.3IONS-SCNC: 10 MEQ/L (ref 9–15)
ANISOCYTOSIS: ABNORMAL
APTT: 25.4 SEC (ref 24.4–36.8)
AST SERPL-CCNC: 17 U/L (ref 0–35)
BASOPHILS ABSOLUTE: 0.1 K/UL (ref 0–0.2)
BASOPHILS RELATIVE PERCENT: 0.8 %
BILIRUB SERPL-MCNC: <0.2 MG/DL (ref 0.2–0.7)
BUN BLDV-MCNC: 14 MG/DL (ref 6–20)
CALCIUM SERPL-MCNC: 9.2 MG/DL (ref 8.5–9.9)
CHLORIDE BLD-SCNC: 104 MEQ/L (ref 95–107)
CO2: 21 MEQ/L (ref 20–31)
CREAT SERPL-MCNC: 1.06 MG/DL (ref 0.5–0.9)
D DIMER: 0.3 MG/L FEU (ref 0–0.5)
EKG ATRIAL RATE: 67 BPM
EKG P AXIS: 18 DEGREES
EKG P-R INTERVAL: 142 MS
EKG Q-T INTERVAL: 378 MS
EKG QRS DURATION: 90 MS
EKG QTC CALCULATION (BAZETT): 399 MS
EKG R AXIS: 4 DEGREES
EKG T AXIS: 49 DEGREES
EKG VENTRICULAR RATE: 67 BPM
EOSINOPHILS ABSOLUTE: 0.1 K/UL (ref 0–0.7)
EOSINOPHILS RELATIVE PERCENT: 1.5 %
GFR AFRICAN AMERICAN: >60
GFR AFRICAN AMERICAN: >60
GFR NON-AFRICAN AMERICAN: 56
GFR NON-AFRICAN AMERICAN: 58
GLOBULIN: 2.9 G/DL (ref 2.3–3.5)
GLUCOSE BLD-MCNC: 122 MG/DL (ref 70–99)
HCG QUALITATIVE: NEGATIVE
HCT VFR BLD CALC: 29.7 % (ref 37–47)
HEMOGLOBIN: 10 G/DL (ref 12–16)
INR BLD: 1.1
LYMPHOCYTES ABSOLUTE: 1.7 K/UL (ref 1–4.8)
LYMPHOCYTES RELATIVE PERCENT: 21.4 %
MCH RBC QN AUTO: 24.4 PG (ref 27–31.3)
MCHC RBC AUTO-ENTMCNC: 33.5 % (ref 33–37)
MCV RBC AUTO: 72.9 FL (ref 82–100)
MICROCYTES: ABNORMAL
MONOCYTES ABSOLUTE: 0.4 K/UL (ref 0.2–0.8)
MONOCYTES RELATIVE PERCENT: 5.5 %
NEUTROPHILS ABSOLUTE: 5.6 K/UL (ref 1.4–6.5)
NEUTROPHILS RELATIVE PERCENT: 70.8 %
OVALOCYTES: ABNORMAL
PDW BLD-RTO: 20.2 % (ref 11.5–14.5)
PERFORMED ON: ABNORMAL
PLATELET # BLD: 506 K/UL (ref 130–400)
PLATELET SLIDE REVIEW: ABNORMAL
POC CREATININE: 1.1 MG/DL (ref 0.6–1.1)
POC SAMPLE TYPE: ABNORMAL
POIKILOCYTES: ABNORMAL
POTASSIUM REFLEX MAGNESIUM: 4 MEQ/L (ref 3.4–4.9)
PRO-BNP: 17 PG/ML
PROTHROMBIN TIME: 14.8 SEC (ref 12.3–14.9)
RBC # BLD: 4.07 M/UL (ref 4.2–5.4)
SARS-COV-2, NAAT: NOT DETECTED
SODIUM BLD-SCNC: 135 MEQ/L (ref 135–144)
TOTAL PROTEIN: 6.7 G/DL (ref 6.3–8)
TROPONIN: <0.01 NG/ML (ref 0–0.01)
WBC # BLD: 7.9 K/UL (ref 4.8–10.8)

## 2021-02-12 PROCEDURE — 83880 ASSAY OF NATRIURETIC PEPTIDE: CPT

## 2021-02-12 PROCEDURE — 84484 ASSAY OF TROPONIN QUANT: CPT

## 2021-02-12 PROCEDURE — 93010 ELECTROCARDIOGRAM REPORT: CPT | Performed by: INTERNAL MEDICINE

## 2021-02-12 PROCEDURE — 80053 COMPREHEN METABOLIC PANEL: CPT

## 2021-02-12 PROCEDURE — 93005 ELECTROCARDIOGRAM TRACING: CPT | Performed by: EMERGENCY MEDICINE

## 2021-02-12 PROCEDURE — 85025 COMPLETE CBC W/AUTO DIFF WBC: CPT

## 2021-02-12 PROCEDURE — 85379 FIBRIN DEGRADATION QUANT: CPT

## 2021-02-12 PROCEDURE — 85610 PROTHROMBIN TIME: CPT

## 2021-02-12 PROCEDURE — 36415 COLL VENOUS BLD VENIPUNCTURE: CPT

## 2021-02-12 PROCEDURE — 96372 THER/PROPH/DIAG INJ SC/IM: CPT

## 2021-02-12 PROCEDURE — 85730 THROMBOPLASTIN TIME PARTIAL: CPT

## 2021-02-12 PROCEDURE — 6360000004 HC RX CONTRAST MEDICATION: Performed by: EMERGENCY MEDICINE

## 2021-02-12 PROCEDURE — U0002 COVID-19 LAB TEST NON-CDC: HCPCS

## 2021-02-12 PROCEDURE — 99285 EMERGENCY DEPT VISIT HI MDM: CPT

## 2021-02-12 PROCEDURE — 84703 CHORIONIC GONADOTROPIN ASSAY: CPT

## 2021-02-12 PROCEDURE — 71275 CT ANGIOGRAPHY CHEST: CPT

## 2021-02-12 RX ADMIN — IOPAMIDOL 100 ML: 612 INJECTION, SOLUTION INTRAVENOUS at 22:16

## 2021-02-12 ASSESSMENT — PAIN SCALES - GENERAL: PAINLEVEL_OUTOF10: 8

## 2021-02-12 ASSESSMENT — PAIN DESCRIPTION - DESCRIPTORS: DESCRIPTORS: ACHING;PRESSURE

## 2021-02-12 ASSESSMENT — PAIN DESCRIPTION - PAIN TYPE: TYPE: ACUTE PAIN

## 2021-02-12 NOTE — ED NOTES
Pt awake and alert. No s/s of distress noted at this time. Pt denies needs at this time. Will continue to monitor.         Zulema Buck RN  02/11/21 2364

## 2021-02-12 NOTE — ED NOTES
Dr. Yoshi Romero aware of BP     Radha Waters, RN  02/11/21 32 Samaria Lebron, CARMEN  02/11/21 7629

## 2021-02-12 NOTE — ED NOTES
EMS arrived to transport pt. Pt awake and alert. No s/s of distress noted at this time. Pt denies needs at this time. Will continue to monitor.         Henna Rodrigues RN  02/11/21 2055

## 2021-02-13 PROCEDURE — 6360000002 HC RX W HCPCS: Performed by: PHYSICIAN ASSISTANT

## 2021-02-13 RX ADMIN — ENOXAPARIN SODIUM 105 MG: 120 INJECTION SUBCUTANEOUS at 00:15

## 2021-02-13 NOTE — ED PROVIDER NOTES
eMERGENCY dEPARTMENT eNCOUnter      CHIEF COMPLAINT    Chief Complaint   Patient presents with    Shortness of Breath       HPI    Robyn Rea is a 38 y.o. female with history of pulmonary embolism, diabetes, pseudoseizures currently on Coumadin who presentsto ED from home  By EMS  With complaint of shortness of breath  Onset 1 hour ago  Intensity of symptoms moderate  Patient had a pulmonary embolism last month and takes Coumadin. Patient complains of left chest pain-pressure associate with shortness of breath. Patient denies any fever or Covid exposure. Patient denies any calf pain or pregnancy. PAST MEDICAL HISTORY    Past Medical History:   Diagnosis Date    Diabetes ([address]     Pseudoseizures 11/22/2020       SURGICAL HISTORY    History reviewed. No pertinent surgical history. CURRENT MEDICATIONS    Current Outpatient Rx   Medication Sig Dispense Refill    losartan (COZAAR) 100 MG tablet Take 1 tablet by mouth daily 60 tablet 0    levETIRAcetam (KEPPRA) 500 MG tablet Take 2 tablets by mouth 2 times daily 60 tablet 1    lacosamide (VIMPAT) 150 MG TABS tablet Take 1 tablet by mouth 2 times daily for 30 days. 60 tablet 1    gabapentin (NEURONTIN) 300 MG capsule Take 300 mg by mouth 2 times daily. Patient takes at 9A and 3P      gabapentin (NEURONTIN) 600 MG tablet Take 600 mg by mouth nightly. Patient takes at 9P      lamoTRIgine (LAMICTAL) 25 MG tablet Take 25 mg by mouth daily      prazosin (MINIPRESS) 2 MG capsule Take 2 mg by mouth nightly      amLODIPine (NORVASC) 10 MG tablet Take 10 mg by mouth daily      FLUoxetine (PROZAC) 20 MG capsule Take 20 mg by mouth daily         ALLERGIES    Allergies   Allergen Reactions    Carbamazepine     Latuda [Lurasidone]      Rash         FAMILY HISTORY    History reviewed. No pertinent family history.     SOCIAL HISTORY    Social History     Socioeconomic History    Marital status: Single     Spouse name: None    Number of children: None    Years of education: None    Highest education level: None   Occupational History    None   Social Needs    Financial resource strain: None    Food insecurity     Worry: None     Inability: None    Transportation needs     Medical: None     Non-medical: None   Tobacco Use    Smoking status: Never Smoker    Smokeless tobacco: Never Used   Substance and Sexual Activity    Alcohol use: None    Drug use: None    Sexual activity: None   Lifestyle    Physical activity     Days per week: None     Minutes per session: None    Stress: None   Relationships    Social connections     Talks on phone: None     Gets together: None     Attends Evangelical service: None     Active member of club or organization: None     Attends meetings of clubs or organizations: None     Relationship status: None    Intimate partner violence     Fear of current or ex partner: None     Emotionally abused: None     Physically abused: None     Forced sexual activity: None   Other Topics Concern    None   Social History Narrative    None       REVIEW OF SYSTEMS    Constitutional:  Denies fever, chills, weight loss or weakness   Eyes:  Denies photophobia or discharge   HENT:  Denies sore throat or ear pain   Respiratory:  Denies cough but complains of shortness of breath   Cardiovascular: Complains of chest pain, but denies palpitations or swelling   GI:  Denies abdominal pain, nausea, vomiting, or diarrhea   Musculoskeletal:  Denies back pain   Skin:  Denies rash   Neurologic:  Denies headache, focal weakness or sensory changes   Endocrine:  Denies polyuria or polydypsia   Lymphatic:  Denies swollen glands   Psychiatric:  Denies depression, suicidal ideation or homicidal ideation   All systems negative except as marked.      PHYSICAL EXAM    VITAL SIGNS: /82   Pulse 76   Temp 99.9 °F (37.7 °C) (Oral)   Resp 20   Ht 5' 4\" (1.626 m)   Wt 240 lb (108.9 kg)   SpO2 97%   BMI 41.20 kg/m²    Constitutional: Morbidly obese, No acute while we are discussing this will disposition home with Lovenox discussed with nursing home they have given her 8 mg today and intend to continue on her daily 8 mg she is to follow-up with Coumadin clinic. Return if any symptoms worsen is developed. Labs  Labs Reviewed   CBC WITH AUTO DIFFERENTIAL - Abnormal; Notable for the following components:       Result Value    RBC 4.07 (*)     Hemoglobin 10.0 (*)     Hematocrit 29.7 (*)     MCV 72.9 (*)     MCH 24.4 (*)     RDW 20.2 (*)     Platelets 460 (*)     All other components within normal limits   COMPREHENSIVE METABOLIC PANEL W/ REFLEX TO MG FOR LOW K - Abnormal; Notable for the following components:    Glucose 122 (*)     CREATININE 1.06 (*)     GFR Non- 58.0 (*)     All other components within normal limits   POCT VENOUS - Abnormal; Notable for the following components:    GFR Non- 56 (*)     All other components within normal limits   TROPONIN   BRAIN NATRIURETIC PEPTIDE   PROTIME-INR   APTT   HCG, SERUM, QUALITATIVE   D-DIMER, QUANTITATIVE   COVID-19   URINALYSIS             Summation      Patient Course:     ED Medications administered this visit:    Medications   enoxaparin (LOVENOX) injection 105 mg (has no administration in time range)   iopamidol (ISOVUE-300) 61 % injection 100 mL (100 mLs Intravenous Given 2/12/21 2216)       New Prescriptions from this visit:    New Prescriptions    No medications on file       Follow-up:  No follow-up provider specified. Final Impression:   1. Dyspnea, unspecified type    2.  Pulmonary embolism, other, unspecified chronicity, unspecified whether acute cor pulmonale present ([facility] [address]               (Please note that portions of this note were completed with a voice recognition program.  Efforts were made to edit the dictations but occasionally words are mis-transcribed.)          Rika Michelle PA-C  02/13/21 0013

## 2021-02-13 NOTE — ED NOTES
Bed: 23  Expected date:   Expected time:   Means of arrival:   Comments:  119 Gerardo Hernandez RN  02/12/21 1922

## 2021-02-15 PROCEDURE — 93010 ELECTROCARDIOGRAM REPORT: CPT | Performed by: INTERNAL MEDICINE

## 2022-01-01 NOTE — PROGRESS NOTES
POWER CHG MIDLINE Placement 11/27/2020    Product number: [id number]   Lot Number: [id number]      Ultrasound: YES   LEFT MEDIAN CUBITAL VEIN                Upper Arm Circumference: 35    Size: 4.5 FR SL    Exposed Length: 0 CM    Internal Length: 15   Cut: 0 CM   Vein Measurement: 0.50 CM    Melodie Villafana  11/27/2020  3:38 PM immune

## 2022-03-29 ENCOUNTER — APPOINTMENT (OUTPATIENT)
Dept: GENERAL RADIOLOGY | Age: 37
DRG: 208 | End: 2022-03-29
Payer: MEDICARE

## 2022-03-29 ENCOUNTER — APPOINTMENT (OUTPATIENT)
Dept: CT IMAGING | Age: 37
DRG: 208 | End: 2022-03-29
Payer: MEDICARE

## 2022-03-29 ENCOUNTER — HOSPITAL ENCOUNTER (INPATIENT)
Age: 37
LOS: 6 days | Discharge: PSYCHIATRIC OR SUBSTANCE USE FACILITY | DRG: 208 | End: 2022-04-04
Attending: EMERGENCY MEDICINE | Admitting: FAMILY MEDICINE
Payer: MEDICARE

## 2022-03-29 DIAGNOSIS — G40.919 BREAKTHROUGH SEIZURE (HCC): ICD-10-CM

## 2022-03-29 DIAGNOSIS — J96.01 ACUTE RESPIRATORY FAILURE WITH HYPOXIA (HCC): Primary | ICD-10-CM

## 2022-03-29 DIAGNOSIS — J69.0 ASPIRATION PNEUMONIA OF BOTH LOWER LOBES, UNSPECIFIED ASPIRATION PNEUMONIA TYPE (HCC): ICD-10-CM

## 2022-03-29 DIAGNOSIS — R56.9 SEIZURE-LIKE ACTIVITY (HCC): ICD-10-CM

## 2022-03-29 LAB
ALBUMIN SERPL-MCNC: 4.3 G/DL (ref 3.5–5.2)
ALP BLD-CCNC: 90 U/L (ref 35–104)
ALT SERPL-CCNC: 45 U/L (ref 0–32)
ANION GAP SERPL CALCULATED.3IONS-SCNC: 12 MMOL/L (ref 7–16)
AST SERPL-CCNC: 40 U/L (ref 0–31)
B.E.: 1.5 MMOL/L (ref -3–3)
B.E.: 2.7 MMOL/L (ref -3–3)
BASOPHILS ABSOLUTE: 0.06 E9/L (ref 0–0.2)
BASOPHILS RELATIVE PERCENT: 0.7 % (ref 0–2)
BILIRUB SERPL-MCNC: 0.5 MG/DL (ref 0–1.2)
BUN BLDV-MCNC: 15 MG/DL (ref 6–20)
CALCIUM SERPL-MCNC: 10 MG/DL (ref 8.6–10.2)
CHLORIDE BLD-SCNC: 97 MMOL/L (ref 98–107)
CO2: 27 MMOL/L (ref 22–29)
COHB: 0.3 % (ref 0–1.5)
COHB: 0.5 % (ref 0–1.5)
CREAT SERPL-MCNC: 1.1 MG/DL (ref 0.5–1)
CRITICAL: ABNORMAL
CRITICAL: ABNORMAL
DATE ANALYZED: ABNORMAL
DATE ANALYZED: ABNORMAL
DATE OF COLLECTION: ABNORMAL
DATE OF COLLECTION: ABNORMAL
EKG ATRIAL RATE: 95 BPM
EKG P AXIS: 38 DEGREES
EKG P-R INTERVAL: 140 MS
EKG Q-T INTERVAL: 344 MS
EKG QRS DURATION: 86 MS
EKG QTC CALCULATION (BAZETT): 432 MS
EKG R AXIS: -52 DEGREES
EKG T AXIS: 20 DEGREES
EKG VENTRICULAR RATE: 95 BPM
EOSINOPHILS ABSOLUTE: 0.12 E9/L (ref 0.05–0.5)
EOSINOPHILS RELATIVE PERCENT: 1.4 % (ref 0–6)
GFR AFRICAN AMERICAN: >60
GFR NON-AFRICAN AMERICAN: 55 ML/MIN/1.73
GLUCOSE BLD-MCNC: 154 MG/DL (ref 74–99)
HCG QUALITATIVE: NEGATIVE
HCO3: 25.6 MMOL/L (ref 22–26)
HCO3: 26.8 MMOL/L (ref 22–26)
HCT VFR BLD CALC: 41.8 % (ref 34–48)
HEMOGLOBIN: 13.4 G/DL (ref 11.5–15.5)
HHB: 0.6 % (ref 0–5)
HHB: 13.8 % (ref 0–5)
IMMATURE GRANULOCYTES #: 0.06 E9/L
IMMATURE GRANULOCYTES %: 0.7 % (ref 0–5)
INFLUENZA A: NOT DETECTED
INFLUENZA B: NOT DETECTED
LAB: ABNORMAL
LAB: ABNORMAL
LYMPHOCYTES ABSOLUTE: 1.88 E9/L (ref 1.5–4)
LYMPHOCYTES RELATIVE PERCENT: 21.4 % (ref 20–42)
Lab: ABNORMAL
Lab: ABNORMAL
MCH RBC QN AUTO: 27.3 PG (ref 26–35)
MCHC RBC AUTO-ENTMCNC: 32.1 % (ref 32–34.5)
MCV RBC AUTO: 85.1 FL (ref 80–99.9)
METHB: 0.4 % (ref 0–1.5)
METHB: 0.4 % (ref 0–1.5)
MODE: ABNORMAL
MODE: ABNORMAL
MONOCYTES ABSOLUTE: 0.68 E9/L (ref 0.1–0.95)
MONOCYTES RELATIVE PERCENT: 7.8 % (ref 2–12)
NEUTROPHILS ABSOLUTE: 5.97 E9/L (ref 1.8–7.3)
NEUTROPHILS RELATIVE PERCENT: 68 % (ref 43–80)
O2 CONTENT: 16.9 ML/DL
O2 CONTENT: 18.6 ML/DL
O2 SATURATION: 86.1 % (ref 92–98.5)
O2 SATURATION: 99.4 % (ref 92–98.5)
O2HB: 85.3 % (ref 94–97)
O2HB: 98.7 % (ref 94–97)
OPERATOR ID: 1868
OPERATOR ID: 1868
PATIENT TEMP: 37 C
PATIENT TEMP: 37 C
PCO2: 38.7 MMHG (ref 35–45)
PCO2: 39.4 MMHG (ref 35–45)
PDW BLD-RTO: 13.7 FL (ref 11.5–15)
PH BLOOD GAS: 7.44 (ref 7.35–7.45)
PH BLOOD GAS: 7.45 (ref 7.35–7.45)
PLATELET # BLD: 451 E9/L (ref 130–450)
PMV BLD AUTO: 9 FL (ref 7–12)
PO2: 251.6 MMHG (ref 75–100)
PO2: 51.5 MMHG (ref 75–100)
POTASSIUM REFLEX MAGNESIUM: 3.8 MMOL/L (ref 3.5–5)
PRO-BNP: 7 PG/ML (ref 0–125)
RBC # BLD: 4.91 E12/L (ref 3.5–5.5)
SARS-COV-2 RNA, RT PCR: NOT DETECTED
SODIUM BLD-SCNC: 136 MMOL/L (ref 132–146)
SOURCE, BLOOD GAS: ABNORMAL
SOURCE, BLOOD GAS: ABNORMAL
THB: 13 G/DL (ref 11.5–16.5)
THB: 14.1 G/DL (ref 11.5–16.5)
TIME ANALYZED: 1247
TIME ANALYZED: 1608
TOTAL PROTEIN: 8.1 G/DL (ref 6.4–8.3)
TROPONIN, HIGH SENSITIVITY: <6 NG/L (ref 0–9)
TSH SERPL DL<=0.05 MIU/L-ACNC: 0.66 UIU/ML (ref 0.27–4.2)
WBC # BLD: 8.8 E9/L (ref 4.5–11.5)

## 2022-03-29 PROCEDURE — 96367 TX/PROPH/DG ADDL SEQ IV INF: CPT

## 2022-03-29 PROCEDURE — 84703 CHORIONIC GONADOTROPIN ASSAY: CPT

## 2022-03-29 PROCEDURE — 2060000000 HC ICU INTERMEDIATE R&B

## 2022-03-29 PROCEDURE — 93010 ELECTROCARDIOGRAM REPORT: CPT | Performed by: INTERNAL MEDICINE

## 2022-03-29 PROCEDURE — 96365 THER/PROPH/DIAG IV INF INIT: CPT

## 2022-03-29 PROCEDURE — 93005 ELECTROCARDIOGRAM TRACING: CPT | Performed by: STUDENT IN AN ORGANIZED HEALTH CARE EDUCATION/TRAINING PROGRAM

## 2022-03-29 PROCEDURE — 84443 ASSAY THYROID STIM HORMONE: CPT

## 2022-03-29 PROCEDURE — 6360000002 HC RX W HCPCS: Performed by: STUDENT IN AN ORGANIZED HEALTH CARE EDUCATION/TRAINING PROGRAM

## 2022-03-29 PROCEDURE — 80053 COMPREHEN METABOLIC PANEL: CPT

## 2022-03-29 PROCEDURE — 85025 COMPLETE CBC W/AUTO DIFF WBC: CPT

## 2022-03-29 PROCEDURE — 71045 X-RAY EXAM CHEST 1 VIEW: CPT

## 2022-03-29 PROCEDURE — 36415 COLL VENOUS BLD VENIPUNCTURE: CPT

## 2022-03-29 PROCEDURE — 2580000003 HC RX 258: Performed by: STUDENT IN AN ORGANIZED HEALTH CARE EDUCATION/TRAINING PROGRAM

## 2022-03-29 PROCEDURE — 71275 CT ANGIOGRAPHY CHEST: CPT

## 2022-03-29 PROCEDURE — 83880 ASSAY OF NATRIURETIC PEPTIDE: CPT

## 2022-03-29 PROCEDURE — 87636 SARSCOV2 & INF A&B AMP PRB: CPT

## 2022-03-29 PROCEDURE — 82805 BLOOD GASES W/O2 SATURATION: CPT

## 2022-03-29 PROCEDURE — 96366 THER/PROPH/DIAG IV INF ADDON: CPT

## 2022-03-29 PROCEDURE — 6360000004 HC RX CONTRAST MEDICATION: Performed by: RADIOLOGY

## 2022-03-29 PROCEDURE — 84484 ASSAY OF TROPONIN QUANT: CPT

## 2022-03-29 PROCEDURE — 99285 EMERGENCY DEPT VISIT HI MDM: CPT

## 2022-03-29 RX ORDER — LORAZEPAM 2 MG/ML
INJECTION INTRAMUSCULAR
Status: DISCONTINUED
Start: 2022-03-29 | End: 2022-03-29 | Stop reason: WASHOUT

## 2022-03-29 RX ORDER — LEVETIRACETAM 1000 MG/1
1000 TABLET ORAL 2 TIMES DAILY
Status: ON HOLD | COMMUNITY
End: 2022-04-10 | Stop reason: HOSPADM

## 2022-03-29 RX ORDER — ACETAMINOPHEN 325 MG/1
650 TABLET ORAL EVERY 6 HOURS PRN
COMMUNITY

## 2022-03-29 RX ORDER — HYDROCHLOROTHIAZIDE 25 MG/1
25 TABLET ORAL DAILY
COMMUNITY

## 2022-03-29 RX ORDER — MIRTAZAPINE 15 MG/1
15 TABLET, FILM COATED ORAL NIGHTLY
Status: ON HOLD | COMMUNITY
End: 2022-04-04 | Stop reason: HOSPADM

## 2022-03-29 RX ORDER — LEVETIRACETAM 10 MG/ML
1000 INJECTION INTRAVASCULAR ONCE
Status: COMPLETED | OUTPATIENT
Start: 2022-03-29 | End: 2022-03-29

## 2022-03-29 RX ORDER — PALIPERIDONE 6 MG/1
6 TABLET, EXTENDED RELEASE ORAL NIGHTLY
Status: ON HOLD | COMMUNITY
End: 2022-04-04 | Stop reason: HOSPADM

## 2022-03-29 RX ORDER — LAMOTRIGINE 25 MG/1
25 TABLET ORAL DAILY
Status: ON HOLD | COMMUNITY
End: 2022-04-04 | Stop reason: HOSPADM

## 2022-03-29 RX ORDER — LEVOTHYROXINE SODIUM 0.07 MG/1
150 TABLET ORAL DAILY
Status: ON HOLD | COMMUNITY
End: 2022-06-14 | Stop reason: HOSPADM

## 2022-03-29 RX ORDER — 0.9 % SODIUM CHLORIDE 0.9 %
1000 INTRAVENOUS SOLUTION INTRAVENOUS ONCE
Status: COMPLETED | OUTPATIENT
Start: 2022-03-29 | End: 2022-03-29

## 2022-03-29 RX ADMIN — AMPICILLIN SODIUM AND SULBACTAM SODIUM 3000 MG: 2; 1 INJECTION, POWDER, FOR SOLUTION INTRAMUSCULAR; INTRAVENOUS at 18:31

## 2022-03-29 RX ADMIN — LEVETIRACETAM 1000 MG: 1000 INJECTION, SOLUTION INTRAVENOUS at 12:50

## 2022-03-29 RX ADMIN — LEVETIRACETAM 1000 MG: 10 INJECTION INTRAVENOUS at 14:55

## 2022-03-29 RX ADMIN — IOPAMIDOL 75 ML: 755 INJECTION, SOLUTION INTRAVENOUS at 17:22

## 2022-03-29 RX ADMIN — SODIUM CHLORIDE 1000 ML: 9 INJECTION, SOLUTION INTRAVENOUS at 14:36

## 2022-03-29 ASSESSMENT — PAIN SCALES - GENERAL: PAINLEVEL_OUTOF10: 7

## 2022-03-29 NOTE — LETTER
[facility]), Acute respiratory failure with hypoxia ([facility] [address] Breakthrough seizure ([facility] [address] Aspiration pneumonia of both lower lobes, unspecified aspiration pneumonia type ([facility] [address] and DX codes: R56.9, J96.01, G40.919, J69.0      PATIENT                 Name: Vianey Navarro : 1985 (37 yrs)   Address: [address] Sex: Female   [place] city: [address] [place] [address]         Marital Status: Single   Employer: UNKNOWN         Pentecostalism: Other   Primary Care Provider:           Primary Phone: [phone]   EMERGENCY CONTACT   Contact Name Legal Guardian? Relationship to Patient Home Phone Work Phone   1. Ysabel Robertson  2. *No Contact Specified* No    Other    [phone]                 GUARANTOR            Guarantor: Vianey Navarro     : 1985   Address: [address] Sex: Female     [place] [address]     Relation to Patient: Self       Home Phone: [phone]   Guarantor ID: [id number]       Work Phone:     Guarantor Employer: UNKNOWN         Status: UNKNOWN      COVERAGE        PRIMARY INSURANCE   Payor: [facility] MEDICARE Plan: Shayan BRADLEY*   Payor Address: ,          Group Number: [id number] Insurance Type: INDEMNITY   Subscriber Name: Ulises John : 1985   Subscriber ID: [id number] Pat. Rel. to Sub: Self   SECONDARY INSURANCE   Payor: Anna Fermin* Plan: Joe HAYES*   Payor Address:  [facility] [address] [facility]          Group Number: [id number] Insurance Type: INDEMNITY   Subscriber Name: Ulises John : 1985   Subscriber ID: [id number] Pat.  Rel. to Sub: SELF           CSN: [id number]

## 2022-03-29 NOTE — ED PROVIDER NOTES
[facility] ENCOUNTER      Pt Name: Mirtha Ragsdale  MRN: [id number]  [place] 1985  Date of evaluation: 3/29/2022      CHIEF COMPLAINT       Chief Complaint   Patient presents with    Seizures     hx seizures, non compliant with meds, from [facility]  Mirtha Ragsdale is a 42 y.o. female presents from [facility] for seizure-like episode. EMS was unable to get a story from [facility] facility. They state that the patient has a history of seizure-like episodes. They were unable to the duration of the seizure, if medications were given, or the quality of the seizure. Patient is slow to respond. Unable to get a history or review of systems from patient due to her mental status. Except as noted above the remainder of the review of systems was reviewed and negative. Review of Systems   Unable to perform ROS: Psychiatric disorder        Physical Exam  Vitals and nursing note reviewed. Constitutional:       General: She is not in acute distress. Appearance: Normal appearance. She is not ill-appearing or diaphoretic. HENT:      Head: Normocephalic and atraumatic. Right Ear: External ear normal.      Left Ear: External ear normal.      Nose: Nose normal.      Mouth/Throat:      Mouth: Mucous membranes are moist.      Pharynx: Oropharynx is clear. Eyes:      Extraocular Movements: Extraocular movements intact. Pupils: Pupils are equal, round, and reactive to light. Neck:      Comments: No tracheal deviation. Cardiovascular:      Rate and Rhythm: Regular rhythm. Tachycardia present. Pulses: Normal pulses. Heart sounds: Normal heart sounds. Pulmonary:      Effort: Pulmonary effort is normal. No respiratory distress. Breath sounds: No wheezing, rhonchi or rales. Comments: Decreased breath sounds throughout all lung fields. Abdominal:      General: Abdomen is flat.  Bowel sounds are normal.      Palpations: Abdomen is soft. Tenderness: There is no abdominal tenderness. There is no right CVA tenderness, left CVA tenderness or rebound. Negative signs include Zapien's sign, Rovsing's sign and McBurney's sign. Musculoskeletal:         General: Normal range of motion. Cervical back: Normal range of motion. Right lower leg: No edema. Left lower leg: No edema. Skin:     General: Skin is warm and dry. Capillary Refill: Capillary refill takes less than 2 seconds. Neurological:      General: No focal deficit present. Mental Status: She is alert and oriented to person, place, and time. Psychiatric:         Mood and Affect: Mood normal.          Procedures     MDM  Number of Diagnoses or Management Options  Acute respiratory failure with hypoxia (HCC)  Aspiration pneumonia of both lower lobes, unspecified aspiration pneumonia type ([address]  Breakthrough seizure ([address]  Diagnosis management comments: 25-year-old female history of epilepsy and psychogenic nonepileptic seizures presents with seizure-like episode today. Unable to get a history from facility that she was brought from. They were unable to state the quality, duration, or if patient received any medications for seizure episodes. Vitals here are significant for hypoxia. Patient is breathing 10 breaths a minute. She is hypoxic in the room saturating at 70% on room air. Nonrebreather has been placed. An ABG performed. Shows patient to be hypoxic still. Patient was given supplemental oxygen and a repeat ABG was performed. Showed resolution of her hypoxia. She has no acidosis or alkalosis. Other labs and imaging negative for any acute process. Patient did not have a pneumothorax or have a pulmonary embolism. Possibly this hypoxia is due to a postictal state and the patient had decreased respirations. Patient also may have aspirated during her seizure-like episode.   Patient to be covered with Unasyn for concern for aspiration pneumonia. Patient in the department is been given a loading dose of Keppra and supplemental oxygen. Patient to be admitted to the hospital for further evaluation work-up. ED Course as of 03/30/22 0732   Tue Mar 29, 2022   1248 EKG read interpreted. [JV]   8535 Admit to sound [JV]      ED Course User Index  [JV] Teresa Alexis MD       --------------------------------------------- PAST HISTORY ---------------------------------------------  Past Medical History:  has a past medical history of Diabetes ([facility] and Pseudoseizures ([facility] Past Surgical History:  has no past surgical history on file. Social History:  reports that she has never smoked. She has never used smokeless tobacco.    Family History: family history is not on file. The patients home medications have been reviewed.     Allergies: Carbamazepine and Latuda [lurasidone]    -------------------------------------------------- RESULTS -------------------------------------------------    LABS:  Results for orders placed or performed during the hospital encounter of 03/29/22   COVID-19 & Influenza Combo    Specimen: Nasopharyngeal Swab   Result Value Ref Range    SARS-CoV-2 RNA, RT PCR NOT DETECTED NOT DETECTED    INFLUENZA A NOT DETECTED NOT DETECTED    INFLUENZA B NOT DETECTED NOT DETECTED   CBC with Auto Differential   Result Value Ref Range    WBC 8.8 4.5 - 11.5 E9/L    RBC 4.91 3.50 - 5.50 E12/L    Hemoglobin 13.4 11.5 - 15.5 g/dL    Hematocrit 41.8 34.0 - 48.0 %    MCV 85.1 80.0 - 99.9 fL    MCH 27.3 26.0 - 35.0 pg    MCHC 32.1 32.0 - 34.5 %    RDW 13.7 11.5 - 15.0 fL    Platelets 583 (H) 672 - 450 E9/L    MPV 9.0 7.0 - 12.0 fL    Neutrophils % 68.0 43.0 - 80.0 %    Immature Granulocytes % 0.7 0.0 - 5.0 %    Lymphocytes % 21.4 20.0 - 42.0 %    Monocytes % 7.8 2.0 - 12.0 %    Eosinophils % 1.4 0.0 - 6.0 %    Basophils % 0.7 0.0 - 2.0 %    Neutrophils Absolute 5.97 1.80 - 7.30 E9/L    Immature Granulocytes # 0.06 E9/L    Lymphocytes Absolute 1.88 1.50 - 4.00 E9/L    Monocytes Absolute 0.68 0.10 - 0.95 E9/L    Eosinophils Absolute 0.12 0.05 - 0.50 E9/L    Basophils Absolute 0.06 0.00 - 0.20 E9/L   Comprehensive Metabolic Panel w/ Reflex to MG   Result Value Ref Range    Sodium 136 132 - 146 mmol/L    Potassium reflex Magnesium 3.8 3.5 - 5.0 mmol/L    Chloride 97 (L) 98 - 107 mmol/L    CO2 27 22 - 29 mmol/L    Anion Gap 12 7 - 16 mmol/L    Glucose 154 (H) 74 - 99 mg/dL    BUN 15 6 - 20 mg/dL    CREATININE 1.1 (H) 0.5 - 1.0 mg/dL    GFR Non-African American 55 >=60 mL/min/1.73    GFR African American >60     Calcium 10.0 8.6 - 10.2 mg/dL    Total Protein 8.1 6.4 - 8.3 g/dL    Albumin 4.3 3.5 - 5.2 g/dL    Total Bilirubin 0.5 0.0 - 1.2 mg/dL    Alkaline Phosphatase 90 35 - 104 U/L    ALT 45 (H) 0 - 32 U/L    AST 40 (H) 0 - 31 U/L   HCG Qualitative, Serum   Result Value Ref Range    hCG Qual NEGATIVE NEGATIVE   TSH   Result Value Ref Range    TSH 0.657 0.270 - 4.200 uIU/mL   Blood Gas, Arterial   Result Value Ref Range    Date Analyzed 20220329     Time Analyzed 1247     Source: Blood Arterial     pH, Blood Gas 7.450 7.350 - 7.450    PCO2 39.4 35.0 - 45.0 mmHg    PO2 51.5 (L) 75.0 - 100.0 mmHg    HCO3 26.8 (H) 22.0 - 26.0 mmol/L    B.E. 2.7 -3.0 - 3.0 mmol/L    O2 Sat 86.1 (L) 92.0 - 98.5 %    O2Hb 85.3 (L) 94.0 - 97.0 %    COHb 0.5 0.0 - 1.5 %    MetHb 0.4 0.0 - 1.5 %    O2 Content 16.9 mL/dL    HHb 13.8 (H) 0.0 - 5.0 %    tHb (est) 14.1 11.5 - 16.5 g/dL    Mode NRB 15L     Date Of Collection      Time Collected      Pt Temp 37.0 C     ID 1868     Lab [id number]     Critical(s) Notified .  No Critical Values    Troponin   Result Value Ref Range    Troponin, High Sensitivity <6 0 - 9 ng/L   Brain Natriuretic Peptide   Result Value Ref Range    Pro-BNP 7 0 - 125 pg/mL   Blood Gas, Arterial   Result Value Ref Range    Date Analyzed 20220329     Time Analyzed 1608     Source: Blood Arterial     pH, Blood Gas 7.438 7.350 - 7.450 PCO2 38.7 35.0 - 45.0 mmHg    PO2 251.6 (H) 75.0 - 100.0 mmHg    HCO3 25.6 22.0 - 26.0 mmol/L    B.E. 1.5 -3.0 - 3.0 mmol/L    O2 Sat 99.4 (H) 92.0 - 98.5 %    O2Hb 98.7 (H) 94.0 - 97.0 %    COHb 0.3 0.0 - 1.5 %    MetHb 0.4 0.0 - 1.5 %    O2 Content 18.6 mL/dL    HHb 0.6 0.0 - 5.0 %    tHb (est) 13.0 11.5 - 16.5 g/dL    Mode NRB 15L     Date Of Collection      Time Collected      Pt Temp 37.0 C     ID 1868     Lab [id number]     Critical(s) Notified . No Critical Values    Lactic Acid   Result Value Ref Range    Lactic Acid 2.6 (H) 0.5 - 2.2 mmol/L   CBC with Auto Differential   Result Value Ref Range    WBC 8.0 4.5 - 11.5 E9/L    RBC 4.50 3.50 - 5.50 E12/L    Hemoglobin 12.5 11.5 - 15.5 g/dL    Hematocrit 38.4 34.0 - 48.0 %    MCV 85.3 80.0 - 99.9 fL    MCH 27.8 26.0 - 35.0 pg    MCHC 32.6 32.0 - 34.5 %    RDW 13.8 11.5 - 15.0 fL    Platelets 024 754 - 285 E9/L    MPV 8.8 7.0 - 12.0 fL    Neutrophils % 60.5 43.0 - 80.0 %    Immature Granulocytes % 0.4 0.0 - 5.0 %    Lymphocytes % 28.8 20.0 - 42.0 %    Monocytes % 7.8 2.0 - 12.0 %    Eosinophils % 2.1 0.0 - 6.0 %    Basophils % 0.4 0.0 - 2.0 %    Neutrophils Absolute 4.81 1.80 - 7.30 E9/L    Immature Granulocytes # 0.03 E9/L    Lymphocytes Absolute 2.29 1.50 - 4.00 E9/L    Monocytes Absolute 0.62 0.10 - 0.95 E9/L    Eosinophils Absolute 0.17 0.05 - 0.50 E9/L    Basophils Absolute 0.03 0.00 - 0.20 E9/L   EKG 12 Lead   Result Value Ref Range    Ventricular Rate 95 BPM    Atrial Rate 95 BPM    P-R Interval 140 ms    QRS Duration 86 ms    Q-T Interval 344 ms    QTc Calculation (Bazett) 432 ms    P Axis 38 degrees    R Axis -52 degrees    T Axis 20 degrees       RADIOLOGY:  CTA PULMONARY W CONTRAST   Final Result   No central pulmonary embolism or aortic dissection. The distal subsegmental   and peripheral vessels are poorly evaluated due to suboptimal contrast.      Patchy infiltrates and atelectasis in the lung bases bilaterally concerning   for pneumonia. RECOMMENDATIONS:   Unavailable         XR CHEST PORTABLE   Final Result   Bilateral airspace disease could represent infection or edema from congestive   heart failure. ECG:  EKG read interpreted by me. Heart rate 95. Normal sinus rhythm. Left axis deviation. QTc 420. No ST elevations or depressions. Stable as compared to previous EKG.        ------------------------- NURSING NOTES AND VITALS REVIEWED ---------------------------  Date / Time Roomed:  3/29/2022 12:07 PM  ED Bed Assignment:  10/10    The nursing notes within the ED encounter and vital signs as below have been reviewed. Patient Vitals for the past 24 hrs:   BP Temp Pulse Resp SpO2 Height   03/30/22 0630 131/89 -- 103 25 91 % --   03/30/22 0500 107/85 -- -- -- 94 % --   03/30/22 0300 115/77 -- 94 25 -- --   03/29/22 2200 (!) 103/59 -- 96 19 94 % --   03/29/22 2000 (!) 102/57 -- 105 17 95 % --   03/29/22 1823 117/82 -- 101 24 95 % --   03/29/22 1611 (!) 144/79 -- 97 24 96 % --   03/29/22 1300 113/84 -- 108 23 95 % --   03/29/22 1209 133/85 98.2 °F (36.8 °C) 104 18 (!) 70 % 5' 4\" (1.626 m)       Oxygen Saturation Interpretation: Normal    ------------------------------------------ PROGRESS NOTES ------------------------------------------  Re-evaluation(s):  Time: 1600  Patients symptoms are improving  Repeat physical examination is improved    Counseling:  I have spoken with the patient and discussed todays results, in addition to providing specific details for the plan of care and counseling regarding the diagnosis and prognosis. Their questions are answered at this time and they are agreeable with the plan of admission.    --------------------------------- ADDITIONAL PROVIDER NOTES ---------------------------------  Consultations:  Spoke with Dr. Juventino Dudley. Discussed case. They will admit the patient.   This patient's ED course included: a personal history and physicial examination, re-evaluation prior to disposition, multiple bedside re-evaluations, IV medications, cardiac monitoring and continuous pulse oximetry    Please note that the withdrawal or failure to initiate urgent interventions for this patient would likely result in a life threatening deterioration or permanent disability. Accordingly this patient received 32 minutes of critical care time, excluding separately billable procedures. This patient has remained hemodynamically stable during their ED course. Diagnosis:  1. Acute respiratory failure with hypoxia ([address]    2. Breakthrough seizure ([address]    3. Aspiration pneumonia of both lower lobes, unspecified aspiration pneumonia type ([address]        Disposition:  Patient's disposition: Admit to telemetry  Patient's condition is fair.          Jesse Mckeon MD  Resident  03/29/22 Hortencia Lynch MD  Resident  03/30/22 9428

## 2022-03-29 NOTE — ED NOTES
Patient appears to be having seizaure like activity .   Patient does respond to painful stimuli      Ismael Lamar RN  03/29/22 6927

## 2022-03-29 NOTE — H&P
Hospitalist History & Physical      PCP: No primary care provider on file. Date of Service: Pt seen/examined on 3/29/2022 a    Chief Complaint:  had concerns including Seizures (hx seizures, non compliant with meds, from generations). History Of Present Illness:    Ms. Mirtha Ragsdale, a 42y.o. year old female  who  has a past medical history of Diabetes ([facility] and Pseudoseizures ([facility] Patient presented to the emergency department after having seizure-like activity. Patient has history of seizure disorder. Patient resides [facility]. Vital signs reveal the patient be hypoxic 70% on room air. Possible she may have aspirated. Tachycardic with a rate of 101. She is afebrile. Laboratory studies demonstrate creatinine 1.1, glucose 154. CT chest shows patchy infiltrates and atelectasis in the lung bases bilaterally concerning for pneumonia. Patient was given loading dose of Keppra. Also given Unasyn for aspiration pneumonia. Medicine consulted for admission. Past Medical History:   Diagnosis Date    Diabetes ([facility]     Pseudoseizures ([facility] 11/22/2020       History reviewed. No pertinent surgical history. Prior to Admission medications    Medication Sig Start Date End Date Taking?  Authorizing Provider   apixaban (ELIQUIS) 5 MG TABS tablet Take 5 mg by mouth 2 times daily   Yes Historical Provider, MD   hydroCHLOROthiazide (HYDRODIURIL) 25 MG tablet Take 25 mg by mouth daily   Yes Historical Provider, MD   lamoTRIgine (LAMICTAL) 25 MG tablet Take 25 mg by mouth daily   Yes Historical Provider, MD   levETIRAcetam (KEPPRA) 1000 MG tablet Take 1,000 mg by mouth 2 times daily   Yes Historical Provider, MD   levothyroxine (SYNTHROID) 75 MCG tablet Take 150 mcg by mouth Daily   Yes Historical Provider, MD   mirtazapine (REMERON) 15 MG tablet Take 15 mg by mouth nightly   Yes Historical Provider, MD   paliperidone (INVEGA) 6 MG extended release tablet Take 6 mg by mouth at bedtime   Yes Historical Provider, MD   acetaminophen (TYLENOL) 325 MG tablet Take 650 mg by mouth every 6 hours as needed for Pain   Yes Historical Provider, MD   amLODIPine (NORVASC) 5 MG tablet Take 5 mg by mouth daily     Historical Provider, MD         Allergies:  Carbamazepine and Latuda [lurasidone]    Social History:    TOBACCO:   reports that she has never smoked. She has never used smokeless tobacco.  ETOH:   has no history on file for alcohol use. Family History:    Reviewed in detail and negative for DM, CAD, Cancer, CVA. Positive as follows\"  History reviewed. No pertinent family history. REVIEW OF SYSTEMS:   Pertinent positives as noted in the HPI. All other systems reviewed and negative. PHYSICAL EXAM:  /82   Pulse 101   Temp 98.2 °F (36.8 °C)   Resp 24   Ht 5' 4\" (1.626 m)   LMP  (LMP Unknown)   SpO2 95%   BMI 41.20 kg/m²   General appearance: No apparent distress, appears stated age and cooperative. HEENT: Normal cephalic, atraumatic without obvious deformity. Pupils equal, round, and reactive to light. Extra ocular muscles intact. Conjunctivae/corneas clear. Neck: Supple, with full range of motion. No jugular venous distention. Trachea midline. Respiratory: Diminished   Cardiovascular: Tachycardic soft, nontender, nondistended  Abdomen:    Musculoskeletal: No clubbing, cyanosis, edema of bilateral lower extremities. Brisk capillary refill. Skin: Normal skin color. No rashes or lesions. Neurologic:  Neurovascularly intact without any focal sensory/motor deficits.  Cranial nerves: II-XII intact, grossly non-focal.      CBC:   Recent Labs     03/29/22  1254   WBC 8.8   RBC 4.91   HGB 13.4   HCT 41.8   MCV 85.1   RDW 13.7   *     BMP:   Recent Labs     03/29/22  1254      K 3.8   CL 97*   CO2 27   BUN 15   CREATININE 1.1*     LFT:  Recent Labs     03/29/22  1254   PROT 8.1   ALKPHOS 90   ALT 45*   AST 40*   BILITOT 0.5     CE:  No results for input(s): [place] in the last 72 hours. PT/INR: No results for input(s): INR, APTT in the last 72 hours. BNP: No results for input(s): BNP in the last 72 hours. ESR: No results found for: SEDRATE  CRP: No results found for: CRP  D Dimer:   Lab Results   Component Value Date    DDIMER 0.30 02/12/2021      Folate and B12:   Lab Results   Component Value Date    DQIFZHOL94 371 02/13/2021   ,   Lab Results   Component Value Date    FOLATE 10.7 02/13/2021     Lactic Acid:   Lab Results   Component Value Date    LACTA 1.0 02/11/2021     Thyroid Studies:   Lab Results   Component Value Date    TSH 0.657 03/29/2022       Oupatient labs:  Lab Results   Component Value Date    CHOL 183 02/13/2021    TRIG 185 (H) 02/13/2021    HDL 47 02/13/2021    LDLCALC 99 02/13/2021    TSH 0.657 03/29/2022    INR 1.8 06/23/2021       Urinalysis:    Lab Results   Component Value Date    NITRU Negative 02/11/2021    WBCUA 10-20 02/11/2021    BACTERIA Negative 02/11/2021    RBCUA 3-5 02/11/2021    BLOODU Negative 02/11/2021    SPECGRAV 1.026 02/11/2021    GLUCOSEU Negative 02/11/2021       Imaging:  XR CHEST PORTABLE    Result Date: 3/29/2022  EXAMINATION: ONE XRAY VIEW OF THE CHEST 3/29/2022 1:32 pm COMPARISON: Chest, single view 11/27/2020 HISTORY: ORDERING SYSTEM PROVIDED HISTORY: sob TECHNOLOGIST PROVIDED HISTORY: Reason for exam:->sob What reading provider will be dictating this exam?->CRC FINDINGS: A single portable AP view of the chest was obtained. There is enlargement of the cardiac silhouette. Mediastinal contour is within normal limits. There is central pulmonary vascular congestion. There are bilateral perihilar airspace opacities. There also for airspace opacities within the left lung base. There is poor definition of the bilateral costophrenic angles. Bilateral airspace disease could represent infection or edema from congestive heart failure.      CTA PULMONARY W CONTRAST    Result Date: 3/29/2022  EXAMINATION: CTA OF THE CHEST 3/29/2022 5:23 pm TECHNIQUE: CTA of the chest was performed after the administration of intravenous contrast.  Multiplanar reformatted images are provided for review. MIP images are provided for review. Dose modulation, iterative reconstruction, and/or weight based adjustment of the mA/kV was utilized to reduce the radiation dose to as low as reasonably achievable. COMPARISON: None. HISTORY: ORDERING SYSTEM PROVIDED HISTORY: sob TECHNOLOGIST PROVIDED HISTORY: Reason for exam:->sob Decision Support Exception - unselect if not a suspected or confirmed emergency medical condition->Emergency Medical Condition (MA) What reading provider will be dictating this exam?->CRC FINDINGS: The heart and the great vessels are normal.  Trachea and major bronchi are patent. There are no filling defects in the main pulmonary artery and the central branches. The distal subsegmental and peripheral vessels are poorly opacified and cannot be evaluated for small distal subsegmental pulmonary embolism. There is atelectasis/infiltrates in the lower lobes bilaterally. Liver is fatty infiltrated. Gallbladder is absent. Degenerative changes are identified in the thoracic spine. No central pulmonary embolism or aortic dissection. The distal subsegmental and peripheral vessels are poorly evaluated due to suboptimal contrast. Patchy infiltrates and atelectasis in the lung bases bilaterally concerning for pneumonia.  RECOMMENDATIONS: Unavailable       ASSESSMENT:  -Breakthrough seizures  -Aspiration pneumonia  -Acute respiratory failure with hypoxia  -Hypertension  -Hypothyroidism  -Chronic anticoagulated on Eliquis      PLAN:  -Admit to medicine  Consult neurology  -Unasyn  -Keppra  -Respiratory cultures  -Seizure precautions  -Continue home medications        Diet: No diet orders on file  Code Status: Prior  Surrogate decision maker confirmed with patient:   Extended Emergency Contact Information  Primary Emergency Contact: Froylan Montoya, [address] Phone: [phone]  Mobile Phone: [phone]  Relation: Other  Preferred language: English   needed? No    DVT Prophylaxis: []Lovenox []Heparin []PCD [] [address] []Encouraged ambulation  Disposition: []Med/Surg [] Intermediate [] ICU/CCU  Admit status: [] Observation [] Inpatient     +++++++++++++++++++++++++++++++++++++++++++++++++  Barbara Duenas, DO  +++++++++++++++++++++++++++++++++++++++++++++++++  NOTE: This report was transcribed using voice recognition software. Every effort was made to ensure accuracy; however, inadvertent computerized transcription errors may be present.

## 2022-03-29 NOTE — ED NOTES
Patient was alert and answering questions moving all extremities      Delfina Plunkett RN  03/29/22 2727

## 2022-03-30 ENCOUNTER — ANESTHESIA (OUTPATIENT)
Dept: INPATIENT UNIT | Age: 37
DRG: 208 | End: 2022-03-30
Payer: MEDICARE

## 2022-03-30 ENCOUNTER — APPOINTMENT (OUTPATIENT)
Dept: CT IMAGING | Age: 37
DRG: 208 | End: 2022-03-30
Payer: MEDICARE

## 2022-03-30 ENCOUNTER — ANESTHESIA EVENT (OUTPATIENT)
Dept: INPATIENT UNIT | Age: 37
DRG: 208 | End: 2022-03-30
Payer: MEDICARE

## 2022-03-30 ENCOUNTER — APPOINTMENT (OUTPATIENT)
Dept: GENERAL RADIOLOGY | Age: 37
DRG: 208 | End: 2022-03-30
Payer: MEDICARE

## 2022-03-30 ENCOUNTER — APPOINTMENT (OUTPATIENT)
Dept: NEUROLOGY | Age: 37
DRG: 208 | End: 2022-03-30
Payer: MEDICARE

## 2022-03-30 PROBLEM — F44.5 PSYCHOGENIC NONEPILEPTIC SEIZURE: Status: ACTIVE | Noted: 2022-03-30

## 2022-03-30 PROBLEM — R56.9 PSEUDOSEIZURES (HCC): Status: RESOLVED | Noted: 2020-11-22 | Resolved: 2022-03-30

## 2022-03-30 LAB
AADO2: 268.7 MMHG
ALBUMIN SERPL-MCNC: 4 G/DL (ref 3.5–5.2)
ALBUMIN SERPL-MCNC: 4 G/DL (ref 3.5–5.2)
ALP BLD-CCNC: 76 U/L (ref 35–104)
ALP BLD-CCNC: 80 U/L (ref 35–104)
ALT SERPL-CCNC: 31 U/L (ref 0–32)
ALT SERPL-CCNC: 36 U/L (ref 0–32)
AMPHETAMINE SCREEN, URINE: NOT DETECTED
ANION GAP SERPL CALCULATED.3IONS-SCNC: 12 MMOL/L (ref 7–16)
ANION GAP SERPL CALCULATED.3IONS-SCNC: 17 MMOL/L (ref 7–16)
AST SERPL-CCNC: 20 U/L (ref 0–31)
AST SERPL-CCNC: 24 U/L (ref 0–31)
B.E.: -2.9 MMOL/L (ref -3–3)
BACTERIA: NORMAL /HPF
BARBITURATE SCREEN URINE: NOT DETECTED
BASOPHILS ABSOLUTE: 0.02 E9/L (ref 0–0.2)
BASOPHILS ABSOLUTE: 0.03 E9/L (ref 0–0.2)
BASOPHILS RELATIVE PERCENT: 0.2 % (ref 0–2)
BASOPHILS RELATIVE PERCENT: 0.4 % (ref 0–2)
BENZODIAZEPINE SCREEN, URINE: POSITIVE
BILIRUB SERPL-MCNC: 0.3 MG/DL (ref 0–1.2)
BILIRUB SERPL-MCNC: 0.4 MG/DL (ref 0–1.2)
BILIRUBIN DIRECT: <0.2 MG/DL (ref 0–0.3)
BILIRUBIN URINE: NEGATIVE
BILIRUBIN, INDIRECT: NORMAL MG/DL (ref 0–1)
BLOOD, URINE: NEGATIVE
BUN BLDV-MCNC: 13 MG/DL (ref 6–20)
BUN BLDV-MCNC: 15 MG/DL (ref 6–20)
CALCIUM IONIZED: 1.27 MMOL/L (ref 1.15–1.33)
CALCIUM SERPL-MCNC: 9.1 MG/DL (ref 8.6–10.2)
CALCIUM SERPL-MCNC: 9.6 MG/DL (ref 8.6–10.2)
CANNABINOID SCREEN URINE: NOT DETECTED
CHLORIDE BLD-SCNC: 105 MMOL/L (ref 98–107)
CHLORIDE BLD-SCNC: 97 MMOL/L (ref 98–107)
CLARITY: CLEAR
CO2: 23 MMOL/L (ref 22–29)
CO2: 25 MMOL/L (ref 22–29)
COCAINE METABOLITE SCREEN URINE: NOT DETECTED
COHB: 0.4 % (ref 0–1.5)
COLOR: YELLOW
CREAT SERPL-MCNC: 1 MG/DL (ref 0.5–1)
CREAT SERPL-MCNC: 1.1 MG/DL (ref 0.5–1)
CRITICAL: ABNORMAL
DATE ANALYZED: ABNORMAL
DATE OF COLLECTION: ABNORMAL
EOSINOPHILS ABSOLUTE: 0.09 E9/L (ref 0.05–0.5)
EOSINOPHILS ABSOLUTE: 0.17 E9/L (ref 0.05–0.5)
EOSINOPHILS RELATIVE PERCENT: 0.9 % (ref 0–6)
EOSINOPHILS RELATIVE PERCENT: 2.1 % (ref 0–6)
FENTANYL SCREEN, URINE: NOT DETECTED
FIO2: 60 %
GFR AFRICAN AMERICAN: >60
GFR AFRICAN AMERICAN: >60
GFR NON-AFRICAN AMERICAN: 55 ML/MIN/1.73
GFR NON-AFRICAN AMERICAN: >60 ML/MIN/1.73
GLUCOSE BLD-MCNC: 168 MG/DL (ref 74–99)
GLUCOSE BLD-MCNC: 182 MG/DL (ref 74–99)
GLUCOSE URINE: NEGATIVE MG/DL
HBA1C MFR BLD: 6.2 % (ref 4–5.6)
HCO3: 21.5 MMOL/L (ref 22–26)
HCT VFR BLD CALC: 36 % (ref 34–48)
HCT VFR BLD CALC: 38.4 % (ref 34–48)
HEMOGLOBIN: 11.9 G/DL (ref 11.5–15.5)
HEMOGLOBIN: 12.5 G/DL (ref 11.5–15.5)
HHB: 2.8 % (ref 0–5)
IMMATURE GRANULOCYTES #: 0.03 E9/L
IMMATURE GRANULOCYTES #: 0.05 E9/L
IMMATURE GRANULOCYTES %: 0.4 % (ref 0–5)
IMMATURE GRANULOCYTES %: 0.5 % (ref 0–5)
KETONES, URINE: NEGATIVE MG/DL
LAB: ABNORMAL
LACTIC ACID: 2.6 MMOL/L (ref 0.5–2.2)
LACTIC ACID: 2.6 MMOL/L (ref 0.5–2.2)
LEUKOCYTE ESTERASE, URINE: NEGATIVE
LYMPHOCYTES ABSOLUTE: 1.34 E9/L (ref 1.5–4)
LYMPHOCYTES ABSOLUTE: 2.29 E9/L (ref 1.5–4)
LYMPHOCYTES RELATIVE PERCENT: 12.9 % (ref 20–42)
LYMPHOCYTES RELATIVE PERCENT: 28.8 % (ref 20–42)
Lab: ABNORMAL
Lab: ABNORMAL
MAGNESIUM: 1.4 MG/DL (ref 1.6–2.6)
MAGNESIUM: 1.7 MG/DL (ref 1.6–2.6)
MCH RBC QN AUTO: 27.8 PG (ref 26–35)
MCH RBC QN AUTO: 28 PG (ref 26–35)
MCHC RBC AUTO-ENTMCNC: 32.6 % (ref 32–34.5)
MCHC RBC AUTO-ENTMCNC: 33.1 % (ref 32–34.5)
MCV RBC AUTO: 84.7 FL (ref 80–99.9)
MCV RBC AUTO: 85.3 FL (ref 80–99.9)
METER GLUCOSE: 128 MG/DL (ref 74–99)
METHADONE SCREEN, URINE: NOT DETECTED
METHB: 0.2 % (ref 0–1.5)
MODE: AC
MONOCYTES ABSOLUTE: 0.54 E9/L (ref 0.1–0.95)
MONOCYTES ABSOLUTE: 0.62 E9/L (ref 0.1–0.95)
MONOCYTES RELATIVE PERCENT: 5.2 % (ref 2–12)
MONOCYTES RELATIVE PERCENT: 7.8 % (ref 2–12)
NEUTROPHILS ABSOLUTE: 4.81 E9/L (ref 1.8–7.3)
NEUTROPHILS ABSOLUTE: 8.33 E9/L (ref 1.8–7.3)
NEUTROPHILS RELATIVE PERCENT: 60.5 % (ref 43–80)
NEUTROPHILS RELATIVE PERCENT: 80.3 % (ref 43–80)
NITRITE, URINE: NEGATIVE
O2 CONTENT: 17.4 ML/DL
O2 SATURATION: 97.2 % (ref 92–98.5)
O2HB: 96.6 % (ref 94–97)
OPERATOR ID: 1210
OPIATE SCREEN URINE: NOT DETECTED
OXYCODONE URINE: NOT DETECTED
PATIENT TEMP: 37 C
PCO2: 36.1 MMHG (ref 35–45)
PDW BLD-RTO: 13.7 FL (ref 11.5–15)
PDW BLD-RTO: 13.8 FL (ref 11.5–15)
PEEP/CPAP: 5 CMH2O
PFO2: 1.74 MMHG/%
PH BLOOD GAS: 7.39 (ref 7.35–7.45)
PH UA: 6 (ref 5–9)
PHENCYCLIDINE SCREEN URINE: NOT DETECTED
PHOSPHORUS: 4.4 MG/DL (ref 2.5–4.5)
PLATELET # BLD: 364 E9/L (ref 130–450)
PLATELET # BLD: 386 E9/L (ref 130–450)
PMV BLD AUTO: 8.8 FL (ref 7–12)
PMV BLD AUTO: 8.8 FL (ref 7–12)
PO2: 104.4 MMHG (ref 75–100)
POTASSIUM REFLEX MAGNESIUM: 3.3 MMOL/L (ref 3.5–5)
POTASSIUM SERPL-SCNC: 3.5 MMOL/L (ref 3.5–5)
PROCALCITONIN: 0.07 NG/ML (ref 0–0.08)
PROLACTIN: 47.84 NG/ML
PROTEIN UA: NEGATIVE MG/DL
RBC # BLD: 4.25 E12/L (ref 3.5–5.5)
RBC # BLD: 4.5 E12/L (ref 3.5–5.5)
RBC UA: NORMAL /HPF (ref 0–2)
RI(T): 2.57
RR MECHANICAL: 18 B/MIN
SODIUM BLD-SCNC: 137 MMOL/L (ref 132–146)
SODIUM BLD-SCNC: 142 MMOL/L (ref 132–146)
SOURCE, BLOOD GAS: ABNORMAL
SPECIFIC GRAVITY UA: <=1.005 (ref 1–1.03)
THB: 12.7 G/DL (ref 11.5–16.5)
TIME ANALYZED: 2101
TOTAL PROTEIN: 7.1 G/DL (ref 6.4–8.3)
TOTAL PROTEIN: 7.5 G/DL (ref 6.4–8.3)
TRIGL SERPL-MCNC: 145 MG/DL (ref 0–149)
UROBILINOGEN, URINE: 0.2 E.U./DL
VT MECHANICAL: 500 ML
WBC # BLD: 10.4 E9/L (ref 4.5–11.5)
WBC # BLD: 8 E9/L (ref 4.5–11.5)
WBC UA: NORMAL /HPF (ref 0–5)

## 2022-03-30 PROCEDURE — 71045 X-RAY EXAM CHEST 1 VIEW: CPT

## 2022-03-30 PROCEDURE — 31500 INSERT EMERGENCY AIRWAY: CPT

## 2022-03-30 PROCEDURE — 2000000000 HC ICU R&B

## 2022-03-30 PROCEDURE — 6360000002 HC RX W HCPCS: Performed by: INTERNAL MEDICINE

## 2022-03-30 PROCEDURE — 6360000002 HC RX W HCPCS

## 2022-03-30 PROCEDURE — 0202U NFCT DS 22 TRGT SARS-COV-2: CPT

## 2022-03-30 PROCEDURE — 2580000003 HC RX 258: Performed by: FAMILY MEDICINE

## 2022-03-30 PROCEDURE — 81001 URINALYSIS AUTO W/SCOPE: CPT

## 2022-03-30 PROCEDURE — 80179 DRUG ASSAY SALICYLATE: CPT

## 2022-03-30 PROCEDURE — 70450 CT HEAD/BRAIN W/O DYE: CPT

## 2022-03-30 PROCEDURE — 84478 ASSAY OF TRIGLYCERIDES: CPT

## 2022-03-30 PROCEDURE — 84100 ASSAY OF PHOSPHORUS: CPT

## 2022-03-30 PROCEDURE — 83735 ASSAY OF MAGNESIUM: CPT

## 2022-03-30 PROCEDURE — 84146 ASSAY OF PROLACTIN: CPT

## 2022-03-30 PROCEDURE — 99222 1ST HOSP IP/OBS MODERATE 55: CPT | Performed by: NURSE PRACTITIONER

## 2022-03-30 PROCEDURE — 36600 WITHDRAWAL OF ARTERIAL BLOOD: CPT

## 2022-03-30 PROCEDURE — 95819 EEG AWAKE AND ASLEEP: CPT | Performed by: NURSE PRACTITIONER

## 2022-03-30 PROCEDURE — 93005 ELECTROCARDIOGRAM TRACING: CPT | Performed by: INTERNAL MEDICINE

## 2022-03-30 PROCEDURE — 6370000000 HC RX 637 (ALT 250 FOR IP): Performed by: FAMILY MEDICINE

## 2022-03-30 PROCEDURE — 85025 COMPLETE CBC W/AUTO DIFF WBC: CPT

## 2022-03-30 PROCEDURE — 82962 GLUCOSE BLOOD TEST: CPT

## 2022-03-30 PROCEDURE — 82330 ASSAY OF CALCIUM: CPT

## 2022-03-30 PROCEDURE — 95819 EEG AWAKE AND ASLEEP: CPT

## 2022-03-30 PROCEDURE — 0BH17EZ INSERTION OF ENDOTRACHEAL AIRWAY INTO TRACHEA, VIA NATURAL OR ARTIFICIAL OPENING: ICD-10-PCS | Performed by: STUDENT IN AN ORGANIZED HEALTH CARE EDUCATION/TRAINING PROGRAM

## 2022-03-30 PROCEDURE — 2580000003 HC RX 258: Performed by: INTERNAL MEDICINE

## 2022-03-30 PROCEDURE — 84443 ASSAY THYROID STIM HORMONE: CPT

## 2022-03-30 PROCEDURE — 31500 INSERT EMERGENCY AIRWAY: CPT | Performed by: ANESTHESIOLOGY

## 2022-03-30 PROCEDURE — 82077 ASSAY SPEC XCP UR&BREATH IA: CPT

## 2022-03-30 PROCEDURE — 36415 COLL VENOUS BLD VENIPUNCTURE: CPT

## 2022-03-30 PROCEDURE — 87449 NOS EACH ORGANISM AG IA: CPT

## 2022-03-30 PROCEDURE — 83605 ASSAY OF LACTIC ACID: CPT

## 2022-03-30 PROCEDURE — 6370000000 HC RX 637 (ALT 250 FOR IP): Performed by: NURSE PRACTITIONER

## 2022-03-30 PROCEDURE — 80053 COMPREHEN METABOLIC PANEL: CPT

## 2022-03-30 PROCEDURE — 6360000002 HC RX W HCPCS: Performed by: FAMILY MEDICINE

## 2022-03-30 PROCEDURE — 36556 INSERT NON-TUNNEL CV CATH: CPT

## 2022-03-30 PROCEDURE — 2500000003 HC RX 250 WO HCPCS: Performed by: INTERNAL MEDICINE

## 2022-03-30 PROCEDURE — 84145 PROCALCITONIN (PCT): CPT

## 2022-03-30 PROCEDURE — 83036 HEMOGLOBIN GLYCOSYLATED A1C: CPT

## 2022-03-30 PROCEDURE — 84439 ASSAY OF FREE THYROXINE: CPT

## 2022-03-30 PROCEDURE — 80076 HEPATIC FUNCTION PANEL: CPT

## 2022-03-30 PROCEDURE — 51702 INSERT TEMP BLADDER CATH: CPT

## 2022-03-30 PROCEDURE — 74018 RADEX ABDOMEN 1 VIEW: CPT

## 2022-03-30 PROCEDURE — 94002 VENT MGMT INPAT INIT DAY: CPT

## 2022-03-30 PROCEDURE — 5A1935Z RESPIRATORY VENTILATION, LESS THAN 24 CONSECUTIVE HOURS: ICD-10-PCS | Performed by: STUDENT IN AN ORGANIZED HEALTH CARE EDUCATION/TRAINING PROGRAM

## 2022-03-30 PROCEDURE — 82805 BLOOD GASES W/O2 SATURATION: CPT

## 2022-03-30 PROCEDURE — 80143 DRUG ASSAY ACETAMINOPHEN: CPT

## 2022-03-30 PROCEDURE — 80048 BASIC METABOLIC PNL TOTAL CA: CPT

## 2022-03-30 PROCEDURE — 94640 AIRWAY INHALATION TREATMENT: CPT

## 2022-03-30 PROCEDURE — 6370000000 HC RX 637 (ALT 250 FOR IP): Performed by: INTERNAL MEDICINE

## 2022-03-30 PROCEDURE — 80307 DRUG TEST PRSMV CHEM ANLYZR: CPT

## 2022-03-30 RX ORDER — ONDANSETRON 4 MG/1
4 TABLET, ORALLY DISINTEGRATING ORAL EVERY 8 HOURS PRN
Status: DISCONTINUED | OUTPATIENT
Start: 2022-03-30 | End: 2022-04-04 | Stop reason: HOSPADM

## 2022-03-30 RX ORDER — LAMOTRIGINE 25 MG/1
25 TABLET ORAL DAILY
Status: DISCONTINUED | OUTPATIENT
Start: 2022-03-30 | End: 2022-03-30

## 2022-03-30 RX ORDER — LORAZEPAM 2 MG/ML
1 INJECTION INTRAMUSCULAR EVERY 5 MIN PRN
Status: DISCONTINUED | OUTPATIENT
Start: 2022-03-30 | End: 2022-04-04 | Stop reason: HOSPADM

## 2022-03-30 RX ORDER — HYDROCHLOROTHIAZIDE 25 MG/1
25 TABLET ORAL DAILY
Status: DISCONTINUED | OUTPATIENT
Start: 2022-03-30 | End: 2022-03-30

## 2022-03-30 RX ORDER — DEXTROSE MONOHYDRATE 50 MG/ML
100 INJECTION, SOLUTION INTRAVENOUS PRN
Status: DISCONTINUED | OUTPATIENT
Start: 2022-03-30 | End: 2022-04-04 | Stop reason: HOSPADM

## 2022-03-30 RX ORDER — LEVOTHYROXINE SODIUM 0.15 MG/1
150 TABLET ORAL DAILY
Status: DISCONTINUED | OUTPATIENT
Start: 2022-03-30 | End: 2022-04-04 | Stop reason: HOSPADM

## 2022-03-30 RX ORDER — POLYETHYLENE GLYCOL 3350 17 G/17G
17 POWDER, FOR SOLUTION ORAL DAILY PRN
Status: DISCONTINUED | OUTPATIENT
Start: 2022-03-30 | End: 2022-04-04 | Stop reason: HOSPADM

## 2022-03-30 RX ORDER — MIDAZOLAM HYDROCHLORIDE 2 MG/2ML
10 INJECTION, SOLUTION INTRAMUSCULAR; INTRAVENOUS ONCE
Status: DISCONTINUED | OUTPATIENT
Start: 2022-03-30 | End: 2022-03-30

## 2022-03-30 RX ORDER — SODIUM CHLORIDE 9 MG/ML
INJECTION, SOLUTION INTRAVENOUS PRN
Status: DISCONTINUED | OUTPATIENT
Start: 2022-03-30 | End: 2022-04-04 | Stop reason: HOSPADM

## 2022-03-30 RX ORDER — GUAIFENESIN 400 MG/1
400 TABLET ORAL 3 TIMES DAILY
Status: DISCONTINUED | OUTPATIENT
Start: 2022-03-30 | End: 2022-04-04 | Stop reason: HOSPADM

## 2022-03-30 RX ORDER — AMLODIPINE BESYLATE 5 MG/1
5 TABLET ORAL DAILY
Status: DISCONTINUED | OUTPATIENT
Start: 2022-03-30 | End: 2022-03-30

## 2022-03-30 RX ORDER — LACOSAMIDE 100 MG/1
150 TABLET ORAL 2 TIMES DAILY
Status: DISCONTINUED | OUTPATIENT
Start: 2022-03-30 | End: 2022-04-01 | Stop reason: ALTCHOICE

## 2022-03-30 RX ORDER — CHLORHEXIDINE GLUCONATE 0.12 MG/ML
15 RINSE ORAL 2 TIMES DAILY
Status: DISCONTINUED | OUTPATIENT
Start: 2022-03-30 | End: 2022-03-31 | Stop reason: ALTCHOICE

## 2022-03-30 RX ORDER — LEVETIRACETAM 10 MG/ML
INJECTION INTRAVASCULAR
Status: DISPENSED
Start: 2022-03-30 | End: 2022-03-31

## 2022-03-30 RX ORDER — ARFORMOTEROL TARTRATE 15 UG/2ML
15 SOLUTION RESPIRATORY (INHALATION) 2 TIMES DAILY
Status: DISCONTINUED | OUTPATIENT
Start: 2022-03-30 | End: 2022-04-01

## 2022-03-30 RX ORDER — POTASSIUM CHLORIDE 7.45 MG/ML
10 INJECTION INTRAVENOUS PRN
Status: DISCONTINUED | OUTPATIENT
Start: 2022-03-30 | End: 2022-03-31

## 2022-03-30 RX ORDER — ACETAMINOPHEN 650 MG/1
650 SUPPOSITORY RECTAL EVERY 6 HOURS PRN
Status: DISCONTINUED | OUTPATIENT
Start: 2022-03-30 | End: 2022-04-04 | Stop reason: HOSPADM

## 2022-03-30 RX ORDER — MIDAZOLAM HYDROCHLORIDE 2 MG/2ML
5 INJECTION, SOLUTION INTRAMUSCULAR; INTRAVENOUS ONCE
Status: COMPLETED | OUTPATIENT
Start: 2022-03-30 | End: 2022-03-30

## 2022-03-30 RX ORDER — NICOTINE POLACRILEX 4 MG
15 LOZENGE BUCCAL PRN
Status: DISCONTINUED | OUTPATIENT
Start: 2022-03-30 | End: 2022-04-04 | Stop reason: HOSPADM

## 2022-03-30 RX ORDER — LORAZEPAM 2 MG/ML
INJECTION INTRAMUSCULAR
Status: COMPLETED | OUTPATIENT
Start: 2022-03-30 | End: 2022-03-30

## 2022-03-30 RX ORDER — LEVETIRACETAM 5 MG/ML
INJECTION INTRAVASCULAR CONTINUOUS PRN
Status: COMPLETED | OUTPATIENT
Start: 2022-03-30 | End: 2022-03-30

## 2022-03-30 RX ORDER — PROPOFOL 10 MG/ML
5-50 INJECTION, EMULSION INTRAVENOUS ONCE
Status: COMPLETED | OUTPATIENT
Start: 2022-03-30 | End: 2022-03-30

## 2022-03-30 RX ORDER — LAMOTRIGINE 25 MG/1
50 TABLET ORAL DAILY
Status: DISCONTINUED | OUTPATIENT
Start: 2022-03-30 | End: 2022-04-04 | Stop reason: HOSPADM

## 2022-03-30 RX ORDER — SODIUM CHLORIDE 9 MG/ML
INJECTION, SOLUTION INTRAVENOUS CONTINUOUS PRN
Status: COMPLETED | OUTPATIENT
Start: 2022-03-30 | End: 2022-03-30

## 2022-03-30 RX ORDER — SODIUM CHLORIDE 0.9 % (FLUSH) 0.9 %
5-40 SYRINGE (ML) INJECTION EVERY 12 HOURS SCHEDULED
Status: DISCONTINUED | OUTPATIENT
Start: 2022-03-30 | End: 2022-04-04 | Stop reason: HOSPADM

## 2022-03-30 RX ORDER — MIDAZOLAM HYDROCHLORIDE 2 MG/2ML
INJECTION, SOLUTION INTRAMUSCULAR; INTRAVENOUS
Status: COMPLETED | OUTPATIENT
Start: 2022-03-30 | End: 2022-03-30

## 2022-03-30 RX ORDER — IPRATROPIUM BROMIDE AND ALBUTEROL SULFATE 2.5; .5 MG/3ML; MG/3ML
1 SOLUTION RESPIRATORY (INHALATION)
Status: DISCONTINUED | OUTPATIENT
Start: 2022-03-31 | End: 2022-04-01

## 2022-03-30 RX ORDER — MINERAL OIL AND WHITE PETROLATUM 150; 830 MG/G; MG/G
OINTMENT OPHTHALMIC EVERY 6 HOURS
Status: DISCONTINUED | OUTPATIENT
Start: 2022-03-30 | End: 2022-03-31 | Stop reason: ALTCHOICE

## 2022-03-30 RX ORDER — POTASSIUM CHLORIDE 20 MEQ/1
40 TABLET, EXTENDED RELEASE ORAL PRN
Status: DISCONTINUED | OUTPATIENT
Start: 2022-03-30 | End: 2022-03-31

## 2022-03-30 RX ORDER — ONDANSETRON 2 MG/ML
4 INJECTION INTRAMUSCULAR; INTRAVENOUS EVERY 6 HOURS PRN
Status: DISCONTINUED | OUTPATIENT
Start: 2022-03-30 | End: 2022-04-04 | Stop reason: HOSPADM

## 2022-03-30 RX ORDER — LORAZEPAM 2 MG/ML
2 INJECTION INTRAMUSCULAR ONCE
Status: COMPLETED | OUTPATIENT
Start: 2022-03-30 | End: 2022-04-01

## 2022-03-30 RX ORDER — BUDESONIDE 0.5 MG/2ML
0.5 INHALANT ORAL 2 TIMES DAILY
Status: DISCONTINUED | OUTPATIENT
Start: 2022-03-30 | End: 2022-04-01

## 2022-03-30 RX ORDER — LEVETIRACETAM 10 MG/ML
1000 INJECTION INTRAVASCULAR 2 TIMES DAILY
Status: DISCONTINUED | OUTPATIENT
Start: 2022-03-30 | End: 2022-04-04 | Stop reason: SDUPTHER

## 2022-03-30 RX ORDER — DEXTROSE MONOHYDRATE 25 G/50ML
12.5 INJECTION, SOLUTION INTRAVENOUS PRN
Status: DISCONTINUED | OUTPATIENT
Start: 2022-03-30 | End: 2022-04-04 | Stop reason: HOSPADM

## 2022-03-30 RX ORDER — ACETAMINOPHEN 325 MG/1
650 TABLET ORAL EVERY 6 HOURS PRN
Status: DISCONTINUED | OUTPATIENT
Start: 2022-03-30 | End: 2022-04-04 | Stop reason: HOSPADM

## 2022-03-30 RX ORDER — ACETYLCYSTEINE 200 MG/ML
600 SOLUTION ORAL; RESPIRATORY (INHALATION) 2 TIMES DAILY
Status: DISCONTINUED | OUTPATIENT
Start: 2022-03-30 | End: 2022-04-01

## 2022-03-30 RX ORDER — VECURONIUM BROMIDE 1 MG/ML
INJECTION, POWDER, LYOPHILIZED, FOR SOLUTION INTRAVENOUS
Status: COMPLETED | OUTPATIENT
Start: 2022-03-30 | End: 2022-03-30

## 2022-03-30 RX ORDER — PROPOFOL 10 MG/ML
5-50 INJECTION, EMULSION INTRAVENOUS CONTINUOUS
Status: DISCONTINUED | OUTPATIENT
Start: 2022-03-31 | End: 2022-03-31

## 2022-03-30 RX ORDER — PALIPERIDONE 6 MG/1
6 TABLET, EXTENDED RELEASE ORAL NIGHTLY
Status: DISCONTINUED | OUTPATIENT
Start: 2022-03-30 | End: 2022-04-04 | Stop reason: HOSPADM

## 2022-03-30 RX ORDER — LORAZEPAM 2 MG/ML
2 INJECTION INTRAMUSCULAR ONCE
Status: COMPLETED | OUTPATIENT
Start: 2022-03-30 | End: 2022-03-30

## 2022-03-30 RX ORDER — LEVETIRACETAM 5 MG/ML
500 INJECTION INTRAVASCULAR 2 TIMES DAILY
Status: DISCONTINUED | OUTPATIENT
Start: 2022-03-30 | End: 2022-03-30

## 2022-03-30 RX ORDER — MIDAZOLAM HYDROCHLORIDE 1 MG/ML
INJECTION INTRAMUSCULAR; INTRAVENOUS
Status: COMPLETED
Start: 2022-03-30 | End: 2022-03-30

## 2022-03-30 RX ORDER — SODIUM CHLORIDE 0.9 % (FLUSH) 0.9 %
5-40 SYRINGE (ML) INJECTION PRN
Status: DISCONTINUED | OUTPATIENT
Start: 2022-03-30 | End: 2022-04-04 | Stop reason: HOSPADM

## 2022-03-30 RX ORDER — LORAZEPAM 2 MG/ML
INJECTION INTRAMUSCULAR
Status: DISPENSED
Start: 2022-03-30 | End: 2022-03-31

## 2022-03-30 RX ORDER — MIRTAZAPINE 15 MG/1
15 TABLET, FILM COATED ORAL NIGHTLY
Status: DISCONTINUED | OUTPATIENT
Start: 2022-03-30 | End: 2022-04-04 | Stop reason: HOSPADM

## 2022-03-30 RX ORDER — PROPOFOL 10 MG/ML
INJECTION, EMULSION INTRAVENOUS
Status: COMPLETED | OUTPATIENT
Start: 2022-03-30 | End: 2022-03-30

## 2022-03-30 RX ORDER — LORAZEPAM 2 MG/ML
0.5 INJECTION INTRAMUSCULAR ONCE
Status: DISCONTINUED | OUTPATIENT
Start: 2022-03-30 | End: 2022-04-04 | Stop reason: HOSPADM

## 2022-03-30 RX ORDER — LORAZEPAM 2 MG/ML
INJECTION INTRAMUSCULAR
Status: COMPLETED
Start: 2022-03-30 | End: 2022-03-30

## 2022-03-30 RX ADMIN — LORAZEPAM 4 MG: 2 INJECTION INTRAMUSCULAR; INTRAVENOUS at 18:48

## 2022-03-30 RX ADMIN — MINERAL OIL AND PETROLATUM: 150; 830 OINTMENT OPHTHALMIC at 23:22

## 2022-03-30 RX ADMIN — AMLODIPINE BESYLATE 5 MG: 5 TABLET ORAL at 16:46

## 2022-03-30 RX ADMIN — MIDAZOLAM HYDROCHLORIDE 5 MG: 2 INJECTION, SOLUTION INTRAMUSCULAR; INTRAVENOUS at 20:40

## 2022-03-30 RX ADMIN — APIXABAN 5 MG: 5 TABLET, FILM COATED ORAL at 23:22

## 2022-03-30 RX ADMIN — LAMOTRIGINE 50 MG: 25 TABLET ORAL at 15:33

## 2022-03-30 RX ADMIN — MIDAZOLAM 5 MG: 1 INJECTION INTRAMUSCULAR; INTRAVENOUS at 20:40

## 2022-03-30 RX ADMIN — Medication 10 ML: at 23:53

## 2022-03-30 RX ADMIN — PROPOFOL 100 MG: 10 INJECTION, EMULSION INTRAVENOUS at 19:31

## 2022-03-30 RX ADMIN — LORAZEPAM 1 MG: 2 INJECTION INTRAMUSCULAR; INTRAVENOUS at 18:22

## 2022-03-30 RX ADMIN — LEVETIRACETAM 2 G: 5 INJECTION INTRAVENOUS at 18:23

## 2022-03-30 RX ADMIN — LORAZEPAM 2 MG: 2 INJECTION INTRAMUSCULAR; INTRAVENOUS at 18:28

## 2022-03-30 RX ADMIN — ARFORMOTEROL TARTRATE 15 MCG: 15 SOLUTION RESPIRATORY (INHALATION) at 21:06

## 2022-03-30 RX ADMIN — LEVETIRACETAM 500 MG: 5 INJECTION INTRAVENOUS at 04:59

## 2022-03-30 RX ADMIN — LORAZEPAM 1 MG: 2 INJECTION INTRAMUSCULAR; INTRAVENOUS at 18:19

## 2022-03-30 RX ADMIN — GUAIFENESIN 400 MG: 400 TABLET ORAL at 23:22

## 2022-03-30 RX ADMIN — LORAZEPAM 1 MG: 2 INJECTION INTRAMUSCULAR; INTRAVENOUS at 06:06

## 2022-03-30 RX ADMIN — MIRTAZAPINE 15 MG: 15 TABLET, FILM COATED ORAL at 23:21

## 2022-03-30 RX ADMIN — Medication 1 MG/HR: at 20:48

## 2022-03-30 RX ADMIN — MIDAZOLAM HYDROCHLORIDE 10 MG: 1 INJECTION, SOLUTION INTRAMUSCULAR; INTRAVENOUS at 19:01

## 2022-03-30 RX ADMIN — LEVOTHYROXINE SODIUM 150 MCG: 0.15 TABLET ORAL at 16:46

## 2022-03-30 RX ADMIN — BUDESONIDE 500 MCG: 0.5 SUSPENSION RESPIRATORY (INHALATION) at 21:58

## 2022-03-30 RX ADMIN — APIXABAN 5 MG: 5 TABLET, FILM COATED ORAL at 03:07

## 2022-03-30 RX ADMIN — LORAZEPAM 4 MG: 2 INJECTION INTRAMUSCULAR; INTRAVENOUS at 19:16

## 2022-03-30 RX ADMIN — LORAZEPAM 2 MG: 2 INJECTION INTRAMUSCULAR; INTRAVENOUS at 06:27

## 2022-03-30 RX ADMIN — VECURONIUM BROMIDE FOR INJECTION 10 MG: 1 INJECTION, POWDER, LYOPHILIZED, FOR SOLUTION INTRAVENOUS at 19:31

## 2022-03-30 RX ADMIN — LORAZEPAM 2 MG: 2 INJECTION INTRAMUSCULAR at 06:27

## 2022-03-30 RX ADMIN — APIXABAN 5 MG: 5 TABLET, FILM COATED ORAL at 15:33

## 2022-03-30 RX ADMIN — SODIUM CHLORIDE 999 ML/HR: 9 INJECTION, SOLUTION INTRAVENOUS at 18:50

## 2022-03-30 RX ADMIN — PROPOFOL 20 MCG/KG/MIN: 10 INJECTION, EMULSION INTRAVENOUS at 20:35

## 2022-03-30 RX ADMIN — AMPICILLIN SODIUM AND SULBACTAM SODIUM 3000 MG: 2; 1 INJECTION, POWDER, FOR SOLUTION INTRAMUSCULAR; INTRAVENOUS at 15:40

## 2022-03-30 RX ADMIN — Medication 10 ML: at 16:51

## 2022-03-30 RX ADMIN — ACETAMINOPHEN 650 MG: 325 TABLET ORAL at 08:05

## 2022-03-30 RX ADMIN — 0.12% CHLORHEXIDINE GLUCONATE 15 ML: 1.2 RINSE ORAL at 23:22

## 2022-03-30 RX ADMIN — SODIUM CHLORIDE, PRESERVATIVE FREE 10 ML: 5 INJECTION INTRAVENOUS at 23:22

## 2022-03-30 RX ADMIN — HYDROCHLOROTHIAZIDE 25 MG: 25 TABLET ORAL at 16:46

## 2022-03-30 RX ADMIN — PROPOFOL 20 MCG/KG/MIN: 10 INJECTION, EMULSION INTRAVENOUS at 23:51

## 2022-03-30 RX ADMIN — LEVETIRACETAM 2 G: 5 INJECTION INTRAVENOUS at 19:32

## 2022-03-30 RX ADMIN — LEVETIRACETAM 500 MG: 5 INJECTION INTRAVENOUS at 08:37

## 2022-03-30 RX ADMIN — AMPICILLIN SODIUM AND SULBACTAM SODIUM 3000 MG: 2; 1 INJECTION, POWDER, FOR SOLUTION INTRAMUSCULAR; INTRAVENOUS at 03:05

## 2022-03-30 ASSESSMENT — PULMONARY FUNCTION TESTS
PIF_VALUE: 19
PIF_VALUE: 20
PIF_VALUE: 19
PIF_VALUE: 20
PIF_VALUE: 19
PIF_VALUE: 20
PIF_VALUE: 19

## 2022-03-30 ASSESSMENT — PAIN SCALES - GENERAL: PAINLEVEL_OUTOF10: 7

## 2022-03-30 NOTE — ANESTHESIA PROCEDURE NOTES
Airway  Urgency: emergent    Airway not difficult    General Information and Staff    Patient location during procedure: patient floor  Anesthesiologist: Rosanne Lawler MD  Resident/CRNA: Mike Bradley APRN - CRNA  Other anesthesia staff: Joselyn Cheney RN  Performed: resident/CRNA and other anesthesia staff     Consent for Airway (if performed for an anesthetic, see related documentation for consents)  Patient identity confirmed: per hospital policy  Consent: The procedure was performed in an emergent situation. Verbal consent not obtained. Written consent not obtained.   Risks and benefits: risks, benefits and alternatives were not discussed      Code status verified:yes  Indications and Patient Condition  Indications for airway management: respiratory failure  Spontaneous ventilation: present  Sedation level: deep  Preoxygenated: yes  Patient position: sniffing  MILS not maintained throughout  Mask difficulty assessment: vent by bag mask    Final Airway Details  Final airway type: endotracheal airway      Successful airway: ETT  Cuffed: yes   Successful intubation technique: video laryngoscopy  Facilitating devices/methods: intubating stylet  Endotracheal tube insertion site: oral  Blade size: #4 (Luisa Sing)  ETT size (mm): 8.0  Cormack-Lehane Classification: grade IIb - view of arytenoids or posterior of glottis only  Placement verified by: chest auscultation and capnometry   Measured from: lips  ETT to teeth (cm): 22  ETT to lips (cm): 22  Number of attempts at approach: 1  Ventilation between attempts: bag mask  Number of other approaches attempted: 0

## 2022-03-30 NOTE — PROCEDURES
Barrie Report    MRN: [id number]  Patient's name: Violetta Peraza  : 1985  Age: 42 y.o. Gender: female    Date of report: 3/31/2022   Date of service: 3/31/2022   Interpreting physician: Holly Biswas MD  Referring physician: HARRISON Alexander    Procedure  3 Hour extended EEG with video    Duration of Study  3:47:10    Indication for Study  Seizure-like activity    Technical Summary  Digital video and scalp EEG monitoring was performed using the standard protocol for this laboratory. Scalp electrodes were applied in the international 10/20 system. Multiple digital montage arrangements were utilized for evaluation. EKG and video were recorded. EEG Description  The awake background included a 11 Hz posterior dominant rhythm that attenuated with eye opening and activity. During drowsiness, identified by ocular signs and alpha attenuation, there was intermittent, diffuse, asynchronous theta activity admixed with 2-4 Hz polymorphic frontotemporal delta activity. As the record progressed, stage II sleep was identified by vertex waves, sleep spindles and K-complexes. Hyperventilation and photic strobe stimulation were not performed. There were no focal, lateralized or epileptiform abnormalities. Clinical Interpretation: This EEG is normal awake, drowsy and asleep.

## 2022-03-30 NOTE — CARE COORDINATION
Care Coordination  Went in to talk to the patient about transition care planning. Patient is in the middle of an EEG in her room. Patient was  admitted for breakthrough seizures. Neurology consulted. Patient also has aspiration pneumonia and is on Iv Unasyn . I will see patient in the am to get her plan of care.
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
## 2022-03-30 NOTE — ED NOTES
pt pressed the call button and when this nurse arrived in the room, pt was having a seizure. pt was having repetative movements of the hands/feet/face. pt was not responsive to vocal or painful stimuli at this time. pt was medicated with mg of ativan.  pt continued to seize, ER doctor came to look at her and ordered an additonal 2mg of ativan.  pt has since come out of the seizure, can answer simple yes/no questions, nods yes when asked if she could feel the seizure come on and pressed her call button for help.  pt is complaining of some pain in her head after the seizure. pt is not able to describe the pain or rate it at this time.      Rachael Rogers RN  03/30/22 1148

## 2022-03-30 NOTE — PROGRESS NOTES
Hospitalist Progress Note      PCP: No primary care provider on file. Date of Admission: 3/29/2022    Chief Complaint: seizure like activity    Hospital Course:    Patient presented to the emergency department after having seizure-like activity. Patient has history of seizure disorder. [facility] Vital signs reveal the patient be hypoxic 70% on room air. Possible she may have aspirated. Tachycardic with a rate of 101. She is afebrile. Laboratory studies demonstrate creatinine 1.1, glucose 154. CT chest shows patchy infiltrates and atelectasis in the lung bases bilaterally concerning for pneumonia. Patient was given loading dose of Keppra. Also given Unasyn for aspiration pneumonia. Medicine consulted for admission. Subjective: Pt was seen at bedside before EEG. She was explained about the plan regarding current evaluation. Medications:  Reviewed    Infusion Medications    sodium chloride       Scheduled Medications    amLODIPine  5 mg Oral Daily    apixaban  5 mg Oral BID    hydroCHLOROthiazide  25 mg Oral Daily    levothyroxine  150 mcg Oral Daily    mirtazapine  15 mg Oral Nightly    paliperidone  6 mg Oral Nightly    sodium chloride flush  5-40 mL IntraVENous 2 times per day    ampicillin-sulbactam  3,000 mg IntraVENous Q6H    levetiracetam  500 mg IntraVENous BID    lamoTRIgine  50 mg Oral Daily    LORazepam  0.5 mg IntraVENous Once     PRN Meds: sodium chloride flush, sodium chloride, LORazepam, ondansetron **OR** ondansetron, polyethylene glycol, acetaminophen **OR** acetaminophen, potassium chloride **OR** potassium alternative oral replacement **OR** potassium chloride    No intake or output data in the 24 hours ending 03/30/22 1316    Exam:    /86   Pulse 95   Temp 99 °F (37.2 °C)   Resp 25   Ht 5' 4\" (1.626 m)   LMP  (LMP Unknown)   SpO2 93%   BMI 41.20 kg/m²     General appearance:  Morbid obesity, appears stated age and cooperative. HEENT: Pupils equal, round, and reactive to light. Conjunctivae/corneas clear. Neck: Supple, with full range of motion. No jugular venous distention. Trachea midline. Respiratory:  Normal respiratory effort. Clear to auscultation, bilaterally without Rales/Wheezes/Rhonchi. Cardiovascular: Regular rate and rhythm with normal S1/S2 without murmurs, rubs or gallops. Abdomen: Soft, non-tender, non-distended with normal bowel sounds. Musculoskeletal: No clubbing, cyanosis or edema bilaterally. Full range of motion without deformity. Skin: Skin color, texture, turgor normal.  No rashes or lesions. Neurologic:  Neurovascularly intact without any focal sensory/motor deficits. Cranial nerves: II-XII intact, grossly non-focal.  Psychiatric: Alert and oriented, thought content appropriate, normal insight    Labs:   Recent Labs     03/29/22  1254 03/30/22  0603   WBC 8.8 8.0   HGB 13.4 12.5   HCT 41.8 38.4   * 386     Recent Labs     03/29/22  1254 03/30/22  0603    137   K 3.8 3.3*   CL 97* 97*   CO2 27 23   BUN 15 15   CREATININE 1.1* 1.1*   CALCIUM 10.0 9.6     Recent Labs     03/29/22  1254 03/30/22  0603   AST 40* 24   ALT 45* 36*   BILITOT 0.5 0.4   ALKPHOS 90 80     No results for input(s): INR in the last 72 hours. No results for input(s): Jacob Breeze in the last 72 hours.     Assessment/Plan:    Active Hospital Problems    Diagnosis Date Noted    Seizure [facility]) [R56.9] 03/29/2022   -Nonintractable epilepsy without status epilepticus, unspecified epilepsy type   -Aspiration pneumonia  -Acute respiratory failure with hypoxia  -Hypertension  -Hypothyroidism due to Hashimoto's thyroiditis   -History of DVT/PE chronic anticoagulated on Eliquis  - DM type 2  -Schizoaffective disorder  -Unstable gait at baseline-currently using walker    Patient is currently following neurology and is on [address]  Patient was previously diagnosed to have pseudoseizures per chart review  Currently on Unasyn for aspiration pneumonia  Neurology consutled  EEG ordered per neuro      DVT Prophylaxis: lovenox  Diet: ADULT DIET;  Regular  Code Status: Full Code    PT/OT Eval Status: ordered    Dispo - await neuro eval    Home Carlos MD

## 2022-03-31 ENCOUNTER — APPOINTMENT (OUTPATIENT)
Dept: GENERAL RADIOLOGY | Age: 37
DRG: 208 | End: 2022-03-31
Payer: MEDICARE

## 2022-03-31 LAB
AADO2: 198.3 MMHG
ACETAMINOPHEN LEVEL: <5 MCG/ML (ref 10–30)
ADENOVIRUS BY PCR: NOT DETECTED
ALBUMIN SERPL-MCNC: 3.6 G/DL (ref 3.5–5.2)
ALP BLD-CCNC: 71 U/L (ref 35–104)
ALT SERPL-CCNC: 29 U/L (ref 0–32)
ANION GAP SERPL CALCULATED.3IONS-SCNC: 14 MMOL/L (ref 7–16)
AST SERPL-CCNC: 17 U/L (ref 0–31)
B.E.: 0.4 MMOL/L (ref -3–3)
BASOPHILS ABSOLUTE: 0.03 E9/L (ref 0–0.2)
BASOPHILS RELATIVE PERCENT: 0.3 % (ref 0–2)
BILIRUB SERPL-MCNC: 0.3 MG/DL (ref 0–1.2)
BILIRUBIN DIRECT: <0.2 MG/DL (ref 0–0.3)
BILIRUBIN, INDIRECT: NORMAL MG/DL (ref 0–1)
BORDETELLA PARAPERTUSSIS BY PCR: NOT DETECTED
BORDETELLA PERTUSSIS BY PCR: NOT DETECTED
BUN BLDV-MCNC: 13 MG/DL (ref 6–20)
CALCIUM IONIZED: 1.24 MMOL/L (ref 1.15–1.33)
CALCIUM SERPL-MCNC: 8.8 MG/DL (ref 8.6–10.2)
CHLAMYDOPHILIA PNEUMONIAE BY PCR: NOT DETECTED
CHLORIDE BLD-SCNC: 101 MMOL/L (ref 98–107)
CHOLESTEROL, TOTAL: 146 MG/DL (ref 0–199)
CO2: 25 MMOL/L (ref 22–29)
COHB: 0.3 % (ref 0–1.5)
CORONAVIRUS 229E BY PCR: NOT DETECTED
CORONAVIRUS HKU1 BY PCR: NOT DETECTED
CORONAVIRUS NL63 BY PCR: NOT DETECTED
CORONAVIRUS OC43 BY PCR: NOT DETECTED
CREAT SERPL-MCNC: 1 MG/DL (ref 0.5–1)
CRITICAL: ABNORMAL
DATE ANALYZED: ABNORMAL
DATE OF COLLECTION: ABNORMAL
EKG ATRIAL RATE: 94 BPM
EKG P AXIS: 3 DEGREES
EKG P-R INTERVAL: 160 MS
EKG Q-T INTERVAL: 378 MS
EKG QRS DURATION: 90 MS
EKG QTC CALCULATION (BAZETT): 472 MS
EKG R AXIS: -13 DEGREES
EKG T AXIS: 12 DEGREES
EKG VENTRICULAR RATE: 94 BPM
EOSINOPHILS ABSOLUTE: 0.12 E9/L (ref 0.05–0.5)
EOSINOPHILS RELATIVE PERCENT: 1.3 % (ref 0–6)
ETHANOL: <10 MG/DL (ref 0–0.08)
FIO2: 50 %
GFR AFRICAN AMERICAN: >60
GFR NON-AFRICAN AMERICAN: >60 ML/MIN/1.73
GLUCOSE BLD-MCNC: 127 MG/DL (ref 74–99)
HCO3: 22.8 MMOL/L (ref 22–26)
HCT VFR BLD CALC: 34.2 % (ref 34–48)
HDLC SERPL-MCNC: 42 MG/DL
HEMOGLOBIN: 11.3 G/DL (ref 11.5–15.5)
HHB: 1.9 % (ref 0–5)
HUMAN METAPNEUMOVIRUS BY PCR: NOT DETECTED
HUMAN RHINOVIRUS/ENTEROVIRUS BY PCR: NOT DETECTED
IMMATURE GRANULOCYTES #: 0.03 E9/L
IMMATURE GRANULOCYTES %: 0.3 % (ref 0–5)
INFLUENZA A BY PCR: NOT DETECTED
INFLUENZA B BY PCR: NOT DETECTED
L. PNEUMOPHILA SEROGP 1 UR AG: NORMAL
LAB: ABNORMAL
LACTIC ACID: 2.3 MMOL/L (ref 0.5–2.2)
LACTIC ACID: 3 MMOL/L (ref 0.5–2.2)
LACTIC ACID: 4.7 MMOL/L (ref 0.5–2.2)
LDL CHOLESTEROL CALCULATED: 73 MG/DL (ref 0–99)
LYMPHOCYTES ABSOLUTE: 1.3 E9/L (ref 1.5–4)
LYMPHOCYTES RELATIVE PERCENT: 14.3 % (ref 20–42)
Lab: ABNORMAL
MAGNESIUM: 1.4 MG/DL (ref 1.6–2.6)
MCH RBC QN AUTO: 27.8 PG (ref 26–35)
MCHC RBC AUTO-ENTMCNC: 33 % (ref 32–34.5)
MCV RBC AUTO: 84 FL (ref 80–99.9)
METER GLUCOSE: 119 MG/DL (ref 74–99)
METER GLUCOSE: 131 MG/DL (ref 74–99)
METER GLUCOSE: 143 MG/DL (ref 74–99)
METHB: 0.3 % (ref 0–1.5)
MODE: AC
MONOCYTES ABSOLUTE: 0.69 E9/L (ref 0.1–0.95)
MONOCYTES RELATIVE PERCENT: 7.6 % (ref 2–12)
MYCOPLASMA PNEUMONIAE BY PCR: NOT DETECTED
NEUTROPHILS ABSOLUTE: 6.92 E9/L (ref 1.8–7.3)
NEUTROPHILS RELATIVE PERCENT: 76.2 % (ref 43–80)
O2 CONTENT: 17.2 ML/DL
O2 SATURATION: 98.1 % (ref 92–98.5)
O2HB: 97.5 % (ref 94–97)
OPERATOR ID: 2962
PARAINFLUENZA VIRUS 1 BY PCR: NOT DETECTED
PARAINFLUENZA VIRUS 2 BY PCR: NOT DETECTED
PARAINFLUENZA VIRUS 3 BY PCR: NOT DETECTED
PARAINFLUENZA VIRUS 4 BY PCR: NOT DETECTED
PATIENT TEMP: 37 C
PCO2: 30.3 MMHG (ref 35–45)
PDW BLD-RTO: 13.6 FL (ref 11.5–15)
PEEP/CPAP: 5 CMH2O
PFO2: 2.23 MMHG/%
PH BLOOD GAS: 7.5 (ref 7.35–7.45)
PHOSPHORUS: 3.7 MG/DL (ref 2.5–4.5)
PLATELET # BLD: 351 E9/L (ref 130–450)
PMV BLD AUTO: 8.7 FL (ref 7–12)
PO2: 111.6 MMHG (ref 75–100)
POTASSIUM SERPL-SCNC: 3.1 MMOL/L (ref 3.5–5)
RBC # BLD: 4.07 E12/L (ref 3.5–5.5)
RESPIRATORY SYNCYTIAL VIRUS BY PCR: NOT DETECTED
RI(T): 1.78
RR MECHANICAL: 18 B/MIN
SALICYLATE, SERUM: <0.3 MG/DL (ref 0–30)
SARS-COV-2, PCR: NOT DETECTED
SODIUM BLD-SCNC: 140 MMOL/L (ref 132–146)
SOURCE, BLOOD GAS: ABNORMAL
STREP PNEUMONIAE ANTIGEN, URINE: NORMAL
T4 FREE: 1.45 NG/DL (ref 0.93–1.7)
THB: 12.4 G/DL (ref 11.5–16.5)
TIME ANALYZED: 526
TOTAL CK: 20 U/L (ref 20–180)
TOTAL PROTEIN: 6.5 G/DL (ref 6.4–8.3)
TRICYCLIC ANTIDEPRESSANTS SCREEN SERUM: NEGATIVE NG/ML
TRIGL SERPL-MCNC: 153 MG/DL (ref 0–149)
TSH SERPL DL<=0.05 MIU/L-ACNC: 1.01 UIU/ML (ref 0.27–4.2)
VLDLC SERPL CALC-MCNC: 31 MG/DL
VT MECHANICAL: 400 ML
WBC # BLD: 9.1 E9/L (ref 4.5–11.5)

## 2022-03-31 PROCEDURE — 6370000000 HC RX 637 (ALT 250 FOR IP): Performed by: INTERNAL MEDICINE

## 2022-03-31 PROCEDURE — 6370000000 HC RX 637 (ALT 250 FOR IP): Performed by: FAMILY MEDICINE

## 2022-03-31 PROCEDURE — 2580000003 HC RX 258: Performed by: INTERNAL MEDICINE

## 2022-03-31 PROCEDURE — 94003 VENT MGMT INPAT SUBQ DAY: CPT

## 2022-03-31 PROCEDURE — 85025 COMPLETE CBC W/AUTO DIFF WBC: CPT

## 2022-03-31 PROCEDURE — 6360000002 HC RX W HCPCS: Performed by: INTERNAL MEDICINE

## 2022-03-31 PROCEDURE — 84100 ASSAY OF PHOSPHORUS: CPT

## 2022-03-31 PROCEDURE — 6360000002 HC RX W HCPCS: Performed by: FAMILY MEDICINE

## 2022-03-31 PROCEDURE — 2580000003 HC RX 258: Performed by: FAMILY MEDICINE

## 2022-03-31 PROCEDURE — 82550 ASSAY OF CK (CPK): CPT

## 2022-03-31 PROCEDURE — 80175 DRUG SCREEN QUAN LAMOTRIGINE: CPT

## 2022-03-31 PROCEDURE — 83735 ASSAY OF MAGNESIUM: CPT

## 2022-03-31 PROCEDURE — C9113 INJ PANTOPRAZOLE SODIUM, VIA: HCPCS | Performed by: INTERNAL MEDICINE

## 2022-03-31 PROCEDURE — 80076 HEPATIC FUNCTION PANEL: CPT

## 2022-03-31 PROCEDURE — 2000000000 HC ICU R&B

## 2022-03-31 PROCEDURE — 2700000000 HC OXYGEN THERAPY PER DAY

## 2022-03-31 PROCEDURE — 80177 DRUG SCRN QUAN LEVETIRACETAM: CPT

## 2022-03-31 PROCEDURE — 99233 SBSQ HOSP IP/OBS HIGH 50: CPT | Performed by: NURSE PRACTITIONER

## 2022-03-31 PROCEDURE — 71045 X-RAY EXAM CHEST 1 VIEW: CPT

## 2022-03-31 PROCEDURE — 80061 LIPID PANEL: CPT

## 2022-03-31 PROCEDURE — 95813 EEG EXTND MNTR 61-119 MIN: CPT | Performed by: PSYCHIATRY & NEUROLOGY

## 2022-03-31 PROCEDURE — 87205 SMEAR GRAM STAIN: CPT

## 2022-03-31 PROCEDURE — 83605 ASSAY OF LACTIC ACID: CPT

## 2022-03-31 PROCEDURE — 80048 BASIC METABOLIC PNL TOTAL CA: CPT

## 2022-03-31 PROCEDURE — 82805 BLOOD GASES W/O2 SATURATION: CPT

## 2022-03-31 PROCEDURE — 94640 AIRWAY INHALATION TREATMENT: CPT

## 2022-03-31 PROCEDURE — 82330 ASSAY OF CALCIUM: CPT

## 2022-03-31 PROCEDURE — 82962 GLUCOSE BLOOD TEST: CPT

## 2022-03-31 PROCEDURE — 93010 ELECTROCARDIOGRAM REPORT: CPT | Performed by: INTERNAL MEDICINE

## 2022-03-31 RX ORDER — POTASSIUM CHLORIDE 29.8 MG/ML
40 INJECTION INTRAVENOUS ONCE
Status: COMPLETED | OUTPATIENT
Start: 2022-03-31 | End: 2022-03-31

## 2022-03-31 RX ORDER — SODIUM CHLORIDE 9 MG/ML
INJECTION, SOLUTION INTRAVENOUS CONTINUOUS
Status: ACTIVE | OUTPATIENT
Start: 2022-03-31 | End: 2022-03-31

## 2022-03-31 RX ORDER — SODIUM CHLORIDE, SODIUM LACTATE, POTASSIUM CHLORIDE, CALCIUM CHLORIDE 600; 310; 30; 20 MG/100ML; MG/100ML; MG/100ML; MG/100ML
INJECTION, SOLUTION INTRAVENOUS CONTINUOUS
Status: ACTIVE | OUTPATIENT
Start: 2022-03-31 | End: 2022-04-01

## 2022-03-31 RX ORDER — MAGNESIUM SULFATE IN WATER 40 MG/ML
2000 INJECTION, SOLUTION INTRAVENOUS ONCE
Status: COMPLETED | OUTPATIENT
Start: 2022-03-31 | End: 2022-03-31

## 2022-03-31 RX ADMIN — APIXABAN 5 MG: 5 TABLET, FILM COATED ORAL at 20:46

## 2022-03-31 RX ADMIN — IPRATROPIUM BROMIDE AND ALBUTEROL SULFATE 1 AMPULE: .5; 2.5 SOLUTION RESPIRATORY (INHALATION) at 13:15

## 2022-03-31 RX ADMIN — GUAIFENESIN 400 MG: 400 TABLET ORAL at 09:43

## 2022-03-31 RX ADMIN — SODIUM CHLORIDE, PRESERVATIVE FREE 40 MG: 5 INJECTION INTRAVENOUS at 09:35

## 2022-03-31 RX ADMIN — APIXABAN 5 MG: 5 TABLET, FILM COATED ORAL at 09:42

## 2022-03-31 RX ADMIN — SODIUM CHLORIDE, POTASSIUM CHLORIDE, SODIUM LACTATE AND CALCIUM CHLORIDE: 600; 310; 30; 20 INJECTION, SOLUTION INTRAVENOUS at 16:13

## 2022-03-31 RX ADMIN — POTASSIUM CHLORIDE 40 MEQ: 29.8 INJECTION, SOLUTION INTRAVENOUS at 12:40

## 2022-03-31 RX ADMIN — IPRATROPIUM BROMIDE AND ALBUTEROL SULFATE 1 AMPULE: .5; 2.5 SOLUTION RESPIRATORY (INHALATION) at 09:38

## 2022-03-31 RX ADMIN — POTASSIUM CHLORIDE 40 MEQ: 29.8 INJECTION, SOLUTION INTRAVENOUS at 09:20

## 2022-03-31 RX ADMIN — LEVETIRACETAM 1000 MG: 10 INJECTION INTRAVENOUS at 20:51

## 2022-03-31 RX ADMIN — BUDESONIDE 500 MCG: 0.5 SUSPENSION RESPIRATORY (INHALATION) at 09:38

## 2022-03-31 RX ADMIN — LEVOTHYROXINE SODIUM 150 MCG: 0.15 TABLET ORAL at 07:09

## 2022-03-31 RX ADMIN — MINERAL OIL AND PETROLATUM: 150; 830 OINTMENT OPHTHALMIC at 03:33

## 2022-03-31 RX ADMIN — AMPICILLIN SODIUM AND SULBACTAM SODIUM 3000 MG: 2; 1 INJECTION, POWDER, FOR SOLUTION INTRAMUSCULAR; INTRAVENOUS at 18:32

## 2022-03-31 RX ADMIN — LAMOTRIGINE 50 MG: 25 TABLET ORAL at 09:41

## 2022-03-31 RX ADMIN — MINERAL OIL AND PETROLATUM: 150; 830 OINTMENT OPHTHALMIC at 09:47

## 2022-03-31 RX ADMIN — MAGNESIUM SULFATE HEPTAHYDRATE 2000 MG: 40 INJECTION, SOLUTION INTRAVENOUS at 09:27

## 2022-03-31 RX ADMIN — Medication 10 ML: at 09:00

## 2022-03-31 RX ADMIN — ARFORMOTEROL TARTRATE 15 MCG: 15 SOLUTION RESPIRATORY (INHALATION) at 09:38

## 2022-03-31 RX ADMIN — AMPICILLIN SODIUM AND SULBACTAM SODIUM 3000 MG: 2; 1 INJECTION, POWDER, FOR SOLUTION INTRAMUSCULAR; INTRAVENOUS at 07:09

## 2022-03-31 RX ADMIN — PROPOFOL 20 MCG/KG/MIN: 10 INJECTION, EMULSION INTRAVENOUS at 01:47

## 2022-03-31 RX ADMIN — ARFORMOTEROL TARTRATE 15 MCG: 15 SOLUTION RESPIRATORY (INHALATION) at 19:50

## 2022-03-31 RX ADMIN — IPRATROPIUM BROMIDE AND ALBUTEROL SULFATE 1 AMPULE: .5; 2.5 SOLUTION RESPIRATORY (INHALATION) at 19:50

## 2022-03-31 RX ADMIN — SODIUM CHLORIDE: 9 INJECTION, SOLUTION INTRAVENOUS at 05:15

## 2022-03-31 RX ADMIN — IPRATROPIUM BROMIDE AND ALBUTEROL SULFATE 1 AMPULE: .5; 2.5 SOLUTION RESPIRATORY (INHALATION) at 16:49

## 2022-03-31 RX ADMIN — AMPICILLIN SODIUM AND SULBACTAM SODIUM 3000 MG: 2; 1 INJECTION, POWDER, FOR SOLUTION INTRAMUSCULAR; INTRAVENOUS at 01:56

## 2022-03-31 RX ADMIN — GUAIFENESIN 400 MG: 400 TABLET ORAL at 20:47

## 2022-03-31 RX ADMIN — PROPOFOL 20 MCG/KG/MIN: 10 INJECTION, EMULSION INTRAVENOUS at 07:33

## 2022-03-31 RX ADMIN — 0.12% CHLORHEXIDINE GLUCONATE 15 ML: 1.2 RINSE ORAL at 09:45

## 2022-03-31 RX ADMIN — LACOSAMIDE 150 MG: 100 TABLET, FILM COATED ORAL at 20:47

## 2022-03-31 RX ADMIN — LEVETIRACETAM 1000 MG: 10 INJECTION INTRAVENOUS at 09:35

## 2022-03-31 RX ADMIN — AMPICILLIN SODIUM AND SULBACTAM SODIUM 3000 MG: 2; 1 INJECTION, POWDER, FOR SOLUTION INTRAMUSCULAR; INTRAVENOUS at 13:22

## 2022-03-31 RX ADMIN — BUDESONIDE 500 MCG: 0.5 SUSPENSION RESPIRATORY (INHALATION) at 19:50

## 2022-03-31 RX ADMIN — LACOSAMIDE 150 MG: 100 TABLET, FILM COATED ORAL at 09:41

## 2022-03-31 RX ADMIN — Medication 10 ML: at 20:52

## 2022-03-31 ASSESSMENT — PULMONARY FUNCTION TESTS
PIF_VALUE: 19
PIF_VALUE: 20
PIF_VALUE: 24
PIF_VALUE: 14
PIF_VALUE: 20
PIF_VALUE: 19
PIF_VALUE: 19
PIF_VALUE: 18
PIF_VALUE: 21
PIF_VALUE: 20
PIF_VALUE: 14
PIF_VALUE: 14
PIF_VALUE: 19
PIF_VALUE: 18
PIF_VALUE: 14
PIF_VALUE: 19
PIF_VALUE: 22
PIF_VALUE: 22
PIF_VALUE: 18

## 2022-03-31 ASSESSMENT — PAIN SCALES - GENERAL
PAINLEVEL_OUTOF10: 0

## 2022-03-31 NOTE — CONSULTS
[facility]  Internal Medicine Residency Program  Consult: Critical care  MICU    Patient:  Mark Benson 42 y.o. female MRN: [id number]     Date of Service: 3/31/2022    Hospital Day: 3      Chief complaint: had concerns including Seizures (hx seizures, non compliant with meds, from generations). History of Present Illness   History was obtained from  EMR     The patient is a 42 y.o. 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 On arrival to the ED, the patient was hypoxic to 70% on room air, tachycardic to 101 bpm, afebrile, otherwise hemodynamically stable. Chest CT on admission showing patchy infiltrates with atelectasis of the bilateral lung bases, concerning for aspiration pneumonia given patient's clinical presentation. Patient was given a loading dose of Keppra in the emergency department, as well as Unasyn for aspiration pneumonia prophylaxis. I am was consulted for the admission, and neurology was consulted as well. On 3/30/2022 at approximately 1900, an RRT was called after the patient was observed by her roommate to suddenly fall to the ground, striking her head and face. The patient was lifted back onto the bed, and on resident examination the patient was found to have pendular nystagmus of bilateral eyes as well as rhythmic lipsmacking and finger movements. She remained hemodynamically stable during the entire RRT but did require intubation and mechanical ventilation for status epilepticus. CT of the head performed s/p intubation was negative for any acute intracranial hemorrhage or other abnormalities. After consulting with on-call intensivist, patient was brought to the MICU for further management and stabilization.     Past Medical History:      Diagnosis Date    Borderline personality disorder ([facility]     Chronic kidney disease     Diabetes ([facility] [facility]     Hypertension     Hypothyroidism     Psychogenic nonepileptic seizure 11/22/2020    PTSD (post-traumatic stress disorder)     Pulmonary embolism (HCC)     Schizoaffective disorder (HCC)        Past Surgical History:        Procedure Laterality Date    ANKLE SURGERY      bilateral ankles; patient states when she was young   Connye Sole GALLBLADDER SURGERY         Medications Prior to Admission:    Prior to Admission medications    Medication Sig Start Date End Date Taking? Authorizing Provider   apixaban (ELIQUIS) 5 MG TABS tablet Take 5 mg by mouth 2 times daily   Yes Historical Provider, MD   hydroCHLOROthiazide (HYDRODIURIL) 25 MG tablet Take 25 mg by mouth daily   Yes Historical Provider, MD   lamoTRIgine (LAMICTAL) 25 MG tablet Take 25 mg by mouth daily   Yes Historical Provider, MD   levETIRAcetam (KEPPRA) 1000 MG tablet Take 1,000 mg by mouth 2 times daily   Yes Historical Provider, MD   levothyroxine (SYNTHROID) 75 MCG tablet Take 150 mcg by mouth Daily   Yes Historical Provider, MD   mirtazapine (REMERON) 15 MG tablet Take 15 mg by mouth nightly   Yes Historical Provider, MD   paliperidone (INVEGA) 6 MG extended release tablet Take 6 mg by mouth at bedtime   Yes Historical Provider, MD   acetaminophen (TYLENOL) 325 MG tablet Take 650 mg by mouth every 6 hours as needed for Pain   Yes Historical Provider, MD   amLODIPine (NORVASC) 5 MG tablet Take 5 mg by mouth daily     Historical Provider, MD       Allergies:  Carbamazepine, Latuda [lurasidone], and Shellfish-derived products    Social History:   TOBACCO:   reports that she has never smoked. She has never used smokeless tobacco.  ETOH:   reports previous alcohol use.     Family History:       Problem Relation Age of Onset    Seizures Mother     Mental Illness Mother     Mental Illness Sister      REVIEW OF SYSTEMS: Unable to perform 2/2 patient's mental status    Physical Exam     · Vitals: /83   Pulse 87   Temp 98.4 °F (36.9 °C) (Bladder)   Resp 17   Ht 5' 4\" (1.626 m)   Wt 244 lb 11.4 oz (111 kg)   LMP  (LMP Unknown)   SpO2 100%   BMI 42.00 kg/m²       · Constitutional: Sedated, intubated, able to answer questions or follow commands  · Head: Normocephalic and atraumatic. · Eyes: EOMI, conjunctival injection present bilaterally, (-) scleral icterus. Mucus membranes moist.  · Mouth: Mucus membranes moist. Oropharynx clear. No deviation of the tongue or uvula. Poor dentition. · Neck: (-)  swelling, (-) JVD, trachea midline  · Respiratory: ETT in place, lung sounds diminished in bilateral bases, otherwise clear to auscultation bilaterally. (-)  wheezes, (-)  rales, (-)  rhonchi. No increased work of breathing or respiratory distress  · Cardiovascular: RRR. (-)  murmurs, (-) gallops,  (-) rubs. S1 and S2 were normal.   · GI:  Abdomen soft, non-tender, non-distended. (+) BS. (-) guarding, (-) rigidity. · Extremities: Warm and well perfused. (-) clubbing, (-) cyanosis. 2+ distal pulses. (-) peripheral edema.   · Neurologic: No focal neurological deficits, no further rhythmic movements of the extremities, no nystagmus noted, no tonic-clonic activity    Lines  Type Location Date   CBC  R IJ  3/30/2022               Labs and Imaging Studies   Basic Labs  Recent Labs     03/29/22  1254 03/30/22  0603 03/30/22 2117    137 142   K 3.8 3.3* 3.5   CL 97* 97* 105   CO2 27 23 25   BUN 15 15 13   CREATININE 1.1* 1.1* 1.0   GLUCOSE 154* 182* 168*   CALCIUM 10.0 9.6 9.1       Recent Labs     03/29/22  1254 03/30/22  0603 03/30/22 2117   WBC 8.8 8.0 10.4   RBC 4.91 4.50 4.25   HGB 13.4 12.5 11.9   HCT 41.8 38.4 36.0   MCV 85.1 85.3 84.7   MCH 27.3 27.8 28.0   MCHC 32.1 32.6 33.1   RDW 13.7 13.8 13.7   * 386 364   MPV 9.0 8.8 8.8       HFP:    Lab Results   Component Value Date    PROT 7.1 03/30/2022     U/A:  No components found for: Hien Landaverde, [facility], Yulissa Paredes, [place], [place], [place], [place], [place], [place], [facility], [place], [place], [place], [facility], [place]  PT/INR:  No results found for: PTINR  ABG:     Lab Results   Component Value Date    PH 7.392 03/30/2022    PCO2 36.1 03/30/2022    PO2 104.4 03/30/2022    HCO3 21.5 03/30/2022    BE -2.9 03/30/2022    THB 12.7 03/30/2022    O2SAT 97.2 03/30/2022     Imaging Studies:     CT HEAD WO CONTRAST    Result Date: 3/30/2022  EXAMINATION: CT OF THE HEAD WITHOUT CONTRAST  3/30/2022 7:50 pm TECHNIQUE: CT of the head was performed without the administration of intravenous contrast. Dose modulation, iterative reconstruction, and/or weight based adjustment of the mA/kV was utilized to reduce the radiation dose to as low as reasonably achievable. COMPARISON: November 29, 2020 HISTORY: ORDERING SYSTEM PROVIDED HISTORY: fall w/ head trauma, seizure TECHNOLOGIST PROVIDED HISTORY: Reason for exam:->fall w/ head trauma, seizure Has a \"code stroke\" or \"stroke alert\" been called? ->No What reading provider will be dictating this exam?->CRC FINDINGS: BRAIN/VENTRICLES: There is no acute intracranial hemorrhage, mass effect or midline shift. No abnormal extra-axial fluid collection. The gray-white differentiation is maintained without evidence of an acute infarct. There is no evidence of hydrocephalus. ORBITS: The visualized portion of the orbits demonstrate no acute abnormality. SINUSES: The visualized paranasal sinuses and mastoid air cells demonstrate no acute abnormality. SOFT TISSUES/SKULL:  No acute abnormality of the visualized skull or soft tissues. Endotracheal tube in place. No acute intracranial abnormality or hemorrhage.      XR CHEST PORTABLE    Result Date: 3/30/2022  EXAMINATION: ONE XRAY VIEW OF THE CHEST 3/30/2022 9:02 pm COMPARISON: Chest series from March 29, 2022 HISTORY: ORDERING SYSTEM PROVIDED HISTORY: CVC placement TECHNOLOGIST PROVIDED HISTORY: Reason for exam:->CVC placement What reading provider will be dictating this exam?->CRC FINDINGS: Right IJ central venous catheter with distal tip projecting in the mid to distal superior vena cava. Endotracheal tube terminates in the midthoracic trachea. Enteric tube extends subdiaphragmatic and off the field of view. Low lung volumes which are symmetric. This appears to be contributing to mild bronchovascular crowding. No obvious pleural effusion or pneumothorax. Cardiac silhouette remains mildly prominent. Osseous and thoracic soft tissue structures demonstrate no acute findings. 1.  New right IJ catheter, new endotracheal tube, and new enteric tube. No complicating features. 2.  Low lung volumes which appears to be contributing to bronchovascular crowding. Overall stable when compared to prior exam.  No new cardiopulmonary pathology. XR CHEST PORTABLE    Result Date: 3/29/2022  EXAMINATION: ONE XRAY VIEW OF THE CHEST 3/29/2022 1:32 pm COMPARISON: Chest, single view 11/27/2020 HISTORY: ORDERING SYSTEM PROVIDED HISTORY: sob TECHNOLOGIST PROVIDED HISTORY: Reason for exam:->sob What reading provider will be dictating this exam?->CRC FINDINGS: A single portable AP view of the chest was obtained. There is enlargement of the cardiac silhouette. Mediastinal contour is within normal limits. There is central pulmonary vascular congestion. There are bilateral perihilar airspace opacities. There also for airspace opacities within the left lung base. There is poor definition of the bilateral costophrenic angles. Bilateral airspace disease could represent infection or edema from congestive heart failure. CTA PULMONARY W CONTRAST    Result Date: 3/29/2022  EXAMINATION: CTA OF THE CHEST 3/29/2022 5:23 pm TECHNIQUE: CTA of the chest was performed after the administration of intravenous contrast.  Multiplanar reformatted images are provided for review. MIP images are provided for review.  Dose modulation, iterative reconstruction, and/or weight based adjustment of the mA/kV was utilized to reduce the radiation dose to as low as reasonably achievable. COMPARISON: None. HISTORY: ORDERING SYSTEM PROVIDED HISTORY: sob TECHNOLOGIST PROVIDED HISTORY: Reason for exam:->sob Decision Support Exception - unselect if not a suspected or confirmed emergency medical condition->Emergency Medical Condition (MA) What reading provider will be dictating this exam?->CRC FINDINGS: The heart and the great vessels are normal.  Trachea and major bronchi are patent. There are no filling defects in the main pulmonary artery and the central branches. The distal subsegmental and peripheral vessels are poorly opacified and cannot be evaluated for small distal subsegmental pulmonary embolism. There is atelectasis/infiltrates in the lower lobes bilaterally. Liver is fatty infiltrated. Gallbladder is absent. Degenerative changes are identified in the thoracic spine. No central pulmonary embolism or aortic dissection. The distal subsegmental and peripheral vessels are poorly evaluated due to suboptimal contrast. Patchy infiltrates and atelectasis in the lung bases bilaterally concerning for pneumonia. RECOMMENDATIONS: Unavailable     XR ABDOMEN FOR NG/OG/NE TUBE PLACEMENT    Result Date: 3/30/2022  EXAMINATION: ONE SUPINE XRAY VIEW(S) OF THE ABDOMEN 3/30/2022 9:03 pm COMPARISON: None. HISTORY: ORDERING SYSTEM PROVIDED HISTORY: Confirmation of course of NG/OG/NE tube and location of tip of tube TECHNOLOGIST PROVIDED HISTORY: Reason for exam:->Confirmation of course of NG/OG/NE tube and location of tip of tube Portable? ->Yes What reading provider will be dictating this exam?->CRC FINDINGS: Esophageal route catheter extends into the left upper quadrant stomach location. Chest evaluation is reported separately. No dilated bowels are visible. Catheter is in the stomach.      EKG: NSR, normal axis, no new ST or T wave changes, QTC 472    Resident's Assessment and Plan     Assessment  1. Status epilepticus likely 2/2 breakthrough seizures vs less likely PNES:  · For duration of epileptic episode, patient unresponsive to painful stimuli including sternal rub or nailbed pressure  · Pendular nystagmus, with rhythmic lipsmacking and finger movements observed during course of epileptic episode  · Per documentation, despite patient's diagnosis of PNES and prior unremarkable EEGs, there was previous notation of concomitant epileptic seizures as well. Patient taking Vimpat and Keppra at home but reportedly is noncompliant  2. Hx T2DM (hemoglobin A1c 0.2%)  3. Hx hypothyroidism on Synthroid  4. Hx HTN  5. Hx PE on Eliquis  6. Hx PTSD  7. Hx schizoaffective disorder  8. Hx borderline personality disorder    Plan    Continue sedation, intubation, mechanical ventilation-will wean to extubation as tolerated by patient   Daily CXR and ABG   Consider 24-hour EEG monitoring while patient is in MICU   Continue home Keppra 2 g twice daily, Lamictal 50 mg daily, and Vimpat 150 mg twice daily   Continue Unasyn for empiric treatment of possible aspiration   Continue Synthroid 150mcg daily    BP currently controlled, will consider restarting home HCTZ, losartan, amlodipine as needed for BP goal less than 130/80 mmHg   Appreciate neurology recommendations    PT/OT evaluation: Not indicated at this time  DVT prophylaxis/ GI prophylaxis/ Bowel regiment  : Eliquis/Protonix/PRN GlycoLax  [facility]    Erika Guzman DO, PGY-2  Attending physician: Dr. Jean Rader  Preceptor: Dr. Wilma Mcknight    Patient successfully extubated this afternoon. I personally saw, examined and provided care for the patient. Radiographs, labs and medication list were reviewed by me independently. I spoke with bedside nursing, therapists and consultants. Critical care services and times documented are independent of procedures and multidisciplinary rounds with Residents.  Additionally comprehensive, multidisciplinary rounds were conducted with the MICU team. The case was discussed in detail and plans for care were established. Review of Residents documentation was conducted and revisions were made as appropriate. I agree with the above documented exam, problem list and plan of care.   Rekha Adams MD  CCT excluding procedures:35'

## 2022-03-31 NOTE — PROGRESS NOTES
Hospitalist Progress Note      PCP: No primary care provider on file. Date of Admission: 3/29/2022    Chief Complaint: seizure like activity    Hospital Course:    Patient presented to the emergency department after having seizure-like activity. Patient has history of seizure disorder. Patient resides generations facility. Vital signs reveal the patient be hypoxic 70% on room air. Possible she may have aspirated. Tachycardic with a rate of 101. She is afebrile. Laboratory studies demonstrate creatinine 1.1, glucose 154. CT chest shows patchy infiltrates and atelectasis in the lung bases bilaterally concerning for pneumonia. Patient was given loading dose of Keppra. Also given Unasyn for aspiration pneumonia. Medicine consulted for admission. Subjective: Patient was seen at bedside in the ICU today. Currently intubated, sedated. Weaning sedation at this time and patient wakes up to verbal/tactile stimuli  40/5-FiO2/PEEP    On 3/30/2022 at approximately 1900, an RRT was called after the patient was observed by her roommate to suddenly fall to the ground, striking her head and face. The patient was lifted back onto the bed, and on resident examination the patient was found to have pendular nystagmus of bilateral eyes as well as rhythmic lipsmacking and finger movements. She remained hemodynamically stable during the entire RRT but did require intubation and mechanical ventilation for status epilepticus. CT of the head performed s/p intubation was negative for any acute intracranial hemorrhage or other abnormalities.   After consulting with on-call intensivist, patient was brought to the MICU for further management and stabilization    Medications:  Reviewed    Infusion Medications    sodium chloride 100 mL/hr at 03/31/22 1300    sodium chloride      midazolam Stopped (03/31/22 1204)    dextrose      propofol 20 mcg/kg/min (03/31/22 1300)     Scheduled Medications    potassium chloride 40 mEq IntraVENous Once    apixaban  5 mg Oral BID    levothyroxine  150 mcg Oral Daily    [Held by provider] mirtazapine  15 mg Oral Nightly    [Held by provider] paliperidone  6 mg Oral Nightly    sodium chloride flush  5-40 mL IntraVENous 2 times per day    ampicillin-sulbactam  3,000 mg IntraVENous Q6H    lamoTRIgine  50 mg Oral Daily    LORazepam  0.5 mg IntraVENous Once    lacosamide  150 mg Oral BID    levetiracetam  1,000 mg IntraVENous BID    LORazepam  2 mg IntraVENous Once    chlorhexidine  15 mL Mouth/Throat BID    artificial tears   Both Eyes Q6H    Arformoterol Tartrate  15 mcg Nebulization BID    acetylcysteine  600 mg Inhalation BID    guaiFENesin  400 mg Oral TID    budesonide  0.5 mg Nebulization BID    ipratropium-albuterol  1 ampule Inhalation Q4H WA    pantoprazole (PROTONIX) 40 mg injection  40 mg IntraVENous Daily     PRN Meds: sodium chloride flush, sodium chloride, LORazepam, ondansetron **OR** ondansetron, polyethylene glycol, acetaminophen **OR** acetaminophen, glucose, dextrose, glucagon (rDNA), dextrose      Intake/Output Summary (Last 24 hours) at 3/31/2022 1304  Last data filed at 3/31/2022 1300  Gross per 24 hour   Intake 1960.39 ml   Output 1620 ml   Net 340.39 ml       Exam:    /63   Pulse 111   Temp 100.4 °F (38 °C) (Bladder)   Resp 18   Ht 5' 4\" (1.626 m)   Wt 247 lb 2.2 oz (112.1 kg)   LMP  (LMP Unknown)   SpO2 93%   BMI 42.42 kg/m²     General appearance: Morbid obesity, intubated, wakes up to verbal, tactile stimuli  HEENT: Pupils equal, round, and reactive to light. Conjunctivae/corneas clear. Neck: Supple,. No jugular venous distention. Trachea midline. Respiratory: Intubated. Cardiovascular: Regular rate and rhythm with normal S1/S2 without murmurs, rubs or gallops. Abdomen: Soft, non-tender, non-distended with normal bowel sounds. Musculoskeletal: No clubbing, cyanosis or edema bilaterally.   Skin: Skin color, texture, turgor normal.  No rashes or lesions. Neurologic: Intubated, sedated    Labs:   Recent Labs     03/30/22  0603 03/30/22 2117 03/31/22  0447   WBC 8.0 10.4 9.1   HGB 12.5 11.9 11.3*   HCT 38.4 36.0 34.2    364 351     Recent Labs     03/30/22  0603 03/30/22 2117 03/31/22  0447    142 140   K 3.3* 3.5 3.1*   CL 97* 105 101   CO2 23 25 25   BUN 15 13 13   CREATININE 1.1* 1.0 1.0   CALCIUM 9.6 9.1 8.8   PHOS  --  4.4 3.7     Recent Labs     03/30/22  0603 03/30/22 2117 03/31/22  0447   AST 24 20 17   ALT 36* 31 29   BILIDIR  --  <0.2 <0.2   BILITOT 0.4 0.3 0.3   ALKPHOS 80 76 71     No results for input(s): INR in the last 72 hours.   Recent Labs     03/31/22  0848   CKTOTAL 21       Assessment/Plan:    Active Hospital Problems    Diagnosis Date Noted    Psychogenic nonepileptic seizure [F44.5] 03/30/2022    Seizure ([facility] [address] [R56.9] 03/29/2022   -Nonintractable epilepsy without status epilepticus, unspecified epilepsy type   -Aspiration pneumonia  -Acute respiratory failure with hypoxia  -Hypertension  -Hypothyroidism due to Hashimoto's thyroiditis   -History of DVT/PE chronic anticoagulated on Eliquis  - DM type 2  -Schizoaffective disorder  -Unstable gait at baseline-currently using walker    Patient is currently following neurology and is on [address]  Patient was previously diagnosed to have pseudoseizures per chart review  Currently on Unasyn for aspiration pneumonia  Neurology consutled-Continue home Keppra 2 g twice daily, Lamictal 50 mg daily, and Vimpat 150 mg twice daily,Wean sedation as able and limit use of benzodiazepines  EEG ordered per neuro-3hr showed no epileptic changes  S/p RRT-3/30/2022-intubated, sedated  Appreciate critical care management      DVT Prophylaxis: lovenox  Diet: Diet NPO  Code Status: Full Code    PT/OT Eval Status: ordered    Dispo - Continue ICU care    Jonah Harris MD

## 2022-03-31 NOTE — PROGRESS NOTES
Order to extubate reviewed by this RT. Pt suctioned orally, subglottic and ETT for no secretions.  balloon deflated and pt extubated without complication to 3 lpm nasal cannula.  Pt resting comfortably with stable vitals as follows:      F 24  /70  SpO2 96%

## 2022-03-31 NOTE — PLAN OF CARE
Problem: Falls - Risk of:  Goal: Will remain free from falls  Description: Will remain free from falls  Outcome: Met This Shift  Goal: Absence of physical injury  Description: Absence of physical injury  Outcome: Met This Shift     Problem: Skin Integrity:  Goal: Will show no infection signs and symptoms  Description: Will show no infection signs and symptoms  Outcome: Met This Shift  Goal: Absence of new skin breakdown  Description: Absence of new skin breakdown  Outcome: Met This Shift     Problem: Non-Violent Restraints  Goal: No harm/injury to patient while restraints in use  Outcome: Met This Shift  Goal: Patient's dignity will be maintained  Outcome: Met This Shift     Problem: Pain:  Goal: Pain level will decrease  Description: Pain level will decrease  Outcome: Ongoing  Goal: Control of acute pain  Description: Control of acute pain  Outcome: Ongoing  Goal: Control of chronic pain  Description: Control of chronic pain  Outcome: Ongoing     Problem: Non-Violent Restraints  Goal: Removal from restraints as soon as assessed to be safe  Outcome: Ongoing

## 2022-03-31 NOTE — SIGNIFICANT EVENT
Critical Care - Rapid Response Team Note      Date of event: 3/31/2022   Time of event: Ernestina Mancia [age]y.o. year old female   YOB: 1985     PCP:  No primary care provider on file. Location: [address] [place] [address]   Witnessed? : [x]Yes  [] No  Initial Code status: [x] Full  [] DNR-CCA  []DNR-CC    []Limited  ______________________________________________________________________  Reason for RRT:   Seizure like activity    Chief Complaint for this admission:   Chief Complaint   Patient presents with    Seizures     hx seizures, non compliant with meds, from generations       Admit date:  3/29/2022     Admitting Diagnosis: Seizure ([address] [R56.9]  Acute respiratory failure with hypoxia ([address] [J96.01]  Breakthrough seizure ([address] [G40.919]  Aspiration pneumonia of both lower lobes, unspecified aspiration pneumonia type ([address] [J69.0]      Current Diagnosis: The primary encounter diagnosis was Acute respiratory failure with hypoxia ([address] Diagnoses of Breakthrough seizure ([address] and Aspiration pneumonia of both lower lobes, unspecified aspiration pneumonia type [facility]) were also pertinent to this visit. Subjective:     Violetta Peraza is a 38-year-old female with past medical history of type 2 diabetes mellitus, HTN, hypothyroidism, PE on Eliquis, CKD, schizoaffective disorder, borderline personality disorder, PTSD, who was to the ED from her facility and admitted on 3/29/2022 for seizure-like activity. At approximately 1900 on 3/30/2022 an RRT was called to the patient's room after her roommate observed that she fell to the floor while walking back to bed from the bathroom. Per nursing report the patient's roommate did state that she hit her head as she fell. On physical exam the patient was noted to have pendular horizontal nystagmus of the eyes as well as repetitive, rhythmic lip-smacking movements as well as rhythmic movements of the fingers and hands bilaterally.   The patient was unresponsive to painful stimuli including sternal rub and nailbed pressure. Was given 4 mg IV Ativan but continued to seize. She was given another 4 mg IV Ativan, also without success. 2.5 g of Keppra (60 mg/kg of pt's) body weight were infused via a RIJ CVC that was established at bedside during the RRT. 10 mg IM Versed were given prior to establishment of CVC access. Fosphenytoin was also administered. The patient continued to seize despite medical therapy, and after consulting with the on-call intensivist the decision was made to intubate the patient and transferred down to MICU for further medical management and stabilization. The patient was given 10 mg of vecuronium and started on Versed and propofol gtt. for sedation and paralysis. She tolerated the procedure well and was intubated successfully. After intubation and stabilization the patient was taken to CT where a noncontrast image of the head showed no acute intracranial hemorrhage or other abnormalities. After imaging the patient was taken to MICU bed 4419.     Labs since last 72 hours:   CBC with Differential:    Lab Results   Component Value Date    WBC 10.4 03/30/2022    RBC 4.25 03/30/2022    HGB 11.9 03/30/2022    HCT 36.0 03/30/2022     03/30/2022    MCV 84.7 03/30/2022    MCH 28.0 03/30/2022    MCHC 33.1 03/30/2022    RDW 13.7 03/30/2022    LYMPHOPCT 12.9 03/30/2022    MONOPCT 5.2 03/30/2022    BASOPCT 0.2 03/30/2022    MONOSABS 0.54 03/30/2022    LYMPHSABS 1.34 03/30/2022    EOSABS 0.09 03/30/2022    BASOSABS 0.02 03/30/2022     CMP:    Lab Results   Component Value Date     03/30/2022    K 3.5 03/30/2022    K 3.3 03/30/2022     03/30/2022    CO2 25 03/30/2022    BUN 13 03/30/2022    CREATININE 1.0 03/30/2022    GFRAA >60 03/30/2022    LABGLOM >60 03/30/2022    GLUCOSE 168 03/30/2022    PROT 7.1 03/30/2022    LABALBU 4.0 03/30/2022    CALCIUM 9.1 03/30/2022    BILITOT 0.3 03/30/2022    ALKPHOS 76 03/30/2022    AST 20 03/30/2022    ALT 31 03/30/2022     Ionized Calcium:  No results found for: IONCA  Magnesium:    Lab Results   Component Value Date    MG 1.4 03/30/2022     Phosphorus:    Lab Results   Component Value Date    PHOS 4.4 03/30/2022         Imaging since last 7 days:  CT HEAD WO CONTRAST    Result Date: 3/30/2022  No acute intracranial abnormality or hemorrhage. XR CHEST PORTABLE    Result Date: 3/30/2022  1. New right IJ catheter, new endotracheal tube, and new enteric tube. No complicating features. 2.  Low lung volumes which appears to be contributing to bronchovascular crowding. Overall stable when compared to prior exam.  No new cardiopulmonary pathology. XR CHEST PORTABLE    Result Date: 3/29/2022  Bilateral airspace disease could represent infection or edema from congestive heart failure. CTA PULMONARY W CONTRAST    Result Date: 3/29/2022  No central pulmonary embolism or aortic dissection. The distal subsegmental and peripheral vessels are poorly evaluated due to suboptimal contrast. Patchy infiltrates and atelectasis in the lung bases bilaterally concerning for pneumonia. RECOMMENDATIONS: Unavailable     XR ABDOMEN FOR NG/OG/NE TUBE PLACEMENT    Result Date: 3/30/2022  Catheter is in the stomach. RRT Assessment and Plan:    Nikolay Silva is a 42 y.o. female with  has a past medical history of Borderline personality disorder ([address] Chronic kidney disease, Diabetes ([address] Hypertension, Hypothyroidism, Psychogenic nonepileptic seizure, PTSD (post-traumatic stress disorder), Pulmonary embolism ([address] and Schizoaffective disorder ([address] who was admitted on 3/29/2022 with admitting diagnosis Seizure [facility]) [R56.9]  Acute respiratory failure with hypoxia ([address] [J96.01]  Breakthrough seizure ([address] [G40.919]  Aspiration pneumonia of both lower lobes, unspecified aspiration pneumonia type ([address] [J69.0]  . RRT was called on 3/31/2022 . Initial assessment and interventions as noted above.      Current problems include: 1. Status epilepticus likely 2/2 breakthrough seizures vs less likely PNES    Plan:    Transfer to MICU after intubation, sedated on Versed and propofol GTT.  We will speak with neurology in a.m. regarding 24-hour EEG monitoring while patient is in MICU   We will wean to extubation if patient remains seizure-free overnight and tolerates trial of weaning in a.m.  ?   Code status: [x] Full  [] DNR-CCA  []DNR-CC []Limited    Disposition:  [] No transfer   [] Transfer to monitor floor  [x] Transfer to: [x] MICU [] NICU [] CCU [] SICU    Patients family updated:     [] Yes  [x] No   Discussed with:  [x] Critical Care Intensivist: Dr. Linward Brunner      [] Primary Care Provider:       [] Other: ?    Exie Body, DO PGY-2  3/31/2022 4:38 AM  Attending Physician: Dr Linward Brunner

## 2022-03-31 NOTE — PLAN OF CARE
Problem: Falls - Risk of:  Goal: Will remain free from falls  Description: Will remain free from falls  3/31/2022 1024 by Rikki Chester RN  Outcome: Met This Shift     Problem: Falls - Risk of:  Goal: Absence of physical injury  Description: Absence of physical injury  3/31/2022 1024 by Rikki Chester RN  Outcome: Met This Shift     Problem: Skin Integrity:  Goal: Will show no infection signs and symptoms  Description: Will show no infection signs and symptoms  3/31/2022 1024 by Rikki Chester RN  Outcome: Met This Shift     Problem: Skin Integrity:  Goal: Absence of new skin breakdown  Description: Absence of new skin breakdown  3/31/2022 1024 by Rikki Chester RN  Outcome: Met This Shift     Problem: Non-Violent Restraints  Goal: Removal from restraints as soon as assessed to be safe  3/31/2022 1024 by Rikki Chester RN  Outcome: Not Met This Shift     Problem: Non-Violent Restraints  Goal: No harm/injury to patient while restraints in use  3/31/2022 1024 by Rikki Chester RN  Outcome: Met This Shift     Problem: Non-Violent Restraints  Goal: Patient's dignity will be maintained  3/31/2022 1024 by Rikki Chester RN  Outcome: Met This Shift

## 2022-03-31 NOTE — PROGRESS NOTES
Physician Progress Note      Kerry Nuñez  NEERU #:                  [id number]  :                       1985  ADMIT DATE:       3/29/2022 12:07 PM  DISCH DATE:  RESPONDING  PROVIDER #:        Juanita Miller CNP        QUERY TEXT:    Stage of Chronic Kidney Disease: Please provide further specificity, if known. Clinical indicators include: chronic kidney disease, bun, creatinine  Options provided:  -- Chronic kidney disease stage 1  -- Chronic kidney disease stage 2  -- Chronic kidney disease stage 3  -- Chronic kidney disease stage 3a  -- Chronic kidney disease stage 3b  -- Chronic kidney disease stage 4  -- Chronic kidney disease stage 5  -- Chronic kidney disease stage 5, requiring dialysis  -- End stage renal disease  -- Other - I will add my own diagnosis  -- Disagree - Not applicable / Not valid  -- Disagree - Clinically Unable to determine / Unknown        PROVIDER RESPONSE TEXT:    Provider dismissed this query because it was not applicable to the patient or   not a valid query.   not applicable to neuro      Electronically signed by:  Juanita Miller CNP 3/31/2022 11:07 AM
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
## 2022-04-01 ENCOUNTER — APPOINTMENT (OUTPATIENT)
Dept: GENERAL RADIOLOGY | Age: 37
DRG: 208 | End: 2022-04-01
Payer: MEDICARE

## 2022-04-01 ENCOUNTER — APPOINTMENT (OUTPATIENT)
Dept: CT IMAGING | Age: 37
DRG: 208 | End: 2022-04-01
Payer: MEDICARE

## 2022-04-01 LAB
ALBUMIN SERPL-MCNC: 3.5 G/DL (ref 3.5–5.2)
ALP BLD-CCNC: 68 U/L (ref 35–104)
ALT SERPL-CCNC: 22 U/L (ref 0–32)
AMMONIA: 18 UMOL/L (ref 11–51)
ANION GAP SERPL CALCULATED.3IONS-SCNC: 10 MMOL/L (ref 7–16)
ANION GAP SERPL CALCULATED.3IONS-SCNC: 12 MMOL/L (ref 7–16)
AST SERPL-CCNC: 14 U/L (ref 0–31)
B.E.: -1 MMOL/L (ref -3–3)
B.E.: -1.8 MMOL/L (ref -3–3)
BASOPHILS ABSOLUTE: 0.04 E9/L (ref 0–0.2)
BASOPHILS ABSOLUTE: 0.04 E9/L (ref 0–0.2)
BASOPHILS RELATIVE PERCENT: 0.5 % (ref 0–2)
BASOPHILS RELATIVE PERCENT: 0.5 % (ref 0–2)
BILIRUB SERPL-MCNC: 0.3 MG/DL (ref 0–1.2)
BILIRUBIN DIRECT: <0.2 MG/DL (ref 0–0.3)
BILIRUBIN, INDIRECT: ABNORMAL MG/DL (ref 0–1)
BUN BLDV-MCNC: 8 MG/DL (ref 6–20)
BUN BLDV-MCNC: 9 MG/DL (ref 6–20)
CALCIUM IONIZED: 1.28 MMOL/L (ref 1.15–1.33)
CALCIUM SERPL-MCNC: 8.8 MG/DL (ref 8.6–10.2)
CALCIUM SERPL-MCNC: 9.4 MG/DL (ref 8.6–10.2)
CHLORIDE BLD-SCNC: 102 MMOL/L (ref 98–107)
CHLORIDE BLD-SCNC: 106 MMOL/L (ref 98–107)
CO2: 24 MMOL/L (ref 22–29)
CO2: 27 MMOL/L (ref 22–29)
COHB: 0.2 % (ref 0–1.5)
COHB: 0.3 % (ref 0–1.5)
CREAT SERPL-MCNC: 1 MG/DL (ref 0.5–1)
CREAT SERPL-MCNC: 1 MG/DL (ref 0.5–1)
CRITICAL: ABNORMAL
CRITICAL: ABNORMAL
DATE ANALYZED: ABNORMAL
DATE ANALYZED: ABNORMAL
DATE OF COLLECTION: ABNORMAL
DATE OF COLLECTION: ABNORMAL
EOSINOPHILS ABSOLUTE: 0.24 E9/L (ref 0.05–0.5)
EOSINOPHILS ABSOLUTE: 0.29 E9/L (ref 0.05–0.5)
EOSINOPHILS RELATIVE PERCENT: 3.3 % (ref 0–6)
EOSINOPHILS RELATIVE PERCENT: 3.3 % (ref 0–6)
GFR AFRICAN AMERICAN: >60
GFR AFRICAN AMERICAN: >60
GFR NON-AFRICAN AMERICAN: >60 ML/MIN/1.73
GFR NON-AFRICAN AMERICAN: >60 ML/MIN/1.73
GLUCOSE BLD-MCNC: 104 MG/DL (ref 74–99)
GLUCOSE BLD-MCNC: 160 MG/DL (ref 74–99)
GRAM STAIN ORDERABLE: NORMAL
HCO3: 21.7 MMOL/L (ref 22–26)
HCO3: 22.5 MMOL/L (ref 22–26)
HCT VFR BLD CALC: 31.5 % (ref 34–48)
HCT VFR BLD CALC: 34.3 % (ref 34–48)
HEMOGLOBIN: 10.9 G/DL (ref 11.5–15.5)
HEMOGLOBIN: 9.9 G/DL (ref 11.5–15.5)
HHB: 5.7 % (ref 0–5)
HHB: 6.6 % (ref 0–5)
IMMATURE GRANULOCYTES #: 0.04 E9/L
IMMATURE GRANULOCYTES #: 0.04 E9/L
IMMATURE GRANULOCYTES %: 0.5 % (ref 0–5)
IMMATURE GRANULOCYTES %: 0.5 % (ref 0–5)
LAB: ABNORMAL
LAB: ABNORMAL
LACTIC ACID: 1 MMOL/L (ref 0.5–2.2)
LACTIC ACID: 2.8 MMOL/L (ref 0.5–2.2)
LYMPHOCYTES ABSOLUTE: 1.7 E9/L (ref 1.5–4)
LYMPHOCYTES ABSOLUTE: 2.07 E9/L (ref 1.5–4)
LYMPHOCYTES RELATIVE PERCENT: 19.2 % (ref 20–42)
LYMPHOCYTES RELATIVE PERCENT: 28.3 % (ref 20–42)
Lab: ABNORMAL
Lab: ABNORMAL
MAGNESIUM: 1.7 MG/DL (ref 1.6–2.6)
MCH RBC QN AUTO: 27 PG (ref 26–35)
MCH RBC QN AUTO: 27.5 PG (ref 26–35)
MCHC RBC AUTO-ENTMCNC: 31.4 % (ref 32–34.5)
MCHC RBC AUTO-ENTMCNC: 31.8 % (ref 32–34.5)
MCV RBC AUTO: 86.1 FL (ref 80–99.9)
MCV RBC AUTO: 86.6 FL (ref 80–99.9)
METER GLUCOSE: 112 MG/DL (ref 74–99)
METER GLUCOSE: 144 MG/DL (ref 74–99)
METHB: 0.5 % (ref 0–1.5)
METHB: 0.5 % (ref 0–1.5)
MODE: ABNORMAL
MODE: ABNORMAL
MONOCYTES ABSOLUTE: 0.59 E9/L (ref 0.1–0.95)
MONOCYTES ABSOLUTE: 0.61 E9/L (ref 0.1–0.95)
MONOCYTES RELATIVE PERCENT: 6.9 % (ref 2–12)
MONOCYTES RELATIVE PERCENT: 8.1 % (ref 2–12)
NEUTROPHILS ABSOLUTE: 4.33 E9/L (ref 1.8–7.3)
NEUTROPHILS ABSOLUTE: 6.18 E9/L (ref 1.8–7.3)
NEUTROPHILS RELATIVE PERCENT: 59.3 % (ref 43–80)
NEUTROPHILS RELATIVE PERCENT: 69.6 % (ref 43–80)
O2 CONTENT: 16 ML/DL
O2 CONTENT: 16.3 ML/DL
O2 SATURATION: 93.4 % (ref 92–98.5)
O2 SATURATION: 94.3 % (ref 92–98.5)
O2HB: 92.7 % (ref 94–97)
O2HB: 93.5 % (ref 94–97)
OPERATOR ID: 1119
OPERATOR ID: 8219
PATIENT TEMP: 37 C
PATIENT TEMP: 37 C
PCO2: 33.1 MMHG (ref 35–45)
PCO2: 33.7 MMHG (ref 35–45)
PDW BLD-RTO: 13.5 FL (ref 11.5–15)
PDW BLD-RTO: 13.9 FL (ref 11.5–15)
PH BLOOD GAS: 7.43 (ref 7.35–7.45)
PH BLOOD GAS: 7.44 (ref 7.35–7.45)
PHOSPHORUS: 3.9 MG/DL (ref 2.5–4.5)
PLATELET # BLD: 296 E9/L (ref 130–450)
PLATELET # BLD: 346 E9/L (ref 130–450)
PMV BLD AUTO: 8.8 FL (ref 7–12)
PMV BLD AUTO: 9 FL (ref 7–12)
PO2: 69.2 MMHG (ref 75–100)
PO2: 72.9 MMHG (ref 75–100)
POTASSIUM SERPL-SCNC: 3.8 MMOL/L (ref 3.5–5)
POTASSIUM SERPL-SCNC: 3.8 MMOL/L (ref 3.5–5)
RBC # BLD: 3.66 E12/L (ref 3.5–5.5)
RBC # BLD: 3.96 E12/L (ref 3.5–5.5)
SODIUM BLD-SCNC: 138 MMOL/L (ref 132–146)
SODIUM BLD-SCNC: 143 MMOL/L (ref 132–146)
SOURCE, BLOOD GAS: ABNORMAL
SOURCE, BLOOD GAS: ABNORMAL
THB: 12.1 G/DL (ref 11.5–16.5)
THB: 12.5 G/DL (ref 11.5–16.5)
TIME ANALYZED: 2034
TIME ANALYZED: 2139
TOTAL CK: 46 U/L (ref 20–180)
TOTAL PROTEIN: 6.3 G/DL (ref 6.4–8.3)
TROPONIN, HIGH SENSITIVITY: <6 NG/L (ref 0–9)
WBC # BLD: 7.3 E9/L (ref 4.5–11.5)
WBC # BLD: 8.9 E9/L (ref 4.5–11.5)

## 2022-04-01 PROCEDURE — 83735 ASSAY OF MAGNESIUM: CPT

## 2022-04-01 PROCEDURE — 6370000000 HC RX 637 (ALT 250 FOR IP): Performed by: INTERNAL MEDICINE

## 2022-04-01 PROCEDURE — 6360000002 HC RX W HCPCS: Performed by: INTERNAL MEDICINE

## 2022-04-01 PROCEDURE — 84484 ASSAY OF TROPONIN QUANT: CPT

## 2022-04-01 PROCEDURE — 2580000003 HC RX 258: Performed by: INTERNAL MEDICINE

## 2022-04-01 PROCEDURE — 84100 ASSAY OF PHOSPHORUS: CPT

## 2022-04-01 PROCEDURE — 82330 ASSAY OF CALCIUM: CPT

## 2022-04-01 PROCEDURE — 6360000002 HC RX W HCPCS: Performed by: FAMILY MEDICINE

## 2022-04-01 PROCEDURE — 2060000000 HC ICU INTERMEDIATE R&B

## 2022-04-01 PROCEDURE — 71045 X-RAY EXAM CHEST 1 VIEW: CPT

## 2022-04-01 PROCEDURE — 80048 BASIC METABOLIC PNL TOTAL CA: CPT

## 2022-04-01 PROCEDURE — 85025 COMPLETE CBC W/AUTO DIFF WBC: CPT

## 2022-04-01 PROCEDURE — 2580000003 HC RX 258: Performed by: FAMILY MEDICINE

## 2022-04-01 PROCEDURE — C9113 INJ PANTOPRAZOLE SODIUM, VIA: HCPCS | Performed by: INTERNAL MEDICINE

## 2022-04-01 PROCEDURE — 36415 COLL VENOUS BLD VENIPUNCTURE: CPT

## 2022-04-01 PROCEDURE — 80076 HEPATIC FUNCTION PANEL: CPT

## 2022-04-01 PROCEDURE — 83605 ASSAY OF LACTIC ACID: CPT

## 2022-04-01 PROCEDURE — 99221 1ST HOSP IP/OBS SF/LOW 40: CPT | Performed by: NURSE PRACTITIONER

## 2022-04-01 PROCEDURE — 6370000000 HC RX 637 (ALT 250 FOR IP): Performed by: FAMILY MEDICINE

## 2022-04-01 PROCEDURE — A4216 STERILE WATER/SALINE, 10 ML: HCPCS | Performed by: INTERNAL MEDICINE

## 2022-04-01 PROCEDURE — C9254 INJECTION, LACOSAMIDE: HCPCS | Performed by: INTERNAL MEDICINE

## 2022-04-01 PROCEDURE — 82805 BLOOD GASES W/O2 SATURATION: CPT

## 2022-04-01 PROCEDURE — 82962 GLUCOSE BLOOD TEST: CPT

## 2022-04-01 PROCEDURE — 99233 SBSQ HOSP IP/OBS HIGH 50: CPT | Performed by: NURSE PRACTITIONER

## 2022-04-01 PROCEDURE — 82550 ASSAY OF CK (CPK): CPT

## 2022-04-01 PROCEDURE — 82140 ASSAY OF AMMONIA: CPT

## 2022-04-01 PROCEDURE — 2700000000 HC OXYGEN THERAPY PER DAY

## 2022-04-01 RX ORDER — POTASSIUM CHLORIDE 20 MEQ/1
20 TABLET, EXTENDED RELEASE ORAL ONCE
Status: COMPLETED | OUTPATIENT
Start: 2022-04-01 | End: 2022-04-01

## 2022-04-01 RX ORDER — SODIUM CHLORIDE, SODIUM LACTATE, POTASSIUM CHLORIDE, CALCIUM CHLORIDE 600; 310; 30; 20 MG/100ML; MG/100ML; MG/100ML; MG/100ML
INJECTION, SOLUTION INTRAVENOUS CONTINUOUS
Status: DISCONTINUED | OUTPATIENT
Start: 2022-04-01 | End: 2022-04-01

## 2022-04-01 RX ORDER — AMOXICILLIN AND CLAVULANATE POTASSIUM 500; 125 MG/1; MG/1
1 TABLET, FILM COATED ORAL EVERY 12 HOURS SCHEDULED
Status: DISCONTINUED | OUTPATIENT
Start: 2022-04-01 | End: 2022-04-01

## 2022-04-01 RX ORDER — MAGNESIUM SULFATE 1 G/100ML
1000 INJECTION INTRAVENOUS ONCE
Status: COMPLETED | OUTPATIENT
Start: 2022-04-01 | End: 2022-04-01

## 2022-04-01 RX ORDER — PANTOPRAZOLE SODIUM 40 MG/1
40 TABLET, DELAYED RELEASE ORAL
Status: DISCONTINUED | OUTPATIENT
Start: 2022-04-02 | End: 2022-04-01

## 2022-04-01 RX ORDER — PANTOPRAZOLE SODIUM 40 MG/10ML
40 INJECTION, POWDER, LYOPHILIZED, FOR SOLUTION INTRAVENOUS DAILY
Status: DISCONTINUED | OUTPATIENT
Start: 2022-04-01 | End: 2022-04-01

## 2022-04-01 RX ORDER — AMOXICILLIN AND CLAVULANATE POTASSIUM 875; 125 MG/1; MG/1
1 TABLET, FILM COATED ORAL EVERY 12 HOURS SCHEDULED
Status: DISCONTINUED | OUTPATIENT
Start: 2022-04-01 | End: 2022-04-04 | Stop reason: HOSPADM

## 2022-04-01 RX ADMIN — APIXABAN 5 MG: 5 TABLET, FILM COATED ORAL at 08:18

## 2022-04-01 RX ADMIN — AMPICILLIN SODIUM AND SULBACTAM SODIUM 3000 MG: 2; 1 INJECTION, POWDER, FOR SOLUTION INTRAMUSCULAR; INTRAVENOUS at 01:22

## 2022-04-01 RX ADMIN — Medication 10 ML: at 08:19

## 2022-04-01 RX ADMIN — POTASSIUM CHLORIDE 20 MEQ: 20 TABLET, EXTENDED RELEASE ORAL at 08:44

## 2022-04-01 RX ADMIN — DEXTROSE MONOHYDRATE 150 MG: 50 INJECTION, SOLUTION INTRAVENOUS at 21:30

## 2022-04-01 RX ADMIN — SODIUM CHLORIDE, PRESERVATIVE FREE 40 MG: 5 INJECTION INTRAVENOUS at 08:18

## 2022-04-01 RX ADMIN — LEVETIRACETAM 1000 MG: 10 INJECTION INTRAVENOUS at 08:33

## 2022-04-01 RX ADMIN — LACOSAMIDE 150 MG: 100 TABLET, FILM COATED ORAL at 08:18

## 2022-04-01 RX ADMIN — LAMOTRIGINE 50 MG: 25 TABLET ORAL at 08:18

## 2022-04-01 RX ADMIN — SODIUM CHLORIDE, POTASSIUM CHLORIDE, SODIUM LACTATE AND CALCIUM CHLORIDE: 600; 310; 30; 20 INJECTION, SOLUTION INTRAVENOUS at 02:21

## 2022-04-01 RX ADMIN — GUAIFENESIN 400 MG: 400 TABLET ORAL at 15:18

## 2022-04-01 RX ADMIN — MAGNESIUM SULFATE 1000 MG: 1 INJECTION INTRAVENOUS at 08:47

## 2022-04-01 RX ADMIN — AMOXICILLIN AND CLAVULANATE POTASSIUM 1 TABLET: 875; 125 TABLET, FILM COATED ORAL at 11:37

## 2022-04-01 RX ADMIN — LORAZEPAM 8 MG: 2 INJECTION INTRAMUSCULAR; INTRAVENOUS at 19:53

## 2022-04-01 RX ADMIN — ACETAMINOPHEN 650 MG: 325 TABLET ORAL at 13:27

## 2022-04-01 RX ADMIN — LEVOTHYROXINE SODIUM 150 MCG: 0.15 TABLET ORAL at 05:27

## 2022-04-01 RX ADMIN — LEVETIRACETAM 1000 MG: 10 INJECTION INTRAVENOUS at 21:05

## 2022-04-01 RX ADMIN — GUAIFENESIN 400 MG: 400 TABLET ORAL at 08:18

## 2022-04-01 RX ADMIN — AMPICILLIN SODIUM AND SULBACTAM SODIUM 3000 MG: 2; 1 INJECTION, POWDER, FOR SOLUTION INTRAMUSCULAR; INTRAVENOUS at 06:45

## 2022-04-01 ASSESSMENT — PAIN DESCRIPTION - FREQUENCY
FREQUENCY: INTERMITTENT
FREQUENCY: INTERMITTENT

## 2022-04-01 ASSESSMENT — PAIN DESCRIPTION - LOCATION
LOCATION: HEAD
LOCATION: HEAD

## 2022-04-01 ASSESSMENT — PAIN DESCRIPTION - ONSET
ONSET: ON-GOING
ONSET: ON-GOING

## 2022-04-01 ASSESSMENT — PAIN SCALES - GENERAL
PAINLEVEL_OUTOF10: 7
PAINLEVEL_OUTOF10: 0
PAINLEVEL_OUTOF10: 7
PAINLEVEL_OUTOF10: 0
PAINLEVEL_OUTOF10: 4
PAINLEVEL_OUTOF10: 0

## 2022-04-01 ASSESSMENT — PAIN DESCRIPTION - PROGRESSION
CLINICAL_PROGRESSION: NOT CHANGED
CLINICAL_PROGRESSION: NOT CHANGED

## 2022-04-01 ASSESSMENT — PAIN DESCRIPTION - PAIN TYPE
TYPE: ACUTE PAIN
TYPE: ACUTE PAIN

## 2022-04-01 ASSESSMENT — PAIN DESCRIPTION - DESCRIPTORS
DESCRIPTORS: ACHING;HEADACHE
DESCRIPTORS: ACHING;HEADACHE

## 2022-04-01 ASSESSMENT — PAIN - FUNCTIONAL ASSESSMENT
PAIN_FUNCTIONAL_ASSESSMENT: ACTIVITIES ARE NOT PREVENTED
PAIN_FUNCTIONAL_ASSESSMENT: ACTIVITIES ARE NOT PREVENTED

## 2022-04-01 ASSESSMENT — PAIN DESCRIPTION - ORIENTATION
ORIENTATION: MID
ORIENTATION: MID

## 2022-04-01 NOTE — PROGRESS NOTES
Multiple RN's attempted to place peripheral IV's with no success. Resident notified that patient will need a peripheral placed by them with ultrasound or an IV team consult. Central line remains in place at this time.

## 2022-04-01 NOTE — PROGRESS NOTES
Hospitalist Progress Note      PCP: No primary care provider on file. Date of Admission: 3/29/2022    Chief Complaint: seizure like activity    Hospital Course:    Patient presented to the emergency department after having seizure-like activity. Patient has history of seizure disorder. [facility]. Vital signs reveal the patient be hypoxic 70% on room air. Possible she may have aspirated. Tachycardic with a rate of 101. She is afebrile. Laboratory studies demonstrate creatinine 1.1, glucose 154. CT chest shows patchy infiltrates and atelectasis in the lung bases bilaterally concerning for pneumonia. Patient was given loading dose of Keppra. Also given Unasyn for aspiration pneumonia. Medicine consulted for admission. Subjective: Patient was seen at bedside in the ICU today having a possible pseudo seizure, Discussed with nursing and resident in the MICU and held off on the ativan. Stable for transfer to the floor per MICU,    On 3/30/2022 at approximately 1900, an RRT was called after the patient was observed by her roommate to suddenly fall to the ground, striking her head and face. The patient was lifted back onto the bed, and on resident examination the patient was found to have pendular nystagmus of bilateral eyes as well as rhythmic lipsmacking and finger movements. She remained hemodynamically stable during the entire RRT but did require intubation and mechanical ventilation for status epilepticus. CT of the head performed s/p intubation was negative for any acute intracranial hemorrhage or other abnormalities.   After consulting with on-call intensivist, patient was brought to the MICU for further management and stabilization    Medications:  Reviewed    Infusion Medications    sodium chloride      dextrose       Scheduled Medications    [START ON 4/2/2022] pantoprazole  40 mg Oral QAM AC    amoxicillin-clavulanate  1 tablet Oral 2 times per day    apixaban  5 mg Oral BID    levothyroxine  150 mcg Oral Daily    [Held by provider] mirtazapine  15 mg Oral Nightly    [Held by provider] paliperidone  6 mg Oral Nightly    sodium chloride flush  5-40 mL IntraVENous 2 times per day    lamoTRIgine  50 mg Oral Daily    LORazepam  0.5 mg IntraVENous Once    lacosamide  150 mg Oral BID    levetiracetam  1,000 mg IntraVENous BID    LORazepam  2 mg IntraVENous Once    guaiFENesin  400 mg Oral TID     PRN Meds: sodium chloride flush, sodium chloride, LORazepam, ondansetron **OR** ondansetron, polyethylene glycol, acetaminophen **OR** acetaminophen, glucose, dextrose, glucagon (rDNA), dextrose      Intake/Output Summary (Last 24 hours) at 4/1/2022 1153  Last data filed at 4/1/2022 1050  Gross per 24 hour   Intake 3270.28 ml   Output 2680 ml   Net 590.28 ml       Exam:    BP 98/79   Pulse 85   Temp 98.6 °F (37 °C) (Axillary)   Resp 21   Ht 5' 4\" (1.626 m)   Wt 247 lb 2.2 oz (112.1 kg)   LMP  (LMP Unknown)   SpO2 98%   BMI 42.42 kg/m²     General appearance: Morbid obesity, wakes up to verbal, tactile stimuli-does not respond to commands  HEENT: Pupils equal, round, and reactive to light. Conjunctivae/corneas clear. Neck: Supple,. No jugular venous distention. Trachea midline. Respiratory: Nasal cannula 2 L, normal vesicular breath sounds, no added sounds  Cardiovascular: Regular rate and rhythm with normal S1/S2 without murmurs, rubs or gallops. Abdomen: Soft, non-tender, non-distended with normal bowel sounds. Musculoskeletal: No clubbing, cyanosis or edema bilaterally. Skin: Skin color, texture, turgor normal.  No rashes or lesions.   Neurologic: Does not follow commands, unable to appreciate examination, 3 / 5 upper and lower extremities as patient not cooperative    Labs:   Recent Labs     03/30/22 2117 03/31/22 0447 04/01/22 0440   WBC 10.4 9.1 7.3   HGB 11.9 11.3* 9.9*   HCT 36.0 34.2 31.5*    351 296     Recent Labs     03/30/22 2117 03/31/22  0447 04/01/22  0440    140 143   K 3.5 3.1* 3.8    101 106   CO2 25 25 27   BUN 13 13 9   CREATININE 1.0 1.0 1.0   CALCIUM 9.1 8.8 8.8   PHOS 4.4 3.7 3.9     Recent Labs     03/30/22  2117 03/31/22  0447 04/01/22  0440   AST 20 17 14   ALT 31 29 22   BILIDIR <0.2 <0.2 <0.2   BILITOT 0.3 0.3 0.3   ALKPHOS 76 71 68     No results for input(s): INR in the last 72 hours. Recent Labs     03/31/22  0848   CKTOTAL 21       Assessment/Plan:    Active Hospital Problems    Diagnosis Date Noted    Psychogenic nonepileptic seizure [F44.5] 03/30/2022    Seizure ([facility] [address] [R56.9] 03/29/2022   -Nonintractable epilepsy without status epilepticus, unspecified epilepsy type   -Aspiration pneumonia  -Acute respiratory failure with hypoxia  -Hypertension  -Hypothyroidism due to Hashimoto's thyroiditis   -History of DVT/PE chronic anticoagulated on Eliquis  - DM type 2  -Schizoaffective disorder  -Unstable gait at baseline-currently using walker    Patient is currently following neurology and is on [address]  Patient was previously diagnosed to have pseudoseizures per chart review  Currently on Unasyn for aspiration pneumonia  Neurology consutled-Continue home Keppra 2 g twice daily, Lamictal 50 mg daily, and Vimpat 150 mg twice daily,Wean sedation as able and limit use of benzodiazepines  EEG ordered per neuro-3hr showed no epileptic changes  S/p RRT-3/30/2022 and was intubated -3/31/22 extubated, weaned on NC since  Appreciate critical care management. DVT Prophylaxis: lovenox  Diet: ADULT DIET;  Regular  Code Status: Full Code    PT/OT Eval Status: ordered    Dispo - transfer to [facility] per MICU    Angela García MD

## 2022-04-01 NOTE — PROGRESS NOTES
CH MIDLINE Placement 4/1/2022    Product number: [id number]   Lot Number: [id number]      Ultrasound: yes   Left Basilic vein:                Upper Arm Circumference: 42cm    Size: 4.5f    Exposed Length: 3cm    Internal Length: 12cm   Cut: 0   Vein Measurement: .44cm    Armando Ansari RN  4/1/2022  2:55 PM

## 2022-04-01 NOTE — PROGRESS NOTES
OT SESSION ATTEMPT     Date:2022  Patient Name: Lorena Sylvester  MRN: [id number]  : 1985  Room: [address] [place] [address]A     Attempted OT session this date:    [x] unavailable due to other medical staff currently with pt    [] on hold, await MRI/ neurosurgical recommendations. [] on hold per nursing staff secondary to lab / radiology results    [] declined Occupational Therapy  this date due to ___. Benefits of participation in therapy reviewed with pt.    [] off unit   [x] Other: x2 attempts this date, pt with seizure like activity per nursing. Will reattempt OT session at a later time.     [address] [place] [address], OTR/L, [id number]

## 2022-04-01 NOTE — PROGRESS NOTES
[facility]   Department of Internal Medicine   Internal Medicine Residency  MICU Progress Note    Patient:  Oleksandr Kingston 42 y.o. female   MRN: [id number]       Date of Service: 2022    Allergy: Carbamazepine, Latuda [lurasidone], and Shellfish-derived products    Subjective:     Critical Care Daily Progress Note: 2022 2:53 PM     Patient was seen and examined in AM  Patient states feels better. Denies fever, CP, SOB, chills, and N/V. No complaints  Alert oriented x 3   Lactic acidoses resolved   One episodes of shaking, doubt siezurs    24 hour change: Stable overnight. No acute overnight issues reported. Objective     Vitals reviewed. I/O last 3 completed shifts: In: 4158.2 [I.V.:3326.7; NG/GT:30; IV Piggyback:801.5]  Out: 3900 [Urine:3900]  I/O this shift: In: 520 [P.O.:520]  Out: 450 [Urine:450]      TEMPERATURE:  Current - Temp: 97.6 °F (36.4 °C); Max - Temp  Av.8 °F (37.1 °C)  Min: 97.6 °F (36.4 °C)  Max: 100.4 °F (38 °C)  RESPIRATIONS RANGE: Resp  Av.6  Min: 17  Max: 29  PULSE RANGE: Pulse  Av.5  Min: 80  Max: 121  BLOOD PRESSURE RANGE:  Systolic (66SUP), OAL:583 , Min:86 , OYP:763   ; Diastolic (85LCY), EBN:93, Min:44, Max:106    PULSE OXIMETRY RANGE: SpO2  Av.2 %  Min: 90 %  Max: 99 %    I & O - 24hr:    Intake/Output Summary (Last 24 hours) at 2022 1453  Last data filed at 2022 1412  Gross per 24 hour   Intake 2443.68 ml   Output 2360 ml   Net 83.68 ml     I/O last 3 completed shifts: In: 4158.2 [I.V.:3326.7; NG/GT:30; IV Piggyback:801.5]  Out: 3900 [Urine:3900] I/O this shift: In: 26 [P.O.:520]  Out: 450 [Urine:450]   Weight change:     Physical Exam:  General Appearance:    Alert, cooperative, no acute distress. Obese    HEENT:    NC/AT, mucous membranes are moist   Neck:   Supple, no jugular venous distention. Resp:     CTAB, No wheezes, No rhonchi, no use of accessory muscles   Heart:    RRR, S1 and S2 normal, no murmur, rub or gallop. Abdomen:     Soft, non-tender, non-distended with normal bowel sounds   Extremities:   Atraumatic, no cyanosis or edema   Pulses:  Radial and pedal pulses are intact bilaterally   Neurologic:   AAOx3, No focal motor deficit       Medications:     Continuous Infusions:   sodium chloride      dextrose       Scheduled Meds:   [START ON 4/2/2022] pantoprazole  40 mg Oral QAM AC    amoxicillin-clavulanate  1 tablet Oral 2 times per day    apixaban  5 mg Oral BID    levothyroxine  150 mcg Oral Daily    [Held by provider] mirtazapine  15 mg Oral Nightly    [Held by provider] paliperidone  6 mg Oral Nightly    sodium chloride flush  5-40 mL IntraVENous 2 times per day    lamoTRIgine  50 mg Oral Daily    LORazepam  0.5 mg IntraVENous Once    lacosamide  150 mg Oral BID    levetiracetam  1,000 mg IntraVENous BID    LORazepam  2 mg IntraVENous Once    guaiFENesin  400 mg Oral TID     PRN Meds: sodium chloride flush, sodium chloride, LORazepam, ondansetron **OR** ondansetron, polyethylene glycol, acetaminophen **OR** acetaminophen, glucose, dextrose, glucagon (rDNA), dextrose  Nutrition:   NG/OG tube TF type: Pulmocare/Nephro/Glucerna/Jevity         Labs and Imaging Studies:     CBC:   Recent Labs     03/30/22 2117 03/31/22 0447 04/01/22  0440   WBC 10.4 9.1 7.3   HGB 11.9 11.3* 9.9*   HCT 36.0 34.2 31.5*   MCV 84.7 84.0 86.1    351 296       BMP:    Recent Labs     03/30/22 2117 03/31/22 0447 04/01/22  0440    140 143   K 3.5 3.1* 3.8    101 106   CO2 25 25 27   BUN 13 13 9   CREATININE 1.0 1.0 1.0   GLUCOSE 168* 127* 104*       LIVER PROFILE:   Recent Labs     03/30/22 2117 03/31/22 0447 04/01/22  0440   AST 20 17 14   ALT 31 29 22   BILIDIR <0.2 <0.2 <0.2   BILITOT 0.3 0.3 0.3   ALKPHOS 76 71 68       PT/INR:   No results for input(s): PROTIME, INR in the last 72 hours. APTT:   No results for input(s): APTT in the last 72 hours.     Fasting Lipid Panel:    Lab Results   Component Value Date CHOL 146 03/31/2022    TRIG 153 03/31/2022    HDL 42 03/31/2022       Cardiac Enzymes:    Lab Results   Component Value Date    CKTOTAL 20 03/31/2022    CKTOTAL 45 11/25/2020    CKTOTAL 119 11/23/2020    TROPONINI <0.010 02/12/2021    TROPONINI <0.010 02/11/2021    TROPONINI <0.01 11/27/2020       Notable Cultures:      Blood cultures   Blood Culture, Routine   Date Value Ref Range Status   11/22/2020 5 Days no growth  Final     Respiratory cultures No results found for: RESPCULTURE No results found for: LABGRAM  Urine No results found for: LABURIN  Legionella No results found for: LABLEGI  C Diff PCR No results found for: CDIFPCR  Wound culture/abscess: No results for input(s): WNDABS in the last 72 hours. Tip culture:No results for input(s): CXCATHTIP in the last 72 hours.      Antibiotic  Days  Day started                                Oxygen:     Vent Information  $Ventilation: (S) Off Vent  Skin Assessment: Clean, dry, & intact  Equipment Changed: Humidification  Vent Type: 980  Vent Mode: PS  Vt Ordered: 0 mL  Rate Set: 0 bmp  Peak Flow: 0 L/min  Pressure Support: 8 cmH20  FiO2 : 40 %  SpO2: 96 %  SpO2/FiO2 ratio: 237.5  Sensitivity: 3  PEEP/CPAP: 5  I Time/ I Time %: 0 s  Humidification Source: Heated wire  Humidification Temp: 37  Humidification Temp Measured: 37  Additional Respiratory  Assessments  Pulse: 89  Resp: 22  SpO2: 96 %  End Tidal CO2: 31 (%)  Humidification Source: Heated wire  Humidification Temp: 37  Oral Care: Mouth suctioned     Nasal cannula L/min     Face mask %     Reservoirs mask %       ABG   Vent Settings     PH   Mode      PCO2   TV      PO2   RR      HCO3   PS      Sat%   PEEP      FIO2   FIO2        P/F          Lines:  Site  Day  Date inserted     TLC              PICC              Arterial line              Peripheral line              HD cath            Briefly: 42 y.o. female  has a past medical history of Borderline personality disorder, Chronic kidney disease, Diabetes, Hypertension, Hypothyroidism, Psychogenic nonepileptic seizure, PTSD, Pulmonary embolism, and Schizoaffective disorder admitted to the hospital for seizures like activity. An RRT was called the next day for status epilepticus, Incubated and transfer to MICU:    Assessment:   Status epilepticus likely 2/2 breakthrough seizures vs likely PNES. EEG negative   Acute respiratory failure, intubated for airway protection. Extubated yesterday  Aspiration pneumonia  Hx T2DM (hemoglobin A1c 0.2%)  Hx hypothyroidism on Synthroid  Hx HTN  Hx PE on Eliquis  Hx PTSD  Hx schizoaffective disorder  Hx borderline personality disorder    Plan:  Switch Unasyn to Augmentin p.o. x 5 days  Continue Keppra monitor and Vimpat per neuro  Psychiatric consult, appreciate further recommendations  PT OT and bedside swallow  Patient was extubated yesterday and is stable to transfer out of the ICU      # Next of Kin:   # Code Status: Full  # Peptic ulcer prophylaxis: Diet  # DVT Prophylaxis: Lovenox  # Disposition: Transfer out    Nanda Box MD M.D., PGY-2  Internal medicine resident  Attending Physician: Dr. Meeks Agent     I personally saw, examined and provided care for the patient. Radiographs, labs and medication list were reviewed by me independently. I spoke with bedside nursing, therapists and consultants. Critical care services and times documented are independent of procedures and multidisciplinary rounds with Residents. Additionally comprehensive, multidisciplinary rounds were conducted with the MICU team. The case was discussed in detail and plans for care were established. Review of Residents documentation was conducted and revisions were made as appropriate. I agree with the above documented exam, problem list and plan of care.    Marcial Spears MD   CCT excluding procedures :28'
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
## 2022-04-01 NOTE — CONSULTS
Behavioral health consult    Consulted by: Dr. Vito Valentin  Reason for consult:Hx of bipolar, PTSD and psych polypharmacy    Chief complaint: \"I had a seizure. \"    HPI: Patient is a 77-year-old female with a past medical history of type 2 diabetes melitis, hypothyroidism, pulmonary embolism on Eliquis, CKD, schizoaffective sorter, borderline personality disorder and PTSD seizure-like activity, psychogenic nonepileptic seizures, presented to the ED sent in from [facility] for seizure-like activity in ED patient was hypoxic 70% on room air and tachycardic. She is admitted to the medical floor for treatment of breakthrough seizures and aspiration pneumonia. On 3/31/2022 and RRT was called the patient's room after roommate observed her fall to the floor and hitting her head. On physical exam the patient was noted to have pendular horizontal nystagmus of the eyes as well as repetitive, rhythmic lip-smacking movements as well as rhythmic movements of the fingers and hands bilaterally. The patient was unresponsive to painful stimuli including sternal rub and nailbed pressure. Patient was given Ativan and Keppra she required intubation and transferred to ICU for further medical management. Psychiatry is now consulted for \"history of bipolar, PTSD and psych polypharmacy. \"  Patient was just extubated this morning. Per the chart patient is from [facility] where she can return after she is medically cleared to continue her psychiatric treatment. I went to see the patient this afternoon along with nurse practitioner Kristen Jalil. When asked the patient has any auditory hallucinations she states \"always. \"  I asked if they ever go away with medications and she states \"no they never go away no matter what medications I take. \"  Patient does not appear to be internally stimulated or internally preoccupied she is not responding to unseen others she denies any command hallucinations she denies any visual hallucinations and denies any suicidal homicidal ideations intent or plan. Patient states that she \"had a seizure\". She is not from this area and states that she lives in [place] and came to this area brought in by [facility].  At this time patient not suicidal homicidal, homicidal, psychotic or manic she does report noncommand auditory hallucinations that are always there and she i appears to be at her baseline level of functioning. Mental status examination:  Psychomotor evaluation revealed no agitation retardation any abnormal movements. Her eye contact is fair her speech is normal rate rhythm and tone. Her mood is \"I am okay\"  Affect is appropriate pleasant. Thought process is linear without flight of ideas loose associations. Thought contents with phonic auditory hallucinations that never go away noncommand in nature devoid any visual hallucinations. She denies suicidal homicidal ideations intent or plan her impulse control is adequate her cognitive function appears to be at her baseline her insight judgment is fair she is alert oriented time place and person    Clinical impression:  History of bipolar disorder  History of cluster B personality disorder    Plans and recommendations:  Due to patient's ongoing medical problems psychiatry does not recommend continuing with any psychotropic medications at this time. Once patient is medically cleared and stabilized patient can be transferred back to [facility] for continuity of care where her psychotropic medications can be adjusted.   Psychiatry signed off please reconsult with any acute psychiatric needs

## 2022-04-01 NOTE — PROGRESS NOTES
Nurse to nurse report called to [facility], on 8500. All questions answered. Will await transfer for patient.

## 2022-04-02 ENCOUNTER — APPOINTMENT (OUTPATIENT)
Dept: CT IMAGING | Age: 37
DRG: 208 | End: 2022-04-02
Payer: MEDICARE

## 2022-04-02 LAB
ALBUMIN SERPL-MCNC: 3.8 G/DL (ref 3.5–5.2)
ALP BLD-CCNC: 79 U/L (ref 35–104)
ALT SERPL-CCNC: 25 U/L (ref 0–32)
ANION GAP SERPL CALCULATED.3IONS-SCNC: 12 MMOL/L (ref 7–16)
AST SERPL-CCNC: 18 U/L (ref 0–31)
BASOPHILS ABSOLUTE: 0.05 E9/L (ref 0–0.2)
BASOPHILS RELATIVE PERCENT: 0.6 % (ref 0–2)
BILIRUB SERPL-MCNC: <0.2 MG/DL (ref 0–1.2)
BILIRUBIN DIRECT: <0.2 MG/DL (ref 0–0.3)
BILIRUBIN, INDIRECT: NORMAL MG/DL (ref 0–1)
BUN BLDV-MCNC: 8 MG/DL (ref 6–20)
CALCIUM IONIZED: 1.33 MMOL/L (ref 1.15–1.33)
CALCIUM SERPL-MCNC: 9.3 MG/DL (ref 8.6–10.2)
CHLORIDE BLD-SCNC: 103 MMOL/L (ref 98–107)
CO2: 25 MMOL/L (ref 22–29)
CREAT SERPL-MCNC: 1 MG/DL (ref 0.5–1)
EOSINOPHILS ABSOLUTE: 0.32 E9/L (ref 0.05–0.5)
EOSINOPHILS RELATIVE PERCENT: 4 % (ref 0–6)
GFR AFRICAN AMERICAN: >60
GFR NON-AFRICAN AMERICAN: >60 ML/MIN/1.73
GLUCOSE BLD-MCNC: 121 MG/DL (ref 74–99)
HCT VFR BLD CALC: 34.3 % (ref 34–48)
HEMOGLOBIN: 10.9 G/DL (ref 11.5–15.5)
IMMATURE GRANULOCYTES #: 0.03 E9/L
IMMATURE GRANULOCYTES %: 0.4 % (ref 0–5)
LYMPHOCYTES ABSOLUTE: 2.05 E9/L (ref 1.5–4)
LYMPHOCYTES RELATIVE PERCENT: 25.5 % (ref 20–42)
MAGNESIUM: 1.7 MG/DL (ref 1.6–2.6)
MCH RBC QN AUTO: 27.7 PG (ref 26–35)
MCHC RBC AUTO-ENTMCNC: 31.8 % (ref 32–34.5)
MCV RBC AUTO: 87.1 FL (ref 80–99.9)
MONOCYTES ABSOLUTE: 0.5 E9/L (ref 0.1–0.95)
MONOCYTES RELATIVE PERCENT: 6.2 % (ref 2–12)
NEUTROPHILS ABSOLUTE: 5.08 E9/L (ref 1.8–7.3)
NEUTROPHILS RELATIVE PERCENT: 63.3 % (ref 43–80)
PDW BLD-RTO: 13.5 FL (ref 11.5–15)
PHOSPHORUS: 4.1 MG/DL (ref 2.5–4.5)
PLATELET # BLD: 329 E9/L (ref 130–450)
PMV BLD AUTO: 9.3 FL (ref 7–12)
POTASSIUM SERPL-SCNC: 3.7 MMOL/L (ref 3.5–5)
RBC # BLD: 3.94 E12/L (ref 3.5–5.5)
SODIUM BLD-SCNC: 140 MMOL/L (ref 132–146)
TOTAL PROTEIN: 7 G/DL (ref 6.4–8.3)
WBC # BLD: 8 E9/L (ref 4.5–11.5)

## 2022-04-02 PROCEDURE — 6360000002 HC RX W HCPCS: Performed by: INTERNAL MEDICINE

## 2022-04-02 PROCEDURE — 97161 PT EVAL LOW COMPLEX 20 MIN: CPT

## 2022-04-02 PROCEDURE — 2580000003 HC RX 258: Performed by: INTERNAL MEDICINE

## 2022-04-02 PROCEDURE — 97530 THERAPEUTIC ACTIVITIES: CPT

## 2022-04-02 PROCEDURE — 85025 COMPLETE CBC W/AUTO DIFF WBC: CPT

## 2022-04-02 PROCEDURE — 6370000000 HC RX 637 (ALT 250 FOR IP): Performed by: INTERNAL MEDICINE

## 2022-04-02 PROCEDURE — 2580000003 HC RX 258: Performed by: FAMILY MEDICINE

## 2022-04-02 PROCEDURE — 80076 HEPATIC FUNCTION PANEL: CPT

## 2022-04-02 PROCEDURE — 84100 ASSAY OF PHOSPHORUS: CPT

## 2022-04-02 PROCEDURE — 36415 COLL VENOUS BLD VENIPUNCTURE: CPT

## 2022-04-02 PROCEDURE — 6370000000 HC RX 637 (ALT 250 FOR IP): Performed by: FAMILY MEDICINE

## 2022-04-02 PROCEDURE — 70450 CT HEAD/BRAIN W/O DYE: CPT

## 2022-04-02 PROCEDURE — 2060000000 HC ICU INTERMEDIATE R&B

## 2022-04-02 PROCEDURE — 82330 ASSAY OF CALCIUM: CPT

## 2022-04-02 PROCEDURE — 2700000000 HC OXYGEN THERAPY PER DAY

## 2022-04-02 PROCEDURE — C9254 INJECTION, LACOSAMIDE: HCPCS | Performed by: INTERNAL MEDICINE

## 2022-04-02 PROCEDURE — 80048 BASIC METABOLIC PNL TOTAL CA: CPT

## 2022-04-02 PROCEDURE — 83735 ASSAY OF MAGNESIUM: CPT

## 2022-04-02 RX ORDER — HYDROXYZINE PAMOATE 25 MG/1
25 CAPSULE ORAL 3 TIMES DAILY PRN
Status: DISCONTINUED | OUTPATIENT
Start: 2022-04-02 | End: 2022-04-04 | Stop reason: HOSPADM

## 2022-04-02 RX ADMIN — GUAIFENESIN 400 MG: 400 TABLET ORAL at 14:14

## 2022-04-02 RX ADMIN — LEVETIRACETAM 1000 MG: 10 INJECTION INTRAVENOUS at 10:13

## 2022-04-02 RX ADMIN — Medication 10 ML: at 00:02

## 2022-04-02 RX ADMIN — LAMOTRIGINE 50 MG: 25 TABLET ORAL at 09:39

## 2022-04-02 RX ADMIN — Medication 10 ML: at 09:40

## 2022-04-02 RX ADMIN — LEVOTHYROXINE SODIUM 150 MCG: 0.15 TABLET ORAL at 05:43

## 2022-04-02 RX ADMIN — DEXTROSE MONOHYDRATE 150 MG: 50 INJECTION, SOLUTION INTRAVENOUS at 22:16

## 2022-04-02 RX ADMIN — LEVETIRACETAM 1000 MG: 10 INJECTION INTRAVENOUS at 22:54

## 2022-04-02 RX ADMIN — AMOXICILLIN AND CLAVULANATE POTASSIUM 1 TABLET: 875; 125 TABLET, FILM COATED ORAL at 22:11

## 2022-04-02 RX ADMIN — APIXABAN 5 MG: 5 TABLET, FILM COATED ORAL at 22:11

## 2022-04-02 RX ADMIN — AMOXICILLIN AND CLAVULANATE POTASSIUM 1 TABLET: 875; 125 TABLET, FILM COATED ORAL at 09:39

## 2022-04-02 RX ADMIN — DEXTROSE MONOHYDRATE 150 MG: 50 INJECTION, SOLUTION INTRAVENOUS at 09:43

## 2022-04-02 RX ADMIN — GUAIFENESIN 400 MG: 400 TABLET ORAL at 22:12

## 2022-04-02 RX ADMIN — APIXABAN 5 MG: 5 TABLET, FILM COATED ORAL at 13:10

## 2022-04-02 RX ADMIN — ACETAMINOPHEN 650 MG: 325 TABLET ORAL at 15:36

## 2022-04-02 RX ADMIN — Medication 10 ML: at 22:49

## 2022-04-02 RX ADMIN — GUAIFENESIN 400 MG: 400 TABLET ORAL at 09:39

## 2022-04-02 ASSESSMENT — PAIN SCALES - GENERAL
PAINLEVEL_OUTOF10: 2
PAINLEVEL_OUTOF10: 0
PAINLEVEL_OUTOF10: 7

## 2022-04-02 ASSESSMENT — PAIN DESCRIPTION - DESCRIPTORS: DESCRIPTORS: ACHING;DISCOMFORT;SORE

## 2022-04-02 ASSESSMENT — PAIN DESCRIPTION - LOCATION
LOCATION: HEAD
LOCATION: HEAD

## 2022-04-02 ASSESSMENT — PAIN DESCRIPTION - PAIN TYPE
TYPE: ACUTE PAIN
TYPE: ACUTE PAIN

## 2022-04-02 NOTE — PLAN OF CARE
Problem: Pain:  Goal: Pain level will decrease  Description: Pain level will decrease  4/2/2022 1019 by Nadine Lewis RN  Outcome: Met This Shift     Problem: Pain:  Goal: Control of acute pain  Description: Control of acute pain  4/2/2022 1019 by Nadine Lewis RN  Outcome: Met This Shift     Problem: Falls - Risk of:  Goal: Will remain free from falls  Description: Will remain free from falls  4/2/2022 1019 by Nadine Lewis RN  Outcome: Met This Shift

## 2022-04-02 NOTE — PLAN OF CARE
Problem: Pain:  Goal: Pain level will decrease  Description: Pain level will decrease  4/2/2022 0229 by Ute Olivares RN  Outcome: Met This Shift  4/1/2022 1533 by Marisel Allan RN  Outcome: Met This Shift  Goal: Control of acute pain  Description: Control of acute pain  4/2/2022 0229 by Ute Olivares RN  Outcome: Met This Shift  4/1/2022 1533 by Marisel Allan RN  Outcome: Met This Shift  Goal: Control of chronic pain  Description: Control of chronic pain  4/2/2022 0229 by Ute Olivares RN  Outcome: Met This Shift  4/1/2022 1533 by Marisel Allan RN  Outcome: Met This Shift     Problem: Falls - Risk of:  Goal: Will remain free from falls  Description: Will remain free from falls  4/2/2022 0229 by Ute Olivares RN  Outcome: Met This Shift  4/1/2022 1533 by Marisel Allan RN  Outcome: Met This Shift  Goal: Absence of physical injury  Description: Absence of physical injury  4/2/2022 0229 by Ute Olivares RN  Outcome: Met This Shift  4/1/2022 1533 by Marisel Allan RN  Outcome: Met This Shift     Problem: Skin Integrity:  Goal: Will show no infection signs and symptoms  Description: Will show no infection signs and symptoms  4/2/2022 0229 by Ute Olivares RN  Outcome: Met This Shift  4/1/2022 1533 by Marisel Allan RN  Outcome: Met This Shift  Goal: Absence of new skin breakdown  Description: Absence of new skin breakdown  4/2/2022 0229 by Ute Oliavres RN  Outcome: Met This Shift  4/1/2022 1533 by Marisel Allan RN  Outcome: Met This Shift

## 2022-04-02 NOTE — PROGRESS NOTES
Hospitalist Progress Note      PCP: No primary care provider on file. Date of Admission: 3/29/2022    Chief Complaint: seizure like activity    Hospital Course:    Patient presented to the emergency department after having seizure-like activity. Patient has history of seizure disorder. [facility]. Vital signs reveal the patient be hypoxic 70% on room air. Possible she may have aspirated. Tachycardic with a rate of 101. She is afebrile. Laboratory studies demonstrate creatinine 1.1, glucose 154. CT chest shows patchy infiltrates and atelectasis in the lung bases bilaterally concerning for pneumonia. Patient was given loading dose of Keppra. Also given Unasyn for aspiration pneumonia. Medicine consulted for admission. Subjective: Patient was seen at bedside today- well oriented to time place and person. Does not have any acute concerns at this time. So far this has been most active and alert during my evaluation. Does not have any shaking movements at this time, patient does not complain of any nausea, headache or lightheadedness. On 3/30/2022 at approximately 1900, an RRT was called after the patient was observed by her roommate to suddenly fall to the ground, striking her head and face. The patient was lifted back onto the bed, and on resident examination the patient was found to have pendular nystagmus of bilateral eyes as well as rhythmic lipsmacking and finger movements. She remained hemodynamically stable during the entire RRT but did require intubation and mechanical ventilation for status epilepticus. CT of the head performed s/p intubation was negative for any acute intracranial hemorrhage or other abnormalities. After consulting with on-call intensivist, patient was brought to the MICU for further management and stabilization  On 4/1/22 had an RRT called for  patient went out of the bed by herself and fell down with her face down on the bathroom.   The event was unwitnessed. On arrival the patient was opening her eyes, she was breathing and shivering, was not following commands. Stat CT head ordered but it was not done because the patient was shivering. Blood sugar level was 144. Stat BMP, mag, phos, lactate ordered. She was hemodynamically stable with a blood pressure of 134/80 mmHg, protecting her airway with a oxygen saturation of 95%. She was persistently having questionable seizure-like activity, she received a total of 6 mg of Ativan. Case discussed with neurologist Dr. Henry Velasquez, and they recommended to continue Lamictal, Vimpat, Keppra. Case discussed with intensivist Dr. Milvia Buckley as well. She was persistently having shivering-like movement unlikely to be true seizure. She was hemodynamically stable, protecting her airway. The patient has extensive history of psychogenic nonepileptic seizure and multiple EEGs done. It was decided to give the patient in telemetry floor with tele sitter on board.     Medications:  Reviewed    Infusion Medications    sodium chloride      dextrose       Scheduled Medications    amoxicillin-clavulanate  1 tablet Oral 2 times per day    lacosamide (VIMPAT) IVPB  150 mg IntraVENous BID    apixaban  5 mg Oral BID    levothyroxine  150 mcg Oral Daily    [Held by provider] mirtazapine  15 mg Oral Nightly    [Held by provider] paliperidone  6 mg Oral Nightly    sodium chloride flush  5-40 mL IntraVENous 2 times per day    lamoTRIgine  50 mg Oral Daily    LORazepam  0.5 mg IntraVENous Once    levetiracetam  1,000 mg IntraVENous BID    guaiFENesin  400 mg Oral TID     PRN Meds: sodium chloride flush, sodium chloride, LORazepam, ondansetron **OR** ondansetron, polyethylene glycol, acetaminophen **OR** acetaminophen, glucose, dextrose, glucagon (rDNA), dextrose      Intake/Output Summary (Last 24 hours) at 4/2/2022 1309  Last data filed at 4/1/2022 1500  Gross per 24 hour   Intake 549.96 ml   Output 500 ml   Net 49.96 ml       Exam:    /88   Pulse 80   Temp 97.7 °F (36.5 °C) (Temporal)   Resp 16   Ht 5' 4\" (1.626 m)   Wt 247 lb 2.2 oz (112.1 kg)   LMP  (LMP Unknown)   SpO2 100%   BMI 42.42 kg/m²     General appearance: Morbid obesity, wakes up to verbal, tactile stimuli-does not respond to commands  HEENT: Pupils equal, round, and reactive to light. Conjunctivae/corneas clear. Neck: Supple,. No jugular venous distention. Trachea midline. Respiratory: Nasal cannula 2 L, normal vesicular breath sounds, no added sounds  Cardiovascular: Regular rate and rhythm with normal S1/S2 without murmurs, rubs or gallops. Abdomen: Soft, non-tender, non-distended with normal bowel sounds. Musculoskeletal: No clubbing, cyanosis or edema bilaterally. Skin: Skin color, texture, turgor normal.  No rashes or lesions. Neurologic: Alert and oriented to time place and person, no focal deficits with strength normal b/l upper and lower extremities    Labs:   Recent Labs     04/01/22 0440 04/01/22 2033 04/02/22  0540   WBC 7.3 8.9 8.0   HGB 9.9* 10.9* 10.9*   HCT 31.5* 34.3 34.3    346 329     Recent Labs     03/31/22 0447 03/31/22 0447 04/01/22 0440 04/01/22 2033 04/02/22  0540      < > 143 138 140   K 3.1*   < > 3.8 3.8 3.7      < > 106 102 103   CO2 25   < > 27 24 25   BUN 13   < > 9 8 8   CREATININE 1.0   < > 1.0 1.0 1.0   CALCIUM 8.8   < > 8.8 9.4 9.3   PHOS 3.7  --  3.9  --  4.1    < > = values in this interval not displayed. Recent Labs     03/31/22 0447 04/01/22 0440 04/02/22  0540   AST 17 14 18   ALT 29 22 25   BILIDIR <0.2 <0.2 <0.2   BILITOT 0.3 0.3 <0.2   ALKPHOS 71 68 79     No results for input(s): INR in the last 72 hours.   Recent Labs     03/31/22  0848 04/01/22 2033   CKTOTAL 20 55       Assessment/Plan:    Active Hospital Problems    Diagnosis Date Noted    Psychogenic nonepileptic seizure [F44.5] 03/30/2022    Seizure ([facility] [address] [R56.9] 03/29/2022   -Nonintractable epilepsy without status epilepticus, unspecified epilepsy type   -Aspiration pneumonia  -Acute respiratory failure with hypoxia  -Hypertension  -Hypothyroidism due to Hashimoto's thyroiditis   -History of DVT/PE chronic anticoagulated on Eliquis  - DM type 2  -Schizoaffective disorder  -Unstable gait at baseline-currently using walker    Patient is currently following neurology and is on [address]  Patient was previously diagnosed to have pseudoseizures per chart review  Currently on Unasyn for aspiration pneumonia  Neurology consutled-Continue home Keppra 2 g twice daily, Lamictal 50 mg daily, and Vimpat 150 mg twice daily,Wean sedation as able and limit use of benzodiazepines  EEG ordered per neuro-3hr showed no epileptic changes  Psych consulted- psychiatry does not recommend continuing with any psychotropic medications at this time. Once patient is medically cleared and stabilized patient can be transferred back to [facility] for [facility] of care where her psychotropic medications can be adjusted. S/p RRT-3/30/2022 and was intubated -3/31/22 extubated, weaned on NC 3L  Appreciate critical care management. DVT Prophylaxis: lovenox  Diet: ADULT DIET;  Regular; shellfish allergy  Code Status: Full Code    PT/OT Eval Status: ordered    Dispo - awaiting clinical improvement    Jamar Dorantes MD

## 2022-04-02 NOTE — PROGRESS NOTES
Physical Therapy  Physical Therapy Initial Assessment     Name: Vianey Case  : 1985  MRN: [id number]      Date of Service: 2022    Evaluating PT:  Carlos Sorto PT, DPT [id number]    Room #:  8006/3909-Q  Diagnosis:  Seizure ([facility] [address] [R56.9]  Acute respiratory failure with hypoxia ([facility] [address] [J96.01]  Breakthrough seizure ([facility] [address] [G40.919]  Aspiration pneumonia of both lower lobes, unspecified aspiration pneumonia type ([facility] [address] [J69.0]  PMHx/PSHx:  Borderline personality disorder, CKD, DM, HTN, PTSD, Schizoaffective disorder  Procedure/Surgery:  None  Precautions:  Falls, bed alarm, TSM  Equipment Needs:  TBD    SUBJECTIVE:    Pt was admitted from [address]. Pt ambulated with WW PRN and was independent PTA. OBJECTIVE:   Initial Evaluation  Date: 22 Treatment Short Term/ Long Term   Goals   AM-PAC 6 Clicks      Was pt agreeable to Eval/treatment? Yes     Does pt have pain? 7/10 HA     Bed Mobility  Rolling: NT  Supine to sit: SBA with HOB elevated  Sit to supine: SBA  Scooting: SBA  Mod Independent   Transfers Sit to stand: Mary Jo  Stand to sit: Mary Jo  Stand pivot: Mary Jo with [employer]  Mod Independent with [employer]   Ambulation   25 feet with Mary Jo with [employer]  >150 feet with Mod Independent with [employer]   Stair negotiation: ascended and descended NT  >4 steps with 1 rail Mod Independent   ROM BUE:  WFL  BLE:  WFL     Strength BUE:  WFL  BLE:  45 grossly  Increase by 1/3 MMT grade   Balance Sitting EOB:  SBA  Dynamic Standing:  Mary Jo with [employer]  Sitting EOB:  Independent  Dynamic Standing:   Mod Independent with [employer]     Pt is A & O x 4  Sensation:  No reported paresthesias  Edema:  None    Therapeutic Exercises:  NA    Patient education  Pt educated on safety    Patient response to education:   Pt verbalized understanding Pt demonstrated skill Pt requires further education in this area   x x x     ASSESSMENT:    Conditions Requiring Skilled Therapeutic Intervention:    [x]Decreased strength     []Decreased ROM  [x]Decreased functional mobility  [x]Decreased balance   [x]Decreased endurance   [x]Decreased posture  []Decreased sensation  []Decreased coordination   []Decreased vision  [x]Decreased safety awareness   []Increased pain       Comments:  Pt was in bed upon arrival, agreeable to initial evaluation. Slight lightheadedness reported with upright activity but improved with time. Pt's gait was slow with impaired [employer] management in crowded environments. Unsteadiness noted with fatigue causing one LOB. Pt was left in bed, per request, with all needs met and call light in reach. Bed alarm on. Treatment:  Patient practiced and was instructed in the following treatment:     Bed mobility training - pt given verbal cues to facilitate proper sequencing and safety during supine>sit.  Sitting EOB for >3 minutes for upright tolerance, postural awareness and BLE ROM   Transfer training - pt was given verbal and tactile cues to facilitate proper hand placement, technique and safety during sit to stand, stand to sit and stand pivot transfers as well as provided with physical assistance.  Gait training- pt was given verbal and tactile cues to facilitate safety, balance and use of AD during ambulation as well as provided with physical assistance. Pt's/ family goals   1. Return home    Prognosis is good for reaching above PT goals. Patient and or family understand(s) diagnosis, prognosis, and plan of care.   yes    PHYSICAL THERAPY PLAN OF CARE:    PT POC is established based on physician order and patient diagnosis     Referring provider/PT Order:  Valencia Moura MD /04/01/22 0830 PT eval and treat  Diagnosis:  Seizure ([facility] [address] [R56.9]  Acute respiratory failure with hypoxia ([address] [J96.01]  Breakthrough seizure ([address] [G40.919]  Aspiration pneumonia of both lower lobes, unspecified aspiration pneumonia type ([address] [J69.0]  Specific instructions for next treatment:  Progress activity    Current Treatment Recommendations:     [x] Strengthening to improve independence with functional mobility   [] ROM to improve independence with functional mobility   [x] Balance Training to improve static/dynamic balance and to reduce fall risk  [x] Endurance Training to improve activity tolerance during functional mobility   [x] Transfer Training to improve safety and independence with all functional transfers   [x] Gait Training to improve gait mechanics, endurance and asses need for appropriate assistive device  [x] Stair Training in preparation for safe discharge home and/or into the community   [] Positioning to prevent skin breakdown and contractures  [x] Safety and Education Training   [x] Patient/Caregiver Education   [] HEP  [] Other     PT long term treatment goals are located in above grid    Frequency of treatments: 2-5x/week x 1-2 weeks. Time in  0735  Time out  0755    Total Treatment Time  10 minutes     Evaluation Time includes thorough review of current medical information, gathering information on past medical history/social history and prior level of function, completion of standardized testing/informal observation of tasks, assessment of data and education on plan of care and goals.     CPT codes:  [x] Low Complexity PT evaluation 04624  [] Moderate Complexity PT evaluation 21225  [] High Complexity PT evaluation 54335  [] PT Re-evaluation 02707  [] Gait training 51615 - minutes  [] Manual therapy 62098 - minutes  [x] Therapeutic activities 86437 10 minutes  [] Therapeutic exercises 11759 - minutes  [] Neuromuscular reeducation 71892 - minutes     Neville Pate, PT, DPT  [id number]

## 2022-04-02 NOTE — SIGNIFICANT EVENT
Rapid Response Team Note  Date of event: 4/1/2022   Time of event:   Soledad Kraft 42y.o. year old female   YOB: 1985   Admit date:  3/29/2022   Location: 0/1-A   Witnessed? : [x]Yes  [] No  Monitored? : [x]Yes  [] No  Code status: [x] Full  [] DNR-CCA  []DNR-CC  ______________________________________________________________________  Reason for RRT:    [] RR < 8     [] RR > 28   [] SpO2 <90%   [] HR < 40 bpm   [] HR > 130 bpm  [] SBP < 90 mmHg    [] SpO2 <90%   [] LOC   [] Seizures    [] Significant Bleeding Event    [] Other: Status post fall, seizure? Subjective:   CTSP regarding the above event. RRT was called as the patient went out of the bed by herself and fell down with her face down on the bathroom. The event was unwitnessed. On arrival the patient was opening her eyes, she was breathing and shivering, was not following commands. Stat CT head ordered but it was not done because the patient was shivering. Blood sugar level was 144. Stat BMP, mag, phos, lactate ordered. She was hemodynamically stable with a blood pressure of 134/80 mmHg, protecting her airway with a oxygen saturation of 95%. She was persistently having questionable seizure-like activity, she received a total of 6 mg of Ativan. Case discussed with neurologist Dr. Marjorie Victoria, and they recommended to continue Lamictal, Vimpat, Keppra. Case discussed with intensivist Dr. Ishaan Velasquez as well. She was persistently having shivering-like movement unlikely to be true seizure. She was hemodynamically stable, protecting her airway. The patient has extensive history of psychogenic nonepileptic seizure and multiple EEGs done. It was decided to give the patient in telemetry floor with tele sitter on board.       Objective:   Vital signs: Temp: 98.3/BP: 134/80 mmHg/RR: 18 /HR: 98  Initial Condition:  Conscious   [x] Yes  [] No     Breathing [x] Yes  [] No     Pulse  [x] Yes  [] No    Airway:   [x] Open/ Clear     Intervention: [] None  [] Pooled secretions     [] Suctioned  [] Stridor      [] Intubation    Lungs:   [x] Symmetrical chest rise/ CTABL Intervention: [] None  [] Use of accessory muscles    [] NIV (CPAP/BiPAP)  [] Cyanosis      [] Nasal Oxygen/Mask  [] Wheezing       [] ABG             [] CXR  [] Other:     Circulation:   Rhythm:  [x] Sinus [] Other:     Intervention: [] None            [] IV Access  [] Peripheral              [] Central            [] EKG            [] Cardioversion            [] Defibrillation     Capillary Refill:  [] > 2 seconds [x] < 2 seconds    Neurologic:   [] NIHSS      [] Pupillary Response: Equally responsive to light bilaterally   Response to pain:   [] Yes  [x] No  Follow commands:  [] Yes  [x] No  Facial asymmetry:  [] Yes  [x] No  Motor strength:  [] Equal  [] Focal deficit: No gross focal neurologic deficit noted, he was moving all extremities  Diagnostic Test:  Blood Sugar:  144 mg/dL  EKG:      ABG:      Lab Results   Component Value Date    PH 7.495 03/31/2022    PCO2 30.3 03/31/2022    PO2 111.6 03/31/2022    HCO3 22.8 03/31/2022    O2SAT 98.1 03/31/2022     Medication(s) Given:  []  Narcan (Naloxone)   []  Romazicon (Flumazenil)    []  Breathing Treatment    []  Steroids (Prednisone, Solu-Medrol)  []  Adenosine  [] Cardiac Medicines:     Infusion(s):   [] Normal Saline    Amount:       [] Lactate Ringers      [] Other:     Imaging:   [] CXR:   [] Normal         [] Pneumothorax         [] Pulmonary Edema  [] Infiltrate          [] CT Head  [] Normal          [] ICB          [] [facility]          [] Other:     Laboratory Tests:     CBC:   Lab Results   Component Value Date    WBC 8.9 04/01/2022    RBC 3.96 04/01/2022    HGB 10.9 04/01/2022    HCT 34.3 04/01/2022    MCV 86.6 04/01/2022    MCH 27.5 04/01/2022    MCHC 31.8 04/01/2022    RDW 13.5 04/01/2022     04/01/2022    MPV 9.0 04/01/2022     CMP:    Lab Results   Component Value Date     04/01/2022    K 3.8 04/01/2022    K 3.3 03/30/2022  04/01/2022    CO2 24 04/01/2022    BUN 8 04/01/2022    CREATININE 1.0 04/01/2022    GFRAA >60 04/01/2022    LABGLOM >60 04/01/2022    GLUCOSE 160 04/01/2022    PROT 6.3 04/01/2022    LABALBU 3.5 04/01/2022    CALCIUM 9.4 04/01/2022    BILITOT 0.3 04/01/2022    ALKPHOS 68 04/01/2022    AST 14 04/01/2022    ALT 22 04/01/2022         Teams Assessment and Plan:  1. Psychogenic nonepileptic seizure; unlikely to be true seizure, no fecal, urinary incontinence, hemodynamically stable  2. Sinus tachycardia ? ? Plan :  · Continue Keppra, Vimpat, lacosamide   · Keep head end elevated  · CT head could not be done as the patient was shivering; if there is no gross focal neurologic deficits CT head can be discontinued  · Continue Amoxiclav   · Telesitter on board for continuous monitoring  · Continue telemetry monitoring  · Aspiration/seizure precaution  · Fall precaution  ? Disposition:  [x] No transfer   [] Transfer to monitor floor  [] Transfer to: [] MICU [] NICU [] CCU [] SICU    Patients family updated: [] Yes  [] No   Discussed with:  [] Critical Care Intensivist: Dr. Fransisca Cheema      [] Primary Care Provider: No primary care provider on file.       [] Other: Ean Yu MD MD PGY-3  4/1/2022 8:27 PM  Attending Shante Baker MD

## 2022-04-02 NOTE — PROGRESS NOTES
Spoke with Dr. Kalen Victoria regarding head CT ordered, Dr. Kalen Victoria stated he still wants CT of head done, and to hold eliquis until scan Is completed.    CT called, stated they would send for patient as soon as they can

## 2022-04-02 NOTE — PROGRESS NOTES
This RN entered room to pt having twitching of her arms and head. Laxmi Bryan with neurology notified and at bedside, this RN was instructed to not give ativan and to place pt on oxygen for comfort and to monitor patients saftey. Vital signs obtained and documented.  Twitching movements lasted 3 mins, pt now following commands and talking

## 2022-04-03 LAB
ALBUMIN SERPL-MCNC: 4 G/DL (ref 3.5–5.2)
ALP BLD-CCNC: 85 U/L (ref 35–104)
ALT SERPL-CCNC: 28 U/L (ref 0–32)
ANION GAP SERPL CALCULATED.3IONS-SCNC: 10 MMOL/L (ref 7–16)
AST SERPL-CCNC: 20 U/L (ref 0–31)
BASOPHILS ABSOLUTE: 0.05 E9/L (ref 0–0.2)
BASOPHILS RELATIVE PERCENT: 0.7 % (ref 0–2)
BILIRUB SERPL-MCNC: 0.2 MG/DL (ref 0–1.2)
BILIRUBIN DIRECT: <0.2 MG/DL (ref 0–0.3)
BILIRUBIN, INDIRECT: NORMAL MG/DL (ref 0–1)
BUN BLDV-MCNC: 9 MG/DL (ref 6–20)
CALCIUM IONIZED: 1.32 MMOL/L (ref 1.15–1.33)
CALCIUM SERPL-MCNC: 9.7 MG/DL (ref 8.6–10.2)
CHLORIDE BLD-SCNC: 105 MMOL/L (ref 98–107)
CO2: 25 MMOL/L (ref 22–29)
CREAT SERPL-MCNC: 1.1 MG/DL (ref 0.5–1)
EOSINOPHILS ABSOLUTE: 0.31 E9/L (ref 0.05–0.5)
EOSINOPHILS RELATIVE PERCENT: 4.1 % (ref 0–6)
GFR AFRICAN AMERICAN: >60
GFR NON-AFRICAN AMERICAN: 55 ML/MIN/1.73
GLUCOSE BLD-MCNC: 106 MG/DL (ref 74–99)
HCT VFR BLD CALC: 36.2 % (ref 34–48)
HEMOGLOBIN: 11.7 G/DL (ref 11.5–15.5)
IMMATURE GRANULOCYTES #: 0.01 E9/L
IMMATURE GRANULOCYTES %: 0.1 % (ref 0–5)
KEPPRA: 41 UG/ML (ref 10–40)
LAMOTRIGINE LEVEL: <0.9 UG/ML (ref 3–15)
LYMPHOCYTES ABSOLUTE: 1.87 E9/L (ref 1.5–4)
LYMPHOCYTES RELATIVE PERCENT: 24.7 % (ref 20–42)
MAGNESIUM: 1.8 MG/DL (ref 1.6–2.6)
MCH RBC QN AUTO: 27.4 PG (ref 26–35)
MCHC RBC AUTO-ENTMCNC: 32.3 % (ref 32–34.5)
MCV RBC AUTO: 84.8 FL (ref 80–99.9)
MONOCYTES ABSOLUTE: 0.43 E9/L (ref 0.1–0.95)
MONOCYTES RELATIVE PERCENT: 5.7 % (ref 2–12)
NEUTROPHILS ABSOLUTE: 4.9 E9/L (ref 1.8–7.3)
NEUTROPHILS RELATIVE PERCENT: 64.7 % (ref 43–80)
PDW BLD-RTO: 13.4 FL (ref 11.5–15)
PHOSPHORUS: 4.3 MG/DL (ref 2.5–4.5)
PLATELET # BLD: 364 E9/L (ref 130–450)
PMV BLD AUTO: 9.1 FL (ref 7–12)
POTASSIUM SERPL-SCNC: 3.7 MMOL/L (ref 3.5–5)
RBC # BLD: 4.27 E12/L (ref 3.5–5.5)
SODIUM BLD-SCNC: 140 MMOL/L (ref 132–146)
TOTAL PROTEIN: 7.5 G/DL (ref 6.4–8.3)
TRIGL SERPL-MCNC: 133 MG/DL (ref 0–149)
WBC # BLD: 7.6 E9/L (ref 4.5–11.5)

## 2022-04-03 PROCEDURE — 6360000002 HC RX W HCPCS: Performed by: INTERNAL MEDICINE

## 2022-04-03 PROCEDURE — 80076 HEPATIC FUNCTION PANEL: CPT

## 2022-04-03 PROCEDURE — 85025 COMPLETE CBC W/AUTO DIFF WBC: CPT

## 2022-04-03 PROCEDURE — 36415 COLL VENOUS BLD VENIPUNCTURE: CPT

## 2022-04-03 PROCEDURE — C9254 INJECTION, LACOSAMIDE: HCPCS | Performed by: INTERNAL MEDICINE

## 2022-04-03 PROCEDURE — 84478 ASSAY OF TRIGLYCERIDES: CPT

## 2022-04-03 PROCEDURE — 83735 ASSAY OF MAGNESIUM: CPT

## 2022-04-03 PROCEDURE — 84100 ASSAY OF PHOSPHORUS: CPT

## 2022-04-03 PROCEDURE — 2580000003 HC RX 258: Performed by: INTERNAL MEDICINE

## 2022-04-03 PROCEDURE — 6370000000 HC RX 637 (ALT 250 FOR IP): Performed by: INTERNAL MEDICINE

## 2022-04-03 PROCEDURE — 97165 OT EVAL LOW COMPLEX 30 MIN: CPT

## 2022-04-03 PROCEDURE — 82330 ASSAY OF CALCIUM: CPT

## 2022-04-03 PROCEDURE — 2060000000 HC ICU INTERMEDIATE R&B

## 2022-04-03 PROCEDURE — 6370000000 HC RX 637 (ALT 250 FOR IP): Performed by: FAMILY MEDICINE

## 2022-04-03 PROCEDURE — 80048 BASIC METABOLIC PNL TOTAL CA: CPT

## 2022-04-03 PROCEDURE — 2580000003 HC RX 258: Performed by: FAMILY MEDICINE

## 2022-04-03 RX ADMIN — GUAIFENESIN 400 MG: 400 TABLET ORAL at 21:40

## 2022-04-03 RX ADMIN — GUAIFENESIN 400 MG: 400 TABLET ORAL at 14:33

## 2022-04-03 RX ADMIN — LEVETIRACETAM 1000 MG: 10 INJECTION INTRAVENOUS at 21:47

## 2022-04-03 RX ADMIN — Medication 10 ML: at 21:40

## 2022-04-03 RX ADMIN — GUAIFENESIN 400 MG: 400 TABLET ORAL at 09:51

## 2022-04-03 RX ADMIN — LEVOTHYROXINE SODIUM 150 MCG: 0.15 TABLET ORAL at 06:22

## 2022-04-03 RX ADMIN — AMOXICILLIN AND CLAVULANATE POTASSIUM 1 TABLET: 875; 125 TABLET, FILM COATED ORAL at 21:40

## 2022-04-03 RX ADMIN — ACETAMINOPHEN 650 MG: 325 TABLET ORAL at 21:51

## 2022-04-03 RX ADMIN — AMOXICILLIN AND CLAVULANATE POTASSIUM 1 TABLET: 875; 125 TABLET, FILM COATED ORAL at 09:51

## 2022-04-03 RX ADMIN — LEVETIRACETAM 1000 MG: 10 INJECTION INTRAVENOUS at 10:18

## 2022-04-03 RX ADMIN — DEXTROSE MONOHYDRATE 150 MG: 50 INJECTION, SOLUTION INTRAVENOUS at 22:04

## 2022-04-03 RX ADMIN — DEXTROSE MONOHYDRATE 150 MG: 50 INJECTION, SOLUTION INTRAVENOUS at 09:53

## 2022-04-03 RX ADMIN — ACETAMINOPHEN 650 MG: 325 TABLET ORAL at 14:15

## 2022-04-03 RX ADMIN — Medication 20 ML: at 09:55

## 2022-04-03 RX ADMIN — APIXABAN 5 MG: 5 TABLET, FILM COATED ORAL at 21:40

## 2022-04-03 RX ADMIN — APIXABAN 5 MG: 5 TABLET, FILM COATED ORAL at 09:52

## 2022-04-03 RX ADMIN — LAMOTRIGINE 50 MG: 25 TABLET ORAL at 09:52

## 2022-04-03 ASSESSMENT — PAIN SCALES - GENERAL
PAINLEVEL_OUTOF10: 0
PAINLEVEL_OUTOF10: 3
PAINLEVEL_OUTOF10: 0
PAINLEVEL_OUTOF10: 7
PAINLEVEL_OUTOF10: 3

## 2022-04-03 ASSESSMENT — PAIN DESCRIPTION - PAIN TYPE
TYPE: ACUTE PAIN
TYPE: ACUTE PAIN

## 2022-04-03 ASSESSMENT — PAIN DESCRIPTION - PROGRESSION: CLINICAL_PROGRESSION: GRADUALLY IMPROVING

## 2022-04-03 ASSESSMENT — PAIN DESCRIPTION - LOCATION
LOCATION: HEAD
LOCATION: HEAD

## 2022-04-03 ASSESSMENT — PAIN DESCRIPTION - DESCRIPTORS
DESCRIPTORS: ACHING;DISCOMFORT;SORE
DESCRIPTORS: ACHING;DISCOMFORT;SORE

## 2022-04-03 ASSESSMENT — PAIN DESCRIPTION - FREQUENCY: FREQUENCY: INTERMITTENT

## 2022-04-03 ASSESSMENT — PAIN - FUNCTIONAL ASSESSMENT: PAIN_FUNCTIONAL_ASSESSMENT: PREVENTS OR INTERFERES SOME ACTIVE ACTIVITIES AND ADLS

## 2022-04-03 ASSESSMENT — PAIN DESCRIPTION - ONSET: ONSET: ON-GOING

## 2022-04-03 NOTE — PLAN OF CARE
Problem: Pain:  Goal: Pain level will decrease  Description: Pain level will decrease  Outcome: Ongoing  Goal: Control of acute pain  Description: Control of acute pain  Outcome: Ongoing  Goal: Control of chronic pain  Description: Control of chronic pain  Outcome: Ongoing     Problem: Falls - Risk of:  Goal: Will remain free from falls  Description: Will remain free from falls  Outcome: Ongoing  Goal: Absence of physical injury  Description: Absence of physical injury  Outcome: Ongoing     Problem: Skin Integrity:  Goal: Will show no infection signs and symptoms  Description: Will show no infection signs and symptoms  Outcome: Ongoing  Goal: Absence of new skin breakdown  Description: Absence of new skin breakdown  Outcome: Ongoing     Problem: Neurological  Goal: Maximum potential motor/sensory/cognitive function  Outcome: Ongoing

## 2022-04-03 NOTE — CARE COORDINATION
Spoke with the charge nurse Linnette Rivera re: possible discharge to [facility] today. Call placed to the [facility] at [phone]. Number rings twice and message is received that the call did not go through. Call placed to [facility] with [facility] with the same results. Spoke with  manager Damon Schaeffer and number received for [employer] with [facility]. Call placed to [place] and detailed VM left with number for return call. Await return call re: patient's ability to return to [facility] today. Will continue to follow.      Anshu Lyons RN.  [facility]  [phone]

## 2022-04-03 NOTE — PLAN OF CARE
Problem: Pain:  Goal: Pain level will decrease  Description: Pain level will decrease  4/3/2022 1043 by Mirna Garza, RN  Outcome: Met This Shift     Problem: Skin Integrity:  Goal: Will show no infection signs and symptoms  Description: Will show no infection signs and symptoms  4/3/2022 1043 by Mirna Garza, RN  Outcome: Met This Shift     Problem: Skin Integrity:  Goal: Absence of new skin breakdown  Description: Absence of new skin breakdown  4/3/2022 1043 by Mirna Garza RN  Outcome: Met This Shift

## 2022-04-03 NOTE — CARE COORDINATION
Return call received from [facility]. She confirms they have been having issues with their phone and fax machines in the building since yesterday. She was able to speak with someone and get some information on the patient however. Patient has been gone greater than 72 hours, but according to Dr Musa Huertas, they will accept her back as a new admission. H&P, clinical notes, medical stability notes will need to be faxed to [facility] at  . She will be going to their Dual diagnosis unit. Spoke with patient's bedside nurse [facility] [address]. Patient had a fall and hit her head yesterday and was having seizure like activity. Last note from Medicine stating awaiting medical improvement for transition of care. Fax number for clinical information is [phone]. They will need a voluntary form signed as well for transition of care. Will continue to follow.      Panchito Florence RN.  [facility]  [phone]

## 2022-04-04 VITALS
HEART RATE: 90 BPM | DIASTOLIC BLOOD PRESSURE: 98 MMHG | WEIGHT: 247.14 LBS | SYSTOLIC BLOOD PRESSURE: 141 MMHG | HEIGHT: 64 IN | OXYGEN SATURATION: 96 % | TEMPERATURE: 98.3 F | RESPIRATION RATE: 17 BRPM | BODY MASS INDEX: 42.19 KG/M2

## 2022-04-04 LAB
ALBUMIN SERPL-MCNC: 4 G/DL (ref 3.5–5.2)
ALP BLD-CCNC: 86 U/L (ref 35–104)
ALT SERPL-CCNC: 27 U/L (ref 0–32)
ANION GAP SERPL CALCULATED.3IONS-SCNC: 14 MMOL/L (ref 7–16)
AST SERPL-CCNC: 23 U/L (ref 0–31)
BASOPHILS ABSOLUTE: 0.06 E9/L (ref 0–0.2)
BASOPHILS RELATIVE PERCENT: 0.7 % (ref 0–2)
BILIRUB SERPL-MCNC: <0.2 MG/DL (ref 0–1.2)
BILIRUBIN DIRECT: <0.2 MG/DL (ref 0–0.3)
BILIRUBIN, INDIRECT: NORMAL MG/DL (ref 0–1)
BUN BLDV-MCNC: 14 MG/DL (ref 6–20)
CALCIUM IONIZED: 1.35 MMOL/L (ref 1.15–1.33)
CALCIUM SERPL-MCNC: 9.5 MG/DL (ref 8.6–10.2)
CHLORIDE BLD-SCNC: 104 MMOL/L (ref 98–107)
CO2: 22 MMOL/L (ref 22–29)
CREAT SERPL-MCNC: 1 MG/DL (ref 0.5–1)
EOSINOPHILS ABSOLUTE: 0.28 E9/L (ref 0.05–0.5)
EOSINOPHILS RELATIVE PERCENT: 3.3 % (ref 0–6)
GFR AFRICAN AMERICAN: >60
GFR NON-AFRICAN AMERICAN: >60 ML/MIN/1.73
GLUCOSE BLD-MCNC: 110 MG/DL (ref 74–99)
HCT VFR BLD CALC: 37.4 % (ref 34–48)
HEMOGLOBIN: 11.9 G/DL (ref 11.5–15.5)
IMMATURE GRANULOCYTES #: 0.03 E9/L
IMMATURE GRANULOCYTES %: 0.4 % (ref 0–5)
LYMPHOCYTES ABSOLUTE: 2.52 E9/L (ref 1.5–4)
LYMPHOCYTES RELATIVE PERCENT: 29.9 % (ref 20–42)
MAGNESIUM: 2 MG/DL (ref 1.6–2.6)
MCH RBC QN AUTO: 27.5 PG (ref 26–35)
MCHC RBC AUTO-ENTMCNC: 31.8 % (ref 32–34.5)
MCV RBC AUTO: 86.4 FL (ref 80–99.9)
MONOCYTES ABSOLUTE: 0.56 E9/L (ref 0.1–0.95)
MONOCYTES RELATIVE PERCENT: 6.7 % (ref 2–12)
NEUTROPHILS ABSOLUTE: 4.97 E9/L (ref 1.8–7.3)
NEUTROPHILS RELATIVE PERCENT: 59 % (ref 43–80)
PDW BLD-RTO: 13.3 FL (ref 11.5–15)
PHOSPHORUS: 4.7 MG/DL (ref 2.5–4.5)
PLATELET # BLD: 375 E9/L (ref 130–450)
PMV BLD AUTO: 9.3 FL (ref 7–12)
POTASSIUM SERPL-SCNC: 4 MMOL/L (ref 3.5–5)
RBC # BLD: 4.33 E12/L (ref 3.5–5.5)
SARS-COV-2, NAAT: NOT DETECTED
SODIUM BLD-SCNC: 140 MMOL/L (ref 132–146)
TOTAL PROTEIN: 7.4 G/DL (ref 6.4–8.3)
WBC # BLD: 8.4 E9/L (ref 4.5–11.5)

## 2022-04-04 PROCEDURE — 6370000000 HC RX 637 (ALT 250 FOR IP): Performed by: INTERNAL MEDICINE

## 2022-04-04 PROCEDURE — C9254 INJECTION, LACOSAMIDE: HCPCS | Performed by: INTERNAL MEDICINE

## 2022-04-04 PROCEDURE — 2580000003 HC RX 258: Performed by: INTERNAL MEDICINE

## 2022-04-04 PROCEDURE — 87635 SARS-COV-2 COVID-19 AMP PRB: CPT

## 2022-04-04 PROCEDURE — 85025 COMPLETE CBC W/AUTO DIFF WBC: CPT

## 2022-04-04 PROCEDURE — 80076 HEPATIC FUNCTION PANEL: CPT

## 2022-04-04 PROCEDURE — 82330 ASSAY OF CALCIUM: CPT

## 2022-04-04 PROCEDURE — 6360000002 HC RX W HCPCS: Performed by: INTERNAL MEDICINE

## 2022-04-04 PROCEDURE — 36415 COLL VENOUS BLD VENIPUNCTURE: CPT

## 2022-04-04 PROCEDURE — 83735 ASSAY OF MAGNESIUM: CPT

## 2022-04-04 PROCEDURE — 6370000000 HC RX 637 (ALT 250 FOR IP): Performed by: FAMILY MEDICINE

## 2022-04-04 PROCEDURE — 84100 ASSAY OF PHOSPHORUS: CPT

## 2022-04-04 PROCEDURE — 80048 BASIC METABOLIC PNL TOTAL CA: CPT

## 2022-04-04 PROCEDURE — 2580000003 HC RX 258: Performed by: FAMILY MEDICINE

## 2022-04-04 RX ORDER — LAMOTRIGINE 25 MG/1
50 TABLET ORAL DAILY
Qty: 30 TABLET | Refills: 3 | Status: ON HOLD | OUTPATIENT
Start: 2022-04-04 | End: 2022-06-14 | Stop reason: HOSPADM

## 2022-04-04 RX ORDER — LACOSAMIDE 150 MG/1
150 TABLET ORAL 2 TIMES DAILY
Qty: 60 TABLET | Refills: 1 | Status: ON HOLD | OUTPATIENT
Start: 2022-04-04 | End: 2022-06-14 | Stop reason: HOSPADM

## 2022-04-04 RX ORDER — AMOXICILLIN AND CLAVULANATE POTASSIUM 875; 125 MG/1; MG/1
1 TABLET, FILM COATED ORAL EVERY 12 HOURS SCHEDULED
Qty: 5 TABLET | Refills: 0 | Status: SHIPPED | OUTPATIENT
Start: 2022-04-04 | End: 2022-04-07

## 2022-04-04 RX ORDER — LEVETIRACETAM 500 MG/1
1000 TABLET ORAL 2 TIMES DAILY
Status: DISCONTINUED | OUTPATIENT
Start: 2022-04-04 | End: 2022-04-04 | Stop reason: HOSPADM

## 2022-04-04 RX ADMIN — DEXTROSE MONOHYDRATE 150 MG: 50 INJECTION, SOLUTION INTRAVENOUS at 08:57

## 2022-04-04 RX ADMIN — Medication 10 ML: at 08:31

## 2022-04-04 RX ADMIN — APIXABAN 5 MG: 5 TABLET, FILM COATED ORAL at 08:31

## 2022-04-04 RX ADMIN — ACETAMINOPHEN 650 MG: 325 TABLET ORAL at 08:31

## 2022-04-04 RX ADMIN — LEVETIRACETAM 1000 MG: 10 INJECTION INTRAVENOUS at 08:26

## 2022-04-04 RX ADMIN — GUAIFENESIN 400 MG: 400 TABLET ORAL at 08:30

## 2022-04-04 RX ADMIN — GUAIFENESIN 400 MG: 400 TABLET ORAL at 15:27

## 2022-04-04 RX ADMIN — AMOXICILLIN AND CLAVULANATE POTASSIUM 1 TABLET: 875; 125 TABLET, FILM COATED ORAL at 08:31

## 2022-04-04 RX ADMIN — LAMOTRIGINE 50 MG: 25 TABLET ORAL at 09:49

## 2022-04-04 RX ADMIN — LEVOTHYROXINE SODIUM 150 MCG: 0.15 TABLET ORAL at 05:09

## 2022-04-04 RX ADMIN — ACETAMINOPHEN 650 MG: 325 TABLET ORAL at 16:38

## 2022-04-04 ASSESSMENT — PAIN DESCRIPTION - PROGRESSION: CLINICAL_PROGRESSION: NOT CHANGED

## 2022-04-04 ASSESSMENT — PAIN SCALES - GENERAL
PAINLEVEL_OUTOF10: 7
PAINLEVEL_OUTOF10: 6
PAINLEVEL_OUTOF10: 6
PAINLEVEL_OUTOF10: 0

## 2022-04-04 ASSESSMENT — PAIN DESCRIPTION - FREQUENCY: FREQUENCY: CONTINUOUS

## 2022-04-04 ASSESSMENT — PAIN - FUNCTIONAL ASSESSMENT: PAIN_FUNCTIONAL_ASSESSMENT: PREVENTS OR INTERFERES SOME ACTIVE ACTIVITIES AND ADLS

## 2022-04-04 ASSESSMENT — PAIN DESCRIPTION - ONSET: ONSET: ON-GOING

## 2022-04-04 ASSESSMENT — PAIN DESCRIPTION - PAIN TYPE: TYPE: ACUTE PAIN

## 2022-04-04 ASSESSMENT — PAIN DESCRIPTION - ORIENTATION: ORIENTATION: RIGHT;LEFT

## 2022-04-04 ASSESSMENT — PAIN DESCRIPTION - LOCATION: LOCATION: HEAD

## 2022-04-04 ASSESSMENT — PAIN DESCRIPTION - DESCRIPTORS: DESCRIPTORS: ACHING;HEADACHE

## 2022-04-04 NOTE — CARE COORDINATION
Call received from [place] at [facility]. 2.. Patient accepted and can transition today for care. Call placed to [facility] and transportation arranged for a 5 pm pick-up to [facility] via [facility]. Charge nurse Alesia Millard notified. Call placed to the patient's emergency contact Ysabel, her friend, to notify of her transition of care. She is in agreement with return to [facility]. Patient's room phone number and unit phone number provided so she can speak with the patient prior to her transition of care. Envelope and transfer paperwork in the soft chart.      Kelsi Winchester RN.  [facility]  [phone]

## 2022-04-04 NOTE — CARE COORDINATION
Voluntary form and COVID screening form received via fax from [employer].. Voluntary form signed and faxed back along with medical clearance note, COVID screening, and AVS med list to [phone] to [facility]. Await return call with formal acceptance for transition of care to [facility]. Cain Baron RN.

## 2022-04-04 NOTE — DISCHARGE SUMMARY
Hospitalist Discharge Summary    Patient ID: Vinnie Dunn   Patient : 1985  Patient's PCP: No primary care provider on file. Admit Date: 3/29/2022   Admitting Physician: Hector Ferreira MD    Discharge Date:  2022   Discharge Physician: Home Carlos MD   Discharge Condition: Stable  Discharge Disposition: [facility] course in brief:  (Please refer to daily progress notes for a comprehensive review of the hospitalization by requesting medical records)  Patient presented to the emergency department after having seizure-like activity.  Patient has history of seizure disorder.  Patient resides [facility].  Vital signs reveal the patient be hypoxic 70% on room air.  Possible she may have aspirated.  Tachycardic with a rate of 101.  She is afebrile.  Laboratory studies demonstrate creatinine 1.1, glucose 154.  CT chest shows patchy infiltrates and atelectasis in the lung bases bilaterally concerning for pneumonia.  Patient was given loading dose of Keppra.  Also given Unasyn for aspiration pneumonia.  Medicine consulted for admission.     Patient had EEG done during admission which did not show any epileptic changes. Neurology was on board during the hospital stay and increased the dose of Lamictal to 50 mg daily along with the Vimpat of the 150 mg twice daily and Keppra was increased to 1000 mg twice daily. No ativan for the Psychogenic non epileptic seizure activity. Considered stable for discharge from neurological standpoint. Psychiatry was consulted and does not recommend continuing any psychotropic medications at this time-[facility] where her psychotropic medications can be adjusted. Of note patient was intubated on 3/30/2022 when she had RRT and extubated next day and weaned to room air.   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 Follow-up with consultants as indicated by them. Compliance with medications as prescribed on discharge. No future appointments. The patient's condition is stable. At this time the patient is without objective evidence of an acute process requiring continuing hospitalization or inpatient management. They are stable for discharge with outpatient follow-up. I have spoken with the patient and discussed the results of the current hospitalization, in addition to providing specific details for the plan of care and counseling regarding the diagnosis and prognosis. The plan has been discussed in detail and they are aware of the specific conditions for emergent return, as well as the importance of follow-up. Their questions are answered at this time and they are agreeable with the plan for discharge to [facility]. Continued appropriate risk factor modification of blood pressure, diabetes and serum lipids will remain essential to reducing risk of future atherosclerotic development    Activity: activity as tolerated    Physical exam:  General appearance: Morbid obesity, wakes up to verbal, tactile stimuli-does not respond to commands  HEENT: Pupils equal, round, and reactive to light. Conjunctivae/corneas clear. Neck: Supple,. No jugular venous distention. Trachea midline.   Respiratory: Nasal cannula 2 L, normal vesicular breath sounds, no added sounds  Cardiovascular: Regular rate and rhythm with normal S1/S2 without murmurs, rubs or gallops. Abdomen: Soft, non-tender, non-distended with normal bowel sounds. Musculoskeletal: No clubbing, cyanosis or edema bilaterally. Skin: Skin color, texture, turgor normal.  No rashes or lesions. Neurologic: Alert and oriented to time place and person, no focal deficits with strength normal b/l upper and lower extremities    Significant labs:  CBC:   Recent Labs     04/02/22  0540 04/03/22  0811 04/04/22  0545   WBC 8.0 7.6 8.4   RBC 3.94 4.27 4.33   HGB 10.9* 11.7 11.9   HCT 34.3 36.2 37.4   MCV 87.1 84.8 86.4   RDW 13.5 13.4 13.3    364 375     BMP:   Recent Labs     04/02/22  0540 04/03/22  0811 04/04/22  0545    140 140   K 3.7 3.7 4.0    105 104   CO2 25 25 22   BUN 8 9 14   CREATININE 1.0 1.1* 1.0   MG 1.7 1.8 2.0   PHOS 4.1 4.3 4.7*     LFT:  Recent Labs     04/02/22  0540 04/03/22  0811 04/04/22  0545   PROT 7.0 7.5 7.4   ALKPHOS 79 85 86   ALT 25 28 27   AST 18 20 23   BILITOT <0.2 0.2 <0.2     PT/INR: No results for input(s): INR, APTT in the last 72 hours. BNP: No results for input(s): BNP in the last 72 hours.   Hgb A1C:   Lab Results   Component Value Date    LABA1C 6.2 (H) 03/30/2022     Folate and B12:   Lab Results   Component Value Date    VYKURABL09 371 02/13/2021   ,   Lab Results   Component Value Date    FOLATE 10.7 02/13/2021     Thyroid Studies:   Lab Results   Component Value Date    TSH 1.010 03/30/2022       Urinalysis:    Lab Results   Component Value Date    NITRU Negative 03/30/2022    WBCUA NONE 03/30/2022    BACTERIA NONE SEEN 03/30/2022    RBCUA NONE 03/30/2022    BLOODU Negative 03/30/2022    SPECGRAV <=1.005 03/30/2022    GLUCOSEU Negative 03/30/2022       Imaging:  CT HEAD WO CONTRAST    Result Date: 4/2/2022  EXAMINATION: CT OF THE HEAD WITHOUT CONTRAST  4/1/2022 8:23 pm TECHNIQUE: CT of the head was performed without the administration of intravenous contrast. Dose modulation, iterative reconstruction, and/or weight based adjustment of the mA/kV was utilized to reduce the radiation dose to as low as reasonably achievable. COMPARISON: None. HISTORY: ORDERING SYSTEM PROVIDED HISTORY: HEAD TRAUMA, CLOSED, MILD, GCS >= 13, NO RISK FACTORS, NEURO EXAM NORMAL TECHNOLOGIST PROVIDED HISTORY: Has a \"code stroke\" or \"stroke alert\" been called? ->No Reason for exam:->fall, AMS What reading provider will be dictating this exam?->CRC FINDINGS: BRAIN/VENTRICLES: There is no acute intracranial hemorrhage, mass effect or midline shift. No abnormal extra-axial fluid collection. The gray-white differentiation is maintained without evidence of an acute infarct. There is no evidence of hydrocephalus. ORBITS: The visualized portion of the orbits demonstrate no acute abnormality. SINUSES: The visualized paranasal sinuses and mastoid air cells demonstrate no acute abnormality. SOFT TISSUES/SKULL:  No acute abnormality of the visualized skull or soft tissues. No acute intracranial abnormality. RECOMMENDATIONS: Unavailable     CT HEAD WO CONTRAST    Result Date: 3/30/2022  EXAMINATION: CT OF THE HEAD WITHOUT CONTRAST  3/30/2022 7:50 pm TECHNIQUE: CT of the head was performed without the administration of intravenous contrast. Dose modulation, iterative reconstruction, and/or weight based adjustment of the mA/kV was utilized to reduce the radiation dose to as low as reasonably achievable. COMPARISON: November 29, 2020 HISTORY: ORDERING SYSTEM PROVIDED HISTORY: fall w/ head trauma, seizure TECHNOLOGIST PROVIDED HISTORY: Reason for exam:->fall w/ head trauma, seizure Has a \"code stroke\" or \"stroke alert\" been called? ->No What reading provider will be dictating this exam?->CRC FINDINGS: BRAIN/VENTRICLES: There is no acute intracranial hemorrhage, mass effect or midline shift. No abnormal extra-axial fluid collection.   The gray-white differentiation is maintained without evidence of an acute infarct. There is no evidence of hydrocephalus. ORBITS: The visualized portion of the orbits demonstrate no acute abnormality. SINUSES: The visualized paranasal sinuses and mastoid air cells demonstrate no acute abnormality. SOFT TISSUES/SKULL:  No acute abnormality of the visualized skull or soft tissues. Endotracheal tube in place. No acute intracranial abnormality or hemorrhage. XR CHEST PORTABLE    Result Date: 4/1/2022  EXAMINATION: ONE XRAY VIEW OF THE CHEST 4/1/2022 9:39 pm COMPARISON: 03/31/2022 HISTORY: ORDERING SYSTEM PROVIDED HISTORY: SOB TECHNOLOGIST PROVIDED HISTORY: Reason for exam:->SOB What reading provider will be dictating this exam?->CRC FINDINGS: EKG leads overlie the chest.  Lung volumes are diminished. Cardiac silhouette is near the upper limits of normal but unchanged. Right IJ catheter is been removed. No pneumothorax. Worsening perihilar and basal opacities. Worsening perihilar and basal opacities which may represent mild edema. Developing infiltrates from pneumonia not entirely excluded. Continued follow-up recommended. XR CHEST PORTABLE    Result Date: 3/31/2022  EXAMINATION: ONE XRAY VIEW OF THE CHEST 3/31/2022 8:00 am COMPARISON: None. HISTORY: ORDERING SYSTEM PROVIDED HISTORY: intubated patient TECHNOLOGIST PROVIDED HISTORY: Reason for exam:->intubated patient What reading provider will be dictating this exam?->CRC FINDINGS: The cardiac silhouette is within normal limits. The endotracheal tube and NG tube are unchanged in position. There is a right internal jugular central venous catheter. There is no pneumothorax. There is minimal atelectasis seen within the right infrahilar region. The right upper lobe and left lung are clear. There is no pleural effusion. 1. Stable position of the support lines and tubes. 2. Minimal atelectasis seen within the right infrahilar region.      XR CHEST PORTABLE    Result Date: 3/30/2022  EXAMINATION: ONE XRAY VIEW OF THE CHEST 3/30/2022 9:02 pm COMPARISON: Chest series from March 29, 2022 HISTORY: ORDERING SYSTEM PROVIDED HISTORY: CVC placement TECHNOLOGIST PROVIDED HISTORY: Reason for exam:->CVC placement What reading provider will be dictating this exam?->CRC FINDINGS: Right IJ central venous catheter with distal tip projecting in the mid to distal superior vena cava. Endotracheal tube terminates in the midthoracic trachea. Enteric tube extends subdiaphragmatic and off the field of view. Low lung volumes which are symmetric. This appears to be contributing to mild bronchovascular crowding. No obvious pleural effusion or pneumothorax. Cardiac silhouette remains mildly prominent. Osseous and thoracic soft tissue structures demonstrate no acute findings. 1.  New right IJ catheter, new endotracheal tube, and new enteric tube. No complicating features. 2.  Low lung volumes which appears to be contributing to bronchovascular crowding. Overall stable when compared to prior exam.  No new cardiopulmonary pathology. XR CHEST PORTABLE    Result Date: 3/29/2022  EXAMINATION: ONE XRAY VIEW OF THE CHEST 3/29/2022 1:32 pm COMPARISON: Chest, single view 11/27/2020 HISTORY: ORDERING SYSTEM PROVIDED HISTORY: sob TECHNOLOGIST PROVIDED HISTORY: Reason for exam:->sob What reading provider will be dictating this exam?->CRC FINDINGS: A single portable AP view of the chest was obtained. There is enlargement of the cardiac silhouette. Mediastinal contour is within normal limits. There is central pulmonary vascular congestion. There are bilateral perihilar airspace opacities. There also for airspace opacities within the left lung base. There is poor definition of the bilateral costophrenic angles. Bilateral airspace disease could represent infection or edema from congestive heart failure.      CTA PULMONARY W CONTRAST    Result Date: 3/29/2022  EXAMINATION: CTA OF THE CHEST 3/29/2022 5:23 pm TECHNIQUE: CTA of the chest was performed after the administration of intravenous contrast.  Multiplanar reformatted images are provided for review. MIP images are provided for review. Dose modulation, iterative reconstruction, and/or weight based adjustment of the mA/kV was utilized to reduce the radiation dose to as low as reasonably achievable. COMPARISON: None. HISTORY: ORDERING SYSTEM PROVIDED HISTORY: sob TECHNOLOGIST PROVIDED HISTORY: Reason for exam:->sob Decision Support Exception - unselect if not a suspected or confirmed emergency medical condition->Emergency Medical Condition (MA) What reading provider will be dictating this exam?->CRC FINDINGS: The heart and the great vessels are normal.  Trachea and major bronchi are patent. There are no filling defects in the main pulmonary artery and the central branches. The distal subsegmental and peripheral vessels are poorly opacified and cannot be evaluated for small distal subsegmental pulmonary embolism. There is atelectasis/infiltrates in the lower lobes bilaterally. Liver is fatty infiltrated. Gallbladder is absent. Degenerative changes are identified in the thoracic spine. No central pulmonary embolism or aortic dissection. The distal subsegmental and peripheral vessels are poorly evaluated due to suboptimal contrast. Patchy infiltrates and atelectasis in the lung bases bilaterally concerning for pneumonia. RECOMMENDATIONS: Unavailable     XR ABDOMEN FOR NG/OG/NE TUBE PLACEMENT    Result Date: 3/30/2022  EXAMINATION: ONE SUPINE XRAY VIEW(S) OF THE ABDOMEN 3/30/2022 9:03 pm COMPARISON: None. HISTORY: ORDERING SYSTEM PROVIDED HISTORY: Confirmation of course of NG/OG/NE tube and location of tip of tube TECHNOLOGIST PROVIDED HISTORY: Reason for exam:->Confirmation of course of NG/OG/NE tube and location of tip of tube Portable? ->Yes What reading provider will be dictating this exam?->CRC FINDINGS: Esophageal route catheter extends into the left upper quadrant stomach location.  Chest evaluation is reported separately. No dilated bowels are visible. Catheter is in the stomach. Discharge Medications:      Medication List      START taking these medications    amoxicillin-clavulanate 875-125 MG per tablet  Commonly known as: AUGMENTIN  Take 1 tablet by mouth every 12 hours for 5 doses        CHANGE how you take these medications    lamoTRIgine 25 MG tablet  Commonly known as: LAMICTAL  Take 2 tablets by mouth daily  What changed: how much to take        CONTINUE taking these medications    acetaminophen 325 MG tablet  Commonly known as: TYLENOL     amLODIPine 5 MG tablet  Commonly known as: NORVASC     Eliquis 5 MG Tabs tablet  Generic drug: apixaban     hydroCHLOROthiazide 25 MG tablet  Commonly known as: HYDRODIURIL     lacosamide 150 MG Tabs tablet  Commonly known as: VIMPAT  Take 1 tablet by mouth 2 times daily for 30 days. levETIRAcetam 1000 MG tablet  Commonly known as: KEPPRA     levothyroxine 75 MCG tablet  Commonly known as: SYNTHROID        STOP taking these medications    mirtazapine 15 MG tablet  Commonly known as: REMERON     paliperidone 6 MG extended release tablet  Commonly known as: INVEGA           Where to Get Your Medications      These medications were sent to [facility] [address] [address]    Phone: [phone]   · amoxicillin-clavulanate 875-125 MG per tablet  · lacosamide 150 MG Tabs tablet  · lamoTRIgine 25 MG tablet         Time Spent on discharge is more than 45 minutes in the examination, evaluation, counseling and review of medications and discharge plan.    +++++++++++++++++++++++++++++++++++++++++++++++++  Fazal Holly MD  [address]  +++++++++++++++++++++++++++++++++++++++++++++++++  NOTE: This report was transcribed using voice recognition software.  Every effort was made to ensure accuracy; however, inadvertent computerized transcription errors may be present.

## 2022-04-04 NOTE — PROGRESS NOTES
Hospitalist Progress Note      PCP: No primary care provider on file. Date of Admission: 3/29/2022    Chief Complaint: seizure like activity    Hospital Course:    Patient presented to the emergency department after having seizure-like activity. Patient has history of seizure disorder. [facility]. Vital signs reveal the patient be hypoxic 70% on room air. Possible she may have aspirated. Tachycardic with a rate of 101. She is afebrile. Laboratory studies demonstrate creatinine 1.1, glucose 154. CT chest shows patchy infiltrates and atelectasis in the lung bases bilaterally concerning for pneumonia. Patient was given loading dose of Keppra. Also given Unasyn for aspiration pneumonia. Medicine consulted for admission. Subjective: Patient was seen at bedside today- well oriented to time place and person. Does not have any acute concerns at this time. Doing well and does not recall any more events overnight,    On 3/30/2022 at approximately 1900, an RRT was called after the patient was observed by her roommate to suddenly fall to the ground, striking her head and face. The patient was lifted back onto the bed, and on resident examination the patient was found to have pendular nystagmus of bilateral eyes as well as rhythmic lipsmacking and finger movements. She remained hemodynamically stable during the entire RRT but did require intubation and mechanical ventilation for status epilepticus. CT of the head performed s/p intubation was negative for any acute intracranial hemorrhage or other abnormalities. After consulting with on-call intensivist, patient was brought to the MICU for further management and stabilization  On 4/1/22 had an RRT called for  patient went out of the bed by herself and fell down with her face down on the bathroom. The event was unwitnessed.   On arrival the patient was opening her eyes, she was breathing and shivering, was not following commands. Stat CT head ordered but it was not done because the patient was shivering. Blood sugar level was 144. Stat BMP, mag, phos, lactate ordered. She was hemodynamically stable with a blood pressure of 134/80 mmHg, protecting her airway with a oxygen saturation of 95%. She was persistently having questionable seizure-like activity, she received a total of 6 mg of Ativan. Case discussed with neurologist Dr. Jeanna Kramer, and they recommended to continue Lamictal, Vimpat, Keppra. Case discussed with intensivist Dr. Verlene Fabry as well. She was persistently having shivering-like movement unlikely to be true seizure. She was hemodynamically stable, protecting her airway. The patient has extensive history of psychogenic nonepileptic seizure and multiple EEGs done. It was decided to give the patient in telemetry floor with tele sitter on board.     Medications:  Reviewed    Infusion Medications    sodium chloride      dextrose       Scheduled Medications    amoxicillin-clavulanate  1 tablet Oral 2 times per day    lacosamide (VIMPAT) IVPB  150 mg IntraVENous BID    apixaban  5 mg Oral BID    levothyroxine  150 mcg Oral Daily    [Held by provider] mirtazapine  15 mg Oral Nightly    [Held by provider] paliperidone  6 mg Oral Nightly    sodium chloride flush  5-40 mL IntraVENous 2 times per day    lamoTRIgine  50 mg Oral Daily    LORazepam  0.5 mg IntraVENous Once    levetiracetam  1,000 mg IntraVENous BID    guaiFENesin  400 mg Oral TID     PRN Meds: hydrOXYzine, sodium chloride flush, sodium chloride, LORazepam, ondansetron **OR** ondansetron, polyethylene glycol, acetaminophen **OR** acetaminophen, glucose, dextrose, glucagon (rDNA), dextrose      Intake/Output Summary (Last 24 hours) at 4/4/2022 0812  Last data filed at 4/3/2022 1841  Gross per 24 hour   Intake 720 ml   Output --   Net 720 ml       Exam:    /68   Pulse 83   Temp 98.8 °F (37.1 °C) (Temporal)   Resp 19   Ht 5' 4\" (1.626 m)   Wt 247 lb 2.2 oz (112.1 kg)   LMP  (LMP Unknown)   SpO2 95%   BMI 42.42 kg/m²     General appearance: Morbid obesity, wakes up to verbal, tactile stimuli-does not respond to commands  HEENT: Pupils equal, round, and reactive to light. Conjunctivae/corneas clear. Neck: Supple,. No jugular venous distention. Trachea midline. Respiratory: Nasal cannula 2 L, normal vesicular breath sounds, no added sounds  Cardiovascular: Regular rate and rhythm with normal S1/S2 without murmurs, rubs or gallops. Abdomen: Soft, non-tender, non-distended with normal bowel sounds. Musculoskeletal: No clubbing, cyanosis or edema bilaterally. Skin: Skin color, texture, turgor normal.  No rashes or lesions. Neurologic: Alert and oriented to time place and person, no focal deficits with strength normal b/l upper and lower extremities    Labs:   Recent Labs     04/02/22  0540 04/03/22  0811 04/04/22  0545   WBC 8.0 7.6 8.4   HGB 10.9* 11.7 11.9   HCT 34.3 36.2 37.4    364 375     Recent Labs     04/02/22  0540 04/03/22  0811 04/04/22  0545    140 140   K 3.7 3.7 4.0    105 104   CO2 25 25 22   BUN 8 9 14   CREATININE 1.0 1.1* 1.0   CALCIUM 9.3 9.7 9.5   PHOS 4.1 4.3 4.7*     Recent Labs     04/02/22  0540 04/03/22  0811 04/04/22  0545   AST 18 20 23   ALT 25 28 27   BILIDIR <0.2 <0.2 <0.2   BILITOT <0.2 0.2 <0.2   ALKPHOS 79 85 86     No results for input(s): INR in the last 72 hours.   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  - DM type 2  -Schizoaffective disorder  -Unstable gait at baseline-currently using walker    Patient is currently following neurology and is on [address]  Patient was previously diagnosed to have pseudoseizures per chart review  Currently on Unasyn for aspiration pneumonia  Neurology consutled-Continue home Keppra 2 g twice daily, Lamictal 50 mg daily, and Vimpat 150 mg twice daily,Wean sedation as able and limit use of benzodiazepines  EEG ordered per neuro-3hr showed no epileptic changes  Psych consulted- psychiatry does not recommend continuing with any psychotropic medications at this time. Once patient is medically cleared and stabilized patient can be [facility] of care where her psychotropic medications can be adjusted. S/p RRT-3/30/2022 and was intubated -3/31/22 extubated, weaned to [facility] critical care management. DVT Prophylaxis: lovenox  Diet: ADULT DIET; Regular; shellfish allergy  Code Status: Full Code    PT/OT Eval Status: ordered    Dispo - will await to be off seizure like activity for 24hrs, possible d/c tomorrow.     Yamila Mclain MD

## 2022-04-04 NOTE — CARE COORDINATION
Danielle Landrum called back requesting additional information and order of medical clearance. Noted where doctor stated medical clearance in note. Discharge Summary sent. Voluntary form and COVID screen updated with time appropriately. [facility] nursing order of medical clearance for transition to [facility] also sent via fax to [phone]. Continue to await formal acceptance for transition of care.      Rosalia Gonzalez RN.  [facility]  [phone]

## 2022-04-04 NOTE — CARE COORDINATION
Spoke with Dr Talya Garcia in IDR this morning and he stated the patient is medically stable to transition to [facility] today. Call placed to the Intake line at [facility], spoke with [facility]. Clinical information faxed to [phone] at her request.  They will need a voluntary form signed or patient will need to be pink slipped for transition of care. Message out to Dr Talya Garcia re: need for medical clearance note. Note from yesterday states, free from seizure like activity for 24 hour prior to discharge, likely discharge tomorrow. Await return call with acceptance for transfer to [facility] unit. Will continue to follow.      Isidro Simons RN.  [facility]io  [phone]

## 2022-04-04 NOTE — DISCHARGE INSTR - COC
Continuity of Care Form    Patient Name: Terrell Mcarthur   :  1985  MRN:  [id number]    Admit date:  3/29/2022  Discharge date:  22    Code Status Order: Full Code   Advance Directives:      Admitting Physician:  Anne Arceo MD  PCP: No primary care provider on file. Discharging Nurse: [facility]Room[facility] 3234/2672-Q  Discharging Unit Phone Number: [phone]    Emergency Contact:   Extended Emergency Contact Information  Primary Emergency Contact: [address] [place] [address] Phone: [phone]  Mobile Phone: [phone]  Relation: Other  Preferred language: English   needed? No    Past Surgical History:  Past Surgical History:   Procedure Laterality Date    ANKLE SURGERY      bilateral ankles; patient states when she was young    GALLBLADDER SURGERY         Immunization History: There is no immunization history on file for this patient.     Active Problems:  Patient Active Problem List   Diagnosis Code    Seizure-like activity ([address] R56.9    Type 2 diabetes mellitus, without long-term current use of insulin ([address] E11.9    Seizure ([address] R56.9    Psychogenic nonepileptic seizure F44.5       Isolation/Infection:   Isolation            No Isolation          Patient Infection Status       Infection Onset Added Last Indicated Last Indicated By Review Planned Expiration Resolved Resolved By    None active    Resolved    COVID-19 (Rule Out) 22 Respiratory Panel, Molecular, with COVID-19 (Restricted: peds pts or suitable admitted adults) (Ordered)   22 Rule-Out Test Resulted    COVID-19 (Rule Out) 22 COVID-19 & Influenza Combo (Ordered)   22 Rule-Out Test Resulted    COVID-19 (Rule Out) 21 COVID-19 (Ordered)   21 Rule-Out Test Resulted    COVID-19 (Rule Out) 20 COVID-19 (Ordered)   20 Rule-Out Test Resulted            Nurse Assessment:  Last Vital Signs: /68   Pulse 83   Temp 98.8 °F (37.1 °C) (Temporal)   Resp 19   Ht 5' 4\" (1.626 m)   Wt 247 lb 2.2 oz (112.1 kg)   LMP  (LMP Unknown)   SpO2 95%   BMI 42.42 kg/m²     Last documented pain score (0-10 scale): Pain Level: 0  Last Weight:   Wt Readings from Last 1 Encounters:   03/31/22 247 lb 2.2 oz (112.1 kg)     Mental Status:  oriented and alert    IV Access:  - None    Nursing Mobility/ADLs:  Walking   Assisted  Transfer  Assisted  Bathing  Independent  Dressing  Independent  Toileting  Independent  Feeding  [address]  Assisted  Med Delivery   whole    Wound Care Documentation and Therapy:        Elimination:  Continence: Bowel: Yes  Bladder: Yes  Urinary Catheter: None   Colostomy/Ileostomy/Ileal Conduit: No       Date of Last BM: ***    Intake/Output Summary (Last 24 hours) at 4/4/2022 1051  Last data filed at 4/3/2022 1841  Gross per 24 hour   Intake 720 ml   Output --   Net 720 ml     I/O last 3 completed shifts: In: 1080 [P.O.:1080]  Out: -     Safety Concerns:     History of Falls (last 30 days), At Risk for Falls, and History of Seizures    Impairments/Disabilities:      None    Nutrition Therapy:  Current Nutrition Therapy:   - Oral Diet:  General    Routes of Feeding: Oral  Liquids: Thin Liquids  Daily Fluid Restriction: no  Last Modified Barium Swallow with Video (Video Swallowing Test): not done    Treatments at the Time of Hospital Discharge:   Respiratory Treatments: ***  Oxygen Therapy:  is not on home oxygen therapy.   Ventilator:    - No ventilator support    Rehab Therapies: {THERAPEUTIC INTERVENTION:0811289797}  Weight Bearing Status/Restrictions: No weight bearing restrictions  Other Medical Equipment (for information only, NOT a DME order):  {EQUIPMENT:569696572}  Other Treatments: ***    Patient's personal belongings (please select all that are sent with patient):  None    RN SIGNATURE:  Electronically signed by Enoc Li RN on 4/4/22 at 10:53 AM EDT    CASE MANAGEMENT/SOCIAL WORK SECTION    Inpatient Status Date: ***    Readmission Risk Assessment Score:  Readmission Risk              Risk of Unplanned Readmission:  16           Discharging to Facility/ Agency   Name:   Address:  Phone:  Fax:    Dialysis Facility (if applicable)   Name:  Address:  Dialysis Schedule:  Phone:  Fax:    / signature: {Esignature:[id number]}    PHYSICIAN SECTION    Prognosis: {Prognosis:3441541763}    Condition at Discharge: [address] [place] [address] Patient Condition:188407898}    Rehab Potential (if transferring to Rehab): {Prognosis:3533176482}    Recommended Labs or Other Treatments After Discharge: ***    Physician Certification: I certify the above information and transfer of Ignacio Styles  is necessary for the continuing treatment of the diagnosis listed and that she requires {Admit to Appropriate Level of Care:64202} for {GREATER/LESS:080198910} 30 days.      Update Admission H&P: {CHP DME Changes in QQWML:229932943}    PHYSICIAN SIGNATURE:  {Esignature:461178748}

## 2022-04-04 NOTE — PROGRESS NOTES
Hospitalist Progress Note      PCP: No primary care provider on file. Date of Admission: 3/29/2022    Chief Complaint: seizure like activity    Hospital Course:    Patient presented to the emergency department after having seizure-like activity. Patient has history of seizure disorder. [facility]. Vital signs reveal the patient be hypoxic 70% on room air. Possible she may have aspirated. Tachycardic with a rate of 101. She is afebrile. Laboratory studies demonstrate creatinine 1.1, glucose 154. CT chest shows patchy infiltrates and atelectasis in the lung bases bilaterally concerning for pneumonia. Patient was given loading dose of Keppra. Also given Unasyn for aspiration pneumonia. Medicine consulted for admission. Subjective: Patient was seen at bedside today- well oriented to time place and person. Does not have any acute concerns at this time. Doing well and does not recall any more events overnight and discussed regarding discharge which she was agreaable to    On 3/30/2022 at approximately 1900, an RRT was called after the patient was observed by her roommate to suddenly fall to the ground, striking her head and face. The patient was lifted back onto the bed, and on resident examination the patient was found to have pendular nystagmus of bilateral eyes as well as rhythmic lipsmacking and finger movements. She remained hemodynamically stable during the entire RRT but did require intubation and mechanical ventilation for status epilepticus. CT of the head performed s/p intubation was negative for any acute intracranial hemorrhage or other abnormalities. After consulting with on-call intensivist, patient was brought to the MICU for further management and stabilization  On 4/1/22 had an RRT called for  patient went out of the bed by herself and fell down with her face down on the bathroom. The event was unwitnessed.   On arrival the patient was opening her eyes, she was breathing and shivering, was not following commands. Stat CT head ordered but it was not done because the patient was shivering. Blood sugar level was 144. Stat BMP, mag, phos, lactate ordered. She was hemodynamically stable with a blood pressure of 134/80 mmHg, protecting her airway with a oxygen saturation of 95%. She was persistently having questionable seizure-like activity, she received a total of 6 mg of Ativan. Case discussed with neurologist Dr. Bryon Leblanc, and they recommended to continue Lamictal, Vimpat, Keppra. Case discussed with intensivist Dr. Hamzah Capellan as well. She was persistently having shivering-like movement unlikely to be true seizure. She was hemodynamically stable, protecting her airway. The patient has extensive history of psychogenic nonepileptic seizure and multiple EEGs done. It was decided to give the patient in telemetry floor with tele sitter on board.     Medications:  Reviewed    Infusion Medications    sodium chloride      dextrose       Scheduled Medications    amoxicillin-clavulanate  1 tablet Oral 2 times per day    lacosamide (VIMPAT) IVPB  150 mg IntraVENous BID    apixaban  5 mg Oral BID    levothyroxine  150 mcg Oral Daily    [Held by provider] mirtazapine  15 mg Oral Nightly    [Held by provider] paliperidone  6 mg Oral Nightly    sodium chloride flush  5-40 mL IntraVENous 2 times per day    lamoTRIgine  50 mg Oral Daily    LORazepam  0.5 mg IntraVENous Once    levetiracetam  1,000 mg IntraVENous BID    guaiFENesin  400 mg Oral TID     PRN Meds: hydrOXYzine, sodium chloride flush, sodium chloride, LORazepam, ondansetron **OR** ondansetron, polyethylene glycol, acetaminophen **OR** acetaminophen, glucose, dextrose, glucagon (rDNA), dextrose      Intake/Output Summary (Last 24 hours) at 4/4/2022 1058  Last data filed at 4/3/2022 1841  Gross per 24 hour   Intake 720 ml   Output --   Net 720 ml       Exam:    /68   Pulse 83   Temp 98.8 °F (37.1 °C) (Temporal)   Resp 19   Ht 5' 4\" (1.626 m)   Wt 247 lb 2.2 oz (112.1 kg)   LMP  (LMP Unknown)   SpO2 95%   BMI 42.42 kg/m²     General appearance: Morbid obesity, wakes up to verbal, tactile stimuli-does not respond to commands  HEENT: Pupils equal, round, and reactive to light. Conjunctivae/corneas clear. Neck: Supple,. No jugular venous distention. Trachea midline. Respiratory: Nasal cannula 2 L, normal vesicular breath sounds, no added sounds  Cardiovascular: Regular rate and rhythm with normal S1/S2 without murmurs, rubs or gallops. Abdomen: Soft, non-tender, non-distended with normal bowel sounds. Musculoskeletal: No clubbing, cyanosis or edema bilaterally. Skin: Skin color, texture, turgor normal.  No rashes or lesions. Neurologic: Alert and oriented to time place and person, no focal deficits with strength normal b/l upper and lower extremities    Labs:   Recent Labs     04/02/22  0540 04/03/22  0811 04/04/22  0545   WBC 8.0 7.6 8.4   HGB 10.9* 11.7 11.9   HCT 34.3 36.2 37.4    364 375     Recent Labs     04/02/22  0540 04/03/22  0811 04/04/22  0545    140 140   K 3.7 3.7 4.0    105 104   CO2 25 25 22   BUN 8 9 14   CREATININE 1.0 1.1* 1.0   CALCIUM 9.3 9.7 9.5   PHOS 4.1 4.3 4.7*     Recent Labs     04/02/22  0540 04/03/22  0811 04/04/22  0545   AST 18 20 23   ALT 25 28 27   BILIDIR <0.2 <0.2 <0.2   BILITOT <0.2 0.2 <0.2   ALKPHOS 79 85 86     No results for input(s): INR in the last 72 hours.   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  - DM type 2  -Schizoaffective disorder  -Unstable gait at baseline-currently using walker    Patient is currently following neurology and is on [address]  Patient was previously diagnosed to have pseudoseizures per chart review  Currently on Unasyn for aspiration pneumonia  Neurology consutled-Continue home Keppra 2 g twice daily, Lamictal 50 mg daily, and Vimpat 150 mg twice daily,Wean sedation as able and limit use of benzodiazepines  EEG ordered per neuro-3hr showed no epileptic changes  Psych consulted- psychiatry does not recommend continuing with any psychotropic medications at this time. Once patient is medically cleared and stabilized patient can be [facility] where her psychotropic medications can be adjusted. S/p RRT-3/30/2022 and was intubated -3/31/22 extubated, weaned to   Appreciate critical care management. DVT Prophylaxis: lovenox  Diet: ADULT DIET;  Regular; shellfish allergy  Code Status: Full Code    PT/OT Eval Status: ordered    Dispo - medically stable for discharge to [facility]    Fazal Holly MD

## 2022-04-08 ENCOUNTER — TELEPHONE (OUTPATIENT)
Dept: OTHER | Facility: CLINIC | Age: 37
End: 2022-04-08

## 2022-04-08 ENCOUNTER — APPOINTMENT (OUTPATIENT)
Dept: CT IMAGING | Age: 37
End: 2022-04-08
Payer: MEDICARE

## 2022-04-08 ENCOUNTER — HOSPITAL ENCOUNTER (OUTPATIENT)
Age: 37
Setting detail: OBSERVATION
Discharge: PSYCHIATRIC OR SUBSTANCE USE FACILITY | End: 2022-04-10
Attending: STUDENT IN AN ORGANIZED HEALTH CARE EDUCATION/TRAINING PROGRAM | Admitting: FAMILY MEDICINE
Payer: MEDICARE

## 2022-04-08 ENCOUNTER — APPOINTMENT (OUTPATIENT)
Dept: GENERAL RADIOLOGY | Age: 37
End: 2022-04-08
Payer: MEDICARE

## 2022-04-08 DIAGNOSIS — R41.0 CONFUSION: ICD-10-CM

## 2022-04-08 DIAGNOSIS — D75.839 THROMBOCYTOSIS: ICD-10-CM

## 2022-04-08 DIAGNOSIS — R56.9 SEIZURE-LIKE ACTIVITY (HCC): Primary | ICD-10-CM

## 2022-04-08 LAB
ALBUMIN SERPL-MCNC: 3.9 G/DL (ref 3.5–5.2)
ALP BLD-CCNC: 104 U/L (ref 35–104)
ALT SERPL-CCNC: 54 U/L (ref 0–32)
ANION GAP SERPL CALCULATED.3IONS-SCNC: 15 MMOL/L (ref 7–16)
AST SERPL-CCNC: 51 U/L (ref 0–31)
BACTERIA: ABNORMAL /HPF
BASOPHILS ABSOLUTE: 0.05 E9/L (ref 0–0.2)
BASOPHILS RELATIVE PERCENT: 0.7 % (ref 0–2)
BILIRUB SERPL-MCNC: <0.2 MG/DL (ref 0–1.2)
BILIRUBIN URINE: NEGATIVE
BLOOD, URINE: NEGATIVE
BUN BLDV-MCNC: 13 MG/DL (ref 6–20)
CALCIUM SERPL-MCNC: 9.7 MG/DL (ref 8.6–10.2)
CHLORIDE BLD-SCNC: 103 MMOL/L (ref 98–107)
CHP ED QC CHECK: YES
CLARITY: CLEAR
CO2: 20 MMOL/L (ref 22–29)
COLOR: YELLOW
CREAT SERPL-MCNC: 0.9 MG/DL (ref 0.5–1)
EOSINOPHILS ABSOLUTE: 0.17 E9/L (ref 0.05–0.5)
EOSINOPHILS RELATIVE PERCENT: 2.2 % (ref 0–6)
EPITHELIAL CELLS, UA: ABNORMAL /HPF
GFR AFRICAN AMERICAN: >60
GFR NON-AFRICAN AMERICAN: >60 ML/MIN/1.73
GLUCOSE BLD-MCNC: 114 MG/DL (ref 74–99)
GLUCOSE BLD-MCNC: 119 MG/DL
GLUCOSE URINE: NEGATIVE MG/DL
HCG, URINE, POC: NEGATIVE
HCT VFR BLD CALC: 40 % (ref 34–48)
HEMOGLOBIN: 12.6 G/DL (ref 11.5–15.5)
IMMATURE GRANULOCYTES #: 0.05 E9/L
IMMATURE GRANULOCYTES %: 0.7 % (ref 0–5)
KETONES, URINE: NEGATIVE MG/DL
LACTIC ACID: 2.1 MMOL/L (ref 0.5–2.2)
LEUKOCYTE ESTERASE, URINE: NEGATIVE
LYMPHOCYTES ABSOLUTE: 2.19 E9/L (ref 1.5–4)
LYMPHOCYTES RELATIVE PERCENT: 28.7 % (ref 20–42)
Lab: NORMAL
MCH RBC QN AUTO: 27 PG (ref 26–35)
MCHC RBC AUTO-ENTMCNC: 31.5 % (ref 32–34.5)
MCV RBC AUTO: 85.7 FL (ref 80–99.9)
METER GLUCOSE: 119 MG/DL (ref 74–99)
MONOCYTES ABSOLUTE: 0.57 E9/L (ref 0.1–0.95)
MONOCYTES RELATIVE PERCENT: 7.5 % (ref 2–12)
NEGATIVE QC PASS/FAIL: NORMAL
NEUTROPHILS ABSOLUTE: 4.6 E9/L (ref 1.8–7.3)
NEUTROPHILS RELATIVE PERCENT: 60.2 % (ref 43–80)
NITRITE, URINE: NEGATIVE
PDW BLD-RTO: 13.4 FL (ref 11.5–15)
PH UA: 6 (ref 5–9)
PLATELET # BLD: 486 E9/L (ref 130–450)
PLATELET CONFIRMATION: NORMAL
PMV BLD AUTO: 9.7 FL (ref 7–12)
POSITIVE QC PASS/FAIL: NORMAL
POTASSIUM REFLEX MAGNESIUM: 4 MMOL/L (ref 3.5–5)
PROTEIN UA: NEGATIVE MG/DL
RBC # BLD: 4.67 E12/L (ref 3.5–5.5)
RBC UA: ABNORMAL /HPF (ref 0–2)
SODIUM BLD-SCNC: 138 MMOL/L (ref 132–146)
SPECIFIC GRAVITY UA: >=1.03 (ref 1–1.03)
TOTAL PROTEIN: 7.8 G/DL (ref 6.4–8.3)
TROPONIN, HIGH SENSITIVITY: <6 NG/L (ref 0–9)
UROBILINOGEN, URINE: 0.2 E.U./DL
WBC # BLD: 7.6 E9/L (ref 4.5–11.5)
WBC UA: ABNORMAL /HPF (ref 0–5)

## 2022-04-08 PROCEDURE — 2580000003 HC RX 258: Performed by: STUDENT IN AN ORGANIZED HEALTH CARE EDUCATION/TRAINING PROGRAM

## 2022-04-08 PROCEDURE — 99285 EMERGENCY DEPT VISIT HI MDM: CPT

## 2022-04-08 PROCEDURE — 85025 COMPLETE CBC W/AUTO DIFF WBC: CPT

## 2022-04-08 PROCEDURE — 82962 GLUCOSE BLOOD TEST: CPT

## 2022-04-08 PROCEDURE — 80175 DRUG SCREEN QUAN LAMOTRIGINE: CPT

## 2022-04-08 PROCEDURE — 96372 THER/PROPH/DIAG INJ SC/IM: CPT

## 2022-04-08 PROCEDURE — 96376 TX/PRO/DX INJ SAME DRUG ADON: CPT

## 2022-04-08 PROCEDURE — 96375 TX/PRO/DX INJ NEW DRUG ADDON: CPT

## 2022-04-08 PROCEDURE — 70450 CT HEAD/BRAIN W/O DYE: CPT

## 2022-04-08 PROCEDURE — 84484 ASSAY OF TROPONIN QUANT: CPT

## 2022-04-08 PROCEDURE — 80053 COMPREHEN METABOLIC PANEL: CPT

## 2022-04-08 PROCEDURE — 6360000002 HC RX W HCPCS

## 2022-04-08 PROCEDURE — 93005 ELECTROCARDIOGRAM TRACING: CPT | Performed by: STUDENT IN AN ORGANIZED HEALTH CARE EDUCATION/TRAINING PROGRAM

## 2022-04-08 PROCEDURE — 81001 URINALYSIS AUTO W/SCOPE: CPT

## 2022-04-08 PROCEDURE — 6360000002 HC RX W HCPCS: Performed by: STUDENT IN AN ORGANIZED HEALTH CARE EDUCATION/TRAINING PROGRAM

## 2022-04-08 PROCEDURE — 80177 DRUG SCRN QUAN LEVETIRACETAM: CPT

## 2022-04-08 PROCEDURE — 96374 THER/PROPH/DIAG INJ IV PUSH: CPT

## 2022-04-08 PROCEDURE — P9612 CATHETERIZE FOR URINE SPEC: HCPCS

## 2022-04-08 PROCEDURE — 83605 ASSAY OF LACTIC ACID: CPT

## 2022-04-08 PROCEDURE — 71045 X-RAY EXAM CHEST 1 VIEW: CPT

## 2022-04-08 RX ORDER — 0.9 % SODIUM CHLORIDE 0.9 %
1000 INTRAVENOUS SOLUTION INTRAVENOUS ONCE
Status: COMPLETED | OUTPATIENT
Start: 2022-04-08 | End: 2022-04-08

## 2022-04-08 RX ORDER — LORAZEPAM 2 MG/ML
2 INJECTION INTRAMUSCULAR ONCE
Status: COMPLETED | OUTPATIENT
Start: 2022-04-08 | End: 2022-04-08

## 2022-04-08 RX ORDER — MIDAZOLAM HYDROCHLORIDE 2 MG/2ML
2 INJECTION, SOLUTION INTRAMUSCULAR; INTRAVENOUS ONCE
Status: COMPLETED | OUTPATIENT
Start: 2022-04-08 | End: 2022-04-08

## 2022-04-08 RX ORDER — LEVETIRACETAM 10 MG/ML
1000 INJECTION INTRAVASCULAR ONCE
Status: COMPLETED | OUTPATIENT
Start: 2022-04-08 | End: 2022-04-09

## 2022-04-08 RX ORDER — LORAZEPAM 2 MG/ML
INJECTION INTRAMUSCULAR
Status: DISCONTINUED
Start: 2022-04-08 | End: 2022-04-08

## 2022-04-08 RX ORDER — LEVETIRACETAM 10 MG/ML
1000 INJECTION INTRAVASCULAR ONCE
Status: COMPLETED | OUTPATIENT
Start: 2022-04-08 | End: 2022-04-08

## 2022-04-08 RX ORDER — LORAZEPAM 2 MG/ML
1 INJECTION INTRAMUSCULAR ONCE
Status: COMPLETED | OUTPATIENT
Start: 2022-04-08 | End: 2022-04-08

## 2022-04-08 RX ADMIN — LORAZEPAM 2 MG: 2 INJECTION INTRAMUSCULAR; INTRAVENOUS at 20:27

## 2022-04-08 RX ADMIN — SODIUM CHLORIDE 1000 ML: 9 INJECTION, SOLUTION INTRAVENOUS at 18:35

## 2022-04-08 RX ADMIN — LORAZEPAM 1 MG: 2 INJECTION INTRAMUSCULAR; INTRAVENOUS at 22:10

## 2022-04-08 RX ADMIN — LEVETIRACETAM 1000 MG: 1000 INJECTION, SOLUTION INTRAVENOUS at 22:11

## 2022-04-08 RX ADMIN — LEVETIRACETAM 1000 MG: 1000 INJECTION, SOLUTION INTRAVENOUS at 18:35

## 2022-04-08 RX ADMIN — MIDAZOLAM 2 MG: 1 INJECTION INTRAMUSCULAR; INTRAVENOUS at 23:03

## 2022-04-08 RX ADMIN — LORAZEPAM 2 MG: 2 INJECTION INTRAMUSCULAR at 20:27

## 2022-04-08 NOTE — ED PROVIDER NOTES
Rosalina Rivero is a 42year old female with PMH nonepileptic seizures, PTSD, who presented to ED with concern for AMS. Patient was reported to have had at least 1 breakthrough seizure at [facility] and has not returned to baseline. Patient was given 2mg ativan and did not return to baseline. Patient was brought into ED for evaluation. HPI and ROS limited due to acuity of condition. The history is provided by medical records and the EMS personnel. Review of Systems   Unable to perform ROS: Acuity of condition        Physical Exam  Vitals and nursing note reviewed. Constitutional:       General: She is not in acute distress. Appearance: She is ill-appearing. Comments: Chronically ill appearance   HENT:      Head: Normocephalic and atraumatic. Eyes:      General: No scleral icterus. Conjunctiva/sclera: Conjunctivae normal.      Pupils: Pupils are equal, round, and reactive to light. Cardiovascular:      Rate and Rhythm: Normal rate and regular rhythm. Pulmonary:      Effort: Pulmonary effort is normal.      Breath sounds: Normal breath sounds. Abdominal:      General: Bowel sounds are normal. There is no distension. Palpations: Abdomen is soft. Tenderness: There is no abdominal tenderness. There is no guarding or rebound. Musculoskeletal:      Cervical back: Normal range of motion and neck supple. No rigidity or tenderness. No muscular tenderness. Right lower leg: No edema. Left lower leg: No edema. Skin:     General: Skin is warm and dry. Capillary Refill: Capillary refill takes less than 2 seconds. Coloration: Skin is not pale. Findings: No erythema or rash. Neurological:      Mental Status: She is alert.       Comments: Patient not speaking  Patient is moving all extremities  Patient does not appear to have any deficits on exam.    Psychiatric:         Mood and Affect: Mood normal.          Procedures     MDM  Number of Diagnoses or Management Options  Diagnosis management comments: Mason Clark is a 42year old female who presented to ED with concern for seizures. Patient was given ativan at time of arrival to ED and then additional dose in ED at time of keppra administration. Patient was suspected to have additional seizure while in CT and lost IV access. Patient was given versed IM. Central line was placed for access. Patient given additional 1g of keppra. Patient records did not show evidence of epileptic seizure on EEG  Patient was re-evaluated during additional seizure and would withdrawal from pain and did have corneal reflex  Patient stable while in ED patient remained nonverbal in ED but was stable, patient was not hypoxic patient will be admitted and was stable  Patient was afebrile, patient           ED Course as of 04/09/22 0219 Fri Apr 08, 2022   2341 Patient accepted for admission [SS]      ED Course User Index  [SS] Ravi Hamm MD        ED Course as of 04/09/22 0219 Fri Apr 08, 2022   2341 Patient accepted for admission [SS]      ED Course User Index  [SS] Ravi Hamm MD       --------------------------------------------- PAST HISTORY ---------------------------------------------  Past Medical History:  has a past medical history of Borderline personality disorder ([facility] Chronic kidney disease, Diabetes ([facility] Hypertension, Hypothyroidism, Psychogenic nonepileptic seizure, PTSD (post-traumatic stress disorder), Pulmonary embolism ([facility] and Schizoaffective disorder ([facility] Past Surgical History:  has a past surgical history that includes Gallbladder surgery and Ankle surgery. Social History:  reports that she has never smoked. She has never used smokeless tobacco. She reports previous alcohol use. She reports that she does not use drugs. Family History: family history includes Mental Illness in her mother and sister; Seizures in her mother.      The patients home medications have been reviewed.     Allergies: Carbamazepine, Latuda [lurasidone], and Shellfish-derived products    -------------------------------------------------- RESULTS -------------------------------------------------    LABS:  Results for orders placed or performed during the hospital encounter of 04/08/22   CBC with Auto Differential   Result Value Ref Range    WBC 7.6 4.5 - 11.5 E9/L    RBC 4.67 3.50 - 5.50 E12/L    Hemoglobin 12.6 11.5 - 15.5 g/dL    Hematocrit 40.0 34.0 - 48.0 %    MCV 85.7 80.0 - 99.9 fL    MCH 27.0 26.0 - 35.0 pg    MCHC 31.5 (L) 32.0 - 34.5 %    RDW 13.4 11.5 - 15.0 fL    Platelets 166 (H) 864 - 450 E9/L    MPV 9.7 7.0 - 12.0 fL    Neutrophils % 60.2 43.0 - 80.0 %    Immature Granulocytes % 0.7 0.0 - 5.0 %    Lymphocytes % 28.7 20.0 - 42.0 %    Monocytes % 7.5 2.0 - 12.0 %    Eosinophils % 2.2 0.0 - 6.0 %    Basophils % 0.7 0.0 - 2.0 %    Neutrophils Absolute 4.60 1.80 - 7.30 E9/L    Immature Granulocytes # 0.05 E9/L    Lymphocytes Absolute 2.19 1.50 - 4.00 E9/L    Monocytes Absolute 0.57 0.10 - 0.95 E9/L    Eosinophils Absolute 0.17 0.05 - 0.50 E9/L    Basophils Absolute 0.05 0.00 - 0.20 E9/L   Comprehensive Metabolic Panel w/ Reflex to MG   Result Value Ref Range    Sodium 138 132 - 146 mmol/L    Potassium reflex Magnesium 4.0 3.5 - 5.0 mmol/L    Chloride 103 98 - 107 mmol/L    CO2 20 (L) 22 - 29 mmol/L    Anion Gap 15 7 - 16 mmol/L    Glucose 114 (H) 74 - 99 mg/dL    BUN 13 6 - 20 mg/dL    CREATININE 0.9 0.5 - 1.0 mg/dL    GFR Non-African American >60 >=60 mL/min/1.73    GFR African American >60     Calcium 9.7 8.6 - 10.2 mg/dL    Total Protein 7.8 6.4 - 8.3 g/dL    Albumin 3.9 3.5 - 5.2 g/dL    Total Bilirubin <0.2 0.0 - 1.2 mg/dL    Alkaline Phosphatase 104 35 - 104 U/L    ALT 54 (H) 0 - 32 U/L    AST 51 (H) 0 - 31 U/L   Troponin   Result Value Ref Range    Troponin, High Sensitivity <6 0 - 9 ng/L   Lactic Acid   Result Value Ref Range    Lactic Acid 2.1 0.5 - 2.2 mmol/L   Urinalysis with Microscopic Result Value Ref Range    Color, UA Yellow Straw/Yellow    Clarity, UA Clear Clear    Glucose, Ur Negative Negative mg/dL    Bilirubin Urine Negative Negative    Ketones, Urine Negative Negative mg/dL    Specific Gravity, UA >=1.030 1.005 - 1.030    Blood, Urine Negative Negative    pH, UA 6.0 5.0 - 9.0    Protein, UA Negative Negative mg/dL    Urobilinogen, Urine 0.2 <2.0 E.U./dL    Nitrite, Urine Negative Negative    Leukocyte Esterase, Urine Negative Negative    WBC, UA 1-3 0 - 5 /HPF    RBC, UA 1-3 0 - 2 /HPF    Epithelial Cells, UA MODERATE /HPF    Bacteria, UA RARE (A) None Seen /HPF   Platelet Confirmation   Result Value Ref Range    Platelet Confirmation SEE BELOW    POCT Glucose   Result Value Ref Range    Glucose 119 mg/dL    QC OK? yes    POC Pregnancy Urine Qual   Result Value Ref Range    HCG, Urine, POC Negative Negative    Lot Number [id number]     Positive QC Pass/Fail Pass     Negative QC Pass/Fail Pass    POCT Glucose   Result Value Ref Range    Meter Glucose 119 (H) 74 - 99 mg/dL       RADIOLOGY:  CT HEAD WO CONTRAST   Final Result   No acute intracranial abnormality. XR CHEST 1 VIEW   Final Result   No acute process. EKG:  This EKG is signed and interpreted by me. Rate: normal  Rhythm: Sinus  Interpretation: non-specific EKG  Comparison: no previous EKG available      ------------------------- NURSING NOTES AND VITALS REVIEWED ---------------------------  Date / Time Roomed:  4/8/2022  6:01 PM  ED Bed Assignment:  21/21    The nursing notes within the ED encounter and vital signs as below have been reviewed.      Patient Vitals for the past 24 hrs:   BP Pulse Resp SpO2   04/09/22 0200 114/82 84 19 98 %   04/09/22 0100 (!) 113/91 96 20 95 %   04/09/22 0045 111/83 93 20 95 %   04/09/22 0030 110/86 95 18 96 %   04/09/22 0000 (!) 116/101 89 18 95 %   04/08/22 2230 122/81 98 18 96 %   04/08/22 2130 (!) 123/102 109 16 97 %   04/08/22 1834 128/89 84 16 94 %       Oxygen Saturation Interpretation: Normal    ------------------------------------------ PROGRESS NOTES ------------------------------------------  Re-evaluation(s):  Time: 11pm  Patients symptoms show no change  Repeat physical examination is not changed    Counseling:  Patient will be admitted    --------------------------------- ADDITIONAL PROVIDER NOTES ---------------------------------  Consultations:  Spoke with Dr. Alexia Castaneda. Discussed case. They will admit the patient. This patient's ED course included: a personal history and physicial examination, re-evaluation prior to disposition, multiple bedside re-evaluations, IV medications, cardiac monitoring, continuous pulse oximetry and complex medical decision making and emergency management    This patient has remained hemodynamically stable during their ED course. Please note that the withdrawal or failure to initiate urgent interventions for this patient would likely result in a life threatening deterioration or permanent disability. Accordingly this patient received 30 minutes of critical care time, excluding separately billable procedures. Diagnosis:  1. Seizure-like activity ([address]    2. Confusion    3. Thrombocytosis        Disposition:  Patient's disposition: Admit to telemetry  Patient's condition is stable.          Bib Moreno MD  04/09/22 1523

## 2022-04-09 ENCOUNTER — APPOINTMENT (OUTPATIENT)
Dept: GENERAL RADIOLOGY | Age: 37
End: 2022-04-09
Payer: MEDICARE

## 2022-04-09 ENCOUNTER — APPOINTMENT (OUTPATIENT)
Dept: CT IMAGING | Age: 37
End: 2022-04-09
Payer: MEDICARE

## 2022-04-09 LAB
ANION GAP SERPL CALCULATED.3IONS-SCNC: 15 MMOL/L (ref 7–16)
B.E.: 0.7 MMOL/L (ref -3–3)
BASOPHILS ABSOLUTE: 0.04 E9/L (ref 0–0.2)
BASOPHILS RELATIVE PERCENT: 0.6 % (ref 0–2)
BUN BLDV-MCNC: 11 MG/DL (ref 6–20)
CALCIUM SERPL-MCNC: 9.1 MG/DL (ref 8.6–10.2)
CHLORIDE BLD-SCNC: 101 MMOL/L (ref 98–107)
CO2: 22 MMOL/L (ref 22–29)
COHB: 0 % (ref 0–1.5)
CREAT SERPL-MCNC: 0.8 MG/DL (ref 0.5–1)
CRITICAL: ABNORMAL
D DIMER: <200 NG/ML DDU
DATE ANALYZED: ABNORMAL
DATE OF COLLECTION: ABNORMAL
EOSINOPHILS ABSOLUTE: 0.11 E9/L (ref 0.05–0.5)
EOSINOPHILS RELATIVE PERCENT: 1.5 % (ref 0–6)
GFR AFRICAN AMERICAN: >60
GFR NON-AFRICAN AMERICAN: >60 ML/MIN/1.73
GLUCOSE BLD-MCNC: 187 MG/DL (ref 74–99)
HCO3: 24.3 MMOL/L (ref 22–26)
HCT VFR BLD CALC: 32.2 % (ref 34–48)
HEMOGLOBIN: 10.2 G/DL (ref 11.5–15.5)
HHB: 0.6 % (ref 0–5)
IMMATURE GRANULOCYTES #: 0.03 E9/L
IMMATURE GRANULOCYTES %: 0.4 % (ref 0–5)
LAB: ABNORMAL
LYMPHOCYTES ABSOLUTE: 1.99 E9/L (ref 1.5–4)
LYMPHOCYTES RELATIVE PERCENT: 27.6 % (ref 20–42)
Lab: ABNORMAL
MAGNESIUM: 1.9 MG/DL (ref 1.6–2.6)
MCH RBC QN AUTO: 27 PG (ref 26–35)
MCHC RBC AUTO-ENTMCNC: 31.7 % (ref 32–34.5)
MCV RBC AUTO: 85.2 FL (ref 80–99.9)
METHB: 0.3 % (ref 0–1.5)
MODE: ABNORMAL
MONOCYTES ABSOLUTE: 0.45 E9/L (ref 0.1–0.95)
MONOCYTES RELATIVE PERCENT: 6.2 % (ref 2–12)
NEUTROPHILS ABSOLUTE: 4.6 E9/L (ref 1.8–7.3)
NEUTROPHILS RELATIVE PERCENT: 63.7 % (ref 43–80)
O2 CONTENT: 18.4 ML/DL
O2 SATURATION: 99.4 % (ref 92–98.5)
O2HB: 99.1 % (ref 94–97)
OPERATOR ID: 8218
PATIENT TEMP: 37 C
PCO2: 35.5 MMHG (ref 35–45)
PDW BLD-RTO: 13.3 FL (ref 11.5–15)
PH BLOOD GAS: 7.45 (ref 7.35–7.45)
PLATELET # BLD: 442 E9/L (ref 130–450)
PMV BLD AUTO: 8.9 FL (ref 7–12)
PO2: 274.5 MMHG (ref 75–100)
POTASSIUM REFLEX MAGNESIUM: 3.2 MMOL/L (ref 3.5–5)
RBC # BLD: 3.78 E12/L (ref 3.5–5.5)
SODIUM BLD-SCNC: 138 MMOL/L (ref 132–146)
SOURCE, BLOOD GAS: ABNORMAL
THB: 12.7 G/DL (ref 11.5–16.5)
TIME ANALYZED: 1724
WBC # BLD: 7.2 E9/L (ref 4.5–11.5)

## 2022-04-09 PROCEDURE — 80048 BASIC METABOLIC PNL TOTAL CA: CPT

## 2022-04-09 PROCEDURE — 36415 COLL VENOUS BLD VENIPUNCTURE: CPT

## 2022-04-09 PROCEDURE — 6370000000 HC RX 637 (ALT 250 FOR IP): Performed by: FAMILY MEDICINE

## 2022-04-09 PROCEDURE — 85025 COMPLETE CBC W/AUTO DIFF WBC: CPT

## 2022-04-09 PROCEDURE — 6370000000 HC RX 637 (ALT 250 FOR IP): Performed by: INTERNAL MEDICINE

## 2022-04-09 PROCEDURE — 2580000003 HC RX 258: Performed by: FAMILY MEDICINE

## 2022-04-09 PROCEDURE — G0378 HOSPITAL OBSERVATION PER HR: HCPCS

## 2022-04-09 PROCEDURE — 6360000002 HC RX W HCPCS: Performed by: PSYCHIATRY & NEUROLOGY

## 2022-04-09 PROCEDURE — 70450 CT HEAD/BRAIN W/O DYE: CPT

## 2022-04-09 PROCEDURE — 85378 FIBRIN DEGRADE SEMIQUANT: CPT

## 2022-04-09 PROCEDURE — 36600 WITHDRAWAL OF ARTERIAL BLOOD: CPT

## 2022-04-09 PROCEDURE — 6360000002 HC RX W HCPCS: Performed by: FAMILY MEDICINE

## 2022-04-09 PROCEDURE — 71045 X-RAY EXAM CHEST 1 VIEW: CPT

## 2022-04-09 PROCEDURE — 99222 1ST HOSP IP/OBS MODERATE 55: CPT | Performed by: PSYCHIATRY & NEUROLOGY

## 2022-04-09 PROCEDURE — 36556 INSERT NON-TUNNEL CV CATH: CPT

## 2022-04-09 PROCEDURE — 96376 TX/PRO/DX INJ SAME DRUG ADON: CPT

## 2022-04-09 PROCEDURE — 83735 ASSAY OF MAGNESIUM: CPT

## 2022-04-09 PROCEDURE — 93005 ELECTROCARDIOGRAM TRACING: CPT | Performed by: PSYCHIATRY & NEUROLOGY

## 2022-04-09 PROCEDURE — 82805 BLOOD GASES W/O2 SATURATION: CPT

## 2022-04-09 RX ORDER — SODIUM CHLORIDE 9 MG/ML
INJECTION, SOLUTION INTRAVENOUS PRN
Status: DISCONTINUED | OUTPATIENT
Start: 2022-04-09 | End: 2022-04-10 | Stop reason: HOSPADM

## 2022-04-09 RX ORDER — HYDRALAZINE HYDROCHLORIDE 25 MG/1
25 TABLET, FILM COATED ORAL EVERY 8 HOURS SCHEDULED
Status: DISCONTINUED | OUTPATIENT
Start: 2022-04-09 | End: 2022-04-10 | Stop reason: HOSPADM

## 2022-04-09 RX ORDER — LORAZEPAM 2 MG/ML
2 INJECTION INTRAMUSCULAR
Status: COMPLETED | OUTPATIENT
Start: 2022-04-09 | End: 2022-04-09

## 2022-04-09 RX ORDER — DIVALPROEX SODIUM 250 MG/1
250 TABLET, DELAYED RELEASE ORAL 2 TIMES DAILY
Status: DISCONTINUED | OUTPATIENT
Start: 2022-04-09 | End: 2022-04-09

## 2022-04-09 RX ORDER — ACETAMINOPHEN 325 MG/1
650 TABLET ORAL EVERY 6 HOURS PRN
Status: DISCONTINUED | OUTPATIENT
Start: 2022-04-09 | End: 2022-04-10 | Stop reason: HOSPADM

## 2022-04-09 RX ORDER — PROMETHAZINE HYDROCHLORIDE 25 MG/1
12.5 TABLET ORAL EVERY 6 HOURS PRN
Status: DISCONTINUED | OUTPATIENT
Start: 2022-04-09 | End: 2022-04-10 | Stop reason: HOSPADM

## 2022-04-09 RX ORDER — LAMOTRIGINE 25 MG/1
50 TABLET ORAL DAILY
Status: DISCONTINUED | OUTPATIENT
Start: 2022-04-09 | End: 2022-04-09

## 2022-04-09 RX ORDER — HYDROCHLOROTHIAZIDE 25 MG/1
25 TABLET ORAL DAILY
Status: DISCONTINUED | OUTPATIENT
Start: 2022-04-09 | End: 2022-04-10 | Stop reason: HOSPADM

## 2022-04-09 RX ORDER — LAMOTRIGINE 100 MG/1
100 TABLET ORAL DAILY
Status: DISCONTINUED | OUTPATIENT
Start: 2022-04-10 | End: 2022-04-10 | Stop reason: HOSPADM

## 2022-04-09 RX ORDER — HALOPERIDOL 5 MG/ML
5 INJECTION INTRAMUSCULAR EVERY 6 HOURS PRN
Status: DISCONTINUED | OUTPATIENT
Start: 2022-04-09 | End: 2022-04-10 | Stop reason: HOSPADM

## 2022-04-09 RX ORDER — SODIUM CHLORIDE 0.9 % (FLUSH) 0.9 %
10 SYRINGE (ML) INJECTION EVERY 12 HOURS SCHEDULED
Status: DISCONTINUED | OUTPATIENT
Start: 2022-04-09 | End: 2022-04-10 | Stop reason: HOSPADM

## 2022-04-09 RX ORDER — ACETAMINOPHEN 650 MG/1
650 SUPPOSITORY RECTAL EVERY 6 HOURS PRN
Status: DISCONTINUED | OUTPATIENT
Start: 2022-04-09 | End: 2022-04-10 | Stop reason: HOSPADM

## 2022-04-09 RX ORDER — AMLODIPINE BESYLATE 5 MG/1
5 TABLET ORAL DAILY
Status: DISCONTINUED | OUTPATIENT
Start: 2022-04-09 | End: 2022-04-10 | Stop reason: HOSPADM

## 2022-04-09 RX ORDER — LEVETIRACETAM 500 MG/1
1000 TABLET ORAL 2 TIMES DAILY
Status: DISCONTINUED | OUTPATIENT
Start: 2022-04-09 | End: 2022-04-09

## 2022-04-09 RX ORDER — SODIUM CHLORIDE 0.9 % (FLUSH) 0.9 %
10 SYRINGE (ML) INJECTION PRN
Status: DISCONTINUED | OUTPATIENT
Start: 2022-04-09 | End: 2022-04-10 | Stop reason: HOSPADM

## 2022-04-09 RX ORDER — HYDRALAZINE HYDROCHLORIDE 20 MG/ML
5 INJECTION INTRAMUSCULAR; INTRAVENOUS EVERY 6 HOURS PRN
Status: DISCONTINUED | OUTPATIENT
Start: 2022-04-09 | End: 2022-04-10 | Stop reason: HOSPADM

## 2022-04-09 RX ORDER — ONDANSETRON 2 MG/ML
4 INJECTION INTRAMUSCULAR; INTRAVENOUS EVERY 6 HOURS PRN
Status: DISCONTINUED | OUTPATIENT
Start: 2022-04-09 | End: 2022-04-10 | Stop reason: HOSPADM

## 2022-04-09 RX ORDER — POLYETHYLENE GLYCOL 3350 17 G/17G
17 POWDER, FOR SOLUTION ORAL DAILY PRN
Status: DISCONTINUED | OUTPATIENT
Start: 2022-04-09 | End: 2022-04-10 | Stop reason: HOSPADM

## 2022-04-09 RX ORDER — LEVOTHYROXINE SODIUM 0.15 MG/1
150 TABLET ORAL DAILY
Status: DISCONTINUED | OUTPATIENT
Start: 2022-04-09 | End: 2022-04-10 | Stop reason: HOSPADM

## 2022-04-09 RX ORDER — LACOSAMIDE 100 MG/1
150 TABLET ORAL 2 TIMES DAILY
Status: DISCONTINUED | OUTPATIENT
Start: 2022-04-09 | End: 2022-04-10 | Stop reason: HOSPADM

## 2022-04-09 RX ADMIN — LEVETIRACETAM 1000 MG: 500 TABLET, FILM COATED ORAL at 10:15

## 2022-04-09 RX ADMIN — ACETAMINOPHEN 650 MG: 325 TABLET ORAL at 05:35

## 2022-04-09 RX ADMIN — AMLODIPINE BESYLATE 5 MG: 5 TABLET ORAL at 10:15

## 2022-04-09 RX ADMIN — APIXABAN 5 MG: 5 TABLET, FILM COATED ORAL at 22:21

## 2022-04-09 RX ADMIN — LORAZEPAM 2 MG: 2 INJECTION INTRAMUSCULAR; INTRAVENOUS at 03:34

## 2022-04-09 RX ADMIN — LACOSAMIDE 150 MG: 100 TABLET, FILM COATED ORAL at 10:15

## 2022-04-09 RX ADMIN — LACOSAMIDE 150 MG: 100 TABLET, FILM COATED ORAL at 22:21

## 2022-04-09 RX ADMIN — APIXABAN 5 MG: 5 TABLET, FILM COATED ORAL at 10:14

## 2022-04-09 RX ADMIN — LEVOTHYROXINE SODIUM 150 MCG: 0.15 TABLET ORAL at 05:35

## 2022-04-09 RX ADMIN — LORAZEPAM 2 MG: 2 INJECTION INTRAMUSCULAR; INTRAVENOUS at 14:07

## 2022-04-09 RX ADMIN — ACETAMINOPHEN 650 MG: 325 TABLET ORAL at 10:19

## 2022-04-09 RX ADMIN — HALOPERIDOL LACTATE 5 MG: 5 INJECTION, SOLUTION INTRAMUSCULAR at 20:41

## 2022-04-09 RX ADMIN — LAMOTRIGINE 50 MG: 25 TABLET ORAL at 10:15

## 2022-04-09 RX ADMIN — SODIUM CHLORIDE, PRESERVATIVE FREE 10 ML: 5 INJECTION INTRAVENOUS at 22:27

## 2022-04-09 RX ADMIN — HYDROCHLOROTHIAZIDE 25 MG: 25 TABLET ORAL at 10:15

## 2022-04-09 RX ADMIN — SODIUM CHLORIDE, PRESERVATIVE FREE 10 ML: 5 INJECTION INTRAVENOUS at 10:16

## 2022-04-09 RX ADMIN — APIXABAN 5 MG: 5 TABLET, FILM COATED ORAL at 02:55

## 2022-04-09 RX ADMIN — POTASSIUM BICARBONATE 40 MEQ: 782 TABLET, EFFERVESCENT ORAL at 12:15

## 2022-04-09 ASSESSMENT — PAIN SCALES - GENERAL
PAINLEVEL_OUTOF10: 0
PAINLEVEL_OUTOF10: 6
PAINLEVEL_OUTOF10: 0
PAINLEVEL_OUTOF10: 8
PAINLEVEL_OUTOF10: 0
PAINLEVEL_OUTOF10: 0

## 2022-04-09 NOTE — PROGRESS NOTES
Pt alert and oriented X4 on arrival to the unit. Able to answer all the questions I asked of her and was following all commands. She took her eliquis and was having a snack while she waited for her vimpat to come from pharmacy. Soon after she began to have a seizure. 2mg ativan given. Pt unable to answer questions or speak/ follow commands at this time.  Will continue to monitor

## 2022-04-09 NOTE — PROGRESS NOTES
Patient found down on bathroom floor. Patient initially unresponsive and unarousable. Ativan given per seizure protocol on MAR. RN moved patient back to bed. Vitals obtained. Both Dr. Alcon Matias and Dr. Ludy Andres notified. New orders obtained.

## 2022-04-09 NOTE — ED NOTES
Report called to [facility]. SBAR faxed.         Thi Daily, RN  04/09/22 6172

## 2022-04-09 NOTE — CONSULTS
[facility]  Neurology Consult    Date:  4/9/2022  Patient Name:  Lorena Sylvester  YOB: 1985  MRN: [id number]     PCP:  No primary care provider on file. Referring:  No ref. provider found      Chief Complaint: \"seizures\"    History obtained from: chart    Ruma Miranda is a 42 y.o. female with a well known history of PNES. Current events are consistent with PNES. Keppra known to worsen mental health disorders, will discontinue at this time. Plan  · Will discontinue Keppra  · Continue Vimpat 150 BID  · Increase Lamotrigine to 100 QD  · Haldol 5 mg PRN for prolonged shaking spells  · Maintain seizure safety precautions        History of Present Illness:  Lorena Sylvester is a 42 y.o. right handed female presenting for evaluation of \"seizures. \" She has a well documented history of non-epileptic spells. She still appears to be on Keppra and Vimpat for some reason, however. At this time she is not responsive, and has had intermittent spells consisting of side to side shaking of her head, arrhythmic, direction changing shaking of her hands and asynchronous shaking of her hands and feet. These events are clinically consistent with non-epileptic spells.           Review of Systems:  Unable to obtain review of symptoms due to altered mental status    Medical History:   Past Medical History:   Diagnosis Date    [facility]line [facility] disorder [facility]     Chronic kidney disease     Diabetes ([facility]     Hypertension     Hypothyroidism     Psychogenic nonepileptic seizure 11/22/2020    PTSD (post-traumatic stress disorder)     Pulmonary embolism (HCC)     Schizoaffective disorder (HCC)         Surgical History:   Past Surgical History:   Procedure Laterality Date    ANKLE SURGERY      bilateral ankles; patient states when she was young   Connye Sole GALLBLADDER SURGERY        Family History:   Family History   Problem Relation Age of Onset    Seizures Mother     Mental Illness Mother     Mental Illness Sister        Social History:  Social History     Tobacco Use    Smoking status: Never Smoker    Smokeless tobacco: Never Used   Vaping Use    Vaping Use: Never used   Substance Use Topics    Alcohol use: Not Currently    Drug use: Never        Current Medications:      Current Facility-Administered Medications   Medication Dose Route Frequency Provider Last Rate Last Admin    amLODIPine (NORVASC) tablet 5 mg  5 mg Oral Daily Gerre Ric, DO        apixaban (ELIQUIS) tablet 5 mg  5 mg Oral BID Gerre Ric, DO   5 mg at 04/09/22 0255    hydroCHLOROthiazide (HYDRODIURIL) tablet 25 mg  25 mg Oral Daily Gerre Ric, DO        lacosamide (VIMPAT) tablet 150 mg  150 mg Oral BID Gerre Ric, DO        lamoTRIgine (LAMICTAL) tablet 50 mg  50 mg Oral Daily Gerre Ric, DO        levETIRAcetam (KEPPRA) tablet 1,000 mg  1,000 mg Oral BID Gerre Ric, DO        levothyroxine (SYNTHROID) tablet 150 mcg  150 mcg Oral Daily Gerre Ric, DO   150 mcg at 04/09/22 0535    sodium chloride flush 0.9 % injection 10 mL  10 mL IntraVENous 2 times per day Gerre Ric, DO        sodium chloride flush 0.9 % injection 10 mL  10 mL IntraVENous PRN Gerre Ric, DO        0.9 % sodium chloride infusion   IntraVENous PRN Gerre Ric, DO        promethazine (PHENERGAN) tablet 12.5 mg  12.5 mg Oral Q6H PRN Gerre Ric, DO        Or    [facility]etron [facility]) injection 4 mg  4 mg IntraVENous Q6H PRN Gerre Ric, DO        polyethylene glycol (GLYCOLAX) packet 17 g  17 g Oral Daily PRN Gerre Ric, DO        acetaminophen (TYLENOL) tablet 650 mg  650 mg Oral Q6H PRN Gerre Ric, DO   650 mg at 04/09/22 0535    Or    acetaminophen (TYLENOL) suppository 650 mg  650 mg Rectal Q6H PRN Gerre Ric, DO        LORazepam (ATIVAN) injection 2 mg  2 mg IntraVENous Once PRN Gerre Ric, DO            Allergies:       Allergies   Allergen Reactions    Carbamazepine Hives and Swelling  Latuda [Lurasidone] Rash     Rash      Shellfish-Derived Products Swelling and Rash        Physical Examination  Vitals   Vitals:    04/09/22 0245 04/09/22 0300 04/09/22 0415 04/09/22 0800   BP: (!) 129/99  (!) 145/100 (!) 143/101   Pulse: 89  90 72   Resp: 18  16 16   Temp: 97.3 °F (36.3 °C)  97.8 °F (36.6 °C) 98.4 °F (36.9 °C)   TempSrc: Temporal  Temporal Axillary   SpO2: 95%  97% 97%   Weight:  250 lb (113.4 kg)     Height:  5' 4\" (1.626 m)          General: Patient appears poorly responsive, does not follow commands  HEENT: Normocephalic, atraumatic  Chest: Clear to auscultation bilaterally  Heart: No murmurs appreciated  Extremities/Peripheral vascular: No edema/swelling noted. No cold limbs noted. Neurologic Examination    Mental Status  Somnolent, oriented x 0. Attention poor, fluctuating. Cranial Nerves  II. Visual fields full to threat bilaterally  III, IV, VI: Pupils equally round and reactive to light, 3 to 2 mm bilaterally. EOMs: full, no nystagmus. V.   VII: Facial movements symmetric   VIII: Hearing intact to voice  IX,X: No dysarthria  XI: shoulders appear symmetric  XII: Tongue is midline    Motor  Moves all four limbs antigravity    Sensation  · Does not withdraw limbs to noxious stimulation    Reflexes     Right Left   Biceps 2 2   Brachioradialis 2 2   Patellar 2 2   Achilles 2 2   ankle clonus none none     Toes down going bilaterally.     Coordination  No resting tremors observed    Gait  Deferred for safety/fall consideration      Labs  Recent Labs     04/08/22  1831 04/08/22  1846 04/09/22  0440      < > 138   K 4.0   < > 3.2*      < > 101   CO2 20*   < > 22   BUN 13   < > 11   CREATININE 0.9   < > 0.8   GLUCOSE 114*   < > 187*   CALCIUM 9.7   < > 9.1   PROT 7.8  --   --    LABALBU 3.9  --   --    BILITOT <0.2  --   --    ALKPHOS 104  --   --    AST 51*  --   --    ALT 54*  --   --    WBC 7.6   < > 7.2   RBC 4.67   < > 3.78   HGB 12.6   < > 10.2*   HCT 40.0   < > 32.2*   MCV 85.7   < > 85.2   MCH 27.0   < > 27.0   MCHC 31.5*   < > 31.7*   RDW 13.4   < > 13.3   *   < > 442   MPV 9.7   < > 8.9   LACTA 2.1  --   --     < > = values in this interval not displayed. Imaging  CT HEAD WO CONTRAST   Final Result   No acute intracranial abnormality. XR CHEST 1 VIEW   Final Result   No acute process. CT HEAD WO CONTRAST    (Results Pending)           Other Testing Results  EEG Monitoring 12/31/2020    Clinical Summary:  Pt is a 38y.o. year old female, undergoing   continuous EEG monitoring in the Inpatient setting for capture of   clinical and/or sub-clinical electrographic seizures. Conditions of Recording   This study was performed using a 28-lead digital   electroencephalographic array, with data analyzed across multiple   montages.  Direct video visualization of the patient is utilized   during the study, as is automated seizure detection software.  A   single EKG lead is utilized throughout the recording.       EEG Description   Background activities on this day of recording are noted as   reasonably well-developed, with appropriate background   architecture and an 11 Hz PDR noted during periods of full   wakefulness with eye closure.  Excess non-pathological Beta-range   activities are noted, most commonly seen in the setting of   Benzodiazepine administration.  No focal slowing is noted.  There   are no definitive Interictal Epileptiform Discharges and NO   Electrographic seizures occur.       Numerous spontaneous clinical target spells were captured,   clinically manifest as abnormal movements, and without EEG   correlate.  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      Summary   During this period of [place]'s EEG monitoring, background   activities with appropriate architecture are noted.  There are no   interictal epileptiform discharges seen throughout the recording.    No electrographic seizures were captured during this period.     Numerous spontaneous clinical target spells of Non-Epileptic   nature were captured, with abnormal movements noted.       Overall diagnostic classification is likely Psychogenic   Non-Epileptic Spells of an unconscious nature, although   Factitious production of Spells in an intentional fashion cannot   be ruled out. Continuous EEG monitoring is completed based upon stated goals of   care and discontinued in the setting of self-removal of EEG   leads. Tabatha Jimenez MD, ABPN, [address] [place] [address]   Adult Epileptologist & Neurologist  [facility]      3/9/2020 admission notes:  Hospital Course: This is a 39 yo female with a past medical history of bipolar disorder, schizoaffective, psychogenic non-epileptic syndrome, and autism who was dropped off at to [facility] ED for seizure like activity. While in the ED, she was given Ativan and Keppra. Workup was largely unremarkable aside from asymptomatic bacteriuria, chronic anemia with Hb of 10.6 , and hypophosphatemia which was replaced. Head CT was unremarkable. She was then monitored oncontinuous video EEG and neurology was consulted who recommended continuing her current medications and increasing lamotrigine as her presentation was consistent with psychogenic non-epileptic seizures. Several \"seizure-like\" episodes were recorded on EEG which was read as non-epileptiform with mild-moderate encephalopathy. Anemia workup was consistent with iron deficiency and she was started on ferrous sulfate. Discussed patient's medical diagnosis and medication changes with her landlord after permission was obtained from patient, as her landlord assists with her care. She was medically satisfactory for discharge. The is moderate to high chance of readmission given multiple comorbidities and social living situation.          Electronically signed by Amanda Braun DO on 4/9/2022 at 9:05 AM

## 2022-04-09 NOTE — ED NOTES
Patient having seizure. Verbal order for Ativan per Dr. Romario Ornelas.       Miladys Collins, RN  04/08/22 2028

## 2022-04-09 NOTE — TELEPHONE ENCOUNTER
Writer contacted Dr. Hattie Cohen to inform of 30 day readmission risk. Writer's attempt to contact Dr. Hattie Cohen was unsuccessful.

## 2022-04-09 NOTE — PROGRESS NOTES
Pt had fall going back to bed from the bathroom around 0415 am. She went into a seizure while up. Normally independent walking. Found down/ unwitnessed. Bed alarm on at this time. Dr and supervisor notified vitals stable.  New orders for ct-scan

## 2022-04-09 NOTE — H&P
Hospitalist History & Physical      PCP: No primary care provider on file. Date of Service: Pt seen/examined on 4/8/2022     Chief Complaint:  had concerns including Altered Mental Status (seizure like activity, hx of seizures. Given 2mg of Ativan at [facility] and became lethargic ). History Of Present Illness:    Ms. Monserrat Rooney, a 42y.o. year old female  who  has a past medical history of Borderline personality disorder ([facility] [address] Chronic kidney disease, Diabetes ([address] Hypertension, Hypothyroidism, Psychogenic nonepileptic seizure, PTSD (post-traumatic stress disorder), Pulmonary embolism ([facility] [address] and Schizoaffective disorder ([address] Patient presents to the emergency department with seizure-like activity. Patient comes from [facility]. Was given 2 mg of Ativan. At the emergency department she was continued to display seizure-like activity. She was given eva Conde. There was consideration to intubate her but was decided against.  Patient was discharged 4 days ago after admission for seizures. She has had multiple EEGs that did not show any epileptic activity. There is also had a psych consult. These are likely pseudoseizures but she cannot return to [facility] with this activity. Vital signs within normal limits and stable. Work-up unremarkable. Patient will be admitted to observation on the medicine service. Past Medical History:   Diagnosis Date    Borderline personality disorder ([facility] [address] [facility]     Chronic kidney disease     Diabetes ([facility] [address]     Hypertension     Hypothyroidism     Psychogenic nonepileptic seizure 11/22/2020    PTSD (post-traumatic stress disorder)     Pulmonary embolism (HCC)     Schizoaffective disorder (HCC)        Past Surgical History:   Procedure Laterality Date    ANKLE SURGERY      bilateral ankles; patient states when she was young   [employer] GALLBLADDER SURGERY         Prior to Admission medications    Medication Sig Start Date End Date Taking? Authorizing Provider   lacosamide (VIMPAT) 150 MG TABS tablet Take 1 tablet by mouth 2 times daily for 30 days. 4/4/22 5/4/22  Tan Pandya MD   lamoTRIgine (LAMICTAL) 25 MG tablet Take 2 tablets by mouth daily 4/4/22   Tan Pandya MD   apixaban (ELIQUIS) 5 MG TABS tablet Take 5 mg by mouth 2 times daily    Historical Provider, MD   hydroCHLOROthiazide (HYDRODIURIL) 25 MG tablet Take 25 mg by mouth daily    Historical Provider, MD   levETIRAcetam (KEPPRA) 1000 MG tablet Take 1,000 mg by mouth 2 times daily    Historical Provider, MD   levothyroxine (SYNTHROID) 75 MCG tablet Take 150 mcg by mouth Daily    Historical Provider, MD   acetaminophen (TYLENOL) 325 MG tablet Take 650 mg by mouth every 6 hours as needed for Pain    Historical Provider, MD   amLODIPine (NORVASC) 5 MG tablet Take 5 mg by mouth daily     Historical Provider, MD         Allergies:  Carbamazepine, Latuda [lurasidone], and Shellfish-derived products    Social History:    TOBACCO:   reports that she has never smoked. She has never used smokeless tobacco.  ETOH:   reports previous alcohol use. Family History:    Reviewed in detail and negative for DM, CAD, Cancer, CVA. Positive as follows\"      Problem Relation Age of Onset    Seizures Mother     Mental Illness Mother     Mental Illness Sister        REVIEW OF SYSTEMS:   Pertinent positives as noted in the HPI. All other systems reviewed and negative. PHYSICAL EXAM:  /81   Pulse 98   Resp (!) 36   LMP  (LMP Unknown)   SpO2 96%   General appearance: No apparent distress  Respiratory: CTA   Cardiovascular: RRR  Abdomen: S/NT/ND  Musculoskeletal: No clubbing, cyanosis, edema of bilateral lower extremities. Brisk capillary refill. Skin: Normal skin color. No rashes or lesions.   Neurologic: No focal deficits      CBC:   Recent Labs     04/08/22 1831   WBC 7.6   RBC 4.67   HGB 12.6   HCT 40.0   MCV 85.7   RDW 13.4   *     BMP:   Recent Labs     04/08/22 1831    K 4.0      CO2 20*   BUN 13   CREATININE 0.9     LFT:  Recent Labs     04/08/22  1831   PROT 7.8   ALKPHOS 104   ALT 54*   AST 51*   BILITOT <0.2     CE:  No results for input(s): Francisco J Armenian in the last 72 hours. PT/INR: No results for input(s): INR, APTT in the last 72 hours. BNP: No results for input(s): BNP in the last 72 hours. ESR: No results found for: SEDRATE  CRP: No results found for: CRP  D Dimer:   Lab Results   Component Value Date    DDIMER 0.30 02/12/2021      Folate and B12:   Lab Results   Component Value Date    XRNOSDSK73 371 02/13/2021   ,   Lab Results   Component Value Date    FOLATE 10.7 02/13/2021     Lactic Acid:   Lab Results   Component Value Date    LACTA 2.1 04/08/2022     Thyroid Studies:   Lab Results   Component Value Date    TSH 1.010 03/30/2022       Oupatient labs:  Lab Results   Component Value Date    CHOL 146 03/31/2022    TRIG 133 04/03/2022    HDL 42 03/31/2022    LDLCALC 73 03/31/2022    TSH 1.010 03/30/2022    INR 1.8 06/23/2021    LABA1C 6.2 (H) 03/30/2022       Urinalysis:    Lab Results   Component Value Date    NITRU Negative 04/08/2022    WBCUA 1-3 04/08/2022    BACTERIA RARE 04/08/2022    RBCUA 1-3 04/08/2022    BLOODU Negative 04/08/2022    SPECGRAV >=1.030 04/08/2022    GLUCOSEU Negative 04/08/2022       Imaging:  CT HEAD WO CONTRAST    Result Date: 4/8/2022  EXAMINATION: CT OF THE HEAD WITHOUT CONTRAST  4/8/2022 11:05 pm TECHNIQUE: CT of the head was performed without the administration of intravenous contrast. Dose modulation, iterative reconstruction, and/or weight based adjustment of the mA/kV was utilized to reduce the radiation dose to as low as reasonably achievable. COMPARISON: 04/02/2022. HISTORY: ORDERING SYSTEM PROVIDED HISTORY: Evaluate intracranial abnormality TECHNOLOGIST PROVIDED HISTORY: Has a \"code stroke\" or \"stroke alert\" been called? ->No Reason for exam:->Evaluate intracranial abnormality Decision Support Exception - unselect if not a suspected or confirmed emergency medical condition->Emergency Medical Condition (MA) What reading provider will be dictating this exam?->CRC FINDINGS: BRAIN/VENTRICLES: There is no acute intracranial hemorrhage, mass effect or midline shift. No abnormal extra-axial fluid collection. The gray-white differentiation is maintained without evidence of an acute infarct. There is no evidence of hydrocephalus. ORBITS: The visualized portion of the orbits demonstrate no acute abnormality. SINUSES: The visualized paranasal sinuses and mastoid air cells demonstrate no acute abnormality. SOFT TISSUES/SKULL:  No acute abnormality of the visualized skull or soft tissues. No acute intracranial abnormality. CT HEAD WO CONTRAST    Result Date: 4/2/2022  EXAMINATION: CT OF THE HEAD WITHOUT CONTRAST  4/1/2022 8:23 pm TECHNIQUE: CT of the head was performed without the administration of intravenous contrast. Dose modulation, iterative reconstruction, and/or weight based adjustment of the mA/kV was utilized to reduce the radiation dose to as low as reasonably achievable. COMPARISON: None. HISTORY: ORDERING SYSTEM PROVIDED HISTORY: HEAD TRAUMA, CLOSED, MILD, GCS >= 13, NO RISK FACTORS, NEURO EXAM NORMAL TECHNOLOGIST PROVIDED HISTORY: Has a \"code stroke\" or \"stroke alert\" been called? ->No Reason for exam:->fall, AMS What reading provider will be dictating this exam?->CRC FINDINGS: BRAIN/VENTRICLES: There is no acute intracranial hemorrhage, mass effect or midline shift. No abnormal extra-axial fluid collection. The gray-white differentiation is maintained without evidence of an acute infarct. There is no evidence of hydrocephalus. ORBITS: The visualized portion of the orbits demonstrate no acute abnormality. SINUSES: The visualized paranasal sinuses and mastoid air cells demonstrate no acute abnormality. SOFT TISSUES/SKULL:  No acute abnormality of the visualized skull or soft tissues.      No acute intracranial abnormality. RECOMMENDATIONS: Unavailable     CT HEAD WO CONTRAST    Result Date: 3/30/2022  EXAMINATION: CT OF THE HEAD WITHOUT CONTRAST  3/30/2022 7:50 pm TECHNIQUE: CT of the head was performed without the administration of intravenous contrast. Dose modulation, iterative reconstruction, and/or weight based adjustment of the mA/kV was utilized to reduce the radiation dose to as low as reasonably achievable. COMPARISON: November 29, 2020 HISTORY: ORDERING SYSTEM PROVIDED HISTORY: fall w/ head trauma, seizure TECHNOLOGIST PROVIDED HISTORY: Reason for exam:->fall w/ head trauma, seizure Has a \"code stroke\" or \"stroke alert\" been called? ->No What reading provider will be dictating this exam?->CRC FINDINGS: BRAIN/VENTRICLES: There is no acute intracranial hemorrhage, mass effect or midline shift. No abnormal extra-axial fluid collection. The gray-white differentiation is maintained without evidence of an acute infarct. There is no evidence of hydrocephalus. ORBITS: The visualized portion of the orbits demonstrate no acute abnormality. SINUSES: The visualized paranasal sinuses and mastoid air cells demonstrate no acute abnormality. SOFT TISSUES/SKULL:  No acute abnormality of the visualized skull or soft tissues. Endotracheal tube in place. No acute intracranial abnormality or hemorrhage. XR CHEST PORTABLE    Result Date: 4/1/2022  EXAMINATION: ONE XRAY VIEW OF THE CHEST 4/1/2022 9:39 pm COMPARISON: 03/31/2022 HISTORY: ORDERING SYSTEM PROVIDED HISTORY: SOB TECHNOLOGIST PROVIDED HISTORY: Reason for exam:->SOB What reading provider will be dictating this exam?->CRC FINDINGS: EKG leads overlie the chest.  Lung volumes are diminished. Cardiac silhouette is near the upper limits of normal but unchanged. Right IJ catheter is been removed. No pneumothorax. Worsening perihilar and basal opacities. Worsening perihilar and basal opacities which may represent mild edema.  Developing infiltrates from pneumonia not entirely excluded. Continued follow-up recommended. XR CHEST PORTABLE    Result Date: 3/31/2022  EXAMINATION: ONE XRAY VIEW OF THE CHEST 3/31/2022 8:00 am COMPARISON: None. HISTORY: ORDERING SYSTEM PROVIDED HISTORY: intubated patient TECHNOLOGIST PROVIDED HISTORY: Reason for exam:->intubated patient What reading provider will be dictating this exam?->CRC FINDINGS: The cardiac silhouette is within normal limits. The endotracheal tube and NG tube are unchanged in position. There is a right internal jugular central venous catheter. There is no pneumothorax. There is minimal atelectasis seen within the right infrahilar region. The right upper lobe and left lung are clear. There is no pleural effusion. 1. Stable position of the support lines and tubes. 2. Minimal atelectasis seen within the right infrahilar region. XR CHEST PORTABLE    Result Date: 3/30/2022  EXAMINATION: ONE XRAY VIEW OF THE CHEST 3/30/2022 9:02 pm COMPARISON: Chest series from March 29, 2022 HISTORY: ORDERING SYSTEM PROVIDED HISTORY: CVC placement TECHNOLOGIST PROVIDED HISTORY: Reason for exam:->CVC placement What reading provider will be dictating this exam?->CRC FINDINGS: Right IJ central venous catheter with distal tip projecting in the mid to distal superior vena cava. Endotracheal tube terminates in the midthoracic trachea. Enteric tube extends subdiaphragmatic and off the field of view. Low lung volumes which are symmetric. This appears to be contributing to mild bronchovascular crowding. No obvious pleural effusion or pneumothorax. Cardiac silhouette remains mildly prominent. Osseous and thoracic soft tissue structures demonstrate no acute findings. 1.  New right IJ catheter, new endotracheal tube, and new enteric tube. No complicating features. 2.  Low lung volumes which appears to be contributing to bronchovascular crowding.   Overall stable when compared to prior exam.  No new cardiopulmonary pathology. XR CHEST PORTABLE    Result Date: 3/29/2022  EXAMINATION: ONE XRAY VIEW OF THE CHEST 3/29/2022 1:32 pm COMPARISON: Chest, single view 11/27/2020 HISTORY: ORDERING SYSTEM PROVIDED HISTORY: sob TECHNOLOGIST PROVIDED HISTORY: Reason for exam:->sob What reading provider will be dictating this exam?->CRC FINDINGS: A single portable AP view of the chest was obtained. There is enlargement of the cardiac silhouette. Mediastinal contour is within normal limits. There is central pulmonary vascular congestion. There are bilateral perihilar airspace opacities. There also for airspace opacities within the left lung base. There is poor definition of the bilateral costophrenic angles. Bilateral airspace disease could represent infection or edema from congestive heart failure. XR CHEST 1 VIEW    Result Date: 4/8/2022  EXAMINATION: ONE XRAY VIEW OF THE CHEST 4/8/2022 9:15 pm COMPARISON: 04/01/2022. HISTORY: ORDERING SYSTEM PROVIDED HISTORY: Pneumonia aspiration TECHNOLOGIST PROVIDED HISTORY: Reason for exam:->Pneumonia aspiration What reading provider will be dictating this exam?->CRC FINDINGS: The lungs are without acute focal process. There is no effusion or pneumothorax. The cardiomediastinal silhouette is without acute process. The osseous structures are without acute process. Findings. No acute process. CTA PULMONARY W CONTRAST    Result Date: 3/29/2022  EXAMINATION: CTA OF THE CHEST 3/29/2022 5:23 pm TECHNIQUE: CTA of the chest was performed after the administration of intravenous contrast.  Multiplanar reformatted images are provided for review. MIP images are provided for review. Dose modulation, iterative reconstruction, and/or weight based adjustment of the mA/kV was utilized to reduce the radiation dose to as low as reasonably achievable. COMPARISON: None.  HISTORY: ORDERING SYSTEM PROVIDED HISTORY: sob TECHNOLOGIST PROVIDED HISTORY: Reason for exam:->sob Decision Support Exception - unselect if not a suspected or confirmed emergency medical condition->Emergency Medical Condition (MA) What reading provider will be dictating this exam?->CRC FINDINGS: The heart and the great vessels are normal.  Trachea and major bronchi are patent. There are no filling defects in the main pulmonary artery and the central branches. The distal subsegmental and peripheral vessels are poorly opacified and cannot be evaluated for small distal subsegmental pulmonary embolism. There is atelectasis/infiltrates in the lower lobes bilaterally. Liver is fatty infiltrated. Gallbladder is absent. Degenerative changes are identified in the thoracic spine. No central pulmonary embolism or aortic dissection. The distal subsegmental and peripheral vessels are poorly evaluated due to suboptimal contrast. Patchy infiltrates and atelectasis in the lung bases bilaterally concerning for pneumonia. RECOMMENDATIONS: Unavailable     XR ABDOMEN FOR NG/OG/NE TUBE PLACEMENT    Result Date: 3/30/2022  EXAMINATION: ONE SUPINE XRAY VIEW(S) OF THE ABDOMEN 3/30/2022 9:03 pm COMPARISON: None. HISTORY: ORDERING SYSTEM PROVIDED HISTORY: Confirmation of course of NG/OG/NE tube and location of tip of tube TECHNOLOGIST PROVIDED HISTORY: Reason for exam:->Confirmation of course of NG/OG/NE tube and location of tip of tube Portable? ->Yes What reading provider will be dictating this exam?->CRC FINDINGS: Esophageal route catheter extends into the left upper quadrant stomach location. Chest evaluation is reported separately. No dilated bowels are visible. Catheter is in the stomach.        ASSESSMENT:  -Seizure-like activity  -Borderline personality disorder  -PTSD  -Hypertension  -Hyperlipidemia         PLAN:  -Admit to medicine  -Consult neurology  -Seizure precautions  -Continue home medications      Diet: No diet orders on file  Code Status: Prior  Surrogate decision maker confirmed with patient:   Extended Emergency Contact Information  Primary Emergency Contact: [address] [place], [place] [address]Lily  Phone: [phone]  Mobile Phone: [phone]  Relation: Other  Preferred language: English   needed? No    DVT Prophylaxis: []Lovenox []Heparin []PCD [] [address] []Encouraged ambulation  Disposition: []Med/Surg [] Intermediate [] ICU/CCU  Admit status: [] Observation [] Inpatient     +++++++++++++++++++++++++++++++++++++++++++++++++  Jayson Gaytan, DO  +++++++++++++++++++++++++++++++++++++++++++++++++  NOTE: This report was transcribed using voice recognition software. Every effort was made to ensure accuracy; however, inadvertent computerized transcription errors may be present.

## 2022-04-10 VITALS
HEART RATE: 87 BPM | OXYGEN SATURATION: 98 % | SYSTOLIC BLOOD PRESSURE: 121 MMHG | WEIGHT: 250 LBS | DIASTOLIC BLOOD PRESSURE: 61 MMHG | RESPIRATION RATE: 16 BRPM | TEMPERATURE: 96.8 F | HEIGHT: 64 IN | BODY MASS INDEX: 42.68 KG/M2

## 2022-04-10 LAB
ALBUMIN SERPL-MCNC: 4 G/DL (ref 3.5–5.2)
ALP BLD-CCNC: 86 U/L (ref 35–104)
ALT SERPL-CCNC: 45 U/L (ref 0–32)
ANION GAP SERPL CALCULATED.3IONS-SCNC: 10 MMOL/L (ref 7–16)
AST SERPL-CCNC: 26 U/L (ref 0–31)
BILIRUB SERPL-MCNC: <0.2 MG/DL (ref 0–1.2)
BUN BLDV-MCNC: 7 MG/DL (ref 6–20)
CALCIUM SERPL-MCNC: 9.5 MG/DL (ref 8.6–10.2)
CHLORIDE BLD-SCNC: 103 MMOL/L (ref 98–107)
CO2: 26 MMOL/L (ref 22–29)
CREAT SERPL-MCNC: 0.9 MG/DL (ref 0.5–1)
FERRITIN: 28 NG/ML
FOLATE: 11.7 NG/ML (ref 4.8–24.2)
GFR AFRICAN AMERICAN: >60
GFR NON-AFRICAN AMERICAN: >60 ML/MIN/1.73
GLUCOSE BLD-MCNC: 128 MG/DL (ref 74–99)
HCT VFR BLD CALC: 34.7 % (ref 34–48)
IMMATURE RETIC FRACT: 21.3 % (ref 3–15.9)
IRON SATURATION: 9 % (ref 15–50)
IRON: 27 MCG/DL (ref 37–145)
POTASSIUM REFLEX MAGNESIUM: 4 MMOL/L (ref 3.5–5)
RETIC HGB EQUIVALENT: 26.8 PG (ref 28.2–36.6)
RETICULOCYTE ABSOLUTE COUNT: 0.08 E12/L
RETICULOCYTE COUNT PCT: 1.9 % (ref 0.4–1.9)
SODIUM BLD-SCNC: 139 MMOL/L (ref 132–146)
TOTAL IRON BINDING CAPACITY: 307 MCG/DL (ref 250–450)
TOTAL PROTEIN: 7 G/DL (ref 6.4–8.3)
VITAMIN B-12: 440 PG/ML (ref 211–946)

## 2022-04-10 PROCEDURE — 36415 COLL VENOUS BLD VENIPUNCTURE: CPT

## 2022-04-10 PROCEDURE — 82728 ASSAY OF FERRITIN: CPT

## 2022-04-10 PROCEDURE — 6370000000 HC RX 637 (ALT 250 FOR IP): Performed by: PSYCHIATRY & NEUROLOGY

## 2022-04-10 PROCEDURE — 6370000000 HC RX 637 (ALT 250 FOR IP): Performed by: FAMILY MEDICINE

## 2022-04-10 PROCEDURE — 99232 SBSQ HOSP IP/OBS MODERATE 35: CPT | Performed by: PHYSICIAN ASSISTANT

## 2022-04-10 PROCEDURE — G0378 HOSPITAL OBSERVATION PER HR: HCPCS

## 2022-04-10 PROCEDURE — 82607 VITAMIN B-12: CPT

## 2022-04-10 PROCEDURE — 80053 COMPREHEN METABOLIC PANEL: CPT

## 2022-04-10 PROCEDURE — 83550 IRON BINDING TEST: CPT

## 2022-04-10 PROCEDURE — 83540 ASSAY OF IRON: CPT

## 2022-04-10 PROCEDURE — 85045 AUTOMATED RETICULOCYTE COUNT: CPT

## 2022-04-10 PROCEDURE — 6360000002 HC RX W HCPCS: Performed by: PSYCHIATRY & NEUROLOGY

## 2022-04-10 PROCEDURE — 82746 ASSAY OF FOLIC ACID SERUM: CPT

## 2022-04-10 PROCEDURE — 83921 ORGANIC ACID SINGLE QUANT: CPT

## 2022-04-10 PROCEDURE — 2580000003 HC RX 258: Performed by: FAMILY MEDICINE

## 2022-04-10 PROCEDURE — 6360000002 HC RX W HCPCS: Performed by: FAMILY MEDICINE

## 2022-04-10 RX ORDER — HYDRALAZINE HYDROCHLORIDE 25 MG/1
25 TABLET, FILM COATED ORAL EVERY 8 HOURS SCHEDULED
Qty: 90 TABLET | Refills: 3 | Status: SHIPPED | OUTPATIENT
Start: 2022-04-10

## 2022-04-10 RX ORDER — LAMOTRIGINE 100 MG/1
100 TABLET ORAL DAILY
Qty: 30 TABLET | Refills: 3 | Status: SHIPPED | OUTPATIENT
Start: 2022-04-11

## 2022-04-10 RX ORDER — HALOPERIDOL 5 MG/ML
5 INJECTION INTRAMUSCULAR ONCE
Qty: 2 ML | Refills: 0 | Status: ON HOLD | OUTPATIENT
Start: 2022-04-10 | End: 2022-06-14 | Stop reason: HOSPADM

## 2022-04-10 RX ADMIN — HYDRALAZINE HYDROCHLORIDE 5 MG: 20 INJECTION INTRAMUSCULAR; INTRAVENOUS at 13:50

## 2022-04-10 RX ADMIN — LEVOTHYROXINE SODIUM 150 MCG: 0.15 TABLET ORAL at 05:51

## 2022-04-10 RX ADMIN — HYDROCHLOROTHIAZIDE 25 MG: 25 TABLET ORAL at 10:07

## 2022-04-10 RX ADMIN — ACETAMINOPHEN 650 MG: 325 TABLET ORAL at 11:08

## 2022-04-10 RX ADMIN — AMLODIPINE BESYLATE 5 MG: 5 TABLET ORAL at 10:06

## 2022-04-10 RX ADMIN — LAMOTRIGINE 100 MG: 100 TABLET ORAL at 10:06

## 2022-04-10 RX ADMIN — HYDRALAZINE HYDROCHLORIDE 25 MG: 25 TABLET, FILM COATED ORAL at 06:03

## 2022-04-10 RX ADMIN — SODIUM CHLORIDE, PRESERVATIVE FREE 10 ML: 5 INJECTION INTRAVENOUS at 10:08

## 2022-04-10 RX ADMIN — HALOPERIDOL LACTATE 5 MG: 5 INJECTION, SOLUTION INTRAMUSCULAR at 13:45

## 2022-04-10 RX ADMIN — LACOSAMIDE 150 MG: 100 TABLET, FILM COATED ORAL at 10:06

## 2022-04-10 RX ADMIN — APIXABAN 5 MG: 5 TABLET, FILM COATED ORAL at 10:06

## 2022-04-10 ASSESSMENT — PAIN SCALES - GENERAL: PAINLEVEL_OUTOF10: 6

## 2022-04-10 ASSESSMENT — PAIN DESCRIPTION - PROGRESSION: CLINICAL_PROGRESSION: NOT CHANGED

## 2022-04-10 NOTE — PROGRESS NOTES
Hospitalist Progress Note      SYNOPSIS: Patient admitted on 4/8/2022 for Seizure-like activity [facility])      SUBJECTIVE:    Patient seen and examined  Records reviewed. Hx of non epileptic seizures  neurolog has cleared her for discharge      OBJECTIVE:97% room air    /61   Pulse 87   Temp 96.8 °F (36 °C) (Temporal)   Resp 16   Ht 5' 4\" (1.626 m)   Wt 250 lb (113.4 kg)   LMP  (LMP Unknown)   SpO2 98%   BMI 42.91 kg/m²     General appearance:overweight   pickwickian not in extremis    Not diaphoretic     Respiratory: Clear to auscultation bilaterally, unlabored respiratory effort clear sounds   Cardiovascular: audible S1-S2    Neurologic: awake, alert and attentive  She responds in high squeeky/  almost child like quality to her tone  ASSESSMENT:  Non-Epileptic Spells,   without evidence for epileptic source.      Mild anemia NC with higher hct today  Hyperglycemia likely IGT   abnl peripheral smear in 2020     Suggestive of thalassem trait  Class III obesity    At risk for BAKARI  Bacteria in her urine-clini significance not yet determined she has not voiced any symptoms   rare bacteria in urine  contamin vs colonizer  Recent pneumonia in the hospital beginning of this month  abnl cxr earlier this month- whch appears to have cleared up based on radiologist impression    abnl EKG ( ekg 3/30/2022- delayed precordial transition  Qtc manual calculated via Daly 438 msec  Hx of hypothyroid recent tsh in normal range 03/30/2022  Hx of thn essent-bp in acceptable range     PLAN:[facility] to [facility]  vimpat 150mg po bid   lamotrigine 100mg po qd  Haldol 5m IM prn for prolonged shaking  spells characterized as target spells / abnl movements classed as Non epileptic spells without evid of epileptic source          DISPOSITION: tbd    Medications:  REVIEWED DAILY    Infusion Medications    sodium chloride       Scheduled Medications    amLODIPine  5 mg Oral Daily    apixaban  5 mg Oral BID    hydroCHLOROthiazide 25 mg Oral Daily    lacosamide  150 mg Oral BID    levothyroxine  150 mcg Oral Daily    sodium chloride flush  10 mL IntraVENous 2 times per day    lamoTRIgine  100 mg Oral Daily    hydrALAZINE  25 mg Oral 3 times per day     PRN Meds: sodium chloride flush, sodium chloride, promethazine **OR** ondansetron, polyethylene glycol, acetaminophen **OR** acetaminophen, haloperidol lactate, hydrALAZINE    Labs:     Recent Labs     04/08/22  1831 04/09/22  0440 04/10/22  0620   WBC 7.6 7.2  --    HGB 12.6 10.2*  --    HCT 40.0 32.2* 34.7   * 442  --        Recent Labs     04/08/22  1831 04/09/22  0440 04/10/22  1015    138 139   K 4.0 3.2* 4.0    101 103   CO2 20* 22 26   BUN 13 11 7   CREATININE 0.9 0.8 0.9   CALCIUM 9.7 9.1 9.5       Recent Labs     04/08/22  1831 04/10/22  1015   PROT 7.8 7.0   ALKPHOS 104 86   ALT 54* 45*   AST 51* 26   BILITOT <0.2 <0.2         Chronic labs:  hgba1c 6.2 03/30/2022  TSH 1.01 03/30/2022      Radiology:   +++++++++++++++++++++++++++++++++++++++++++++++++  DO Howard Ring Physician - [address] [place], [place]  +++++++++++++++++++++++++++++++++++++++++++++++++  NOTE: This report was transcribed using voice recognition software. Every effort was made to ensure accuracy; however, inadvertent computerized transcription errors may be present.

## 2022-04-10 NOTE — PROGRESS NOTES
Per [facility], they can accept patient back tonight. Patient's family member Niger notified, confirmed discharge plan. [facility] called to arrange transportation.  Approximate pickup time 7PM.

## 2022-04-10 NOTE — PLAN OF CARE
Problem: Nutritional:  Goal: Ability to tolerate tube feedings without aspirating will improve  Description: Ability to tolerate tube feedings without aspirating will improve  Outcome: Completed  Goal: Consumption of food in small portions  Description: Consumption of food in small portions  Outcome: Completed  Goal: Consumption of liquid of appropriate consistency  Description: Consumption of liquid of appropriate consistency  Outcome: Completed

## 2022-04-10 NOTE — PROGRESS NOTES
Pt currently having a seizure, 02 level drops into 70's- patient does have apnea moments during her seizures. BP and HR increased at times.  Hydralazine IV given and Haldol per new seizure protocol

## 2022-04-10 NOTE — PLAN OF CARE
Problem: Safety:  Goal: Ability to chew and swallow food without choking will improve  Description: Ability to chew and swallow food without choking will improve  Outcome: Met This Shift

## 2022-04-10 NOTE — DISCHARGE SUMMARY
Hospitalist Discharge Summary    Patient ID: Renaldo Salcedo   Patient : 1985  Patient's PCP: No primary care provider on file. Admit Date: 2022   Admitting Physician: Meño Cannon DO    Discharge Date:  4/10/2022  Discharge Physician: Yael White DO   Discharge Condition: Stable  Discharge Disposition: [address] [facility]  History of presenting illness: Altered Mental Status (seizure like activity    Hospital course in brief:  (Please refer to daily progress notes for a comprehensive review of the hospitalization by requesting medical records)  Patient remained in the hospital for evaluation by neurology  After clinical examination and eeg ultimately determined activity reported and witnessed not consistent with epilepsy  Movements displayed abnl in appearance  \"intermittent spells consisting of side to side shaking of her head, arrhythmic, direction changing shaking of her hands and asynchronous shaking of her hands and feet. These events are clinically consistent with non-epileptic spells. \"      however did not correlated with evidence on EEG for seizure  Known hx of PNES  keppra discontinued by neurology   with recommendations  · Continue Vimpat 150 BID  · Continue Lamotrigine to 100 QD  · Haldol 5 mg PRN for prolonged shaking spells    Other issues reviewed to include  Rare bacter in urine clinical significance likely not heavily weighed as patient denied urology symptoms and did not register fever    Low hb with repeat hct higher and no evid of active or allan hemmorrhage or bleeding diathesis  Hypokalemia identified on chem 7 which was addsessed and treated    Patient requested d cback to [facility] on afternoon of   approx 1520 per RN notification via PS patient apparently displayed another \"spell\"  Per RN she was medicated and event extinguished  Consults:   IP CONSULT TO NEUROLOGY    Discharge Diagnoses:  PNES  Multiple episodes of spells without clinical or objective evidence to support diagnosis of epilepsy    EEG during spells as patient manifested abnormal movements did not correlate with EEG evid of epilepsy/seizures    Mild anemia NC  Hyperglycemia likely IGT   abnl peripheral smear in 2020     Suggestive of thalassem trait  Class III obesity    At risk for BAKARI    Discharge Instructions / Follow up:  ortega is at high risk for spell re occurrence  As there is no diagnostic study that predicts or produces evidence of PNES/  clinical target spells ,   clinically manifest as abnormal movements/classed as Psychogenic Non-Epileptic Spells    It Is not clear that repeated admission to the hospital for these events is of clinical or therapeutic benefit to the patient and repeated hospital observation or inpatient admission without other criteria other than these spells   over time may  expose the patient to a high risk environment and possibly even pose a jeaopardy to patient's well being    Continued appropriate risk factor modification of blood pressure, diabetes and serum lipids will remain essential to reducing risk of future atherosclerotic development    Activity: activity as tolerated    Significant labs:  CBC:   Recent Labs     04/08/22  1831 04/09/22  0440 04/10/22  0620   WBC 7.6 7.2  --    RBC 4.67 3.78  --    HGB 12.6 10.2*  --    HCT 40.0 32.2* 34.7   MCV 85.7 85.2  --    RDW 13.4 13.3  --    * 442  --      BMP:   Recent Labs     04/08/22  1831 04/09/22  0440 04/10/22  1015    138 139   K 4.0 3.2* 4.0    101 103   CO2 20* 22 26   BUN 13 11 7   CREATININE 0.9 0.8 0.9   MG  --  1.9  --      LFT:  Recent Labs     04/08/22  1831 04/10/22  1015   PROT 7.8 7.0   ALKPHOS 104 86   ALT 54* 45*   AST 51* 26   BILITOT <0.2 <0.2     Folate and B12:   Lab Results   Component Value Date    SXLWICAB60 440 04/10/2022   ,   Lab Results   Component Value Date    FOLATE 11.7 04/10/2022     Thyroid Studies:   Lab Results   Component Value Date    TSH 1.010 03/30/2022 Imaging:  CT HEAD WO CONTRAST    Result Date: 4/9/2022  EXAMINATION: CT OF THE HEAD WITHOUT CONTRAST  4/9/2022 7:47 am     Unremarkable noncontrast head CT scan. CT HEAD WO CONTRAST    Result Date: 4/8/2022  EXAMINATION: CT OF THE HEAD WITHOUT CONTRAST  4/8/2022 11:05 pm TECHNIQUE: CT of the head was performed without the administration of intravenous contrast.   No acute intracranial abnormality. skull or soft tissues. Endotracheal tube in place. XR CHEST PORTABLE    Result Date: 4/9/2022  EXAMINATION: ONE XRAY VIEW OF THE CHEST 4/9/2022 3:54 pm   Minimal left basilar atelectasis. Otherwise, clear lungs. Cardiomegaly. new endotracheal tube, and new enteric tube. No complicating features. 2.  Low lung volumes which appears to be contributing to bronchovascular crowding. Overall stable when compared to prior exam.  No new cardiopulmonary pathology. Discharge Medications:      Medication List      START taking these medications    haloperidol lactate 5 MG/ML injection  Commonly known as: HALDOL  Inject 1 mL into the muscle once for 1 dose     hydrALAZINE 25 MG tablet  Commonly known as: APRESOLINE  Take 1 tablet by mouth every 8 hours        CHANGE how you take these medications    * lamoTRIgine 25 MG tablet  Commonly known as: LAMICTAL  Take 2 tablets by mouth daily  What changed: Another medication with the same name was added. Make sure you understand how and when to take each. * lamoTRIgine 100 MG tablet  Commonly known as: LAMICTAL  Take 1 tablet by mouth daily  Start taking on: April 11, 2022  What changed: You were already taking a medication with the same name, and this prescription was added. Make sure you understand how and when to take each. * This list has 2 medication(s) that are the same as other medications prescribed for you. Read the directions carefully, and ask your doctor or other care provider to review them with you.             CONTINUE taking these medications    acetaminophen 325 MG tablet  Commonly known as: TYLENOL     amLODIPine 5 MG tablet  Commonly known as: NORVASC     Eliquis 5 MG Tabs tablet  Generic drug: apixaban     hydroCHLOROthiazide 25 MG tablet  Commonly known as: HYDRODIURIL     lacosamide 150 MG Tabs tablet  Commonly known as: VIMPAT  Take 1 tablet by mouth 2 times daily for 30 days. levothyroxine 75 MCG tablet  Commonly known as: SYNTHROID        STOP taking these medications    levETIRAcetam 1000 MG tablet  Commonly known as: KEPPRA           Where to Get Your Medications      These medications were sent to [address] [place] [address]    Phone: [phone]   · haloperidol lactate 5 MG/ML injection  · hydrALAZINE 25 MG tablet  · lamoTRIgine 100 MG tablet         Time Spent on discharge is more than 35 minutes in the examination, evaluation, counseling and review of medications and discharge plan.    +++++++++++++++++++++++++++++++++++++++++++++++++  Celine Primrose, DO [facility] [address]  +++++++++++++++++++++++++++++++++++++++++++++++++  NOTE: This report was transcribed using voice recognition software. Every effort was made to ensure accuracy; however, inadvertent computerized transcription errors may be present.

## 2022-04-10 NOTE — PROGRESS NOTES
[facility]  Neurology Consult    Date:  4/10/2022  Patient Name:  Darlene Koehler  YOB: 1985  MRN: [id number]     PCP:  No primary care provider on file. Referring:  No ref. provider found      Chief Complaint: \"seizures\"    History obtained from: chart    Eligio Nash is a 42 y.o. female with a well known history of PNES. Current events are consistent with PNES. Plan  · Continue Vimpat 150 BID  · Continue Lamotrigine to 100 QD  · Haldol 5 mg PRN for prolonged shaking spells  · Maintain seizure safety precautions  · Okay to d/c from neuro- will sign off , please call with questions       History of Present Illness:  Darlene Koehler is a 42 y.o. right handed female presenting for evaluation of \"seizures. \" She has a well documented history of non-epileptic spells. She still appears to be on Keppra and Vimpat for some reason, however. During yesterday's neurology evaluation she was noted to be not responsive, and has had intermittent spells consisting of side to side shaking of her head, arrhythmic, direction changing shaking of her hands and asynchronous shaking of her hands and feet. These events are clinically consistent with non-epileptic spells. Keppra was d/c. Vimpat continued for now. Lamotrigine incresaed to 100 mg qd. No further spells since last night. Review of Systems:  Denies weakness, numbness, speech or swallowing difficulties,     Allergies:       Allergies   Allergen Reactions    Carbamazepine Hives and Swelling    Latuda [Lurasidone] Rash     Rash      Shellfish-Derived Products Swelling and Rash        Physical Examination  Vitals   Vitals:    04/09/22 2100 04/09/22 2300 04/10/22 0745 04/10/22 0915   BP: (!) 142/89 110/68 100/63 112/71   Pulse: 109 83 74 71   Resp: 16 18 18 16   Temp: 97.8 °F (36.6 °C)   96.8 °F (36 °C)   TempSrc: Temporal   Temporal   SpO2: 99% 98% 99% 98%   Weight:       Height:            General: Patient appears poorly responsive, does not follow commands  HEENT: Normocephalic, atraumatic  Chest: Clear to auscultation bilaterally  Heart: No murmurs appreciated  Extremities/Peripheral vascular: No edema/swelling noted. No cold limbs noted. Neurologic Examination    Mental Status  Alert. Following commands well. Cranial Nerves  II. Visual fields full to threat bilaterally  III, IV, VI: Pupils equally round and reactive to light, 3 to 2 mm bilaterally. EOMs: full, no nystagmus. V.   VII: Facial movements symmetric   VIII: Hearing intact to voice  IX,X: No dysarthria  XI: shoulders appear symmetric  XII: Tongue is midline    Motor  5/5 throughout   No abnormal movements     Sensation  · LT normal distally in all extremities     Reflexes    Toes down going bilaterally.     Coordination  No resting tremors observed    Gait  Deferred for safety/fall consideration      Labs  Recent Labs       0000 04/08/22  1831 04/08/22  1846 04/09/22  0440 04/09/22  0440 04/09/22  1724 04/10/22  0620 04/10/22  1015   NA  --  138   < > 138   < >  --   --  139   K  --  4.0   < > 3.2*   < >  --   --  4.0   CL  --  103   < > 101   < >  --   --  103   CO2  --  20*   < > 22   < >  --   --  26   BUN  --  13   < > 11   < >  --   --  7   CREATININE  --  0.9   < > 0.8   < >  --   --  0.9   GLUCOSE  --  114*   < > 187*   < >  --   --  128*   CALCIUM  --  9.7   < > 9.1   < >  --   --  9.5   PROT   < > 7.8  --   --   --   --   --  7.0   LABALBU   < > 3.9  --   --   --   --   --  4.0   BILITOT   < > <0.2  --   --   --   --   --  <0.2   ALKPHOS   < > 104  --   --   --   --   --  86   AST   < > 51*  --   --   --   --   --  26   ALT   < > 54*  --   --   --   --   --  45*   WBC  --  7.6   < > 7.2  --   --   --   --    RBC  --  4.67   < > 3.78  --   --   --   --    HGB  --  12.6   < > 10.2*  --   --   --   --    HCT  --  40.0   < > 32.2*   < >  --  34.7  --    MCV  --  85.7   < > 85.2  --   --   --   --    MCH  --  27.0   < > 27.0  --   --   -- --    MCHC  --  31.5*   < > 31.7*  --   --   --   --    RDW  --  13.4   < > 13.3  --   --   --   --    PLT  --  486*   < > 442  --   --   --   --    MPV  --  9.7   < > 8.9  --   --   --   --    PH  --   --   --   --   --  7.453*  --   --    PO2  --   --   --   --   --  274.5*  --   --    PCO2  --   --   --   --   --  35.5  --   --    HCO3  --   --   --   --   --  24.3  --   --    BE  --   --   --   --   --  0.7  --   --    O2SAT  --   --   --   --   --  99.4*  --   --    LACTA  --  2.1  --   --   --   --   --   --     < > = values in this interval not displayed. Imaging  XR CHEST PORTABLE   Final Result   Minimal left basilar atelectasis. Otherwise, clear lungs. Cardiomegaly. CT HEAD WO CONTRAST   Final Result   Unremarkable noncontrast head CT scan. CT HEAD WO CONTRAST   Final Result   No acute intracranial abnormality. XR CHEST 1 VIEW   Final Result   No acute process. Other Testing Results  EEG Monitoring 12/31/2020    Clinical Summary:  Pt is a 38y.o. year old female, undergoing   continuous EEG monitoring in the Inpatient setting for capture of   clinical and/or sub-clinical electrographic seizures.      Conditions of Recording   This study was performed using a 28-lead digital   electroencephalographic array, with data analyzed across multiple   montages.  Direct video visualization of the patient is utilized   during the study, as is automated seizure detection software.  A   single EKG lead is utilized throughout the recording.       EEG Description   Background activities on this day of recording are noted as   reasonably well-developed, with appropriate background   architecture and an 11 Hz PDR noted during periods of full   wakefulness with eye closure.  Excess non-pathological Beta-range   activities are noted, most commonly seen in the setting of   Benzodiazepine administration.  No focal slowing is noted.  There   are no definitive Interictal Epileptiform Discharges and NO   Electrographic seizures occur.       Numerous spontaneous clinical target spells were captured,   clinically manifest as abnormal movements, and without EEG   correlate.  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 Summary   During this period of [place]'s EEG monitoring, background   activities with appropriate architecture are noted.  There are no   interictal epileptiform discharges seen throughout the recording.    No electrographic seizures were captured during this period.     Numerous spontaneous clinical target spells of Non-Epileptic   nature were captured, with abnormal movements noted.       Overall diagnostic classification is likely Psychogenic   Non-Epileptic Spells of an unconscious nature, although   Factitious production of Spells in an intentional fashion cannot   be ruled out. Continuous EEG monitoring is completed based upon stated goals of   care and discontinued in the setting of self-removal of EEG   leads. Randy Ortiz MD, [facility], [address] [place] [address]   Adult Epileptologist & Neurologist  [address] [place] [address]      3/9/2020 admission notes:  Hospital Course: This is a 39 yo female with a past medical history of bipolar disorder, schizoaffective, psychogenic non-epileptic syndrome, and autism who was dropped off at to [facility] ED for seizure like activity. While in the ED, she was given Ativan and Keppra. Workup was largely unremarkable aside from asymptomatic bacteriuria, chronic anemia with Hb of 10.6 , and hypophosphatemia which was replaced. Head CT was unremarkable. She was then monitored oncontinuous video EEG and neurology was consulted who recommended continuing her current medications and increasing lamotrigine as her presentation was consistent with psychogenic non-epileptic seizures.  Several \"seizure-like\" episodes were recorded on EEG which was read as non-epileptiform with mild-moderate encephalopathy. Anemia workup was consistent with iron deficiency and she was started on ferrous sulfate. Discussed patient's medical diagnosis and medication changes with her landlord after permission was obtained from patient, as her landlord assists with her care. She was medically satisfactory for discharge. The is moderate to high chance of readmission given multiple comorbidities and social living situation.      Electronically signed by CARMELITA Vegas on 4/10/2022 at 12:19 PM

## 2022-04-10 NOTE — PROGRESS NOTES
[facility], updated on patient's status. Noted that she is still having psychogenic non-epileptic spells. Per RN, they will still be able to take patient back as long as neurology is okay with patient discharge.

## 2022-04-11 LAB
EKG ATRIAL RATE: 87 BPM
EKG ATRIAL RATE: 97 BPM
EKG P AXIS: 26 DEGREES
EKG P AXIS: 44 DEGREES
EKG P-R INTERVAL: 162 MS
EKG P-R INTERVAL: 182 MS
EKG Q-T INTERVAL: 344 MS
EKG Q-T INTERVAL: 376 MS
EKG QRS DURATION: 88 MS
EKG QRS DURATION: 88 MS
EKG QTC CALCULATION (BAZETT): 436 MS
EKG QTC CALCULATION (BAZETT): 452 MS
EKG R AXIS: -18 DEGREES
EKG R AXIS: -26 DEGREES
EKG T AXIS: 38 DEGREES
EKG T AXIS: 44 DEGREES
EKG VENTRICULAR RATE: 87 BPM
EKG VENTRICULAR RATE: 97 BPM
KEPPRA: 19 UG/ML (ref 10–40)
LAMOTRIGINE LEVEL: <0.9 UG/ML (ref 3–15)

## 2022-04-11 PROCEDURE — 93010 ELECTROCARDIOGRAM REPORT: CPT | Performed by: INTERNAL MEDICINE

## 2022-04-15 LAB — METHYLMALONIC ACID: 0.29 UMOL/L (ref 0–0.4)

## 2022-06-09 NOTE — PROGRESS NOTES
Occupational Therapy  OCCUPATIONAL THERAPY INITIAL EVALUATION    [facility] [address] [place] [address], [facility], [address] [facility]                                                  Patient Name: Darlene Koehler    MRN: [id number]    : 1985    Room: [address] [place] [address]      Evaluating OT: Tucker Cameron OTR/L [id number]      Referring Akbar Corral MD    Specific Provider Orders/Date: OT eval and treat 22      Diagnosis: Seizures/ Acute Respiratory Failure     Surgery: none     Pertinent Medical History: CKD,DM, Schizoaffective Disorder       Precautions:  Fall Risk, seizures,bed alarm       Assessment of current deficits    [x] Functional mobility  [x]ADLs  [x] Strength               [x]Cognition    [x] Functional transfers   [x] IADLs         [x] Safety Awareness   [x]Endurance    [] Fine Coordination              [x] Balance      [] Vision/perception   [x]Sensation     []Gross Motor Coordination  [] ROM  [] Delirium                   [] Motor Control     OT PLAN OF CARE   OT POC based on physician orders, patient diagnosis and results of clinical assessment    Frequency/Duration 1-3 days/wk for 2 weeks PRN   Specific OT Treatment Interventions to include:   * Instruction/training on adapted ADL techniques and AE recommendations to increase functional independence within precautions       * Training on energy conservation strategies, correct breathing pattern and techniques to improve independence/tolerance for self-care routine  * Functional transfer/mobility training/DME recommendations for increased independence, safety, and fall prevention  * Patient/Family education to increase follow through with safety techniques and functional independence  * Recommendation of environmental modifications for increased safety with functional transfers/mobility and ADLs  * Cognitive retraining/development of therapeutic activities to improve problem solving, Patient is due for an appointment. Only 30 day refill sent.    judgement, memory, and attention for increased safety/participation in ADL/IADL tasks  * Therapeutic exercise to improve motor endurance, ROM, and functional strength for ADLs/functional transfers  * Therapeutic activities to facilitate/challenge dynamic balance, stand tolerance for increased safety and independence with ADLs        Recommended Adaptive Equipment:  TBD     Home Living: Pt lives at [address] setup: walk in shower    Equipment owned: pt has a ww     Prior Level of Function: pt reports that she was independent  with ADLs , had assist  with IADLs; ambulated with ww sometimes   Driving: pt does not drive   Occupation: pt does not work     Pain Level: headache 5/10   Cognition: A&O: 4/4; Follows multi  step directions   Memory:  Fair    Sequencing:  Fair    Problem solving:  Fair-    Judgement/safety:  Fair -     Functional Assessment:  AM-PAC Daily Activity Raw Score: 18/24   Initial Eval Status  Date: 4/3/22 Treatment Status  Date: STGs = LTGs  Time frame: 5-7 days    Feeding Supervision   Independent    Grooming Minimal Assist   Supervision    UB Dressing Minimal Assist   Supervision    LB Dressing Minimal Assist   Supervision    Bathing NT     Toileting Minimal Assist   Supervision    Bed Mobility  Supine to sit: Stand by Assist   Sit to supine: Stand by Assist   Supine to sit:  Independent   Sit to supine: Independent    Functional Transfers Minimal Assist with ww   Supervision with ww   Functional Mobility Minimal Assist   Supervision with ww    Balance Sitting:     Static:  SBA    Dynamic:CGA  Standing: Min A with ww     Activity Tolerance Fair      Visual/  Perceptual Glasses: pt does not have glasses                 Hand Dominance right    AROM (PROM) Strength Additional Info:    RUE  WFL 3+/5 good  and wfl FMC/dexterity noted during ADL tasks       LUE WFL 3+/5 good  and wfl FMC/dexterity noted during ADL tasks       Hearing: [facility]   Sensation:  No c/o numbness or tingling   Tone: WFL   Edema: none in BUE's     Comments: Upon arrival patient was supine in bed and agreeable to OT eval. At end of session, patient returned to supine in bed with call light and phone within reach, all lines and tubes intact. Overall patient demonstrated  decreased independence and safety during completion of ADL/functional transfer/mobility tasks. Pt would benefit from continued skilled OT to increase safety and independence with completion of ADL/IADL tasks for functional independence and quality of life. Rehab Potential: Good  for established goals     Patient / Family Goal: pt did not state a goal     Patient and/or family were instructed on functional diagnosis, prognosis/goals and OT plan of care. Demonstrated good  understanding. Eval Complexity: Low    Time In: 1015  Time Out:1030    Total Treatment Time: 0     Min Units   OT Eval Low 76678  15     OT Eval Medium 04070      OT Eval High 58581      OT Re-Eval [id number]       Therapeutic Ex [phone]       Therapeutic Activities 74514       ADL/Self Care 49927       Orthotic Management 17446       Manual [id number]     Neuro Re-Ed [id number]       Non-Billable Time          Evaluation Time additionally includes thorough review of current medical information, gathering information on past medical history/social history and prior level of function, interpretation of standardized testing/informal observation of tasks, assessment of data and development of plan of care and goals.             Nora López OTR/L [id number]

## 2022-06-13 ENCOUNTER — APPOINTMENT (OUTPATIENT)
Dept: GENERAL RADIOLOGY | Age: 37
DRG: 880 | End: 2022-06-13
Payer: MEDICARE

## 2022-06-13 ENCOUNTER — APPOINTMENT (OUTPATIENT)
Dept: CT IMAGING | Age: 37
DRG: 880 | End: 2022-06-13
Payer: MEDICARE

## 2022-06-13 ENCOUNTER — HOSPITAL ENCOUNTER (INPATIENT)
Age: 37
LOS: 1 days | Discharge: HOME | DRG: 880 | End: 2022-06-14
Attending: EMERGENCY MEDICINE | Admitting: PSYCHIATRY & NEUROLOGY
Payer: MEDICARE

## 2022-06-13 DIAGNOSIS — R56.9 SEIZURE-LIKE ACTIVITY (HCC): Primary | ICD-10-CM

## 2022-06-13 PROBLEM — Z86.711 HISTORY OF PULMONARY EMBOLUS (PE): Status: ACTIVE | Noted: 2022-06-13

## 2022-06-13 PROBLEM — I26.99 PULMONARY EMBOLISM (HCC): Status: ACTIVE | Noted: 2022-06-13

## 2022-06-13 LAB
ABSOLUTE EOS #: 0.15 K/UL (ref 0–0.44)
ABSOLUTE IMMATURE GRANULOCYTE: 0.03 K/UL (ref 0–0.3)
ABSOLUTE LYMPH #: 2.1 K/UL (ref 1.1–3.7)
ABSOLUTE MONO #: 0.59 K/UL (ref 0.1–1.2)
ACETAMINOPHEN LEVEL: <5 UG/ML (ref 10–30)
ALBUMIN SERPL-MCNC: 4.2 G/DL (ref 3.5–5.2)
ALBUMIN/GLOBULIN RATIO: 1.4 (ref 1–2.5)
ALP BLD-CCNC: 88 U/L (ref 35–104)
ALT SERPL-CCNC: 35 U/L (ref 5–33)
ANION GAP SERPL CALCULATED.3IONS-SCNC: 11 MMOL/L (ref 9–17)
AST SERPL-CCNC: 23 U/L
BASOPHILS # BLD: 1 % (ref 0–2)
BASOPHILS ABSOLUTE: 0.05 K/UL (ref 0–0.2)
BILIRUB SERPL-MCNC: 0.2 MG/DL (ref 0.3–1.2)
BUN BLDV-MCNC: 12 MG/DL (ref 6–20)
CALCIUM SERPL-MCNC: 9.5 MG/DL (ref 8.6–10.4)
CHLORIDE BLD-SCNC: 105 MMOL/L (ref 98–107)
CO2: 22 MMOL/L (ref 20–31)
CREAT SERPL-MCNC: 1.09 MG/DL (ref 0.5–0.9)
EKG ATRIAL RATE: 84 BPM
EKG P AXIS: 2 DEGREES
EKG P-R INTERVAL: 160 MS
EKG Q-T INTERVAL: 386 MS
EKG QRS DURATION: 92 MS
EKG QTC CALCULATION (BAZETT): 456 MS
EKG R AXIS: -2 DEGREES
EKG T AXIS: 35 DEGREES
EKG VENTRICULAR RATE: 84 BPM
EOSINOPHILS RELATIVE PERCENT: 2 % (ref 1–4)
ETHANOL PERCENT: <0.01 %
ETHANOL: <10 MG/DL
GFR AFRICAN AMERICAN: >60 ML/MIN
GFR NON-AFRICAN AMERICAN: 56 ML/MIN
GFR SERPL CREATININE-BSD FRML MDRD: ABNORMAL ML/MIN/{1.73_M2}
GLUCOSE BLD-MCNC: 110 MG/DL (ref 65–105)
GLUCOSE BLD-MCNC: 129 MG/DL (ref 70–99)
GLUCOSE BLD-MCNC: 180 MG/DL (ref 65–105)
HCG QUALITATIVE: NEGATIVE
HCT VFR BLD CALC: 35.4 % (ref 36.3–47.1)
HEMOGLOBIN: 10.9 G/DL (ref 11.9–15.1)
IMMATURE GRANULOCYTES: 0 %
KEPPRA: 16 UG/ML
LACTIC ACID, WHOLE BLOOD: 1 MMOL/L (ref 0.7–2.1)
LAMOTRIGINE LEVEL: <1 UG/ML (ref 3–15)
LYMPHOCYTES # BLD: 28 % (ref 24–43)
MAGNESIUM: 1.9 MG/DL (ref 1.6–2.6)
MCH RBC QN AUTO: 25.2 PG (ref 25.2–33.5)
MCHC RBC AUTO-ENTMCNC: 30.8 G/DL (ref 28.4–34.8)
MCV RBC AUTO: 81.8 FL (ref 82.6–102.9)
MONOCYTES # BLD: 8 % (ref 3–12)
MYOGLOBIN: 40 NG/ML (ref 25–58)
NRBC AUTOMATED: 0 PER 100 WBC
PDW BLD-RTO: 16.4 % (ref 11.8–14.4)
PLATELET # BLD: 443 K/UL (ref 138–453)
PMV BLD AUTO: 9 FL (ref 8.1–13.5)
POTASSIUM SERPL-SCNC: 4.2 MMOL/L (ref 3.7–5.3)
PROLACTIN: 21.96 NG/ML (ref 4.79–23.3)
RBC # BLD: 4.33 M/UL (ref 3.95–5.11)
RBC # BLD: ABNORMAL 10*6/UL
SALICYLATE LEVEL: <1 MG/DL (ref 3–10)
SEG NEUTROPHILS: 61 % (ref 36–65)
SEGMENTED NEUTROPHILS ABSOLUTE COUNT: 4.69 K/UL (ref 1.5–8.1)
SODIUM BLD-SCNC: 138 MMOL/L (ref 135–144)
THYROXINE, FREE: 0.87 NG/DL (ref 0.93–1.7)
TOTAL CK: 86 U/L (ref 26–192)
TOTAL PROTEIN: 7.1 G/DL (ref 6.4–8.3)
TOXIC TRICYCLIC SC,BLOOD: NEGATIVE
TSH SERPL DL<=0.05 MIU/L-ACNC: 9.42 UIU/ML (ref 0.3–5)
WBC # BLD: 7.6 K/UL (ref 3.5–11.3)

## 2022-06-13 PROCEDURE — 2060000000 HC ICU INTERMEDIATE R&B

## 2022-06-13 PROCEDURE — 99285 EMERGENCY DEPT VISIT HI MDM: CPT

## 2022-06-13 PROCEDURE — 6360000002 HC RX W HCPCS: Performed by: STUDENT IN AN ORGANIZED HEALTH CARE EDUCATION/TRAINING PROGRAM

## 2022-06-13 PROCEDURE — 94761 N-INVAS EAR/PLS OXIMETRY MLT: CPT

## 2022-06-13 PROCEDURE — 82947 ASSAY GLUCOSE BLOOD QUANT: CPT

## 2022-06-13 PROCEDURE — 84703 CHORIONIC GONADOTROPIN ASSAY: CPT

## 2022-06-13 PROCEDURE — 83874 ASSAY OF MYOGLOBIN: CPT

## 2022-06-13 PROCEDURE — 96375 TX/PRO/DX INJ NEW DRUG ADDON: CPT

## 2022-06-13 PROCEDURE — 82550 ASSAY OF CK (CPK): CPT

## 2022-06-13 PROCEDURE — 80053 COMPREHEN METABOLIC PANEL: CPT

## 2022-06-13 PROCEDURE — G0378 HOSPITAL OBSERVATION PER HR: HCPCS

## 2022-06-13 PROCEDURE — 80235 DRUG ASSAY LACOSAMIDE: CPT

## 2022-06-13 PROCEDURE — 96372 THER/PROPH/DIAG INJ SC/IM: CPT

## 2022-06-13 PROCEDURE — 93005 ELECTROCARDIOGRAM TRACING: CPT | Performed by: STUDENT IN AN ORGANIZED HEALTH CARE EDUCATION/TRAINING PROGRAM

## 2022-06-13 PROCEDURE — 85025 COMPLETE CBC W/AUTO DIFF WBC: CPT

## 2022-06-13 PROCEDURE — 6370000000 HC RX 637 (ALT 250 FOR IP): Performed by: STUDENT IN AN ORGANIZED HEALTH CARE EDUCATION/TRAINING PROGRAM

## 2022-06-13 PROCEDURE — 80175 DRUG SCREEN QUAN LAMOTRIGINE: CPT

## 2022-06-13 PROCEDURE — 84443 ASSAY THYROID STIM HORMONE: CPT

## 2022-06-13 PROCEDURE — 72125 CT NECK SPINE W/O DYE: CPT

## 2022-06-13 PROCEDURE — 36415 COLL VENOUS BLD VENIPUNCTURE: CPT

## 2022-06-13 PROCEDURE — 93010 ELECTROCARDIOGRAM REPORT: CPT | Performed by: INTERNAL MEDICINE

## 2022-06-13 PROCEDURE — 80179 DRUG ASSAY SALICYLATE: CPT

## 2022-06-13 PROCEDURE — 83735 ASSAY OF MAGNESIUM: CPT

## 2022-06-13 PROCEDURE — 95819 EEG AWAKE AND ASLEEP: CPT | Performed by: PSYCHIATRY & NEUROLOGY

## 2022-06-13 PROCEDURE — 6360000002 HC RX W HCPCS

## 2022-06-13 PROCEDURE — 84146 ASSAY OF PROLACTIN: CPT

## 2022-06-13 PROCEDURE — 80177 DRUG SCRN QUAN LEVETIRACETAM: CPT

## 2022-06-13 PROCEDURE — 71045 X-RAY EXAM CHEST 1 VIEW: CPT

## 2022-06-13 PROCEDURE — 80307 DRUG TEST PRSMV CHEM ANLYZR: CPT

## 2022-06-13 PROCEDURE — G0480 DRUG TEST DEF 1-7 CLASSES: HCPCS

## 2022-06-13 PROCEDURE — 95819 EEG AWAKE AND ASLEEP: CPT

## 2022-06-13 PROCEDURE — 83605 ASSAY OF LACTIC ACID: CPT

## 2022-06-13 PROCEDURE — 2580000003 HC RX 258: Performed by: STUDENT IN AN ORGANIZED HEALTH CARE EDUCATION/TRAINING PROGRAM

## 2022-06-13 PROCEDURE — 80143 DRUG ASSAY ACETAMINOPHEN: CPT

## 2022-06-13 PROCEDURE — 84439 ASSAY OF FREE THYROXINE: CPT

## 2022-06-13 PROCEDURE — 99223 1ST HOSP IP/OBS HIGH 75: CPT | Performed by: PSYCHIATRY & NEUROLOGY

## 2022-06-13 PROCEDURE — 96365 THER/PROPH/DIAG IV INF INIT: CPT

## 2022-06-13 PROCEDURE — 70450 CT HEAD/BRAIN W/O DYE: CPT

## 2022-06-13 RX ORDER — LORAZEPAM 2 MG/ML
1 INJECTION INTRAMUSCULAR EVERY 5 MIN PRN
Status: DISCONTINUED | OUTPATIENT
Start: 2022-06-13 | End: 2022-06-13

## 2022-06-13 RX ORDER — LEVETIRACETAM 10 MG/ML
1000 INJECTION INTRAVASCULAR ONCE
Status: COMPLETED | OUTPATIENT
Start: 2022-06-13 | End: 2022-06-13

## 2022-06-13 RX ORDER — ACETAMINOPHEN 650 MG/1
650 SUPPOSITORY RECTAL EVERY 6 HOURS PRN
Status: DISCONTINUED | OUTPATIENT
Start: 2022-06-13 | End: 2022-06-14 | Stop reason: HOSPADM

## 2022-06-13 RX ORDER — LEVOTHYROXINE SODIUM 0.07 MG/1
150 TABLET ORAL DAILY
Status: DISCONTINUED | OUTPATIENT
Start: 2022-06-13 | End: 2022-06-14 | Stop reason: HOSPADM

## 2022-06-13 RX ORDER — ONDANSETRON 4 MG/1
4 TABLET, ORALLY DISINTEGRATING ORAL EVERY 8 HOURS PRN
Status: DISCONTINUED | OUTPATIENT
Start: 2022-06-13 | End: 2022-06-14 | Stop reason: HOSPADM

## 2022-06-13 RX ORDER — ONDANSETRON 2 MG/ML
4 INJECTION INTRAMUSCULAR; INTRAVENOUS EVERY 6 HOURS PRN
Status: DISCONTINUED | OUTPATIENT
Start: 2022-06-13 | End: 2022-06-14 | Stop reason: HOSPADM

## 2022-06-13 RX ORDER — SODIUM CHLORIDE 0.9 % (FLUSH) 0.9 %
5-40 SYRINGE (ML) INJECTION PRN
Status: DISCONTINUED | OUTPATIENT
Start: 2022-06-13 | End: 2022-06-14 | Stop reason: HOSPADM

## 2022-06-13 RX ORDER — SODIUM CHLORIDE 9 MG/ML
INJECTION, SOLUTION INTRAVENOUS CONTINUOUS
Status: DISCONTINUED | OUTPATIENT
Start: 2022-06-13 | End: 2022-06-14 | Stop reason: HOSPADM

## 2022-06-13 RX ORDER — SODIUM CHLORIDE 0.9 % (FLUSH) 0.9 %
5-40 SYRINGE (ML) INJECTION EVERY 12 HOURS SCHEDULED
Status: DISCONTINUED | OUTPATIENT
Start: 2022-06-13 | End: 2022-06-14 | Stop reason: HOSPADM

## 2022-06-13 RX ORDER — SODIUM CHLORIDE 9 MG/ML
INJECTION, SOLUTION INTRAVENOUS PRN
Status: DISCONTINUED | OUTPATIENT
Start: 2022-06-13 | End: 2022-06-14 | Stop reason: HOSPADM

## 2022-06-13 RX ORDER — MIDAZOLAM HYDROCHLORIDE 2 MG/2ML
2 INJECTION, SOLUTION INTRAMUSCULAR; INTRAVENOUS ONCE
Status: COMPLETED | OUTPATIENT
Start: 2022-06-13 | End: 2022-06-13

## 2022-06-13 RX ORDER — HALOPERIDOL 5 MG/ML
5 INJECTION INTRAMUSCULAR ONCE
Status: COMPLETED | OUTPATIENT
Start: 2022-06-13 | End: 2022-06-13

## 2022-06-13 RX ORDER — ACETAMINOPHEN 325 MG/1
650 TABLET ORAL EVERY 6 HOURS PRN
Status: DISCONTINUED | OUTPATIENT
Start: 2022-06-13 | End: 2022-06-14 | Stop reason: HOSPADM

## 2022-06-13 RX ORDER — ENOXAPARIN SODIUM 100 MG/ML
30 INJECTION SUBCUTANEOUS 2 TIMES DAILY
Status: DISCONTINUED | OUTPATIENT
Start: 2022-06-13 | End: 2022-06-14 | Stop reason: HOSPADM

## 2022-06-13 RX ORDER — DIPHENHYDRAMINE HYDROCHLORIDE 50 MG/ML
25 INJECTION INTRAMUSCULAR; INTRAVENOUS ONCE
Status: DISCONTINUED | OUTPATIENT
Start: 2022-06-13 | End: 2022-06-13

## 2022-06-13 RX ORDER — LORAZEPAM 2 MG/ML
2 INJECTION INTRAMUSCULAR ONCE
Status: COMPLETED | OUTPATIENT
Start: 2022-06-13 | End: 2022-06-13

## 2022-06-13 RX ORDER — POLYETHYLENE GLYCOL 3350 17 G/17G
17 POWDER, FOR SOLUTION ORAL DAILY PRN
Status: DISCONTINUED | OUTPATIENT
Start: 2022-06-13 | End: 2022-06-14 | Stop reason: HOSPADM

## 2022-06-13 RX ORDER — MIDAZOLAM HYDROCHLORIDE 1 MG/ML
INJECTION INTRAMUSCULAR; INTRAVENOUS
Status: COMPLETED
Start: 2022-06-13 | End: 2022-06-13

## 2022-06-13 RX ADMIN — ACETAMINOPHEN 650 MG: 325 TABLET ORAL at 20:57

## 2022-06-13 RX ADMIN — LEVETIRACETAM 1000 MG: 10 INJECTION INTRAVENOUS at 02:35

## 2022-06-13 RX ADMIN — MIDAZOLAM HYDROCHLORIDE 2 MG: 2 INJECTION, SOLUTION INTRAMUSCULAR; INTRAVENOUS at 02:33

## 2022-06-13 RX ADMIN — ENOXAPARIN SODIUM 30 MG: 100 INJECTION SUBCUTANEOUS at 09:17

## 2022-06-13 RX ADMIN — SODIUM CHLORIDE, PRESERVATIVE FREE 10 ML: 5 INJECTION INTRAVENOUS at 07:27

## 2022-06-13 RX ADMIN — MIDAZOLAM HYDROCHLORIDE 2 MG: 1 INJECTION, SOLUTION INTRAMUSCULAR; INTRAVENOUS at 02:05

## 2022-06-13 RX ADMIN — LORAZEPAM 2 MG: 2 INJECTION INTRAMUSCULAR at 07:40

## 2022-06-13 RX ADMIN — MIDAZOLAM HYDROCHLORIDE 2 MG: 2 INJECTION, SOLUTION INTRAMUSCULAR; INTRAVENOUS at 02:05

## 2022-06-13 RX ADMIN — LEVETIRACETAM 1000 MG: 10 INJECTION INTRAVENOUS at 02:20

## 2022-06-13 RX ADMIN — LEVETIRACETAM 1000 MG: 10 INJECTION INTRAVENOUS at 03:18

## 2022-06-13 RX ADMIN — HALOPERIDOL LACTATE 5 MG: 5 INJECTION, SOLUTION INTRAMUSCULAR at 03:23

## 2022-06-13 RX ADMIN — SODIUM CHLORIDE: 9 INJECTION, SOLUTION INTRAVENOUS at 07:27

## 2022-06-13 ASSESSMENT — PAIN DESCRIPTION - LOCATION: LOCATION: HEAD

## 2022-06-13 ASSESSMENT — PAIN SCALES - GENERAL
PAINLEVEL_OUTOF10: 0
PAINLEVEL_OUTOF10: 2

## 2022-06-13 NOTE — PROGRESS NOTES
Physical Therapy        Physical Therapy Cancel Note      DATE: 2022    NAME: Robina Merrill  MRN: [id number]   : 1985      Patient not seen this date for Physical Therapy due to: Other: RR called this AM, PT will check back for evaluation appropriateness 22.        Electronically signed by Judy Boles PT on 2022 at 2:53 PM

## 2022-06-13 NOTE — PROGRESS NOTES
Occupational [address]  Occupational Therapy Not Seen Note    DATE: 2022    NAME: Mirtha Ragsdale  MRN: [id number]   : 1985      Patient not seen this date for Occupational Therapy due to:    Pt is not appropriate this date: Rapid Response was called this morning. OT will re-attempt for OT Evaluation at later time / date as appropriate.         Electronically signed by JULISA Schwartz OTR/L on 2022 at 10:40 AM

## 2022-06-13 NOTE — PROGRESS NOTES
RN called rapid response for pt. On arrival to unit from ED, pt was found unresponsive with involuntary eye movement and small tremors of head, feet and hands. RN assessed pt and tried painful stimuli including sternal rub and pt was still unresponsive at which point RN called RR team. Dr. Mihir Pena, respiratory, House supervisor, unit manager, and charge ICU RN present at bedside. HR 91, 100% O2 on RA, 15 RR, /86 (104) and . Dr. Mihir Pena advised to monitor as pt had seizure in ED at 0730 and was given ativan and could be post-ictal still. Dr. Mihir Pena to be notified at [address] if pt still unresponsive to discuss transfer to ICU status. 0930 - Dr. Mihir Pena came to bedside to eval pt. No change in pt condition since prior evaluation.  Dr. Mihir Pena ordered EEG monitoring and to wait another hour to see if pt becomes not post-ictal.

## 2022-06-13 NOTE — ED NOTES
Pt resting in cot with eyes closed-respirations even and non-labored. Call light within reach. Will cont to monitor.       Buffy Pappas RN  06/13/22 2025

## 2022-06-13 NOTE — ED NOTES
Pt resting comfortably in cot with eyes closed-respirations even and non-labored. No seizure like activity noted. Call light within reach. Will cont to monitor.       Hubert Kim RN  06/13/22 7953

## 2022-06-13 NOTE — ED NOTES
RN to bedside and witnessed seizure like activity by pt. Seizure lasted approximately 2-3 minutes. Pt noted to have tremorring of extremities. Dr. Barney Steven at bedside to order verbally 2mg of Ativan. RN given Ativan 2mg.       Mona Munoz RN  06/13/22 5750

## 2022-06-13 NOTE — PROGRESS NOTES
RN called report to [facility], [facility]. All questions answered in full. RN transferred pt to  via bed.

## 2022-06-13 NOTE — ED NOTES
Pt able to follow simple commands at this time. Pt non-verbal at this time. Pt maintaining airway at this time.       Jonatan Johnson RN  06/13/22 3089
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
## 2022-06-13 NOTE — ED PROVIDER NOTES
[facility] ED  Emergency Department Encounter  EmergencyMedicine Resident     Pt Alban Rubio  MRN: [id number]  [place] 1985  Date of evaluation: 6/13/22  PCP:  No primary care provider on file. This patient was evaluated in the Emergency Department for symptoms described in the history of present illness. The patient was evaluated in the context of the global COVID-19 pandemic, which necessitated consideration that the patient might be at risk for infection with the SARS-CoV-2 virus that causes COVID-19. Institutional protocols and algorithms that pertain to the evaluation of patients at risk for COVID-19 are in a state of rapid change based on information released by regulatory bodies including the CDC and federal and state organizations. These policies and algorithms were followed during the patient's care in the ED. CHIEF COMPLAINT       Chief Complaint   Patient presents with    Seizures     Pt given 10mg Versed IM and 2mg Narcan IM per [facility]. Pt not following commands upon arrival to ED. HISTORY OF PRESENT ILLNESS  (Location/Symptom, Timing/Onset, Context/Setting, Quality, Duration, Modifying Factors, Severity.)      Rosalina Rivero is a 42 y.o. female who presents for seizure activity. Per EMS report patient works at [employer] and suddenly dropped to the ground and began seizing. EMS gave her 10 mg of Versed intramuscularly and they state patient seizure activity resolved. Patient was also given 2 mg of Narcan empirically by EMS.  patient presents in the department appears to still be seizing not answering questions or responding to sternal rub or pain    PAST MEDICAL / SURGICAL / SOCIAL / FAMILY HISTORY      has a past medical history of Borderline personality disorder ([address] Chronic kidney disease, Diabetes ([address] Hypertension, Hypothyroidism, Psychogenic nonepileptic seizure, PTSD (post-traumatic stress disorder), Pulmonary embolism ([address] and Schizoaffective disorder ([facility] has a past surgical history that includes Gallbladder surgery and Ankle surgery. Social History     Socioeconomic History    Marital status: Single     Spouse name: Not on file    Number of children: Not on file    Years of education: Not on file    Highest education level: Not on file   Occupational History    Not on file   Tobacco Use    Smoking status: Never Smoker    Smokeless tobacco: Never Used   Vaping Use    Vaping Use: Never used   Substance and Sexual Activity    Alcohol use: Not Currently    Drug use: Never    Sexual activity: Not on file   Other Topics Concern    Not on file   Social History Narrative    Not on file     Social Determinants of Health     Financial Resource Strain:     Difficulty of Paying Living Expenses: Not on file   Food Insecurity:     Worried About Running Out of Food in the Last Year: Not on file    Jannette of Food in the Last Year: Not on file   Transportation Needs:     Lack of Transportation (Medical): Not on file    Lack of Transportation (Non-Medical):  Not on file   Physical Activity:     Days of Exercise per Week: Not on file    Minutes of Exercise per Session: Not on file   Stress:     Feeling of Stress : Not on file   Social Connections:     Frequency of Communication with Friends and Family: Not on file    Frequency of Social Gatherings with Friends and Family: Not on file    Attends [employer] Services: Not on file    Active Member of Clubs or Organizations: Not on file    Attends Club or Organization Meetings: Not on file    Marital Status: Not on file   Intimate Partner Violence:     Fear of Current or Ex-Partner: Not on file    Emotionally Abused: Not on file    Physically Abused: Not on file    Sexually Abused: Not on file   Housing Stability:     Unable to Pay for Housing in the Last Year: Not on file    Number of [place] in the Last Year: Not on file    Unstable Housing in the Last Year: Not on file       Family History   Problem Relation Age of Onset    Seizures Mother     Mental Illness Mother     Mental Illness Sister        Allergies:  Carbamazepine, Latuda [lurasidone], and Shellfish-derived products    Home Medications:  Prior to Admission medications    Medication Sig Start Date End Date Taking? Authorizing Provider   hydrALAZINE (APRESOLINE) 25 MG tablet Take 1 tablet by mouth every 8 hours 4/10/22   Ben Harrell,    haloperidol lactate (HALDOL) 5 MG/ML injection Inject 1 mL into the muscle once for 1 dose 4/10/22 4/10/22  Ben Harrell DO   lamoTRIgine (LAMICTAL) 100 MG tablet Take 1 tablet by mouth daily 4/11/22   Ben Harrell DO   lacosamide (VIMPAT) 150 MG TABS tablet Take 1 tablet by mouth 2 times daily for 30 days. 4/4/22 5/4/22  Iggy Souza MD   lamoTRIgine (LAMICTAL) 25 MG tablet Take 2 tablets by mouth daily 4/4/22   Iggy Souza MD   apixaban (ELIQUIS) 5 MG TABS tablet Take 5 mg by mouth 2 times daily    Historical Provider, MD   hydroCHLOROthiazide (HYDRODIURIL) 25 MG tablet Take 25 mg by mouth daily    Historical Provider, MD   levothyroxine (SYNTHROID) 75 MCG tablet Take 150 mcg by mouth Daily    Historical Provider, MD   acetaminophen (TYLENOL) 325 MG tablet Take 650 mg by mouth every 6 hours as needed for Pain    Historical Provider, MD   amLODIPine (NORVASC) 5 MG tablet Take 5 mg by mouth daily     Historical Provider, MD       REVIEW OF SYSTEMS    (2-9 systems for level 4, 10 or more for level 5)      Review of Systems   Unable to perform ROS: Acuity of condition       PHYSICAL EXAM   (up to 7 for level 4, 8 or more for level 5)      INITIAL VITALS:   BP (!) 163/93   Pulse 72   Temp 98.4 °F (36.9 °C) (Oral)   Resp 18   Ht 5' 4\" (1.626 m)   Wt 250 lb (113.4 kg)   SpO2 100%   BMI 42.91 kg/m²     Physical Exam  Constitutional:       General: She is in acute distress. Appearance: She is not diaphoretic.    HENT:      Head: Normocephalic and atraumatic. Right Ear: Tympanic membrane normal.      Left Ear: Tympanic membrane normal.      Nose: Nose normal.      Mouth/Throat:      Mouth: Mucous membranes are moist.      Pharynx: No posterior oropharyngeal erythema. Eyes:      Pupils: Pupils are equal, round, and reactive to light. Comments: Pupils 3+ bilateral. eye saccades present   Cardiovascular:      Rate and Rhythm: Normal rate and regular rhythm. Pulses: Normal pulses. Heart sounds: Normal heart sounds. Pulmonary:      Effort: Pulmonary effort is normal. No respiratory distress. Breath sounds: Normal breath sounds. Abdominal:      General: There is no distension. Palpations: Abdomen is soft. Musculoskeletal:         General: No deformity or signs of injury. Cervical back: No rigidity. Right lower leg: No edema. Left lower leg: No edema. Skin:     Findings: No bruising or rash.    Neurological:      Comments: No facial droop, moving all extremities, neurologic exam grossly limited secondary to patient's mental status         DIFFERENTIAL  DIAGNOSIS     PLAN (Nita Lawton / Clearance Yuriy / EKG):  Orders Placed This Encounter   Procedures    XR CHEST PORTABLE    CT Head WO Contrast    CT Cervical Spine WO Contrast    CBC with Auto Differential    Levetiracetam Level    Comprehensive Metabolic Panel    Magnesium    HCG Qualitative, Serum    Urinalysis with Reflex to Culture    TSH with Reflex    TOX SCR, BLD, ED    T4, Free    CK    Myoglobin, Serum    Lamotrigine Level    Lacosamide Level    Lactic Acid    Prolactin    Inpatient consult to Neurology    EKG 12 Lead    ADMIT TO INPATIENT       MEDICATIONS ORDERED:  Orders Placed This Encounter   Medications    midazolam (VERSED) 2 MG/2ML injection     Radha Ortiz: cabinet override    levETIRAcetam (KEPPRA) 1000 mg/100 mL IVPB    levETIRAcetam (KEPPRA) 1000 mg/100 mL IVPB    midazolam PF (VERSED) injection 2 mg    midazolam PF (VERSED) injection 2 mg    levETIRAcetam (KEPPRA) 1000 mg/100 mL IVPB    DISCONTD: diphenhydrAMINE (BENADRYL) injection 25 mg    haloperidol lactate (HALDOL) injection 5 mg       DDX: Breakthrough seizure, intracranial hemorrhage, polysubstance abuse, hyponatremia, sepsis    DIAGNOSTIC RESULTS / EMERGENCY DEPARTMENT COURSE / MDM   LAB RESULTS:  Results for orders placed or performed during the hospital encounter of 06/13/22   CBC with Auto Differential   Result Value Ref Range    WBC 7.6 3.5 - 11.3 k/uL    RBC 4.33 3.95 - 5.11 m/uL    Hemoglobin 10.9 (L) 11.9 - 15.1 g/dL    Hematocrit 35.4 (L) 36.3 - 47.1 %    MCV 81.8 (L) 82.6 - 102.9 fL    MCH 25.2 25.2 - 33.5 pg    MCHC 30.8 28.4 - 34.8 g/dL    RDW 16.4 (H) 11.8 - 14.4 %    Platelets 191 327 - 597 k/uL    MPV 9.0 8.1 - 13.5 fL    NRBC Automated 0.0 0.0 per 100 WBC    Seg Neutrophils 61 36 - 65 %    Lymphocytes 28 24 - 43 %    Monocytes 8 3 - 12 %    Eosinophils % 2 1 - 4 %    Basophils 1 0 - 2 %    Immature Granulocytes 0 0 %    Segs Absolute 4.69 1.50 - 8.10 k/uL    Absolute Lymph # 2.10 1.10 - 3.70 k/uL    Absolute Mono # 0.59 0.10 - 1.20 k/uL    Absolute Eos # 0.15 0.00 - 0.44 k/uL    Basophils Absolute 0.05 0.00 - 0.20 k/uL    Absolute Immature Granulocyte 0.03 0.00 - 0.30 k/uL    RBC Morphology ANISOCYTOSIS PRESENT    Levetiracetam Level   Result Value Ref Range    Levetiracetam Lvl 16 ug/mL   Comprehensive Metabolic Panel   Result Value Ref Range    Glucose 129 (H) 70 - 99 mg/dL    BUN 12 6 - 20 mg/dL    CREATININE 1.09 (H) 0.50 - 0.90 mg/dL    Calcium 9.5 8.6 - 10.4 mg/dL    Sodium 138 135 - 144 mmol/L    Potassium 4.2 3.7 - 5.3 mmol/L    Chloride 105 98 - 107 mmol/L    CO2 22 20 - 31 mmol/L    Anion Gap 11 9 - 17 mmol/L    Alkaline Phosphatase 88 35 - 104 U/L    ALT 35 (H) 5 - 33 U/L    AST 23 <32 U/L    Total Bilirubin 0.20 (L) 0.3 - 1.2 mg/dL    Total Protein 7.1 6.4 - 8.3 g/dL    Albumin 4.2 3.5 - 5.2 g/dL    Albumin/Globulin Ratio 1.4 1.0 - 2.5 GFR Non- 56 (L) >60 mL/min    GFR African American >60 >60 mL/min    GFR Comment         Magnesium   Result Value Ref Range    Magnesium 1.9 1.6 - 2.6 mg/dL   HCG Qualitative, Serum   Result Value Ref Range    hCG Qual NEGATIVE NEGATIVE   TSH with Reflex   Result Value Ref Range    TSH 9.42 (H) 0.30 - 5.00 uIU/mL   TOX SCR, BLD, ED   Result Value Ref Range    Acetaminophen Level <5 (L) 10 - 30 ug/mL    Ethanol <10 <10 mg/dL    Ethanol percent <7.216 <6.566 %    Salicylate Lvl <1 (L) 3 - 10 mg/dL    Toxic Tricyclic Sc,Blood NEGATIVE NEGATIVE   T4, Free   Result Value Ref Range    Thyroxine, Free 0.87 (L) 0.93 - 1.70 ng/dL   CK   Result Value Ref Range    Total CK 86 26 - 192 U/L   Myoglobin, Serum   Result Value Ref Range    Myoglobin 40 25 - 58 ng/mL   Lamotrigine Level   Result Value Ref Range    Lamotrigine Lvl <1.0 (L) 3.0 - 15.0 ug/mL   EKG 12 Lead   Result Value Ref Range    Ventricular Rate 84 BPM    Atrial Rate 84 BPM    P-R Interval 160 ms    QRS Duration 92 ms    Q-T Interval 386 ms    QTc Calculation (Bazett) 456 ms    P Axis 2 degrees    R Axis -2 degrees    T Axis 35 degrees       RADIOLOGY:  CT Head WO Contrast    Result Date: 6/13/2022  No acute intracranial abnormality. CT Cervical Spine WO Contrast    Result Date: 6/13/2022  No acute abnormality of the cervical spine. XR CHEST PORTABLE    Result Date: 6/13/2022  No acute cardiopulmonary abnormality evident. Mild stable cardiomegaly.        EKG Interpretation    Interpreted by myself    Rhythm: normal sinus   Rate: normal  Axis: normal  Ectopy: none  Conduction: right bundle branch block (incomplete)  ST Segments: normal  T Waves: normal  Q Waves: none    Clinical Impression: Nonspecific EKG new incomplete right bundle branch block    All EKG's are interpreted by the Emergency Department Physician who either signs or Co-signs this chart in the absence of a cardiologist.    EMERGENCY DEPARTMENT COURSE:  ED Course as of 06/13/22 0630   Mon Jun 13, 2022   0210 Patient presents by EMS for seizure. Patient still sees in the department. Given 10 mg IM Versed in the field. Will give 2 mg intranasal.  Patient hemodynamically stable no obvious trauma or neurodeficits [ZE]   0227 Patient still seizing will give 2 g of Keppra. Patient to scanner for CT head and neck. Will get broad work-up including Keppra level. Will get chest x-ray. Plan for fosphenytoin if Keppra does not alleviate seizure [ZE]   0235 Patient still appears to be seizing still however she is following some basic commands. We will try 2 mg of Versed [ZE]   0303 Still seizing will give another gram of Keppra as patient is on 2 g at home. Patient also appears to take Abilify will give Benadryl in case dystonic reaction occurring [ZE]   0303 CT head CT C-spine negative for any acute process. Patient appears to be therapeutic and her Keppra level. ED tox screen negative. 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 Right now patient still having rotary head shaking movements. She is answering some questions. Moving all extremities. Per previous notes patient was given Haldol 5 mg as needed and will try this [ZE]   2642 Neurology evaluated patient at bedside. Disposition pending their recommendations. Patient no longer appears having rotary head movements. VSS [ZE]   [id number] Patient reevaluated at bedside. Resting comfortably in bed. Still no longer having rotary head movements or any seizure-like activity VSS [ZE]   0626 Neuro to admit patient [ZE]      ED Course User Index  [ZE] Zaire Alvarez DO       PROCEDURES:  Procedures     CONSULTS:  IP CONSULT TO NEUROLOGY    CRITICAL CARE:  Greater than 45 minutes critical care time spent treating life-threatening diagnosis    MDM  Patient presents for seizure-like activity. Unclear etiology whether this was epileptic versus nonepileptic. Patient does have history of psychogenic nonepileptic seizure disorder as well as epileptic seizure disorder. Patient originally appeared to us to be seizing clinically. She was given a total of 10 mg IM Versed and 6 mg intranasal as well as 3 g of Keppra. Patient's movements after Keppra were not seizure-like she had rotary head movements more consistent with pseudoseizure. Large work-up on patient including CT head and lab work were all unremarkable for any acute process. Neuro was consulted and they 2 were unclear if this is epileptic versus nonepileptic. Patient admitted to neuro unit. Patient hemodynamically stable moving all extremities nontoxic    FINAL IMPRESSION      1. Seizure-like activity ([address]        DISPOSITION / PLAN     DISPOSITION  devi      PATIENT REFERRED TO:  No follow-up provider specified.     DISCHARGE MEDICATIONS:  New Prescriptions    No medications on file       Dunia Carney DO  Emergency Medicine Resident    (Please note that portions of thisnote were completed with a voice recognition program.  Efforts were made to edit the dictations but occasionally words are mis-transcribed.)        Moise Marcial DO  Resident  06/13/22 4783

## 2022-06-13 NOTE — FLOWSHEET NOTE
SPIRITUAL CARE DEPARTMENT - [address]  PROGRESS NOTE    Shift date: 06/13/22  Shift day: Monday   Shift # 1    Room # 1605/2241-38   Name: Terrell Mcarthur                Holiness:    Place of Sabianist:     Referral: Rapid Response    Admit Date & Time: 6/13/2022  2:01 AM    Assessment:  Terrell Mcarthur is a 42 y.o. female       Intervention:   was a ministry of presence to patient and to medical staff.  communicated with RN regarding family member contact information.  was a ministry of presence to patient who appeared tired and sleepy. Outcome:  Patient appeared very tired and did not respond to  presence. Plan:  Chaplains will remain available to offer spiritual and emotional support as needed.       Electronically signed by Eric Godoy, on 6/13/2022 at 9:19 AM.  Zion Pleitez  [phone]

## 2022-06-13 NOTE — ED NOTES
Pt arrives per EMS with c/o possible fall and seizure like activity. Per EMS report pt currently working for the [employer] in [facility] and was found down. Pt with history of seizures, Keppra found in pts purse. Pt given 10mg Versed and 2mg Narcan IM per EMS pta with no change in seizure like activity. Pt non-verbal, but able to follow simple commands. Pt with rhythmic eye movement. Pt maintaining airway and handling secretions without difficulty. Pt placed on monitor and IV attempted multiple times without success. Dr. Catalino Foley and Dr. Corinna Cain at bedside to evaluate pt.       Maddie Sage RN  06/13/22 2346

## 2022-06-13 NOTE — CARE COORDINATION
Transitional planning note: attempted to meet with patient to discuss transitional planning but patient is not responsive and not appropriate at this time.
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
## 2022-06-13 NOTE — H&P
[facility]   IN-PATIENT SERVICE      NEUROLOGY h and P  NOTE            Date:   6/13/2022  Patient name:  Monserrat Rooney  Date of admission:  6/13/2022  YOB: 1985      Chief Complaint:     Chief Complaint   Patient presents with    Seizures     Pt given 10mg Versed IM and 2mg Narcan IM per [facility]. Pt not following commands upon arrival to ED. History of Present Illness: The patient is a 42 y.o. female who presents with Seizures (Pt given 10mg Versed IM and 2mg Narcan IM per [facility]. Pt not following commands upon arrival to ED. )   and she is admitted to the hospital for the management of  Seizure like activity. The patient was seen and examined and the chart was reviewed. 61-year-old female with known history of PNES on lamotrigine 100 mg, lacosamide 150 mg twice daily, schizoaffective disorder, essential hypertension, PE on Eliquis, hypothyroidism on Synthroid presented to the ER with seizure-like activity and fall. Per EMS patient received 10 mg of Versed. In ER patient was loaded with 3 g of Keppra, and she was given 5 of Haldol and another 2mg  of Versed. Per EMS report patient currently working for the [employer] were in town and was found they found to have [address] in her purse. On presentation she is nonverbal.  Have rhythmic eye movement. Able to maintain airway and hemodynamics. Of note patient was recently admitted at [id number] [place] [address] for similar episode. Where Keppra was discontinued. And she was noted to have these intermittent spells consisting of side to side shaking of her head, rhythmic direction and asynchronous shaking of her hands and feet. Recommendation were to discontinue Keppra and continue Vimpat and lamotrigine and Haldol as needed for prolonged shaking spells. Upon examination patient is arousable, but does not follow any commands. Maintaining saturation.       Previous neuro history     EEG Description 12/31/2020  Background activities on this day of recording are noted as   reasonably well-developed, with appropriate background   architecture and an 11 Hz PDR noted during periods of full   wakefulness with eye closure.  Excess non-pathological Beta-range   activities are noted, most commonly seen in the setting of   Benzodiazepine administration.  No focal slowing is noted.  There   are no definitive Interictal Epileptiform Discharges and NO   Electrographic seizures occur.       Numerous spontaneous clinical target spells were captured,   clinically manifest as abnormal movements, and without EEG   correlate.  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 Summary   During this period of [place]'s EEG monitoring, background   activities with appropriate architecture are noted.  There are no   interictal epileptiform discharges seen throughout the recording.    No electrographic seizures were captured during this period.     Numerous spontaneous clinical target spells of Non-Epileptic   nature were captured, with abnormal movements noted.       Overall diagnostic classification is likely Psychogenic   Non-Epileptic Spells of an unconscious nature, although   Factitious production of Spells in an intentional fashion cannot   be ruled out. Continuous EEG monitoring is completed based upon stated goals of   care and discontinued in the setting of self-removal of EEG   leads.      Past Medical History:     Past Medical History:   Diagnosis Date    Borderline personality disorder ([address]     Chronic kidney disease     Diabetes ([address]     Hypertension     Hypothyroidism     Psychogenic nonepileptic seizure 11/22/2020    PTSD (post-traumatic stress disorder)     Pulmonary embolism (HCC)     Schizoaffective disorder (HCC)         Past Surgical History:     Past Surgical History:   Procedure Laterality Date    ANKLE SURGERY      bilateral ankles; patient states when she was young   [facility] GALLBLADDER SURGERY          Medications Prior to Admission:     Prior to Admission medications    Medication Sig Start Date End Date Taking? Authorizing Provider   hydrALAZINE (APRESOLINE) 25 MG tablet Take 1 tablet by mouth every 8 hours 4/10/22   Rober Crigler,    haloperidol lactate (HALDOL) 5 MG/ML injection Inject 1 mL into the muscle once for 1 dose 4/10/22 4/10/22  Rober Crigler, DO   lamoTRIgine (LAMICTAL) 100 MG tablet Take 1 tablet by mouth daily 4/11/22   Rober Crigler, DO   lacosamide (VIMPAT) 150 MG TABS tablet Take 1 tablet by mouth 2 times daily for 30 days. 4/4/22 5/4/22  Ambika Michelle MD   lamoTRIgine (LAMICTAL) 25 MG tablet Take 2 tablets by mouth daily 4/4/22   Ambika Michelle MD   apixaban (ELIQUIS) 5 MG TABS tablet Take 5 mg by mouth 2 times daily    Historical Provider, MD   hydroCHLOROthiazide (HYDRODIURIL) 25 MG tablet Take 25 mg by mouth daily    Historical Provider, MD   levothyroxine (SYNTHROID) 75 MCG tablet Take 150 mcg by mouth Daily    Historical Provider, MD   acetaminophen (TYLENOL) 325 MG tablet Take 650 mg by mouth every 6 hours as needed for Pain    Historical Provider, MD   amLODIPine (NORVASC) 5 MG tablet Take 5 mg by mouth daily     Historical Provider, MD        Allergies:     Carbamazepine, Latuda [lurasidone], and Shellfish-derived products    Social History:     Tobacco:    reports that she has never smoked. She has never used smokeless tobacco.  Alcohol:      reports previous alcohol use. Drug Use:  reports no history of drug use.     Family History:     Family History   Problem Relation Age of Onset    Seizures Mother     Mental Illness Mother     Mental Illness Sister        Review of Systems:       Unable to obtain due to altered mental status  Physical Exam:   BP (!) 163/93   Pulse 72   Temp 98.4 °F (36.9 °C) (Oral)   Resp 18   Ht 5' 4\" (1.626 m)   Wt 250 lb (113.4 kg)   SpO2 100%   BMI 42.91 kg/m²   Temp (24hrs), Av.4 °F (36.9 °C), Min:98.4 °F (36.9 °C), Max:98.4 °F (36.9 °C)        General examination:      General Appearance:  alert, well appearing, and doesn't follow commands  HEENT: Normocephalic, atraumatic, moist mucus membranes  Neck: supple, no carotid bruits, (-) nuchal rigidity  Lungs:  Respirations unlabored, chest wall no deformity, BS normal  Cardiovascular: normal rate, regular rhythm  Abdomen: Soft, nontender, nondistended, normal bowel sounds  Skin: No gross lesions, rashes, bruising or bleeding on exposed skin area  Extremities:  peripheral pulses palpable, clubbing or edema  Psych: normal affect    NEUROLOGIC EXAMINATION    Mental status   Alert and oriented x 0; doesn't following all commands;        Cranial nerves   II - visual fields intact to confrontation; pupils reactive  III, IV, VI  extraocular muscles intact; no AYAKA; no nystagmus; no ptosis   V - normal facial sensation                                                               VII - normal facial symmetry                                                             VIII - intact hearing                                                                             IX, X - symmetrical palate elevation                                               XI - symmetrical shoulder shrug                                                       XII - midline tongue without atrophy or fasciculation     Motor function    Normal bulk and tone. No abnormal moment noted      Sensory function Intact to touch, pin, vibration, proprioception throughout     Cerebellar Intact finger-nose-finger testing: n/a   Intact heel-shin testing: n/a  No dysdiadochokinesia present. No resting tremors                        Reflex function 2/4 symmetric throughout . Downgoing plantar response bilaterally.  (-)Ellison's sign bilaterally      Gait                  deferred             Diagnostics:      Laboratory Testing:  CBC:   Recent Labs     22  0058   WBC 7.6 HGB 10.9*        BMP:    Recent Labs     06/13/22  0058      K 4.2      CO2 22   BUN 12   CREATININE 1.09*   GLUCOSE 129*         Lab Results   Component Value Date    CHOL 146 03/31/2022    HDL 42 03/31/2022    TRIG 133 04/03/2022    ALT 35 (H) 06/13/2022    AST 23 06/13/2022    TSH 9.42 (H) 06/13/2022    INR 1.8 06/23/2021    LABA1C 6.2 (H) 03/30/2022    FCCTCXEF45 440 04/10/2022       No results found for: PHENYTOIN, PHENYTOIN, VALPROATE, CBMZ      Imaging/Diagnostics:        EEG: N/A      CT head : No acute intracranial abnormal noted. 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 CT head and CT cervical spine is negative. Received 10 mg of Versed via EMS. Patient was loaded with 3 g of Keppra in the ER and received another 2 mg of Versed, and 5 mg of Haldol. Able to maintain airway.  Seizure-like activity   PE on eliquis    Schizoaffective disorder    Plan:         Lamotrigine level subtherapeutic less than 1. No family at bedside. Need to assess about her compliance with medication.  Suppose to be on lamotrigine 100 mg daily   Patient is loaded with 3 gm of keppra by ER and received 5mg of haldol and 2mg iv versed and 2mg intranasal versed   Will resume home dose Vimpat 150 mg   Follow-up on Vimpat levels   Seizure precaution   1mg of ativan if  seizures persist more than 5 minutes             Electronically signed by Carlota Rothman MD on 6/13/2022 at 5:28 AM

## 2022-06-13 NOTE — PROCEDURES
EEG REPORT       Patient: Candice Pill Age: 42 y.o. MRN: [id number]      Referring Provider: No ref. provider found    History: This routine 30 minute scalp EEG was recorded with video- monitoring for a 42 y.o. Miki Salmeron female  who presented with encephalopathy. This EEG was performed to evaluate for focal and epileptiform abnormalities. Nora Blunk   Current Facility-Administered Medications   Medication Dose Route Frequency Provider Last Rate Last Admin    sodium chloride flush 0.9 % injection 5-40 mL  5-40 mL IntraVENous 2 times per day Raffy Lofton MD        sodium chloride flush 0.9 % injection 5-40 mL  5-40 mL IntraVENous PRN Raffy Lofton MD   10 mL at 06/13/22 0727    0.9 % sodium chloride infusion   IntraVENous PRN Raffy Lofton MD        enoxaparin Sodium (LOVENOX) injection 30 mg  30 mg SubCUTAneous BID Raffy Lofton MD   30 mg at 06/13/22 0917    ondansetron (ZOFRAN-ODT) disintegrating tablet 4 mg  4 mg Oral Q8H PRN Raffy Lofton MD        Or    ondansetron (ZOFRAN) injection 4 mg  4 mg IntraVENous Q6H PRN Raffy Lofton MD        polyethylene glycol (GLYCOLAX) packet 17 g  17 g Oral Daily PRN Raffy Lofton MD        acetaminophen (TYLENOL) tablet 650 mg  650 mg Oral Q6H PRN Raffy Lofton MD        Or    acetaminophen (TYLENOL) suppository 650 mg  650 mg Rectal Q6H PRN Raffy Lofton MD        0.9 % sodium chloride infusion   IntraVENous Continuous Raffy Lofton MD 75 mL/hr at 06/13/22 0727 New Bag at 06/13/22 0727    levothyroxine (SYNTHROID) tablet 150 mcg  150 mcg Oral Daily Raffy Lofton MD            Technical Description: This is a 21 channel digital EEG recording with time-locked video. Electrodes were placed in accordance with the 10-20 International System of Electrode Placement. Single lead EKG monitoring as well as temporal electrodes were included. The patient was not sleep deprived. This recording was obtained during wakefulness.   EEG Description: The dominant background activity during maximal recorded wakefulness consisted of bioccipitally dominant 9-10 Hz, 25-35 uV symmetric, regular activity that was reactive to eye opening. During drowsiness, the background rhythm waxed and waned and there were periods of slowing. During stage II sleep symmetric V waves, K complexes, and sleep spindles were seen. There was occasional diffuse beta activity seen generalized. Photic stimulation  stepwise photic stimulation at 2-30 Hz was performed and there was a biposterior, symmetric, driving response. Hyperventilation  was not preformed. No abnormalities were activated by photic stimulation     The EKG channel demonstrated a normal sinus rhythm. Interpretation  This EEG was normal in wakefulness and sleep. Occasional diffuse beta activity was seen over all the leads. Clinical correlation  This EEG was normal. No focal or epileptiform abnormalities were seen.     Leila Luna MD  Diplomate, American Board of Psychiatry and Neurology  Diplomate, American Board of Clinical Neurophysiology  Diplomate, American Board of Epilepsy

## 2022-06-13 NOTE — FLOWSHEET NOTE
SPIRITUAL CARE DEPARTMENT - [facility]  PROGRESS NOTE    Shift date: 6/12/2022  Shift day: Saturday   Shift # 3    Room # ED24  Name: Mirtha Rasgdale                Muslim: Unknown  Place of [place]ism: Unknown    Referral: Routine Visit    Admit Date & Time: 6/13/2022  2:01 AM    Assessment:  Mirtha Ragsdale is a 42 y.o. female in the hospital because of \"siezure-like activity. \" Upon entering the room writer observes patient's \"eyes fluttering. \" Soren Metzger spoke to patient, however patient did not respond. Intervention:  Writer introduced self and title as .  spoke to patient throughout encounter, informing her that  will attempt to contact family for her.  made attempts to contact sister, Douglas Aleman ([phone]).  left voicemail for patient's sister, with request to contact the ED for further information.  also attempted to contact patient's friend, Jagruti ([phone]), as she was listed in patient's chart. Patient's friend did not answer calls and  left voicemail for her with the ED phone number for reference.  confirmed phone numbers of Kerrie Schwartz and Ysabel on patient's cell phone, and both phone numbers were correct. Outcome:   informed bedside nurse that patient's emergency contacts had not been reached.     Plan:  Soren Metzger will follow up by next shift to continue attempts in contacting patient's support.       06/13/22 0410   Encounter Summary   Service Provided For: Patient not available   Referral/Consult From: Rounding  (ED Rounding)   Support System Family members;Friends/neighbors   Last Encounter  06/12/22   Complexity of Encounter Low   Begin Time 0410   End Time  0510   Total Time Calculated 60 min   Encounter    Type Initial Screen/Assessment   Spiritual/Emotional needs   Type Spiritual Support   Assessment/Intervention/Outcome   Assessment Unable to assess   Intervention Sustaining Presence/Ministry of presence Outcome Did not respond   Plan and Referrals   Plan/Referrals Referred to (Comment)  (Next Shift )     Electronically signed by Shayla Morgan on 6/13/2022 at 8:26 AM.  [facility]  [phone]

## 2022-06-14 VITALS
DIASTOLIC BLOOD PRESSURE: 87 MMHG | HEIGHT: 64 IN | WEIGHT: 250 LBS | BODY MASS INDEX: 42.68 KG/M2 | OXYGEN SATURATION: 97 % | RESPIRATION RATE: 21 BRPM | TEMPERATURE: 98.1 F | HEART RATE: 89 BPM | SYSTOLIC BLOOD PRESSURE: 141 MMHG

## 2022-06-14 LAB
ABSOLUTE EOS #: 0.19 K/UL (ref 0–0.44)
ABSOLUTE IMMATURE GRANULOCYTE: <0.03 K/UL (ref 0–0.3)
ABSOLUTE LYMPH #: 2.15 K/UL (ref 1.1–3.7)
ABSOLUTE MONO #: 0.51 K/UL (ref 0.1–1.2)
ALBUMIN SERPL-MCNC: 3.7 G/DL (ref 3.5–5.2)
ALBUMIN/GLOBULIN RATIO: 1.2 (ref 1–2.5)
ALP BLD-CCNC: 85 U/L (ref 35–104)
ALT SERPL-CCNC: 34 U/L (ref 5–33)
ANION GAP SERPL CALCULATED.3IONS-SCNC: 13 MMOL/L (ref 9–17)
AST SERPL-CCNC: 28 U/L
BASOPHILS # BLD: 1 % (ref 0–2)
BASOPHILS ABSOLUTE: 0.05 K/UL (ref 0–0.2)
BILIRUB SERPL-MCNC: 0.22 MG/DL (ref 0.3–1.2)
BUN BLDV-MCNC: 11 MG/DL (ref 6–20)
CALCIUM SERPL-MCNC: 9 MG/DL (ref 8.6–10.4)
CHLORIDE BLD-SCNC: 106 MMOL/L (ref 98–107)
CO2: 21 MMOL/L (ref 20–31)
CREAT SERPL-MCNC: 0.95 MG/DL (ref 0.5–0.9)
EOSINOPHILS RELATIVE PERCENT: 3 % (ref 1–4)
GFR AFRICAN AMERICAN: >60 ML/MIN
GFR NON-AFRICAN AMERICAN: >60 ML/MIN
GFR SERPL CREATININE-BSD FRML MDRD: ABNORMAL ML/MIN/{1.73_M2}
GLUCOSE BLD-MCNC: 102 MG/DL (ref 65–105)
GLUCOSE BLD-MCNC: 114 MG/DL (ref 70–99)
HCT VFR BLD CALC: 35.7 % (ref 36.3–47.1)
HEMOGLOBIN: 11.2 G/DL (ref 11.9–15.1)
IMMATURE GRANULOCYTES: 0 %
LYMPHOCYTES # BLD: 37 % (ref 24–43)
MCH RBC QN AUTO: 25.3 PG (ref 25.2–33.5)
MCHC RBC AUTO-ENTMCNC: 31.4 G/DL (ref 28.4–34.8)
MCV RBC AUTO: 80.6 FL (ref 82.6–102.9)
MONOCYTES # BLD: 9 % (ref 3–12)
NRBC AUTOMATED: 0 PER 100 WBC
PDW BLD-RTO: 16.4 % (ref 11.8–14.4)
PLATELET # BLD: 401 K/UL (ref 138–453)
PMV BLD AUTO: 9.3 FL (ref 8.1–13.5)
POTASSIUM SERPL-SCNC: 4.5 MMOL/L (ref 3.7–5.3)
RBC # BLD: 4.43 M/UL (ref 3.95–5.11)
RBC # BLD: ABNORMAL 10*6/UL
REASON FOR REJECTION: NORMAL
SEG NEUTROPHILS: 50 % (ref 36–65)
SEGMENTED NEUTROPHILS ABSOLUTE COUNT: 2.88 K/UL (ref 1.5–8.1)
SODIUM BLD-SCNC: 140 MMOL/L (ref 135–144)
TOTAL PROTEIN: 6.7 G/DL (ref 6.4–8.3)
WBC # BLD: 5.8 K/UL (ref 3.5–11.3)
ZZ NTE CLEAN UP: ORDERED TEST: NORMAL
ZZ NTE WITH NAME CLEAN UP: SPECIMEN SOURCE: NORMAL

## 2022-06-14 PROCEDURE — 36415 COLL VENOUS BLD VENIPUNCTURE: CPT

## 2022-06-14 PROCEDURE — 6370000000 HC RX 637 (ALT 250 FOR IP): Performed by: STUDENT IN AN ORGANIZED HEALTH CARE EDUCATION/TRAINING PROGRAM

## 2022-06-14 PROCEDURE — 82947 ASSAY GLUCOSE BLOOD QUANT: CPT

## 2022-06-14 PROCEDURE — 85025 COMPLETE CBC W/AUTO DIFF WBC: CPT

## 2022-06-14 PROCEDURE — G0378 HOSPITAL OBSERVATION PER HR: HCPCS

## 2022-06-14 PROCEDURE — 6360000002 HC RX W HCPCS: Performed by: STUDENT IN AN ORGANIZED HEALTH CARE EDUCATION/TRAINING PROGRAM

## 2022-06-14 PROCEDURE — 99239 HOSP IP/OBS DSCHRG MGMT >30: CPT | Performed by: PSYCHIATRY & NEUROLOGY

## 2022-06-14 PROCEDURE — 80053 COMPREHEN METABOLIC PANEL: CPT

## 2022-06-14 PROCEDURE — 96372 THER/PROPH/DIAG INJ SC/IM: CPT

## 2022-06-14 RX ORDER — LORAZEPAM 2 MG/ML
INJECTION INTRAMUSCULAR
Status: DISCONTINUED
Start: 2022-06-14 | End: 2022-06-14 | Stop reason: WASHOUT

## 2022-06-14 RX ORDER — LEVOTHYROXINE SODIUM 0.15 MG/1
150 TABLET ORAL DAILY
Qty: 30 TABLET | Refills: 3 | Status: SHIPPED | OUTPATIENT
Start: 2022-06-15

## 2022-06-14 RX ORDER — AMMONIA INHALANTS 0.04 G/.3ML
0.3 INHALANT RESPIRATORY (INHALATION) ONCE
Status: DISCONTINUED | OUTPATIENT
Start: 2022-06-14 | End: 2022-06-14 | Stop reason: HOSPADM

## 2022-06-14 RX ADMIN — LACOSAMIDE 150 MG: 100 TABLET, FILM COATED ORAL at 12:55

## 2022-06-14 RX ADMIN — ENOXAPARIN SODIUM 30 MG: 100 INJECTION SUBCUTANEOUS at 08:41

## 2022-06-14 RX ADMIN — LEVOTHYROXINE SODIUM 150 MCG: 75 TABLET ORAL at 08:41

## 2022-06-14 NOTE — FLOWSHEET NOTE
SPIRITUAL CARE DEPARTMENT - [facility]  PROGRESS NOTE    Shift date: 6/14/2022  Shift day: Tuesday   Shift # 1    Room # 1275/0105-57   Name: Yanci López                  Referral: Rapid Response    Admit Date & Time: 6/13/2022  2:01 AM    Assessment:  Yanci López is a 42 y.o. female who received a visit because a RRT was called. Upon entering the room writer observes much of medical team was exiting; pt had eyes open, but not responding. No family/visitors were present. Intervention:  Writer introduced self and title as . Writer inquired with house sup and with pt's RN if there were any needs to contact family. Outcome:  Medical staff did not need family contacted. Plan:  Chaplains will remain available to offer spiritual and emotional support as needed.       Electronically signed by Edson Corea on 6/14/2022 at 11:53 AM.  [address]  [phone]         06/14/22 1152   Encounter Summary   Service Provided For: Patient   Referral/Consult From: Multi-disciplinary team   Last Encounter  06/14/22   Complexity of Encounter Moderate   Begin Time 1145   End Time  1200   Total Time Calculated 15 min   Crisis   Type Rapid Response   Assessment/Intervention/Outcome   Assessment Unable to assess   Intervention Sustaining Presence/Ministry of presence  (offer to contact family)   Outcome Did not respond

## 2022-06-14 NOTE — PROGRESS NOTES
Occupational [address]  Occupational Therapy Not Seen Note    DATE: 2022    NAME: Soledad Kraft  MRN: [id number]   : 1985      Patient not seen this date for Occupational Therapy due to:       Other: Rapid response called this AM due to seizure-like activity       Next Scheduled Treatment:  Will check back 6/15/2022    Electronically signed by Alessandra Narayanan OT on 2022 at 4:24 PM

## 2022-06-14 NOTE — PLAN OF CARE
Problem: Chronic Conditions and Co-morbidities  Goal: Patient's chronic conditions and co-morbidity symptoms are monitored and maintained or improved  Outcome: Progressing     Problem: Discharge Planning  Goal: Discharge to home or other facility with appropriate resources  Outcome: Progressing     Problem: Safety - Adult  Goal: Free from fall injury  Outcome: Progressing

## 2022-06-14 NOTE — PROGRESS NOTES
[facility] Neurology   IN-PATIENT SERVICE      NEUROLOGY PROGRESS  NOTE            Date:   6/14/2022  Patient name:  Dorothy Estrada  Date of admission:  6/13/2022  YOB: 1985      Interval History:   Dorothy Estrada is a  42 y.o. female admitted on 6/13/2022 with Seizure ([facility] [address] [R56.9]  Seizure-like activity ([facility] [address] [R56.9]. This is a follow-up neurology progress note. The patient was seen and examined and the chart was reviewed. Vitals: Stable  No acute events overnight. Is any headache, dizziness, tingling, numbness, any seizure-like activity overnight. Patient had one more episode of seizure like activity lasting for 1 min. Subsided without any intervention. EEG normal, no focal epileptiform abnormalities were seen    History of Present Illness: The patient is a 42 y.o. female who presents with Seizures (Pt given 10mg Versed IM and 2mg Narcan IM per [employer] Pt not following commands upon arrival to ED. )   and she is admitted to the hospital for the management of  Seizure like activity. The patient was seen and examined and the chart was reviewed.      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 Per EMS patient received 10 mg of Versed. In ER patient was loaded with 3 g of Keppra, and she was given 5 of Haldol and another 2mg  of Versed. Per EMS report patient currently working for the [employer] were in town and was found they found to have [address] in her purse. On presentation she is nonverbal.  Have rhythmic eye movement. Able to maintain airway and hemodynamics.     Of note patient was recently admitted at [facility] for similar episode. Where Keppra was discontinued.   And she was noted to have these intermittent spells consisting of side to side shaking of her head, rhythmic direction and asynchronous shaking of her hands and feet. Recommendation were to discontinue Keppra and continue Vimpat and lamotrigine and Haldol as needed for prolonged shaking spells. Upon examination patient is arousable, but does not follow any commands. Maintaining saturation.       Off Note:      EEG Description 12/31/2020   No focal slowing is noted.  There are no definitive Interictal Epileptiform Discharges and NO  Electrographic seizures occur.       Numerous spontaneous clinical target spells were captured,   clinically manifest as abnormal movements, and without EEG   correlate.  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 Summary   During this period of Angela Meadows's EEG monitoring, background   activities with appropriate architecture are noted.  There are no   interictal epileptiform discharges seen throughout the recording.    No electrographic seizures were captured during this period.     Numerous spontaneous clinical target spells of Non-Epileptic   nature were captured, with abnormal movements noted.       Overall diagnostic classification is likely Psychogenic   Non-Epileptic Spells of an unconscious nature, although   Factitious production of Spells in an intentional fashion cannot   be ruled out.      Continuous EEG monitoring is completed based upon stated goals of   care and discontinued in the setting of self-removal of EEG   leads.        Past Surgical History:     Past Surgical History:   Procedure Laterality Date    ANKLE SURGERY      bilateral ankles; patient states when she was young   tar GALLBLADDER SURGERY          Medications during admission:      lacosamide  150 mg Oral BID    ammonia  0.3 mL Inhalation Once    sodium chloride flush  5-40 mL IntraVENous 2 times per day    enoxaparin  30 mg SubCUTAneous BID    levothyroxine  150 mcg Oral Daily         Physical Exam:   /81   Pulse 76   Temp 97.9 °F (36.6 °C) (Oral)   Resp 17   Ht 5' 4\" (1.626 m)   Wt 250 lb (113.4 kg)   SpO2 99%   BMI 42.91 kg/m²   Temp (24hrs), Av.7 °F (36.5 °C), Min:97.5 °F (36.4 °C), Max:97.9 °F (36.6 °C)        General examination:      General Appearance:  alert, well appearing, and in no acute distress  HEENT: Normocephalic, atraumatic, moist mucus membranes  Neck: supple, no carotid bruits, (-) nuchal rigidity  Lungs:  Respirations unlabored, chest wall no deformity, BS normal  Cardiovascular: normal rate, regular rhythm  Abdomen: Soft, nontender, nondistended, normal bowel sounds  Skin: No gross lesions, rashes, bruising or bleeding on exposed skin area  Extremities:  peripheral pulses palpable, clubbing or edema  Psych: normal affect      Neurological examination:      Mental status   Alert and oriented x 3; following all commands;   speech is fluent, no dysarthria, aphasia. Cranial nerves   II - visual fields intact to confrontation; pupils reactive  III, IV, VI  extraocular muscles intact; no AYAKA; no nystagmus; no ptosis   V - normal facial sensation                                                               VII - normal facial symmetry                                                             VIII - intact hearing                                                                             IX, X - symmetrical palate elevation                                               XI - symmetrical shoulder shrug                                                       XII - midline tongue without atrophy or fasciculation     Motor function  Strength:   5/5 RUE, 5/5 RLE  5/5 LUE, 5/5  LLE  Normal bulk and tone. Sensory function Intact to touch, pin, vibration, proprioception throughout     Cerebellar Intact finger-nose-finger testing. Intact heel-shin testing. No dysdiadochokinesia present. No tremors                        Reflex function 2/4 symmetric throughout . Downgoing plantar response bilaterally. (-)Ellison's sign bilaterally      Gait                  Normal station and gait. Normal Tandem, tip toes and heel walking             Diagnostics:      Laboratory Testing:  CBC:   Recent Labs     06/13/22  0058 06/14/22  0606   WBC 7.6 5.8   HGB 10.9* 11.2*    401       BMP:    Recent Labs     06/13/22  0058 06/14/22  0728    140   K 4.2 4.5    106   CO2 22 21   BUN 12 11   CREATININE 1.09* 0.95*   GLUCOSE 129* 114*         Lab Results   Component Value Date    CHOL 146 03/31/2022    HDL 42 03/31/2022    TRIG 133 04/03/2022    ALT 34 (H) 06/14/2022    AST 28 06/14/2022    TSH 9.42 (H) 06/13/2022    INR 1.8 06/23/2021    LABA1C 6.2 (H) 03/30/2022    [id number] 440 04/10/2022         No results found for: PHENYTOIN, PHENYTOIN, VALPROATE, CBMZ        Imaging/Diagnostics:      EEG 6/13/2022  Interpretation  This EEG was normal in wakefulness and sleep. Occasional diffuse beta activity was seen over all the leads. Clinical correlation  This EEG was normal. No focal or epileptiform abnormalities were seen.       CT head   No acute intracranial abnormality. Impression:      Primary Problem  Seizure-like activity [facility])    Active Hospital Problems    Diagnosis Date Noted    History of pulmonary embolus (PE) [Z86.711] 06/13/2022     Priority: Medium    Psychogenic nonepileptic seizure [F44.5] 03/30/2022    Seizure ([facility] [address] [R56.9] 03/29/2022    Seizure-like activity ([facility] [address] [R56.9]          Assessment       Patient status Admit as inpatient in the  Progressive Unit/Step down  42 year old female was found down noted to have seizure-like activity and possible fall. CT head and CT cervical spine is negative. Received 10 mg of Versed via EMS. Patient was loaded with 3 g of Keppra in the ER and received another 2 mg of Versed, and 5 mg of Haldol.   Able to maintain airway.     · Seizure-like activity  · PE on eliquis   · Schizoaffective disorder    Plan:     · Will resume lamotrigine 100 mg daily  · Patient was loaded with 3, of Keppra in ER, received 5 mg of Haldol, 2 mg of Versed. · Lamotrigine level less than 1  · TSH 9.42, T3 0.87, all Synthyroid 150 mcg. · Seizure precautions      No driving for at least 6 months.   No heavy lifting for at least 6 months  No bathing or swimming unsupervised  Avoid locking doors, it prevents some to help you if needed  Avoid stairs unsupervised  · Avoid cooking unsupervised            DVT prophylaxis: Lovenox 30 mg SC  GI prophylaxis:   Code status: Full code    Consultations:   IP CONSULT TO NEUROLOGY  IP CONSULT TO CASE MANAGEMENT  IP CONSULT TO SOCIAL WORK  PT/OT            Electronically signed by Tae Baibn MD on 6/14/2022 at 2:14 PM      Erich Kemp MD  PGY-3 Neurology Resident   [facility]    6/14/2022 at 2:14 PM

## 2022-06-14 NOTE — PROGRESS NOTES
Physical Therapy        Physical Therapy Cancel Note      DATE: 2022    NAME: Lorena Sylvester  MRN: [id number]   : 1985      Patient not seen this date for Physical Therapy due to: Other: RR called again today. PT will check back for evaluation appropriateness 6/15/22.        Electronically signed by Verna Leal PT on 2022 at 4:12 PM

## 2022-06-14 NOTE — CARE COORDINATION
Attempted to see patient to discuss transition plan. Neuro group was at bedside. She is refusing to speak to me. Her eyes are slightly closed and flickering. I inquired about go back with the carnival , go home to [facility] and going to a shelter. She refuses to answer any of my questions at this time. 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 SW following for possible shelter needs. Will return later today as time allows. 1600 Returned to patients room to discuss transition plan. She is A&Ox4.   She tells me she plans on returning to her \"Show\" and that some one form the [facility] will be picking her up when she is ready for discharge

## 2022-06-15 LAB — LACOSAMIDE: 4.5 UG/ML (ref 1–10)

## 2022-06-15 NOTE — DISCHARGE SUMMARY
[facility]     Department of Neurology    INPATIENT DISCHARGE SUMMARY        Patient Identification:  Soledad Kraft is a 42 y.o. female. :  1985  MRN: [id number]     Acct: [de-identified]   Admit Date:  2022  Discharge date and time: 2022  6:29 PM   Attending Provider: No att. providers found                                     Admission Diagnoses:   Seizure ([address] [R56.9]  Seizure-like activity ([address] [R56.9]    Discharge Diagnoses:   Principal Problem:    Seizure-like activity ([address]  Active Problems:    History of pulmonary embolus (PE)    Seizure ([address]    Psychogenic nonepileptic seizure  Resolved Problems:    * No resolved hospital problems. 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 Per EMS patient received 10 mg of Versed. In ER patient was loaded with 3 g of Keppra, and she was given 5 of Haldol and another 2mg  of Versed. Per EMS report patient currently working for the [employer] [employer] were in town and was found they found to have [place] in her purse. On presentation she is nonverbal.  Have rhythmic eye movement. Able to maintain airway and hemodynamics.     Of note patient was recently admitted at [facility] for similar episode. Where Keppra was discontinued. And she was noted to have these intermittent spells consisting of side to side shaking of her head, rhythmic direction and asynchronous shaking of her hands and feet. Recommendation were to discontinue Keppra and continue Vimpat and lamotrigine and Haldol as needed for prolonged shaking spells. Patient had 1 more episode of subclinical epileptic seizure, in the morning which was lasted less than a minute.     Detailed explanation and counseling was done to the patient regarding the episodes which are nonepileptic. And no need for further work-up. Patient agreed to be discharged today,  supposed to be picking her up today. Procedures:      Any Hospital Acquired Infections: none    Discharge Functional Status:  stable    Disposition: home    Patient Instructions:   Discharge Medication List as of 6/14/2022  5:24 PM      CONTINUE these medications which have CHANGED    Details   levothyroxine (SYNTHROID) 150 MCG tablet Take 1 tablet by mouth Daily, Disp-30 tablet, R-3Normal         CONTINUE these medications which have NOT CHANGED    Details   hydrALAZINE (APRESOLINE) 25 MG tablet Take 1 tablet by mouth every 8 hours, Disp-90 tablet, R-3Normal      lamoTRIgine (LAMICTAL) 100 MG tablet Take 1 tablet by mouth daily, Disp-30 tablet, R-3Normal      apixaban (ELIQUIS) 5 MG TABS tablet Take 5 mg by mouth 2 times dailyHistorical Med      hydroCHLOROthiazide (HYDRODIURIL) 25 MG tablet Take 25 mg by mouth dailyHistorical Med      acetaminophen (TYLENOL) 325 MG tablet Take 650 mg by mouth every 6 hours as needed for PainHistorical Med      amLODIPine (NORVASC) 5 MG tablet Take 5 mg by mouth daily Historical Med         STOP taking these medications       haloperidol lactate (HALDOL) 5 MG/ML injection Comments:   Reason for Stopping:         lacosamide (VIMPAT) 150 MG TABS tablet Comments:   Reason for Stopping:               Activity: activity as tolerated    Diet: regular diet    Follow-up:    No follow-up provider specified. Follow up labs:       Follow up imaging:      Note that over 30 minutes was spent in preparing discharge papers, discussing discharge with patient, medication review, etc.      Isidro He MD,  PGY 3 Neurology Resident  Department of Neurology  [address]  6/15/2022, 4:05 PM

## 2022-06-21 NOTE — ED PROVIDER NOTES
[facility]   Emergency Department  Faculty Attestation       I performed a history and physical examination of the patient and discussed management with the resident. I reviewed the residents note and agree with the documented findings including all diagnostic interpretations and plan of care. Any areas of disagreement are noted on the chart. I was personally present for the key portions of any procedures. I have documented in the chart those procedures where I was not present during the key portions. I have reviewed the emergency nurses triage note. I agree with the chief complaint, past medical history, past surgical history, allergies, medications, social and family history as documented unless otherwise noted below. Documentation of the HPI, Physical Exam and Medical Decision Making performed by scribleydi is based on my personal performance of the HPI, PE and MDM. For Physician Assistant/ Nurse Practitioner cases/documentation I have personally evaluated this patient and have completed at least one if not all key elements of the E/M (history, physical exam, and MDM). Additional findings are as noted. Pertinent Comments     Primary Care Physician: No primary care provider on file. ED Triage Vitals [06/13/22 0207]   BP Temp Temp Source Heart Rate Resp SpO2 Height Weight   (!) 143/80 98.4 °F (36.9 °C) Oral 85 21 99 % 5' 4\" (1.626 m) 250 lb (113.4 kg)      This is a 42 y.o. female who presents to the Emergency Department with complaint of seizure like activity per EMS. Patient has a reported h/o seizures. Works at [employer] and collapsed with tonic clonic seizure like motion. Was given narcan by EMS. Also given 10 mg of Versed. On arrival patient is still having seizure activity and not responsive to painful stimuli. No signs of head trauma. Poor dentition. Heart sounds regular and lungs clear.      Patient given intranasal versed with minimal response  US guided LUE IV placed by me  Given Keppra  CT head due to fall with seizing  Basic labs  Still ahving rhythmic movements but is now responsive to pain and can shake head yes and no  H/o nonepileptic movements as well  Will consult neuro  Admit      Critical Care: There was significant risk of life threatening deterioration of patient's condition requiring my direct management. Critical care time 30 minutes, excluding any documented procedures.     Keerthi Gant MD  Attending Emergency Physician         Keerthi Gant MD  06/20/22 3460

## 2023-02-21 LAB
ALANINE AMINOTRANSFERASE (SGPT) (U/L) IN SER/PLAS: 40 U/L (ref 7–45)
ALBUMIN (G/DL) IN SER/PLAS: 4.1 G/DL (ref 3.4–5)
ALKALINE PHOSPHATASE (U/L) IN SER/PLAS: 85 U/L (ref 33–110)
ANION GAP IN SER/PLAS: 15 MMOL/L (ref 10–20)
ASPARTATE AMINOTRANSFERASE (SGOT) (U/L) IN SER/PLAS: 23 U/L (ref 9–39)
BILIRUBIN TOTAL (MG/DL) IN SER/PLAS: 0.4 MG/DL (ref 0–1.2)
CALCIUM (MG/DL) IN SER/PLAS: 9.7 MG/DL (ref 8.6–10.3)
CARBON DIOXIDE, TOTAL (MMOL/L) IN SER/PLAS: 24 MMOL/L (ref 21–32)
CHLORIDE (MMOL/L) IN SER/PLAS: 103 MMOL/L (ref 98–107)
CREATININE (MG/DL) IN SER/PLAS: 1.02 MG/DL (ref 0.5–1.05)
ERYTHROCYTE DISTRIBUTION WIDTH (RATIO) BY AUTOMATED COUNT: 17.7 % (ref 11.5–14.5)
ERYTHROCYTE MEAN CORPUSCULAR HEMOGLOBIN CONCENTRATION (G/DL) BY AUTOMATED: 30.7 G/DL (ref 32–36)
ERYTHROCYTE MEAN CORPUSCULAR VOLUME (FL) BY AUTOMATED COUNT: 80 FL (ref 80–100)
ERYTHROCYTES (10*6/UL) IN BLOOD BY AUTOMATED COUNT: 4.82 X10E12/L (ref 4–5.2)
GFR FEMALE: 71 ML/MIN/1.73M2
GLUCOSE (MG/DL) IN SER/PLAS: 123 MG/DL (ref 74–99)
HEMATOCRIT (%) IN BLOOD BY AUTOMATED COUNT: 38.7 % (ref 36–46)
HEMOGLOBIN (G/DL) IN BLOOD: 11.9 G/DL (ref 12–16)
LEUKOCYTES (10*3/UL) IN BLOOD BY AUTOMATED COUNT: 7.1 X10E9/L (ref 4.4–11.3)
NRBC (PER 100 WBCS) BY AUTOMATED COUNT: 0 /100 WBC (ref 0–0)
PLATELETS (10*3/UL) IN BLOOD AUTOMATED COUNT: 399 X10E9/L (ref 150–450)
POTASSIUM (MMOL/L) IN SER/PLAS: 4.4 MMOL/L (ref 3.5–5.3)
PROTEIN TOTAL: 7 G/DL (ref 6.4–8.2)
SODIUM (MMOL/L) IN SER/PLAS: 138 MMOL/L (ref 136–145)
UREA NITROGEN (MG/DL) IN SER/PLAS: 13 MG/DL (ref 6–23)

## 2023-03-09 LAB
ALANINE AMINOTRANSFERASE (SGPT) (U/L) IN SER/PLAS: 41 U/L (ref 7–45)
ALBUMIN (G/DL) IN SER/PLAS: 3.6 G/DL (ref 3.4–5)
ALKALINE PHOSPHATASE (U/L) IN SER/PLAS: 70 U/L (ref 33–110)
ANION GAP IN SER/PLAS: 12 MMOL/L (ref 10–20)
ASPARTATE AMINOTRANSFERASE (SGOT) (U/L) IN SER/PLAS: 26 U/L (ref 9–39)
BILIRUBIN TOTAL (MG/DL) IN SER/PLAS: 0.3 MG/DL (ref 0–1.2)
CALCIUM (MG/DL) IN SER/PLAS: 9.1 MG/DL (ref 8.6–10.3)
CARBON DIOXIDE, TOTAL (MMOL/L) IN SER/PLAS: 26 MMOL/L (ref 21–32)
CHLORIDE (MMOL/L) IN SER/PLAS: 105 MMOL/L (ref 98–107)
CREATININE (MG/DL) IN SER/PLAS: 0.94 MG/DL (ref 0.5–1.05)
ERYTHROCYTE DISTRIBUTION WIDTH (RATIO) BY AUTOMATED COUNT: 17.1 % (ref 11.5–14.5)
ERYTHROCYTE MEAN CORPUSCULAR HEMOGLOBIN CONCENTRATION (G/DL) BY AUTOMATED: 31.3 G/DL (ref 32–36)
ERYTHROCYTE MEAN CORPUSCULAR VOLUME (FL) BY AUTOMATED COUNT: 81 FL (ref 80–100)
ERYTHROCYTES (10*6/UL) IN BLOOD BY AUTOMATED COUNT: 4.08 X10E12/L (ref 4–5.2)
GFR FEMALE: 79 ML/MIN/1.73M2
GLUCOSE (MG/DL) IN SER/PLAS: 166 MG/DL (ref 74–99)
HEMATOCRIT (%) IN BLOOD BY AUTOMATED COUNT: 32.9 % (ref 36–46)
HEMOGLOBIN (G/DL) IN BLOOD: 10.3 G/DL (ref 12–16)
LEUKOCYTES (10*3/UL) IN BLOOD BY AUTOMATED COUNT: 6.2 X10E9/L (ref 4.4–11.3)
NRBC (PER 100 WBCS) BY AUTOMATED COUNT: 0 /100 WBC (ref 0–0)
PLATELETS (10*3/UL) IN BLOOD AUTOMATED COUNT: 334 X10E9/L (ref 150–450)
POTASSIUM (MMOL/L) IN SER/PLAS: 4.6 MMOL/L (ref 3.5–5.3)
PROTEIN TOTAL: 6.3 G/DL (ref 6.4–8.2)
SODIUM (MMOL/L) IN SER/PLAS: 138 MMOL/L (ref 136–145)
UREA NITROGEN (MG/DL) IN SER/PLAS: 13 MG/DL (ref 6–23)

## 2023-04-05 LAB — RHEUMATOID FACTOR (IU/ML) IN SERUM OR PLASMA: <10 IU/ML (ref 0–15)

## 2023-05-06 LAB
ALANINE AMINOTRANSFERASE (SGPT) (U/L) IN SER/PLAS: 72 U/L (ref 7–45)
ALBUMIN (G/DL) IN SER/PLAS: 3.6 G/DL (ref 3.4–5)
ALKALINE PHOSPHATASE (U/L) IN SER/PLAS: 72 U/L (ref 33–110)
ANION GAP IN SER/PLAS: 15 MMOL/L (ref 10–20)
ASPARTATE AMINOTRANSFERASE (SGOT) (U/L) IN SER/PLAS: 64 U/L (ref 9–39)
BILIRUBIN TOTAL (MG/DL) IN SER/PLAS: 0.3 MG/DL (ref 0–1.2)
CALCIUM (MG/DL) IN SER/PLAS: 8.9 MG/DL (ref 8.6–10.3)
CARBON DIOXIDE, TOTAL (MMOL/L) IN SER/PLAS: 21 MMOL/L (ref 21–32)
CHLORIDE (MMOL/L) IN SER/PLAS: 103 MMOL/L (ref 98–107)
CREATININE (MG/DL) IN SER/PLAS: 0.95 MG/DL (ref 0.5–1.05)
ERYTHROCYTE DISTRIBUTION WIDTH (RATIO) BY AUTOMATED COUNT: 16.2 % (ref 11.5–14.5)
ERYTHROCYTE MEAN CORPUSCULAR HEMOGLOBIN CONCENTRATION (G/DL) BY AUTOMATED: 29.3 G/DL (ref 32–36)
ERYTHROCYTE MEAN CORPUSCULAR VOLUME (FL) BY AUTOMATED COUNT: 92 FL (ref 80–100)
ERYTHROCYTES (10*6/UL) IN BLOOD BY AUTOMATED COUNT: 3.81 X10E12/L (ref 4–5.2)
GFR FEMALE: 78 ML/MIN/1.73M2
GLUCOSE (MG/DL) IN SER/PLAS: 195 MG/DL (ref 74–99)
HEMATOCRIT (%) IN BLOOD BY AUTOMATED COUNT: 35.1 % (ref 36–46)
HEMOGLOBIN (G/DL) IN BLOOD: 10.3 G/DL (ref 12–16)
LEUKOCYTES (10*3/UL) IN BLOOD BY AUTOMATED COUNT: 5.4 X10E9/L (ref 4.4–11.3)
MAGNESIUM (MG/DL) IN SER/PLAS: 1.76 MG/DL (ref 1.6–2.4)
NRBC (PER 100 WBCS) BY AUTOMATED COUNT: 0 /100 WBC (ref 0–0)
PLATELETS (10*3/UL) IN BLOOD AUTOMATED COUNT: 225 X10E9/L (ref 150–450)
POTASSIUM (MMOL/L) IN SER/PLAS: 4.2 MMOL/L (ref 3.5–5.3)
PROTEIN TOTAL: 6.4 G/DL (ref 6.4–8.2)
SODIUM (MMOL/L) IN SER/PLAS: 135 MMOL/L (ref 136–145)
UREA NITROGEN (MG/DL) IN SER/PLAS: 12 MG/DL (ref 6–23)

## 2023-05-15 LAB
ESTIMATED AVERAGE GLUCOSE FOR HBA1C: 217 MG/DL
HEMOGLOBIN A1C/HEMOGLOBIN TOTAL IN BLOOD: 9.2 %
THYROTROPIN (MIU/L) IN SER/PLAS BY DETECTION LIMIT <= 0.05 MIU/L: 0.16 MIU/L (ref 0.44–3.98)

## 2023-05-16 LAB
C REACTIVE PROTEIN (MG/L) IN SER/PLAS: 0.92 MG/DL
CALCIDIOL (25 OH VITAMIN D3) (NG/ML) IN SER/PLAS: 36 NG/ML
COMPLEMENT TOTAL (CH50): NORMAL
EJ (GLYCYL-TRNA SYNTHETASE) ANTIBODY: NORMAL
FIBRILLARIN (U3 RNP) AB, IGG: NORMAL
JO-1 (HISTIDYL-TRNA SYNTHETASE) AB, IGG: NORMAL
KU ANTIBODY: NORMAL
MDA5 (CADM-140) AB: NORMAL
MI-2 (NUCLEAR HELICASE PROTEIN) ANTIBODY: NORMAL
MYOSITIS PANEL INTERPRETIVE DATA: NORMAL
NXP2 (NUCLEAR MATRIX PROTEIN-2) AB: NORMAL
OJ (ISOLEUCYL-TRNA SYNTHETASE) ANTIBODY: NORMAL
P155/140 ANTIBODY: NORMAL
PL-12 (ALANYL-TRNA SYNTHETASE) ANTIBODY: NORMAL
PL-7 (THREONYL-TRNA SYNTHETASE) ANTIBODY: NORMAL
PM/SCL 100 ANTIBODY, IGG: NORMAL
RHEUMATOID FACTOR (IU/ML) IN SERUM OR PLASMA: 11 IU/ML (ref 0–15)
RHEUMATOID FACTOR (IU/ML) IN SERUM OR PLASMA: NORMAL
SAE1 (SUMO ACTIVATING ENZYME) AB: NORMAL
SEDIMENTATION RATE, ERYTHROCYTE: 20 MM/H (ref 0–20)
SMITH/RNP (ENA) AB, IGG: NORMAL
SRP (SIGNAL RECOGNITION PARTICLE) AB: NORMAL
SSA-52 (RO52) (ENA) ANTIBODY, IGG: NORMAL
SSA-60 (RO60) (ENA) ANTIBODY, IGG: NORMAL
THYROTROPIN (MIU/L) IN SER/PLAS BY DETECTION LIMIT <= 0.05 MIU/L: 0.1 MIU/L (ref 0.44–3.98)
THYROXINE (T4) FREE (NG/DL) IN SER/PLAS: 1.37 NG/DL (ref 0.78–1.48)
TIF-1 GAMMA (155 KDA) AB: NORMAL
URATE (MG/DL) IN SER/PLAS: 6 MG/DL (ref 2.3–6.7)

## 2023-05-18 LAB
ALBUMIN ELP: 4.2 G/DL (ref 3.4–5)
ALPHA 1: 0.3 G/DL (ref 0.2–0.6)
ALPHA 2: 0.8 G/DL (ref 0.4–1.1)
BETA: 1 G/DL (ref 0.5–1.2)
GAMMA GLOBULIN: 1.3 G/DL (ref 0.5–1.4)
PATH REVIEW - SERUM IMMUNOFIXATION: NORMAL
PATH REVIEW-SERUM PROTEIN ELECTROPHORESIS: NORMAL
PROTEIN ELECTROPHORESIS INTERPRETATION: NORMAL
PROTEIN TOTAL: 7.5 G/DL (ref 6.4–8.2)
SERUM IMMUNOFIXATION INTERPRETATION: NORMAL

## 2023-05-19 LAB — CITRULLINE ANTIBODY, IGG: 2 UNITS (ref 0–19)

## 2023-05-30 LAB
ANTICARDIOLIPIN IGA ANTIBODY: <0.5 APL U/ML (ref 0–20)
ANTICARDIOLIPIN IGG ANTIBODY: <1.6 GPL U/ML (ref 0–20)
ANTICARDIOLIPIN IGM ANTIBODY: 0.3 MPL U/ML (ref 0–20)
BETA 2 GLYCOPROTEIN 1 IGA AB IN SERUM: <0.6 U/ML (ref 0–20)
BETA 2 GLYCOPROTEIN 1 IGG AB IN SERUM: <1.4 U/ML (ref 0–20)
BETA 2 GLYCOPROTEIN 1 IGM ANTIBODY IN SERUM: 1.9 U/ML (ref 0–20)
FACTOR II-PROTHROMBIN INTERPRETATION: NORMAL
FACTOR V LEIDEN INTERPRETATION: NORMAL
PROTEIN S ACTUAL/NORMAL IN PPP BY COAGULATION ASSAY: 143 %ACTIVITY (ref 64–150)

## 2023-06-01 LAB
DILUTE RUSSELL VIPER VENOM TIME CONF: 1.39 RATIO
DILUTE RUSSELL VIPER VENOM TIME SCREEN: 1.06 RATIO
DILUTE RUSSELL VIPER VENOM TIME TEST RATIO: 0.77 RATIO
LUPUS ANTICOAGULANT INTERPRETATION: NORMAL
NORMALIZED SILICA CLOTTING TIME (RATIO) OF PPP: 0.76 RATIO
SILICA CLOTTING TIME CONFIRMATION: 0.85 RATIO
SILICA CLOTTING TIME SCREEN: 0.64 RATIO

## 2023-06-03 LAB — PROTEIN C ANTIGEN: 91 % (ref 63–153)

## 2023-06-06 LAB
ALANINE AMINOTRANSFERASE (SGPT) (U/L) IN SER/PLAS: 76 U/L (ref 7–45)
ALBUMIN (G/DL) IN SER/PLAS: 3.8 G/DL (ref 3.4–5)
ALKALINE PHOSPHATASE (U/L) IN SER/PLAS: 72 U/L (ref 33–110)
ANION GAP IN SER/PLAS: 12 MMOL/L (ref 10–20)
ASPARTATE AMINOTRANSFERASE (SGOT) (U/L) IN SER/PLAS: 55 U/L (ref 9–39)
BILIRUBIN TOTAL (MG/DL) IN SER/PLAS: 0.3 MG/DL (ref 0–1.2)
CALCIUM (MG/DL) IN SER/PLAS: 9.1 MG/DL (ref 8.6–10.3)
CARBON DIOXIDE, TOTAL (MMOL/L) IN SER/PLAS: 25 MMOL/L (ref 21–32)
CHLORIDE (MMOL/L) IN SER/PLAS: 105 MMOL/L (ref 98–107)
CREATININE (MG/DL) IN SER/PLAS: 1.02 MG/DL (ref 0.5–1.05)
ERYTHROCYTE DISTRIBUTION WIDTH (RATIO) BY AUTOMATED COUNT: 15.4 % (ref 11.5–14.5)
ERYTHROCYTE MEAN CORPUSCULAR HEMOGLOBIN CONCENTRATION (G/DL) BY AUTOMATED: 31.6 G/DL (ref 32–36)
ERYTHROCYTE MEAN CORPUSCULAR VOLUME (FL) BY AUTOMATED COUNT: 83 FL (ref 80–100)
ERYTHROCYTES (10*6/UL) IN BLOOD BY AUTOMATED COUNT: 4.44 X10E12/L (ref 4–5.2)
GFR FEMALE: 71 ML/MIN/1.73M2
GLUCOSE (MG/DL) IN SER/PLAS: 164 MG/DL (ref 74–99)
HEMATOCRIT (%) IN BLOOD BY AUTOMATED COUNT: 37 % (ref 36–46)
HEMOGLOBIN (G/DL) IN BLOOD: 11.7 G/DL (ref 12–16)
LEUKOCYTES (10*3/UL) IN BLOOD BY AUTOMATED COUNT: 6.2 X10E9/L (ref 4.4–11.3)
NRBC (PER 100 WBCS) BY AUTOMATED COUNT: 0 /100 WBC (ref 0–0)
PLATELETS (10*3/UL) IN BLOOD AUTOMATED COUNT: 322 X10E9/L (ref 150–450)
POTASSIUM (MMOL/L) IN SER/PLAS: 4.2 MMOL/L (ref 3.5–5.3)
PROTEIN TOTAL: 6.5 G/DL (ref 6.4–8.2)
SODIUM (MMOL/L) IN SER/PLAS: 138 MMOL/L (ref 136–145)
UREA NITROGEN (MG/DL) IN SER/PLAS: 11 MG/DL (ref 6–23)

## 2023-06-07 LAB
COBALAMIN (VITAMIN B12) (PG/ML) IN SER/PLAS: 559 PG/ML (ref 211–911)
FERRITIN (UG/LL) IN SER/PLAS: 27 UG/L (ref 8–150)
FOLATE (NG/ML) IN SER/PLAS: 15.6 NG/ML
IRON (UG/DL) IN SER/PLAS: 34 UG/DL (ref 35–150)
IRON BINDING CAPACITY (UG/DL) IN SER/PLAS: 399 UG/DL (ref 240–445)
IRON SATURATION (%) IN SER/PLAS: 9 % (ref 25–45)

## 2023-06-19 LAB
ANION GAP IN SER/PLAS: 15 MMOL/L (ref 10–20)
CALCIUM (MG/DL) IN SER/PLAS: 9 MG/DL (ref 8.6–10.3)
CARBON DIOXIDE, TOTAL (MMOL/L) IN SER/PLAS: 22 MMOL/L (ref 21–32)
CHLORIDE (MMOL/L) IN SER/PLAS: 103 MMOL/L (ref 98–107)
CREATININE (MG/DL) IN SER/PLAS: 0.96 MG/DL (ref 0.5–1.05)
ERYTHROCYTE DISTRIBUTION WIDTH (RATIO) BY AUTOMATED COUNT: 14.8 % (ref 11.5–14.5)
ERYTHROCYTE MEAN CORPUSCULAR HEMOGLOBIN CONCENTRATION (G/DL) BY AUTOMATED: 29.9 G/DL (ref 32–36)
ERYTHROCYTE MEAN CORPUSCULAR VOLUME (FL) BY AUTOMATED COUNT: 87 FL (ref 80–100)
ERYTHROCYTES (10*6/UL) IN BLOOD BY AUTOMATED COUNT: 4.58 X10E12/L (ref 4–5.2)
GFR FEMALE: 77 ML/MIN/1.73M2
GLUCOSE (MG/DL) IN SER/PLAS: 161 MG/DL (ref 74–99)
HEMATOCRIT (%) IN BLOOD BY AUTOMATED COUNT: 39.8 % (ref 36–46)
HEMOGLOBIN (G/DL) IN BLOOD: 11.9 G/DL (ref 12–16)
LEUKOCYTES (10*3/UL) IN BLOOD BY AUTOMATED COUNT: 5.5 X10E9/L (ref 4.4–11.3)
NRBC (PER 100 WBCS) BY AUTOMATED COUNT: 0 /100 WBC (ref 0–0)
PLATELETS (10*3/UL) IN BLOOD AUTOMATED COUNT: 279 X10E9/L (ref 150–450)
POTASSIUM (MMOL/L) IN SER/PLAS: 4.3 MMOL/L (ref 3.5–5.3)
SODIUM (MMOL/L) IN SER/PLAS: 136 MMOL/L (ref 136–145)
UREA NITROGEN (MG/DL) IN SER/PLAS: 8 MG/DL (ref 6–23)

## 2023-06-21 LAB — LACOSAMIDE: 3.3 MCG/ML (ref 1–10)

## 2023-06-29 LAB
APPEARANCE, URINE: ABNORMAL
BACTERIA, URINE: ABNORMAL /HPF
BILIRUBIN, URINE: NEGATIVE
BLOOD, URINE: ABNORMAL
COLOR, URINE: YELLOW
GLUCOSE, URINE: ABNORMAL MG/DL
KETONES, URINE: NEGATIVE MG/DL
LEUKOCYTE ESTERASE, URINE: ABNORMAL
MUCUS, URINE: ABNORMAL /LPF
NITRITE, URINE: NEGATIVE
PH, URINE: 5 (ref 5–8)
PROTEIN, URINE: NEGATIVE MG/DL
RBC, URINE: >182 /HPF (ref 0–5)
SPECIFIC GRAVITY, URINE: 1.03 (ref 1–1.03)
SQUAMOUS EPITHELIAL CELLS, URINE: 12 /HPF
UROBILINOGEN, URINE: <2 MG/DL (ref 0–1.9)
WBC, URINE: 34 /HPF (ref 0–5)

## 2023-07-02 LAB — URINE CULTURE: ABNORMAL

## 2023-07-05 LAB
ALANINE AMINOTRANSFERASE (SGPT) (U/L) IN SER/PLAS: 93 U/L (ref 7–45)
ALBUMIN (G/DL) IN SER/PLAS: 3.7 G/DL (ref 3.4–5)
ALKALINE PHOSPHATASE (U/L) IN SER/PLAS: 96 U/L (ref 33–110)
ANION GAP IN SER/PLAS: 14 MMOL/L (ref 10–20)
ASPARTATE AMINOTRANSFERASE (SGOT) (U/L) IN SER/PLAS: 78 U/L (ref 9–39)
BILIRUBIN TOTAL (MG/DL) IN SER/PLAS: 0.2 MG/DL (ref 0–1.2)
CALCIUM (MG/DL) IN SER/PLAS: 8.9 MG/DL (ref 8.6–10.3)
CARBON DIOXIDE, TOTAL (MMOL/L) IN SER/PLAS: 24 MMOL/L (ref 21–32)
CHLORIDE (MMOL/L) IN SER/PLAS: 102 MMOL/L (ref 98–107)
CREATININE (MG/DL) IN SER/PLAS: 0.97 MG/DL (ref 0.5–1.05)
ERYTHROCYTE DISTRIBUTION WIDTH (RATIO) BY AUTOMATED COUNT: 14.4 % (ref 11.5–14.5)
ERYTHROCYTE MEAN CORPUSCULAR HEMOGLOBIN CONCENTRATION (G/DL) BY AUTOMATED: 31.1 G/DL (ref 32–36)
ERYTHROCYTE MEAN CORPUSCULAR VOLUME (FL) BY AUTOMATED COUNT: 84 FL (ref 80–100)
ERYTHROCYTES (10*6/UL) IN BLOOD BY AUTOMATED COUNT: 4.38 X10E12/L (ref 4–5.2)
GFR FEMALE: 76 ML/MIN/1.73M2
GLUCOSE (MG/DL) IN SER/PLAS: 211 MG/DL (ref 74–99)
HEMATOCRIT (%) IN BLOOD BY AUTOMATED COUNT: 36.7 % (ref 36–46)
HEMOGLOBIN (G/DL) IN BLOOD: 11.4 G/DL (ref 12–16)
LEUKOCYTES (10*3/UL) IN BLOOD BY AUTOMATED COUNT: 6.3 X10E9/L (ref 4.4–11.3)
MAGNESIUM (MG/DL) IN SER/PLAS: 1.95 MG/DL (ref 1.6–2.4)
NRBC (PER 100 WBCS) BY AUTOMATED COUNT: 0 /100 WBC (ref 0–0)
PLATELETS (10*3/UL) IN BLOOD AUTOMATED COUNT: 260 X10E9/L (ref 150–450)
POTASSIUM (MMOL/L) IN SER/PLAS: 4.3 MMOL/L (ref 3.5–5.3)
PROTEIN TOTAL: 6.5 G/DL (ref 6.4–8.2)
SODIUM (MMOL/L) IN SER/PLAS: 136 MMOL/L (ref 136–145)
UREA NITROGEN (MG/DL) IN SER/PLAS: 10 MG/DL (ref 6–23)

## 2023-07-10 LAB — THYROTROPIN (MIU/L) IN SER/PLAS BY DETECTION LIMIT <= 0.05 MIU/L: 1.16 MIU/L (ref 0.44–3.98)

## 2023-07-15 LAB
ALANINE AMINOTRANSFERASE (SGPT) (U/L) IN SER/PLAS: 123 U/L (ref 7–45)
ALBUMIN (G/DL) IN SER/PLAS: 3.9 G/DL (ref 3.4–5)
ALKALINE PHOSPHATASE (U/L) IN SER/PLAS: 87 U/L (ref 33–110)
ANION GAP IN SER/PLAS: 16 MMOL/L (ref 10–20)
ASPARTATE AMINOTRANSFERASE (SGOT) (U/L) IN SER/PLAS: 85 U/L (ref 9–39)
BILIRUBIN TOTAL (MG/DL) IN SER/PLAS: 0.3 MG/DL (ref 0–1.2)
CALCIUM (MG/DL) IN SER/PLAS: 9.1 MG/DL (ref 8.6–10.3)
CARBON DIOXIDE, TOTAL (MMOL/L) IN SER/PLAS: 22 MMOL/L (ref 21–32)
CHLORIDE (MMOL/L) IN SER/PLAS: 103 MMOL/L (ref 98–107)
CHOLESTEROL (MG/DL) IN SER/PLAS: 176 MG/DL (ref 0–199)
CHOLESTEROL IN HDL (MG/DL) IN SER/PLAS: 51.1 MG/DL
CHOLESTEROL/HDL RATIO: 3.4
CREATININE (MG/DL) IN SER/PLAS: 1.1 MG/DL (ref 0.5–1.05)
ERYTHROCYTE DISTRIBUTION WIDTH (RATIO) BY AUTOMATED COUNT: 14.6 % (ref 11.5–14.5)
ERYTHROCYTE MEAN CORPUSCULAR HEMOGLOBIN CONCENTRATION (G/DL) BY AUTOMATED: 30.6 G/DL (ref 32–36)
ERYTHROCYTE MEAN CORPUSCULAR VOLUME (FL) BY AUTOMATED COUNT: 83 FL (ref 80–100)
ERYTHROCYTES (10*6/UL) IN BLOOD BY AUTOMATED COUNT: 4.35 X10E12/L (ref 4–5.2)
GFR FEMALE: 65 ML/MIN/1.73M2
GLUCOSE (MG/DL) IN SER/PLAS: 220 MG/DL (ref 74–99)
HEMATOCRIT (%) IN BLOOD BY AUTOMATED COUNT: 36.3 % (ref 36–46)
HEMOGLOBIN (G/DL) IN BLOOD: 11.1 G/DL (ref 12–16)
LDL: 79 MG/DL (ref 0–99)
LEUKOCYTES (10*3/UL) IN BLOOD BY AUTOMATED COUNT: 6.7 X10E9/L (ref 4.4–11.3)
NON HDL CHOLESTEROL: 125 MG/DL
NRBC (PER 100 WBCS) BY AUTOMATED COUNT: 0 /100 WBC (ref 0–0)
PLATELETS (10*3/UL) IN BLOOD AUTOMATED COUNT: 316 X10E9/L (ref 150–450)
POTASSIUM (MMOL/L) IN SER/PLAS: 4.4 MMOL/L (ref 3.5–5.3)
PROTEIN TOTAL: 6.8 G/DL (ref 6.4–8.2)
SODIUM (MMOL/L) IN SER/PLAS: 137 MMOL/L (ref 136–145)
TRIGLYCERIDE (MG/DL) IN SER/PLAS: 230 MG/DL (ref 0–149)
UREA NITROGEN (MG/DL) IN SER/PLAS: 10 MG/DL (ref 6–23)
VLDL: 46 MG/DL (ref 0–40)

## 2023-07-16 LAB
ESTIMATED AVERAGE GLUCOSE FOR HBA1C: 209 MG/DL
HEMOGLOBIN A1C/HEMOGLOBIN TOTAL IN BLOOD: 8.9 %

## 2023-08-01 LAB — LACOSAMIDE: 3.8 MCG/ML (ref 1–10)

## 2023-08-15 LAB
ANION GAP IN SER/PLAS: 15 MMOL/L (ref 10–20)
CALCIUM (MG/DL) IN SER/PLAS: 9 MG/DL (ref 8.6–10.3)
CARBON DIOXIDE, TOTAL (MMOL/L) IN SER/PLAS: 25 MMOL/L (ref 21–32)
CHLORIDE (MMOL/L) IN SER/PLAS: 102 MMOL/L (ref 98–107)
CREATININE (MG/DL) IN SER/PLAS: 1.03 MG/DL (ref 0.5–1.05)
ERYTHROCYTE DISTRIBUTION WIDTH (RATIO) BY AUTOMATED COUNT: 14.3 % (ref 11.5–14.5)
ERYTHROCYTE MEAN CORPUSCULAR HEMOGLOBIN CONCENTRATION (G/DL) BY AUTOMATED: 30.4 G/DL (ref 32–36)
ERYTHROCYTE MEAN CORPUSCULAR VOLUME (FL) BY AUTOMATED COUNT: 81 FL (ref 80–100)
ERYTHROCYTES (10*6/UL) IN BLOOD BY AUTOMATED COUNT: 4.8 X10E12/L (ref 4–5.2)
GFR FEMALE: 70 ML/MIN/1.73M2
GLUCOSE (MG/DL) IN SER/PLAS: 213 MG/DL (ref 74–99)
HEMATOCRIT (%) IN BLOOD BY AUTOMATED COUNT: 38.8 % (ref 36–46)
HEMOGLOBIN (G/DL) IN BLOOD: 11.8 G/DL (ref 12–16)
LEUKOCYTES (10*3/UL) IN BLOOD BY AUTOMATED COUNT: 6.5 X10E9/L (ref 4.4–11.3)
NRBC (PER 100 WBCS) BY AUTOMATED COUNT: 0 /100 WBC (ref 0–0)
PLATELETS (10*3/UL) IN BLOOD AUTOMATED COUNT: 297 X10E9/L (ref 150–450)
POTASSIUM (MMOL/L) IN SER/PLAS: 4 MMOL/L (ref 3.5–5.3)
SODIUM (MMOL/L) IN SER/PLAS: 138 MMOL/L (ref 136–145)
UREA NITROGEN (MG/DL) IN SER/PLAS: 10 MG/DL (ref 6–23)

## 2023-08-29 LAB
CHOLESTEROL (MG/DL) IN SER/PLAS: 182 MG/DL (ref 0–199)
CHOLESTEROL IN HDL (MG/DL) IN SER/PLAS: 47.4 MG/DL
CHOLESTEROL/HDL RATIO: 3.8
ESTIMATED AVERAGE GLUCOSE FOR HBA1C: 243 MG/DL
HEMOGLOBIN A1C/HEMOGLOBIN TOTAL IN BLOOD: 10.1 %
LDL: 91 MG/DL (ref 0–99)
NON HDL CHOLESTEROL: 135 MG/DL
TRIGLYCERIDE (MG/DL) IN SER/PLAS: 219 MG/DL (ref 0–149)
VLDL: 44 MG/DL (ref 0–40)

## 2023-08-30 LAB
BASOPHILS (10*3/UL) IN BLOOD BY AUTOMATED COUNT: 0.02 X10E9/L (ref 0–0.1)
BASOPHILS/100 LEUKOCYTES IN BLOOD BY AUTOMATED COUNT: 0.3 % (ref 0–2)
EOSINOPHILS (10*3/UL) IN BLOOD BY AUTOMATED COUNT: 0.01 X10E9/L (ref 0–0.7)
EOSINOPHILS/100 LEUKOCYTES IN BLOOD BY AUTOMATED COUNT: 0.1 % (ref 0–6)
ERYTHROCYTE DISTRIBUTION WIDTH (RATIO) BY AUTOMATED COUNT: 15.8 % (ref 11.5–14.5)
ERYTHROCYTE MEAN CORPUSCULAR HEMOGLOBIN CONCENTRATION (G/DL) BY AUTOMATED: 28.4 G/DL (ref 32–36)
ERYTHROCYTE MEAN CORPUSCULAR VOLUME (FL) BY AUTOMATED COUNT: 89 FL (ref 80–100)
ERYTHROCYTES (10*6/UL) IN BLOOD BY AUTOMATED COUNT: 4.85 X10E12/L (ref 4–5.2)
HEMATOCRIT (%) IN BLOOD BY AUTOMATED COUNT: 43.3 % (ref 36–46)
HEMOGLOBIN (G/DL) IN BLOOD: 12.3 G/DL (ref 12–16)
IMMATURE GRANULOCYTES/100 LEUKOCYTES IN BLOOD BY AUTOMATED COUNT: 0.4 % (ref 0–0.9)
LEUKOCYTES (10*3/UL) IN BLOOD BY AUTOMATED COUNT: 7.8 X10E9/L (ref 4.4–11.3)
LYMPHOCYTES (10*3/UL) IN BLOOD BY AUTOMATED COUNT: 1.15 X10E9/L (ref 1.2–4.8)
LYMPHOCYTES/100 LEUKOCYTES IN BLOOD BY AUTOMATED COUNT: 14.7 % (ref 13–44)
MONOCYTES (10*3/UL) IN BLOOD BY AUTOMATED COUNT: 0.33 X10E9/L (ref 0.1–1)
MONOCYTES/100 LEUKOCYTES IN BLOOD BY AUTOMATED COUNT: 4.2 % (ref 2–10)
NEUTROPHILS (10*3/UL) IN BLOOD BY AUTOMATED COUNT: 6.3 X10E9/L (ref 1.2–7.7)
NEUTROPHILS/100 LEUKOCYTES IN BLOOD BY AUTOMATED COUNT: 80.3 % (ref 40–80)
NRBC (PER 100 WBCS) BY AUTOMATED COUNT: 0 /100 WBC (ref 0–0)
PLATELETS (10*3/UL) IN BLOOD AUTOMATED COUNT: 181 X10E9/L (ref 150–450)
URATE (MG/DL) IN SER/PLAS: 6 MG/DL (ref 2.3–6.7)

## 2023-09-23 LAB
ANION GAP IN SER/PLAS: 13 MMOL/L (ref 10–20)
APPEARANCE, URINE: ABNORMAL
BACTERIA, URINE: ABNORMAL /HPF
BILIRUBIN, URINE: NEGATIVE
BLOOD, URINE: ABNORMAL
BUDDING YEAST, URINE: PRESENT /HPF
CALCIUM (MG/DL) IN SER/PLAS: 9.2 MG/DL (ref 8.6–10.3)
CARBON DIOXIDE, TOTAL (MMOL/L) IN SER/PLAS: 21 MMOL/L (ref 21–32)
CHLORIDE (MMOL/L) IN SER/PLAS: 109 MMOL/L (ref 98–107)
COLOR, URINE: YELLOW
CREATININE (MG/DL) IN SER/PLAS: 1.05 MG/DL (ref 0.5–1.05)
ERYTHROCYTE DISTRIBUTION WIDTH (RATIO) BY AUTOMATED COUNT: 16.5 % (ref 11.5–14.5)
ERYTHROCYTE MEAN CORPUSCULAR HEMOGLOBIN CONCENTRATION (G/DL) BY AUTOMATED: 30.8 G/DL (ref 32–36)
ERYTHROCYTE MEAN CORPUSCULAR VOLUME (FL) BY AUTOMATED COUNT: 81 FL (ref 80–100)
ERYTHROCYTES (10*6/UL) IN BLOOD BY AUTOMATED COUNT: 4.89 X10E12/L (ref 4–5.2)
GFR FEMALE: 69 ML/MIN/1.73M2
GLUCOSE (MG/DL) IN SER/PLAS: 135 MG/DL (ref 74–99)
GLUCOSE, URINE: ABNORMAL MG/DL
HEMATOCRIT (%) IN BLOOD BY AUTOMATED COUNT: 39.6 % (ref 36–46)
HEMOGLOBIN (G/DL) IN BLOOD: 12.2 G/DL (ref 12–16)
KETONES, URINE: NEGATIVE MG/DL
LEUKOCYTE ESTERASE, URINE: ABNORMAL
LEUKOCYTES (10*3/UL) IN BLOOD BY AUTOMATED COUNT: 5.8 X10E9/L (ref 4.4–11.3)
MUCUS, URINE: ABNORMAL /LPF
NITRITE, URINE: NEGATIVE
NRBC (PER 100 WBCS) BY AUTOMATED COUNT: 0 /100 WBC (ref 0–0)
PH, URINE: 6 (ref 5–8)
PLATELETS (10*3/UL) IN BLOOD AUTOMATED COUNT: 292 X10E9/L (ref 150–450)
POTASSIUM (MMOL/L) IN SER/PLAS: 4 MMOL/L (ref 3.5–5.3)
PROTEIN, URINE: ABNORMAL MG/DL
RBC, URINE: 59 /HPF (ref 0–5)
SODIUM (MMOL/L) IN SER/PLAS: 139 MMOL/L (ref 136–145)
SPECIFIC GRAVITY, URINE: 1.02 (ref 1–1.03)
SQUAMOUS EPITHELIAL CELLS, URINE: 4 /HPF
UREA NITROGEN (MG/DL) IN SER/PLAS: 11 MG/DL (ref 6–23)
UROBILINOGEN, URINE: <2 MG/DL (ref 0–1.9)
WBC CLUMPS, URINE: ABNORMAL /HPF
WBC, URINE: 8 /HPF (ref 0–5)

## 2023-09-27 LAB
ESTIMATED AVERAGE GLUCOSE FOR HBA1C: 232 MG/DL
HEMOGLOBIN A1C/HEMOGLOBIN TOTAL IN BLOOD: 9.7 %
URINE CULTURE: ABNORMAL
URINE CULTURE: ABNORMAL

## 2023-09-29 LAB
ANION GAP IN SER/PLAS: 16 MMOL/L (ref 10–20)
CALCIUM (MG/DL) IN SER/PLAS: 9.1 MG/DL (ref 8.6–10.3)
CARBON DIOXIDE, TOTAL (MMOL/L) IN SER/PLAS: 20 MMOL/L (ref 21–32)
CHLORIDE (MMOL/L) IN SER/PLAS: 108 MMOL/L (ref 98–107)
CREATININE (MG/DL) IN SER/PLAS: 1.2 MG/DL (ref 0.5–1.05)
ERYTHROCYTE DISTRIBUTION WIDTH (RATIO) BY AUTOMATED COUNT: 16.9 % (ref 11.5–14.5)
ERYTHROCYTE MEAN CORPUSCULAR HEMOGLOBIN CONCENTRATION (G/DL) BY AUTOMATED: 30.8 G/DL (ref 32–36)
ERYTHROCYTE MEAN CORPUSCULAR VOLUME (FL) BY AUTOMATED COUNT: 82 FL (ref 80–100)
ERYTHROCYTES (10*6/UL) IN BLOOD BY AUTOMATED COUNT: 4.93 X10E12/L (ref 4–5.2)
GFR FEMALE: 58 ML/MIN/1.73M2
GLUCOSE (MG/DL) IN SER/PLAS: 133 MG/DL (ref 74–99)
HEMATOCRIT (%) IN BLOOD BY AUTOMATED COUNT: 40.2 % (ref 36–46)
HEMOGLOBIN (G/DL) IN BLOOD: 12.4 G/DL (ref 12–16)
LEUKOCYTES (10*3/UL) IN BLOOD BY AUTOMATED COUNT: 5.4 X10E9/L (ref 4.4–11.3)
NRBC (PER 100 WBCS) BY AUTOMATED COUNT: 0 /100 WBC (ref 0–0)
PLATELETS (10*3/UL) IN BLOOD AUTOMATED COUNT: 277 X10E9/L (ref 150–450)
POTASSIUM (MMOL/L) IN SER/PLAS: 3.6 MMOL/L (ref 3.5–5.3)
SODIUM (MMOL/L) IN SER/PLAS: 140 MMOL/L (ref 136–145)
UREA NITROGEN (MG/DL) IN SER/PLAS: 8 MG/DL (ref 6–23)

## 2023-10-10 PROBLEM — T39.1X2A TYLENOL INGESTION, INTENTIONAL SELF-HARM, INITIAL ENCOUNTER (MULTI): Status: ACTIVE | Noted: 2023-10-10

## 2023-10-10 PROBLEM — R51.9 HEADACHE: Status: ACTIVE | Noted: 2023-10-10

## 2023-10-10 PROBLEM — T50.901A POISONING BY DRUG OR MEDICINAL SUBSTANCE: Status: ACTIVE | Noted: 2023-10-10

## 2023-10-10 PROBLEM — R11.2 NAUSEA AND VOMITING: Status: ACTIVE | Noted: 2023-10-10

## 2023-10-10 PROBLEM — E55.9 VITAMIN D DEFICIENCY: Status: ACTIVE | Noted: 2023-10-10
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
## 2023-10-10 PROBLEM — R53.83 FATIGUE: Status: ACTIVE | Noted: 2023-10-10

## 2023-10-10 PROBLEM — R45.851 SUICIDAL IDEATION: Status: ACTIVE | Noted: 2023-10-10

## 2023-10-10 PROBLEM — G40.201: Status: ACTIVE | Noted: 2023-10-10

## 2023-10-10 PROBLEM — R53.1 WEAKNESS OF LEFT SIDE OF BODY: Status: ACTIVE | Noted: 2023-10-10
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
## 2023-10-10 PROBLEM — E03.9 ACQUIRED HYPOTHYROIDISM: Status: ACTIVE | Noted: 2023-10-10

## 2023-10-10 PROBLEM — S09.90XA INJURY OF HEAD: Status: ACTIVE | Noted: 2023-10-10

## 2023-10-10 PROBLEM — F44.5 PSYCHOGENIC NONEPILEPTIC SEIZURE: Status: ACTIVE | Noted: 2023-10-10

## 2023-10-10 PROBLEM — R60.0 EDEMA OF LEFT LOWER EXTREMITY: Status: ACTIVE | Noted: 2023-10-10

## 2023-10-10 PROBLEM — I10 PRIMARY HYPERTENSION: Status: ACTIVE | Noted: 2023-10-10

## 2023-10-10 PROBLEM — O20.0 THREATENED ABORTION (HHS-HCC): Status: ACTIVE | Noted: 2023-10-10

## 2023-10-10 PROBLEM — J96.90 RESPIRATORY FAILURE (MULTI): Status: ACTIVE | Noted: 2023-10-10

## 2023-10-10 PROBLEM — N18.30 STAGE 3 CHRONIC KIDNEY DISEASE (MULTI): Status: ACTIVE | Noted: 2023-10-10

## 2023-10-10 PROBLEM — R10.9 ABDOMINAL PAIN: Status: ACTIVE | Noted: 2023-10-10

## 2023-10-10 PROBLEM — R09.02 HYPOXIA: Status: ACTIVE | Noted: 2023-10-10

## 2023-10-10 PROBLEM — F25.0 SCHIZOAFFECTIVE DISORDER, BIPOLAR TYPE (MULTI): Status: ACTIVE | Noted: 2023-10-10

## 2023-10-10 PROBLEM — E83.10 DISORDER OF IRON METABOLISM, UNSPECIFIED: Status: ACTIVE | Noted: 2023-10-10

## 2023-10-10 PROBLEM — M62.81 MUSCLE WEAKNESS OF LEFT UPPER EXTREMITY: Status: ACTIVE | Noted: 2023-10-10

## 2023-10-10 PROBLEM — E11.21 TYPE 2 DIABETES MELLITUS WITH DIABETIC NEPHROPATHY (MULTI): Status: ACTIVE | Noted: 2023-10-10

## 2023-10-10 PROBLEM — R42 DIZZINESS: Status: ACTIVE | Noted: 2023-10-10

## 2023-10-10 PROBLEM — I26.99 PULMONARY EMBOLISM (MULTI): Status: ACTIVE | Noted: 2023-10-10

## 2023-10-10 PROBLEM — G47.00 INSOMNIA: Status: ACTIVE | Noted: 2023-10-10

## 2023-10-10 PROBLEM — R55 SYNCOPE: Status: ACTIVE | Noted: 2023-10-10

## 2023-10-10 PROBLEM — G40.909 SEIZURE DISORDER (MULTI): Status: ACTIVE | Noted: 2023-10-10

## 2023-10-10 PROBLEM — M19.049 HAND ARTHROPATHY: Status: ACTIVE | Noted: 2023-10-10

## 2023-10-10 PROBLEM — F32.A DEPRESSION: Status: ACTIVE | Noted: 2023-10-10

## 2023-10-10 PROBLEM — R60.0 LOCALIZED EDEMA: Status: ACTIVE | Noted: 2023-10-10

## 2023-10-10 PROBLEM — R07.9 RIGHT-SIDED CHEST PAIN: Status: ACTIVE | Noted: 2023-10-10

## 2023-10-10 PROBLEM — F43.12 POST-TRAUMATIC STRESS DISORDER, CHRONIC: Status: ACTIVE | Noted: 2023-10-10

## 2023-10-10 PROBLEM — R06.02 SHORTNESS OF BREATH AT REST: Status: ACTIVE | Noted: 2023-10-10

## 2023-10-10 PROBLEM — F44.9 CONVERSION DISORDER: Status: ACTIVE | Noted: 2023-10-10

## 2023-10-10 PROBLEM — R00.0 TACHYCARDIA: Status: ACTIVE | Noted: 2023-10-10

## 2023-10-10 PROBLEM — N12 PYELONEPHRITIS: Status: ACTIVE | Noted: 2023-10-10

## 2023-10-10 PROBLEM — R41.82 ALTERED MENTAL STATUS: Status: ACTIVE | Noted: 2023-10-10

## 2023-10-10 PROBLEM — M54.2 NECK PAIN: Status: ACTIVE | Noted: 2023-10-10

## 2023-10-10 PROBLEM — R55 TRANSIENT LOSS OF CONSCIOUSNESS: Status: ACTIVE | Noted: 2023-10-10

## 2023-10-10 PROBLEM — R29.818 SUSPECTED SLEEP APNEA: Status: ACTIVE | Noted: 2023-10-10

## 2023-10-10 PROBLEM — Z86.718 HISTORY OF DVT (DEEP VEIN THROMBOSIS): Status: ACTIVE | Noted: 2023-10-10

## 2023-10-10 PROBLEM — I95.9 LOW BLOOD PRESSURE: Status: ACTIVE | Noted: 2023-10-10

## 2023-10-10 PROBLEM — I89.0 LYMPHEDEMA OF LIMB: Status: ACTIVE | Noted: 2023-10-10

## 2023-10-10 RX ORDER — LEVOTHYROXINE SODIUM 150 UG/1
1 TABLET ORAL DAILY
COMMUNITY
Start: 2021-10-26

## 2023-10-10 RX ORDER — TALC
1 POWDER (GRAM) TOPICAL NIGHTLY
COMMUNITY
Start: 2022-04-26

## 2023-10-10 RX ORDER — TRAZODONE HYDROCHLORIDE 50 MG/1
1 TABLET ORAL NIGHTLY
COMMUNITY
Start: 2023-02-13

## 2023-10-10 RX ORDER — ERGOCALCIFEROL 1.25 MG/1
1.25 CAPSULE ORAL
COMMUNITY
Start: 2022-09-30

## 2023-10-10 RX ORDER — NITROGLYCERIN 0.4 MG/1
TABLET SUBLINGUAL
COMMUNITY
Start: 2023-04-23

## 2023-10-10 RX ORDER — MULTIVITAMIN
1 TABLET ORAL DAILY
COMMUNITY
Start: 2022-09-21

## 2023-10-10 RX ORDER — MIRTAZAPINE 30 MG/1
1 TABLET, FILM COATED ORAL NIGHTLY
COMMUNITY
Start: 2021-10-26

## 2023-10-10 RX ORDER — PRAZOSIN HYDROCHLORIDE 1 MG/1
4 CAPSULE ORAL DAILY
COMMUNITY

## 2023-10-10 RX ORDER — MIRTAZAPINE 15 MG/1
TABLET, FILM COATED ORAL
COMMUNITY
Start: 2023-03-21

## 2023-10-10 RX ORDER — BISACODYL 10 MG/1
10 SUPPOSITORY RECTAL
COMMUNITY

## 2023-10-10 RX ORDER — LACOSAMIDE 50 MG/1
50 TABLET ORAL DAILY
COMMUNITY

## 2023-10-10 RX ORDER — DEXAMETHASONE 2 MG/1
TABLET ORAL
COMMUNITY
Start: 2023-03-14

## 2023-10-10 RX ORDER — PRAZOSIN HYDROCHLORIDE 2 MG/1
2 CAPSULE ORAL NIGHTLY
COMMUNITY

## 2023-10-10 RX ORDER — HYDROXYZINE PAMOATE 50 MG/1
50 CAPSULE ORAL 3 TIMES DAILY PRN
COMMUNITY
Start: 2022-12-22

## 2023-10-10 RX ORDER — METOPROLOL TARTRATE 25 MG/1
1 TABLET, FILM COATED ORAL 2 TIMES DAILY
COMMUNITY
Start: 2023-03-15

## 2023-10-10 RX ORDER — ROPINIROLE 0.5 MG/1
1 TABLET, FILM COATED ORAL DAILY
COMMUNITY
Start: 2023-02-13

## 2023-10-10 RX ORDER — PALIPERIDONE 9 MG/1
1 TABLET, EXTENDED RELEASE ORAL DAILY
COMMUNITY
Start: 2022-05-12

## 2023-10-10 RX ORDER — ALBUTEROL SULFATE 0.83 MG/ML
2.5 SOLUTION RESPIRATORY (INHALATION)
COMMUNITY
Start: 2023-02-09

## 2023-10-10 RX ORDER — POTASSIUM CHLORIDE 750 MG/1
1 TABLET, EXTENDED RELEASE ORAL DAILY
COMMUNITY
Start: 2023-02-13

## 2023-10-10 RX ORDER — LIDOCAINE 560 MG/1
1 PATCH PERCUTANEOUS; TOPICAL; TRANSDERMAL SEE ADMIN INSTRUCTIONS
COMMUNITY

## 2023-10-10 RX ORDER — LISINOPRIL 10 MG/1
1 TABLET ORAL DAILY
COMMUNITY

## 2023-10-10 RX ORDER — LORAZEPAM 2 MG/ML
1 INJECTION, SOLUTION INTRAMUSCULAR; INTRAVENOUS EVERY 8 HOURS PRN
COMMUNITY

## 2023-10-10 RX ORDER — ONDANSETRON 4 MG/1
1 TABLET, FILM COATED ORAL EVERY 4 HOURS PRN
COMMUNITY
Start: 2023-04-23

## 2023-10-10 RX ORDER — ADHESIVE BANDAGE
30 BANDAGE TOPICAL EVERY 4 HOURS PRN
COMMUNITY

## 2023-10-10 RX ORDER — LEVOTHYROXINE SODIUM 125 UG/1
1 TABLET ORAL DAILY
COMMUNITY
Start: 2023-05-15

## 2023-10-10 RX ORDER — LISINOPRIL 5 MG/1
TABLET ORAL
COMMUNITY
Start: 2022-11-21

## 2023-10-10 RX ORDER — LACOSAMIDE 150 MG/1
1 TABLET ORAL 2 TIMES DAILY
COMMUNITY
Start: 2022-04-26

## 2023-10-10 RX ORDER — CYCLOBENZAPRINE HCL 10 MG
TABLET ORAL
COMMUNITY
Start: 2023-04-13

## 2023-10-10 RX ORDER — RISPERIDONE 2 MG/1
2 TABLET ORAL 2 TIMES DAILY
COMMUNITY
Start: 2023-07-08 | End: 2023-11-04

## 2023-10-10 RX ORDER — LACOSAMIDE 100 MG/1
100 TABLET ORAL 2 TIMES DAILY
COMMUNITY
Start: 2023-07-08 | End: 2023-11-04

## 2023-10-10 RX ORDER — ACETAMINOPHEN 500 MG
1000 TABLET ORAL EVERY 6 HOURS PRN
COMMUNITY
Start: 2022-03-18

## 2023-10-10 RX ORDER — AMLODIPINE BESYLATE 5 MG/1
1 TABLET ORAL DAILY
COMMUNITY
Start: 2021-10-06

## 2023-10-10 RX ORDER — HYDROCORTISONE 1 %
CREAM (GRAM) TOPICAL 2 TIMES DAILY
COMMUNITY

## 2023-10-10 RX ORDER — DEXAMETHASONE 6 MG/1
TABLET ORAL
COMMUNITY
Start: 2023-03-14

## 2023-10-10 RX ORDER — TRAZODONE HYDROCHLORIDE 150 MG/1
0.5 TABLET ORAL DAILY
COMMUNITY
Start: 2023-02-13

## 2023-10-10 RX ORDER — TOPIRAMATE 25 MG/1
50 TABLET ORAL 2 TIMES DAILY
COMMUNITY
Start: 2023-08-11

## 2023-10-10 RX ORDER — MECLIZINE HYDROCHLORIDE 25 MG/1
25 TABLET ORAL EVERY 6 HOURS PRN
COMMUNITY

## 2023-10-10 RX ORDER — LORAZEPAM 0.5 MG/1
0.5 TABLET ORAL EVERY 6 HOURS
COMMUNITY
Start: 2023-05-26

## 2023-10-10 RX ORDER — FLUOXETINE HYDROCHLORIDE 20 MG/1
CAPSULE ORAL
COMMUNITY
Start: 2022-11-21

## 2023-10-10 RX ORDER — PALIPERIDONE 6 MG/1
1 TABLET, EXTENDED RELEASE ORAL DAILY
COMMUNITY

## 2023-10-10 RX ORDER — LABETALOL 100 MG/1
TABLET, FILM COATED ORAL
COMMUNITY
Start: 2022-11-21

## 2023-10-10 RX ORDER — APIXABAN 5 MG/1
1 TABLET, FILM COATED ORAL 2 TIMES DAILY
COMMUNITY
Start: 2022-06-12

## 2023-10-10 RX ORDER — LEVETIRACETAM 1000 MG/1
1000 TABLET ORAL 2 TIMES DAILY
COMMUNITY
Start: 2022-07-25

## 2023-10-16 ENCOUNTER — LAB REQUISITION (OUTPATIENT)
Dept: LAB | Facility: HOSPITAL | Age: 38
End: 2023-10-16
Payer: COMMERCIAL

## 2023-10-16 DIAGNOSIS — N18.9 CHRONIC KIDNEY DISEASE, UNSPECIFIED: ICD-10-CM

## 2023-10-16 LAB
ALBUMIN SERPL BCP-MCNC: 4 G/DL (ref 3.4–5)
ALP SERPL-CCNC: 74 U/L (ref 33–110)
ALT SERPL W P-5'-P-CCNC: 101 U/L (ref 7–45)
ANION GAP SERPL CALC-SCNC: 14 MMOL/L (ref 10–20)
AST SERPL W P-5'-P-CCNC: 69 U/L (ref 9–39)
BILIRUB SERPL-MCNC: 0.4 MG/DL (ref 0–1.2)
BUN SERPL-MCNC: 15 MG/DL (ref 6–23)
CALCIUM SERPL-MCNC: 9.4 MG/DL (ref 8.6–10.3)
CHLORIDE SERPL-SCNC: 108 MMOL/L (ref 98–107)
CO2 SERPL-SCNC: 21 MMOL/L (ref 21–32)
CREAT SERPL-MCNC: 1.05 MG/DL (ref 0.5–1.05)
ERYTHROCYTE [DISTWIDTH] IN BLOOD BY AUTOMATED COUNT: 18 % (ref 11.5–14.5)
GFR SERPL CREATININE-BSD FRML MDRD: 69 ML/MIN/1.73M*2
GLUCOSE SERPL-MCNC: 133 MG/DL (ref 74–99)
HCT VFR BLD AUTO: 41.1 % (ref 36–46)
HGB BLD-MCNC: 12.8 G/DL (ref 12–16)
MCH RBC QN AUTO: 25.4 PG (ref 26–34)
MCHC RBC AUTO-ENTMCNC: 31.1 G/DL (ref 32–36)
MCV RBC AUTO: 82 FL (ref 80–100)
NRBC BLD-RTO: 0 /100 WBCS (ref 0–0)
PLATELET # BLD AUTO: 265 X10*3/UL (ref 150–450)
PMV BLD AUTO: 8.9 FL (ref 7.5–11.5)
POTASSIUM SERPL-SCNC: 3.9 MMOL/L (ref 3.5–5.3)
PROT SERPL-MCNC: 7.2 G/DL (ref 6.4–8.2)
RBC # BLD AUTO: 5.04 X10*6/UL (ref 4–5.2)
SODIUM SERPL-SCNC: 139 MMOL/L (ref 136–145)
WBC # BLD AUTO: 7.5 X10*3/UL (ref 4.4–11.3)

## 2023-10-16 PROCEDURE — 85027 COMPLETE CBC AUTOMATED: CPT | Mod: OUT | Performed by: INTERNAL MEDICINE

## 2023-10-16 PROCEDURE — 84075 ASSAY ALKALINE PHOSPHATASE: CPT | Mod: OUT | Performed by: INTERNAL MEDICINE

## 2023-11-14 ENCOUNTER — TELEPHONE (OUTPATIENT)
Dept: RADIOLOGY | Facility: CLINIC | Age: 38
End: 2023-11-14
Payer: COMMERCIAL

## 2023-11-19 RX ORDER — DOXYCYCLINE 100 MG/1
CAPSULE ORAL
COMMUNITY
Start: 2023-06-30

## 2023-11-19 RX ORDER — CEPHALEXIN 500 MG/1
CAPSULE ORAL
COMMUNITY
Start: 2023-10-28

## 2023-11-19 RX ORDER — AMMONIUM LACTATE 12 G/100G
LOTION TOPICAL
COMMUNITY
Start: 2023-09-29

## 2023-11-19 RX ORDER — AMMONIUM LACTATE 12 G/100G
CREAM TOPICAL
COMMUNITY
Start: 2023-10-25

## 2023-11-19 RX ORDER — KETOROLAC TROMETHAMINE 10 MG/1
TABLET, FILM COATED ORAL
COMMUNITY
Start: 2023-09-25

## 2023-11-19 RX ORDER — LEVOTHYROXINE SODIUM 100 UG/1
TABLET ORAL
COMMUNITY
Start: 2023-11-09

## 2023-11-19 RX ORDER — APIXABAN 2.5 MG/1
TABLET, FILM COATED ORAL
COMMUNITY
Start: 2023-03-16

## 2023-11-19 RX ORDER — NITROFURANTOIN 25; 75 MG/1; MG/1
CAPSULE ORAL
COMMUNITY
Start: 2023-09-28

## 2023-11-19 RX ORDER — BUDESONIDE 90 UG/1
AEROSOL, POWDER RESPIRATORY (INHALATION)
COMMUNITY
Start: 2023-10-06

## 2023-11-19 RX ORDER — DULAGLUTIDE 1.5 MG/.5ML
INJECTION, SOLUTION SUBCUTANEOUS
COMMUNITY
Start: 2023-11-10

## 2023-11-19 RX ORDER — DICLOFENAC SODIUM 10 MG/G
GEL TOPICAL
COMMUNITY
Start: 2023-10-26

## 2023-11-19 RX ORDER — INSULIN GLARGINE 100 [IU]/ML
INJECTION, SOLUTION SUBCUTANEOUS
COMMUNITY
Start: 2023-10-26

## 2023-11-19 RX ORDER — METHYLPREDNISOLONE 4 MG/1
TABLET ORAL
COMMUNITY
Start: 2023-09-06

## 2023-11-19 RX ORDER — TRIAMCINOLONE ACETONIDE 1 MG/G
CREAM TOPICAL
COMMUNITY
Start: 2023-07-24

## 2023-11-19 RX ORDER — CIPROFLOXACIN 500 MG/1
TABLET ORAL
COMMUNITY
Start: 2023-09-27

## 2023-11-19 RX ORDER — LORAZEPAM 2 MG/ML
INJECTION INTRAMUSCULAR
COMMUNITY
Start: 2023-06-22

## 2023-11-19 RX ORDER — DULAGLUTIDE 0.75 MG/.5ML
INJECTION, SOLUTION SUBCUTANEOUS
COMMUNITY
Start: 2023-09-22

## 2023-11-19 RX ORDER — ALBUTEROL SULFATE AND BUDESONIDE 90; 80 UG/1; UG/1
AEROSOL, METERED RESPIRATORY (INHALATION)
COMMUNITY
Start: 2023-10-26

## 2023-11-19 RX ORDER — HYDRALAZINE HYDROCHLORIDE 10 MG/1
TABLET, FILM COATED ORAL
COMMUNITY
Start: 2023-10-26

## 2023-11-19 RX ORDER — ATORVASTATIN CALCIUM 10 MG/1
TABLET, FILM COATED ORAL
COMMUNITY
Start: 2023-10-25

## 2023-11-19 RX ORDER — CIPROFLOXACIN 250 MG/1
TABLET, FILM COATED ORAL
COMMUNITY
Start: 2023-07-09

## 2023-11-19 RX ORDER — EMPAGLIFLOZIN 25 MG/1
TABLET, FILM COATED ORAL
COMMUNITY
Start: 2023-11-04

## 2023-11-19 RX ORDER — FAMCICLOVIR 500 MG/1
TABLET ORAL
COMMUNITY
Start: 2023-10-11

## 2023-11-19 RX ORDER — TRAMADOL HYDROCHLORIDE 50 MG/1
TABLET ORAL
COMMUNITY
Start: 2023-09-14

## 2023-11-19 RX ORDER — METFORMIN HYDROCHLORIDE 500 MG/1
TABLET ORAL
COMMUNITY
Start: 2023-07-10

## 2023-11-19 RX ORDER — AMOXICILLIN AND CLAVULANATE POTASSIUM 875; 125 MG/1; MG/1
TABLET, FILM COATED ORAL
COMMUNITY
Start: 2023-01-08

## 2023-11-19 RX ORDER — INSULIN LISPRO 100 [IU]/ML
INJECTION, SOLUTION INTRAVENOUS; SUBCUTANEOUS
COMMUNITY
Start: 2023-10-26

## 2023-11-19 RX ORDER — PREDNISONE 20 MG/1
TABLET ORAL
COMMUNITY
Start: 2023-08-29

## 2023-12-05 ENCOUNTER — APPOINTMENT (OUTPATIENT)
Dept: BEHAVIORAL HEALTH | Facility: CLINIC | Age: 38
End: 2023-12-05
Payer: COMMERCIAL

## 2023-12-27 ENCOUNTER — LAB REQUISITION (OUTPATIENT)
Dept: LAB | Facility: HOSPITAL | Age: 38
End: 2023-12-27
Payer: COMMERCIAL

## 2023-12-27 DIAGNOSIS — R41.82 ALTERED MENTAL STATUS, UNSPECIFIED: ICD-10-CM

## 2023-12-27 DIAGNOSIS — N18.9 CHRONIC KIDNEY DISEASE, UNSPECIFIED: ICD-10-CM

## 2023-12-27 LAB
ALBUMIN SERPL BCP-MCNC: 4 G/DL (ref 3.4–5)
ALP SERPL-CCNC: 92 U/L (ref 33–110)
ALT SERPL W P-5'-P-CCNC: 45 U/L (ref 7–45)
ANION GAP SERPL CALC-SCNC: 18 MMOL/L (ref 10–20)
APPEARANCE UR: ABNORMAL
AST SERPL W P-5'-P-CCNC: 34 U/L (ref 9–39)
BACTERIA #/AREA URNS AUTO: ABNORMAL /HPF
BASOPHILS # BLD AUTO: 0.05 X10*3/UL (ref 0–0.1)
BASOPHILS NFR BLD AUTO: 0.7 %
BILIRUB SERPL-MCNC: 0.3 MG/DL (ref 0–1.2)
BILIRUB UR STRIP.AUTO-MCNC: NEGATIVE MG/DL
BUN SERPL-MCNC: 16 MG/DL (ref 6–23)
CALCIUM SERPL-MCNC: 9.2 MG/DL (ref 8.6–10.3)
CHLORIDE SERPL-SCNC: 111 MMOL/L (ref 98–107)
CO2 SERPL-SCNC: 12 MMOL/L (ref 21–32)
COLOR UR: YELLOW
CREAT SERPL-MCNC: 1.12 MG/DL (ref 0.5–1.05)
EOSINOPHIL # BLD AUTO: 0.2 X10*3/UL (ref 0–0.7)
EOSINOPHIL NFR BLD AUTO: 2.7 %
ERYTHROCYTE [DISTWIDTH] IN BLOOD BY AUTOMATED COUNT: 16 % (ref 11.5–14.5)
GFR SERPL CREATININE-BSD FRML MDRD: 64 ML/MIN/1.73M*2
GLUCOSE SERPL-MCNC: 175 MG/DL (ref 74–99)
GLUCOSE UR STRIP.AUTO-MCNC: ABNORMAL MG/DL
HCT VFR BLD AUTO: 44.6 % (ref 36–46)
HGB BLD-MCNC: 12.6 G/DL (ref 12–16)
IMM GRANULOCYTES # BLD AUTO: 0.02 X10*3/UL (ref 0–0.7)
IMM GRANULOCYTES NFR BLD AUTO: 0.3 % (ref 0–0.9)
KETONES UR STRIP.AUTO-MCNC: NEGATIVE MG/DL
LEUKOCYTE ESTERASE UR QL STRIP.AUTO: ABNORMAL
LYMPHOCYTES # BLD AUTO: 2.18 X10*3/UL (ref 1.2–4.8)
LYMPHOCYTES NFR BLD AUTO: 29.7 %
MCH RBC QN AUTO: 26.1 PG (ref 26–34)
MCHC RBC AUTO-ENTMCNC: 28.3 G/DL (ref 32–36)
MCV RBC AUTO: 92 FL (ref 80–100)
MONOCYTES # BLD AUTO: 0.44 X10*3/UL (ref 0.1–1)
MONOCYTES NFR BLD AUTO: 6 %
NEUTROPHILS # BLD AUTO: 4.44 X10*3/UL (ref 1.2–7.7)
NEUTROPHILS NFR BLD AUTO: 60.6 %
NITRITE UR QL STRIP.AUTO: NEGATIVE
NRBC BLD-RTO: 0 /100 WBCS (ref 0–0)
PH UR STRIP.AUTO: 6 [PH]
PLATELET # BLD AUTO: 325 X10*3/UL (ref 150–450)
POTASSIUM SERPL-SCNC: 4.7 MMOL/L (ref 3.5–5.3)
PROT SERPL-MCNC: 7.2 G/DL (ref 6.4–8.2)
PROT UR STRIP.AUTO-MCNC: NEGATIVE MG/DL
RBC # BLD AUTO: 4.83 X10*6/UL (ref 4–5.2)
RBC # UR STRIP.AUTO: NEGATIVE /UL
RBC #/AREA URNS AUTO: ABNORMAL /HPF
SODIUM SERPL-SCNC: 136 MMOL/L (ref 136–145)
SP GR UR STRIP.AUTO: 1.04
SQUAMOUS #/AREA URNS AUTO: ABNORMAL /HPF
UROBILINOGEN UR STRIP.AUTO-MCNC: <2 MG/DL
WBC # BLD AUTO: 7.3 X10*3/UL (ref 4.4–11.3)
WBC #/AREA URNS AUTO: ABNORMAL /HPF

## 2023-12-27 PROCEDURE — 80053 COMPREHEN METABOLIC PANEL: CPT

## 2023-12-27 PROCEDURE — 81001 URINALYSIS AUTO W/SCOPE: CPT

## 2023-12-27 PROCEDURE — 85025 COMPLETE CBC W/AUTO DIFF WBC: CPT

## 2024-01-19 ENCOUNTER — TELEPHONE (OUTPATIENT)
Dept: PRIMARY CARE | Facility: CLINIC | Age: 39
End: 2024-01-19
Payer: COMMERCIAL

## 2024-01-19 NOTE — TELEPHONE ENCOUNTER
Tried calling patient at home number that is facility number and she not there/said home number and also called the other phone number but phone not in service